# Patient Record
Sex: FEMALE | Race: WHITE | NOT HISPANIC OR LATINO | URBAN - METROPOLITAN AREA
[De-identification: names, ages, dates, MRNs, and addresses within clinical notes are randomized per-mention and may not be internally consistent; named-entity substitution may affect disease eponyms.]

---

## 2020-04-30 ENCOUNTER — EMERGENCY (EMERGENCY)
Facility: HOSPITAL | Age: 85
LOS: 1 days | Discharge: ANOTHER HOSPITAL | End: 2020-04-30
Attending: EMERGENCY MEDICINE | Admitting: EMERGENCY MEDICINE
Payer: MEDICARE

## 2020-04-30 ENCOUNTER — INPATIENT (INPATIENT)
Facility: HOSPITAL | Age: 85
LOS: 1 days | Discharge: HOME | DRG: 308 | End: 2020-05-02
Attending: INTERNAL MEDICINE | Admitting: INTERNAL MEDICINE
Payer: MEDICARE

## 2020-04-30 ENCOUNTER — TRANSCRIPTION ENCOUNTER (OUTPATIENT)
Age: 85
End: 2020-04-30

## 2020-04-30 VITALS
HEART RATE: 130 BPM | DIASTOLIC BLOOD PRESSURE: 59 MMHG | RESPIRATION RATE: 19 BRPM | OXYGEN SATURATION: 95 % | SYSTOLIC BLOOD PRESSURE: 93 MMHG

## 2020-04-30 VITALS
HEIGHT: 61 IN | RESPIRATION RATE: 18 BRPM | OXYGEN SATURATION: 97 % | DIASTOLIC BLOOD PRESSURE: 73 MMHG | TEMPERATURE: 97 F | WEIGHT: 164.91 LBS | SYSTOLIC BLOOD PRESSURE: 103 MMHG | HEART RATE: 129 BPM

## 2020-04-30 VITALS
DIASTOLIC BLOOD PRESSURE: 57 MMHG | HEART RATE: 144 BPM | SYSTOLIC BLOOD PRESSURE: 81 MMHG | OXYGEN SATURATION: 97 % | TEMPERATURE: 97 F | RESPIRATION RATE: 20 BRPM | HEIGHT: 60 IN | WEIGHT: 169.09 LBS

## 2020-04-30 DIAGNOSIS — I48.91 UNSPECIFIED ATRIAL FIBRILLATION: ICD-10-CM

## 2020-04-30 DIAGNOSIS — I25.10 ATHEROSCLEROTIC HEART DISEASE OF NATIVE CORONARY ARTERY WITHOUT ANGINA PECTORIS: ICD-10-CM

## 2020-04-30 DIAGNOSIS — R79.89 OTHER SPECIFIED ABNORMAL FINDINGS OF BLOOD CHEMISTRY: ICD-10-CM

## 2020-04-30 DIAGNOSIS — I10 ESSENTIAL (PRIMARY) HYPERTENSION: ICD-10-CM

## 2020-04-30 DIAGNOSIS — N17.9 ACUTE KIDNEY FAILURE, UNSPECIFIED: ICD-10-CM

## 2020-04-30 DIAGNOSIS — Z29.9 ENCOUNTER FOR PROPHYLACTIC MEASURES, UNSPECIFIED: ICD-10-CM

## 2020-04-30 LAB
ALBUMIN SERPL ELPH-MCNC: 2.7 G/DL — LOW (ref 3.3–5)
ALBUMIN SERPL ELPH-MCNC: 3.5 G/DL — SIGNIFICANT CHANGE UP (ref 3.3–5)
ALP SERPL-CCNC: 71 U/L — SIGNIFICANT CHANGE UP (ref 40–120)
ALP SERPL-CCNC: 89 U/L — SIGNIFICANT CHANGE UP (ref 30–120)
ALT FLD-CCNC: 18 U/L — SIGNIFICANT CHANGE UP (ref 12–78)
ALT FLD-CCNC: 21 U/L DA — SIGNIFICANT CHANGE UP (ref 10–60)
ANION GAP SERPL CALC-SCNC: 13 MMOL/L — SIGNIFICANT CHANGE UP (ref 5–17)
ANION GAP SERPL CALC-SCNC: 8 MMOL/L — SIGNIFICANT CHANGE UP (ref 5–17)
APPEARANCE UR: CLEAR — SIGNIFICANT CHANGE UP
APTT BLD: 21.1 SEC — LOW (ref 28.5–37)
AST SERPL-CCNC: 21 U/L — SIGNIFICANT CHANGE UP (ref 10–40)
AST SERPL-CCNC: 21 U/L — SIGNIFICANT CHANGE UP (ref 15–37)
BACTERIA # UR AUTO: ABNORMAL
BASOPHILS # BLD AUTO: 0.01 K/UL — SIGNIFICANT CHANGE UP (ref 0–0.2)
BASOPHILS # BLD AUTO: 0.02 K/UL — SIGNIFICANT CHANGE UP (ref 0–0.2)
BASOPHILS NFR BLD AUTO: 0.1 % — SIGNIFICANT CHANGE UP (ref 0–2)
BASOPHILS NFR BLD AUTO: 0.2 % — SIGNIFICANT CHANGE UP (ref 0–2)
BILIRUB SERPL-MCNC: 0.4 MG/DL — SIGNIFICANT CHANGE UP (ref 0.2–1.2)
BILIRUB SERPL-MCNC: 0.4 MG/DL — SIGNIFICANT CHANGE UP (ref 0.2–1.2)
BILIRUB UR-MCNC: NEGATIVE — SIGNIFICANT CHANGE UP
BUN SERPL-MCNC: 27 MG/DL — HIGH (ref 7–23)
BUN SERPL-MCNC: 33 MG/DL — HIGH (ref 7–23)
CALCIUM SERPL-MCNC: 7.9 MG/DL — LOW (ref 8.5–10.1)
CALCIUM SERPL-MCNC: 9.5 MG/DL — SIGNIFICANT CHANGE UP (ref 8.4–10.5)
CHLORIDE SERPL-SCNC: 103 MMOL/L — SIGNIFICANT CHANGE UP (ref 96–108)
CHLORIDE SERPL-SCNC: 114 MMOL/L — HIGH (ref 96–108)
CK MB BLD-MCNC: 4 % — HIGH (ref 0–3.5)
CK MB BLD-MCNC: 5.6 % — HIGH (ref 0–3.5)
CK MB CFR SERPL CALC: 2.5 NG/ML — SIGNIFICANT CHANGE UP (ref 0–3.6)
CK MB CFR SERPL CALC: 3.4 NG/ML — SIGNIFICANT CHANGE UP (ref 0–3.6)
CK SERPL-CCNC: 61 U/L — SIGNIFICANT CHANGE UP (ref 26–192)
CK SERPL-CCNC: 63 U/L — SIGNIFICANT CHANGE UP (ref 26–192)
CO2 SERPL-SCNC: 22 MMOL/L — SIGNIFICANT CHANGE UP (ref 22–31)
CO2 SERPL-SCNC: 22 MMOL/L — SIGNIFICANT CHANGE UP (ref 22–31)
COLOR SPEC: YELLOW — SIGNIFICANT CHANGE UP
CREAT SERPL-MCNC: 0.92 MG/DL — SIGNIFICANT CHANGE UP (ref 0.5–1.3)
CREAT SERPL-MCNC: 1.43 MG/DL — HIGH (ref 0.5–1.3)
D DIMER BLD IA.RAPID-MCNC: 2087 NG/ML DDU — HIGH
DIFF PNL FLD: ABNORMAL
EOSINOPHIL # BLD AUTO: 0 K/UL — SIGNIFICANT CHANGE UP (ref 0–0.5)
EOSINOPHIL # BLD AUTO: 0.08 K/UL — SIGNIFICANT CHANGE UP (ref 0–0.5)
EOSINOPHIL NFR BLD AUTO: 0 % — SIGNIFICANT CHANGE UP (ref 0–6)
EOSINOPHIL NFR BLD AUTO: 0.7 % — SIGNIFICANT CHANGE UP (ref 0–6)
EPI CELLS # UR: ABNORMAL
GLUCOSE SERPL-MCNC: 144 MG/DL — HIGH (ref 70–99)
GLUCOSE SERPL-MCNC: 175 MG/DL — HIGH (ref 70–99)
GLUCOSE UR QL: NEGATIVE MG/DL — SIGNIFICANT CHANGE UP
GRAN CASTS # UR COMP ASSIST: ABNORMAL /LPF
HCT VFR BLD CALC: 34.5 % — SIGNIFICANT CHANGE UP (ref 34.5–45)
HCT VFR BLD CALC: 42.8 % — SIGNIFICANT CHANGE UP (ref 34.5–45)
HGB BLD-MCNC: 11.4 G/DL — LOW (ref 11.5–15.5)
HGB BLD-MCNC: 13.9 G/DL — SIGNIFICANT CHANGE UP (ref 11.5–15.5)
HYALINE CASTS # UR AUTO: ABNORMAL /LPF
IMM GRANULOCYTES NFR BLD AUTO: 0.4 % — SIGNIFICANT CHANGE UP (ref 0–1.5)
IMM GRANULOCYTES NFR BLD AUTO: 0.5 % — SIGNIFICANT CHANGE UP (ref 0–1.5)
INR BLD: 1.1 RATIO — SIGNIFICANT CHANGE UP (ref 0.88–1.16)
KETONES UR-MCNC: ABNORMAL
LACTATE SERPL-SCNC: 1.2 MMOL/L — SIGNIFICANT CHANGE UP (ref 0.7–2)
LACTATE SERPL-SCNC: 2.3 MMOL/L — HIGH (ref 0.7–2)
LACTATE SERPL-SCNC: 3.3 MMOL/L — HIGH (ref 0.7–2)
LEUKOCYTE ESTERASE UR-ACNC: ABNORMAL
LYMPHOCYTES # BLD AUTO: 0.66 K/UL — LOW (ref 1–3.3)
LYMPHOCYTES # BLD AUTO: 2.3 K/UL — SIGNIFICANT CHANGE UP (ref 1–3.3)
LYMPHOCYTES # BLD AUTO: 21.4 % — SIGNIFICANT CHANGE UP (ref 13–44)
LYMPHOCYTES # BLD AUTO: 8.5 % — LOW (ref 13–44)
MCHC RBC-ENTMCNC: 32 PG — SIGNIFICANT CHANGE UP (ref 27–34)
MCHC RBC-ENTMCNC: 32 PG — SIGNIFICANT CHANGE UP (ref 27–34)
MCHC RBC-ENTMCNC: 32.5 GM/DL — SIGNIFICANT CHANGE UP (ref 32–36)
MCHC RBC-ENTMCNC: 33 GM/DL — SIGNIFICANT CHANGE UP (ref 32–36)
MCV RBC AUTO: 96.9 FL — SIGNIFICANT CHANGE UP (ref 80–100)
MCV RBC AUTO: 98.6 FL — SIGNIFICANT CHANGE UP (ref 80–100)
MONOCYTES # BLD AUTO: 0.38 K/UL — SIGNIFICANT CHANGE UP (ref 0–0.9)
MONOCYTES # BLD AUTO: 0.57 K/UL — SIGNIFICANT CHANGE UP (ref 0–0.9)
MONOCYTES NFR BLD AUTO: 4.9 % — SIGNIFICANT CHANGE UP (ref 2–14)
MONOCYTES NFR BLD AUTO: 5.3 % — SIGNIFICANT CHANGE UP (ref 2–14)
NEUTROPHILS # BLD AUTO: 6.71 K/UL — SIGNIFICANT CHANGE UP (ref 1.8–7.4)
NEUTROPHILS # BLD AUTO: 7.74 K/UL — HIGH (ref 1.8–7.4)
NEUTROPHILS NFR BLD AUTO: 71.9 % — SIGNIFICANT CHANGE UP (ref 43–77)
NEUTROPHILS NFR BLD AUTO: 86.1 % — HIGH (ref 43–77)
NITRITE UR-MCNC: NEGATIVE — SIGNIFICANT CHANGE UP
NRBC # BLD: 0 /100 WBCS — SIGNIFICANT CHANGE UP (ref 0–0)
NRBC # BLD: 0 /100 WBCS — SIGNIFICANT CHANGE UP (ref 0–0)
NT-PROBNP SERPL-SCNC: 5693 PG/ML — HIGH (ref 0–450)
PH UR: 6 — SIGNIFICANT CHANGE UP (ref 5–8)
PLATELET # BLD AUTO: 210 K/UL — SIGNIFICANT CHANGE UP (ref 150–400)
PLATELET # BLD AUTO: 230 K/UL — SIGNIFICANT CHANGE UP (ref 150–400)
POTASSIUM SERPL-MCNC: 3.5 MMOL/L — SIGNIFICANT CHANGE UP (ref 3.5–5.3)
POTASSIUM SERPL-MCNC: 4.3 MMOL/L — SIGNIFICANT CHANGE UP (ref 3.5–5.3)
POTASSIUM SERPL-SCNC: 3.5 MMOL/L — SIGNIFICANT CHANGE UP (ref 3.5–5.3)
POTASSIUM SERPL-SCNC: 4.3 MMOL/L — SIGNIFICANT CHANGE UP (ref 3.5–5.3)
PROCALCITONIN SERPL-MCNC: 0.07 NG/ML — SIGNIFICANT CHANGE UP (ref 0.02–0.1)
PROT SERPL-MCNC: 5.8 G/DL — LOW (ref 6–8.3)
PROT SERPL-MCNC: 7.1 G/DL — SIGNIFICANT CHANGE UP (ref 6–8.3)
PROT UR-MCNC: 100 MG/DL
PROTHROM AB SERPL-ACNC: 12.2 SEC — SIGNIFICANT CHANGE UP (ref 10–12.9)
RBC # BLD: 3.56 M/UL — LOW (ref 3.8–5.2)
RBC # BLD: 4.34 M/UL — SIGNIFICANT CHANGE UP (ref 3.8–5.2)
RBC # FLD: 14.5 % — SIGNIFICANT CHANGE UP (ref 10.3–14.5)
RBC # FLD: 14.9 % — HIGH (ref 10.3–14.5)
RBC CASTS # UR COMP ASSIST: ABNORMAL /HPF (ref 0–4)
SARS-COV-2 RNA SPEC QL NAA+PROBE: SIGNIFICANT CHANGE UP
SODIUM SERPL-SCNC: 138 MMOL/L — SIGNIFICANT CHANGE UP (ref 135–145)
SODIUM SERPL-SCNC: 144 MMOL/L — SIGNIFICANT CHANGE UP (ref 135–145)
SP GR SPEC: 1.01 — SIGNIFICANT CHANGE UP (ref 1.01–1.02)
T3 SERPL-MCNC: 79 NG/DL — LOW (ref 80–200)
T4 AB SER-ACNC: 8.8 UG/DL — SIGNIFICANT CHANGE UP (ref 4.6–12)
TROPONIN I SERPL-MCNC: 0.04 NG/ML — SIGNIFICANT CHANGE UP (ref 0.02–0.06)
TROPONIN I SERPL-MCNC: 0.12 NG/ML — HIGH (ref 0.01–0.04)
TROPONIN I SERPL-MCNC: 0.23 NG/ML — HIGH (ref 0.01–0.04)
TROPONIN I SERPL-MCNC: 0.25 NG/ML — HIGH (ref 0.01–0.04)
TSH SERPL-MCNC: 1.38 UIU/ML — SIGNIFICANT CHANGE UP (ref 0.36–3.74)
UROBILINOGEN FLD QL: NEGATIVE MG/DL — SIGNIFICANT CHANGE UP
WBC # BLD: 10.76 K/UL — HIGH (ref 3.8–10.5)
WBC # BLD: 7.79 K/UL — SIGNIFICANT CHANGE UP (ref 3.8–10.5)
WBC # FLD AUTO: 10.76 K/UL — HIGH (ref 3.8–10.5)
WBC # FLD AUTO: 7.79 K/UL — SIGNIFICANT CHANGE UP (ref 3.8–10.5)
WBC UR QL: ABNORMAL

## 2020-04-30 PROCEDURE — 71045 X-RAY EXAM CHEST 1 VIEW: CPT

## 2020-04-30 PROCEDURE — 36415 COLL VENOUS BLD VENIPUNCTURE: CPT

## 2020-04-30 PROCEDURE — 99291 CRITICAL CARE FIRST HOUR: CPT

## 2020-04-30 PROCEDURE — 99291 CRITICAL CARE FIRST HOUR: CPT | Mod: 25

## 2020-04-30 PROCEDURE — 93010 ELECTROCARDIOGRAM REPORT: CPT

## 2020-04-30 PROCEDURE — 83880 ASSAY OF NATRIURETIC PEPTIDE: CPT

## 2020-04-30 PROCEDURE — 96361 HYDRATE IV INFUSION ADD-ON: CPT

## 2020-04-30 PROCEDURE — 85730 THROMBOPLASTIN TIME PARTIAL: CPT

## 2020-04-30 PROCEDURE — 87635 SARS-COV-2 COVID-19 AMP PRB: CPT

## 2020-04-30 PROCEDURE — 96374 THER/PROPH/DIAG INJ IV PUSH: CPT

## 2020-04-30 PROCEDURE — 81001 URINALYSIS AUTO W/SCOPE: CPT

## 2020-04-30 PROCEDURE — 85027 COMPLETE CBC AUTOMATED: CPT

## 2020-04-30 PROCEDURE — 85610 PROTHROMBIN TIME: CPT

## 2020-04-30 PROCEDURE — 96372 THER/PROPH/DIAG INJ SC/IM: CPT | Mod: XU

## 2020-04-30 PROCEDURE — 87040 BLOOD CULTURE FOR BACTERIA: CPT

## 2020-04-30 PROCEDURE — 84145 PROCALCITONIN (PCT): CPT

## 2020-04-30 PROCEDURE — 83605 ASSAY OF LACTIC ACID: CPT

## 2020-04-30 PROCEDURE — 87086 URINE CULTURE/COLONY COUNT: CPT

## 2020-04-30 PROCEDURE — 71045 X-RAY EXAM CHEST 1 VIEW: CPT | Mod: 26

## 2020-04-30 PROCEDURE — 85379 FIBRIN DEGRADATION QUANT: CPT

## 2020-04-30 PROCEDURE — 99223 1ST HOSP IP/OBS HIGH 75: CPT

## 2020-04-30 PROCEDURE — 84484 ASSAY OF TROPONIN QUANT: CPT

## 2020-04-30 PROCEDURE — 80053 COMPREHEN METABOLIC PANEL: CPT

## 2020-04-30 PROCEDURE — 93005 ELECTROCARDIOGRAM TRACING: CPT

## 2020-04-30 RX ORDER — METOPROLOL TARTRATE 50 MG
0 TABLET ORAL
Qty: 0 | Refills: 0 | DISCHARGE

## 2020-04-30 RX ORDER — DILTIAZEM HCL 120 MG
12.5 CAPSULE, EXT RELEASE 24 HR ORAL ONCE
Refills: 0 | Status: COMPLETED | OUTPATIENT
Start: 2020-04-30 | End: 2020-04-30

## 2020-04-30 RX ORDER — METOPROLOL TARTRATE 50 MG
12.5 TABLET ORAL
Refills: 0 | Status: DISCONTINUED | OUTPATIENT
Start: 2020-04-30 | End: 2020-05-02

## 2020-04-30 RX ORDER — SACUBITRIL AND VALSARTAN 24; 26 MG/1; MG/1
1 TABLET, FILM COATED ORAL
Qty: 0 | Refills: 0 | DISCHARGE

## 2020-04-30 RX ORDER — DILTIAZEM HCL 120 MG
20 CAPSULE, EXT RELEASE 24 HR ORAL ONCE
Refills: 0 | Status: COMPLETED | OUTPATIENT
Start: 2020-04-30 | End: 2020-04-30

## 2020-04-30 RX ORDER — SODIUM CHLORIDE 9 MG/ML
1500 INJECTION INTRAMUSCULAR; INTRAVENOUS; SUBCUTANEOUS ONCE
Refills: 0 | Status: COMPLETED | OUTPATIENT
Start: 2020-04-30 | End: 2020-04-30

## 2020-04-30 RX ORDER — FUROSEMIDE 40 MG
20 TABLET ORAL DAILY
Refills: 0 | Status: DISCONTINUED | OUTPATIENT
Start: 2020-04-30 | End: 2020-05-02

## 2020-04-30 RX ORDER — ASPIRIN/CALCIUM CARB/MAGNESIUM 324 MG
1 TABLET ORAL
Qty: 0 | Refills: 0 | DISCHARGE

## 2020-04-30 RX ORDER — FUROSEMIDE 40 MG
1 TABLET ORAL
Qty: 0 | Refills: 0 | DISCHARGE

## 2020-04-30 RX ORDER — FUROSEMIDE 40 MG
0 TABLET ORAL
Qty: 0 | Refills: 0 | DISCHARGE

## 2020-04-30 RX ORDER — SACUBITRIL AND VALSARTAN 24; 26 MG/1; MG/1
2 TABLET, FILM COATED ORAL
Refills: 0 | Status: DISCONTINUED | OUTPATIENT
Start: 2020-04-30 | End: 2020-05-02

## 2020-04-30 RX ORDER — DILTIAZEM HCL 120 MG
10 CAPSULE, EXT RELEASE 24 HR ORAL
Qty: 125 | Refills: 0 | Status: DISCONTINUED | OUTPATIENT
Start: 2020-04-30 | End: 2020-04-30

## 2020-04-30 RX ORDER — ATORVASTATIN CALCIUM 80 MG/1
40 TABLET, FILM COATED ORAL AT BEDTIME
Refills: 0 | Status: DISCONTINUED | OUTPATIENT
Start: 2020-04-30 | End: 2020-05-02

## 2020-04-30 RX ORDER — CLOPIDOGREL BISULFATE 75 MG/1
75 TABLET, FILM COATED ORAL DAILY
Refills: 0 | Status: DISCONTINUED | OUTPATIENT
Start: 2020-04-30 | End: 2020-05-02

## 2020-04-30 RX ORDER — SACUBITRIL AND VALSARTAN 24; 26 MG/1; MG/1
0 TABLET, FILM COATED ORAL
Qty: 0 | Refills: 0 | DISCHARGE

## 2020-04-30 RX ORDER — ATORVASTATIN CALCIUM 80 MG/1
1 TABLET, FILM COATED ORAL
Qty: 0 | Refills: 0 | DISCHARGE

## 2020-04-30 RX ORDER — SACUBITRIL AND VALSARTAN 24; 26 MG/1; MG/1
1 TABLET, FILM COATED ORAL
Refills: 0 | Status: DISCONTINUED | OUTPATIENT
Start: 2020-04-30 | End: 2020-04-30

## 2020-04-30 RX ORDER — ENOXAPARIN SODIUM 100 MG/ML
75 INJECTION SUBCUTANEOUS ONCE
Refills: 0 | Status: COMPLETED | OUTPATIENT
Start: 2020-04-30 | End: 2020-04-30

## 2020-04-30 RX ORDER — CLOPIDOGREL BISULFATE 75 MG/1
1 TABLET, FILM COATED ORAL
Qty: 0 | Refills: 0 | DISCHARGE

## 2020-04-30 RX ORDER — METOPROLOL TARTRATE 50 MG
0.5 TABLET ORAL
Qty: 0 | Refills: 0 | DISCHARGE

## 2020-04-30 RX ORDER — APIXABAN 2.5 MG/1
5 TABLET, FILM COATED ORAL EVERY 12 HOURS
Refills: 0 | Status: DISCONTINUED | OUTPATIENT
Start: 2020-04-30 | End: 2020-05-02

## 2020-04-30 RX ORDER — CLOPIDOGREL BISULFATE 75 MG/1
0 TABLET, FILM COATED ORAL
Qty: 0 | Refills: 0 | DISCHARGE

## 2020-04-30 RX ORDER — FUROSEMIDE 40 MG
20 TABLET ORAL ONCE
Refills: 0 | Status: COMPLETED | OUTPATIENT
Start: 2020-04-30 | End: 2020-04-30

## 2020-04-30 RX ORDER — ASPIRIN/CALCIUM CARB/MAGNESIUM 324 MG
0 TABLET ORAL
Qty: 0 | Refills: 0 | DISCHARGE

## 2020-04-30 RX ORDER — ATORVASTATIN CALCIUM 80 MG/1
0 TABLET, FILM COATED ORAL
Qty: 0 | Refills: 0 | DISCHARGE

## 2020-04-30 RX ORDER — ASPIRIN/CALCIUM CARB/MAGNESIUM 324 MG
81 TABLET ORAL DAILY
Refills: 0 | Status: DISCONTINUED | OUTPATIENT
Start: 2020-04-30 | End: 2020-04-30

## 2020-04-30 RX ADMIN — Medication 10 MG/HR: at 06:21

## 2020-04-30 RX ADMIN — CLOPIDOGREL BISULFATE 75 MILLIGRAM(S): 75 TABLET, FILM COATED ORAL at 13:04

## 2020-04-30 RX ADMIN — ENOXAPARIN SODIUM 75 MILLIGRAM(S): 100 INJECTION SUBCUTANEOUS at 04:29

## 2020-04-30 RX ADMIN — SODIUM CHLORIDE 1500 MILLILITER(S): 9 INJECTION INTRAMUSCULAR; INTRAVENOUS; SUBCUTANEOUS at 02:45

## 2020-04-30 RX ADMIN — ATORVASTATIN CALCIUM 40 MILLIGRAM(S): 80 TABLET, FILM COATED ORAL at 22:04

## 2020-04-30 RX ADMIN — Medication 81 MILLIGRAM(S): at 13:03

## 2020-04-30 RX ADMIN — SODIUM CHLORIDE 1500 MILLILITER(S): 9 INJECTION INTRAMUSCULAR; INTRAVENOUS; SUBCUTANEOUS at 03:45

## 2020-04-30 RX ADMIN — Medication 12.5 MILLIGRAM(S): at 03:45

## 2020-04-30 RX ADMIN — Medication 20 MILLIGRAM(S): at 06:02

## 2020-04-30 RX ADMIN — Medication 20 MILLIGRAM(S): at 14:54

## 2020-04-30 RX ADMIN — APIXABAN 5 MILLIGRAM(S): 2.5 TABLET, FILM COATED ORAL at 18:47

## 2020-04-30 NOTE — ED ADULT TRIAGE NOTE - CHIEF COMPLAINT QUOTE
Patient brought in by ambulance from [facility] for rapid atrial fibrillation and hypotension for admission+ Patient brought in by ambulance from [facility] for rapid atrial fibrillation and hypotension for admission+  Cardizem 12.5 mg and 3 bags of fluids given to patient. IV victor hugo to right AC 20 g and right hand 20 gauge.

## 2020-04-30 NOTE — DISCHARGE NOTE PROVIDER - HOSPITAL COURSE
History of Present Illness    Patient is an 84 year old female with past medical history significant for HTN, and CAD s/p recent MI (March 2020) that required zoll life vest to be worn presenting to the ED with atrial fibrillation with RVR. Patient was recently transferred from [facility]. Patient stated she was last well morning the day before admission but started to progressively become weaker as the day went by. Patient stated that after she ate dinner, she started to feel very nauseas with an odd feeling in her throat to the point of wanting to vomit but did not. Patient states she had associated SOB during that time. When she went to the bathroom, she was unable to stand up from the toilet because of her worsening weakness. Her daughter called the ambulance where she went to [place] [facility].  EMS stated they found the patient not responding well, with BP 68 mm Hg systolic, and O2 sat of 90% on RA. They administered O2 only, and her pressure came up and she became more responsive. At [place] ED, she was found to have Atrial fibrillation with RVR, elevated d- dimer and elevated proBNP. She was transferred to [place] [facility] for admission. Patient denied chest pain, vomiting, abdominal pain, headaches, fever or chills.        Of note patient had an MI in March 2020. Records obtained from outpatient cardiologist Dr Saha [phone]    Cath 3/2020 : 3 stents in LAD, residual lesion in OM1    Pt was continued with Plavix, beta blocker, Statin, and decreased entresto to 24/26 bid    Echo from 3/2020 post MI revealing ef 25-30%, moderate MR, moderate TR, - no need to repeat 2decho , will need to follow up with Dr Aguirre for reassessment of Lvef 3 month post MI for possible ICD placement             Vitals  /70 RR 19 SpO2 95    EKG Afib with RVR    Significant Labs: Trop 0.116 WBC 10.76 APTT 21.1 D-dimer 2087 BUN 33 Cr 1.43 Glucose 175 GFR 34 lactate 2.3 ProBNP 5693 UA +for protein and trace ketone    Imaging     Chest xray Clear lungs. Mild cardiomegaly.    In the ED Received 1.5 L IVF NS, Full dose Lovenox 75, 12.5 cardizem IV.        Hospital Course    Patient was admitted for hypotension, A fib with RVR, elevated D-dimer and proBNP. Patient was made NPO initially due to initial presentation of nausea and discomfort in throat. Bedside speech and swallow was performed, and speech language pathologist recommended regular solids with thin liquids with aspiration precautions.. Cardiology, Dr. Casanova was consulted due to A fib with RVR, elevated troponin, and elevated D-dimer. Patient was unable to have CTA chest done due to renal function. Patient was started on full dose anticoagulation due to presumed pulmonary embolism as evidenced by elevated D-dimer. Lasix 20 mg IVp given in the ED. Patient was given Cardizem infusion 125 mg at 10 mL/hour. Cardiology discontinued the Cardizem drip and started Lopressor 25 mg PO BID, which is an increased dose from her home dose. Eliquis 5 mg PO BID was started given CHADVASC of at least 6. Aspirin was stopped, as she was at high risk for bleeding on triple therapy and she is greater than 1 month from MI and PCI. Plavix 75 mg PO daily was continued. Elevated troponin was likely due to demand ischemia in the setting of A fib with RVR. Patient was monitored on telemetry and troponins were peaked. Patient will need to follow up with outpatient cardiologist, Dr. Aguirre for reassessment of LVEF 3 months post MI for possible ICD placement.                Consultants    Cardiology: Dr. Casanova            Vital Signs                Physical Exam History of Present Illness    Patient is an 84 year old female with past medical history significant for HTN, and CAD s/p recent MI (March 2020) that required zoll life vest to be worn presenting to the ED with atrial fibrillation with RVR. Patient was recently transferred from [facility]. Patient stated she was last well morning the day before admission but started to progressively become weaker as the day went by. Patient stated that after she ate dinner, she started to feel very nauseas with an odd feeling in her throat to the point of wanting to vomit but did not. Patient states she had associated SOB during that time. When she went to the bathroom, she was unable to stand up from the toilet because of her worsening weakness. Her daughter called the ambulance where she went to [place] [facility].  EMS stated they found the patient not responding well, with BP 68 mm Hg systolic, and O2 sat of 90% on RA. They administered O2 only, and her pressure came up and she became more responsive. At [place] ED, she was found to have Atrial fibrillation with RVR, elevated d- dimer and elevated proBNP. She was transferred to [place] ED for admission. Patient denied chest pain, vomiting, abdominal pain, headaches, fever or chills.        Of note patient had an MI in March 2020. Records obtained from outpatient cardiologist Dr Saha [phone]    Cath 3/2020 : 3 stents in LAD, residual lesion in OM1    Pt was continued with Plavix, beta blocker, Statin, and decreased entresto to 24/26 bid    Echo from 3/2020 post MI revealing ef 25-30%, moderate MR, moderate TR, - no need to repeat 2decho , will need to follow up with Dr Aguirre for reassessment of Lvef 3 month post MI for possible ICD placement             Vitals  /70 RR 19 SpO2 95    EKG Afib with RVR    Significant Labs: Trop 0.116 WBC 10.76 APTT 21.1 D-dimer 2087 BUN 33 Cr 1.43 Glucose 175 GFR 34 lactate 2.3 ProBNP 5693 UA +for protein and trace ketone    Imaging     Chest xray Clear lungs. Mild cardiomegaly.    In the ED Received 1.5 L IVF NS, Full dose Lovenox 75, 12.5 cardizem IV.        Hospital Course    Patient was admitted for hypotension, A fib with RVR, elevated D-dimer and proBNP. Patient was made NPO initially due to initial presentation of nausea and discomfort in throat. Bedside speech and swallow was performed, and speech language pathologist recommended regular solids with thin liquids with aspiration precautions.. Cardiology, Dr. Casanova was consulted due to A fib with RVR, elevated troponin, and elevated D-dimer. Patient was unable to have CTA chest done due to renal function. Patient was started on full dose anticoagulation due to presumed pulmonary embolism as evidenced by elevated D-dimer. Pt noted with elevated BNP at 5963 on Lasix 20 mg IVp given in the ED. Lasix 20 mg PO daily was continuedPatient was given Cardizem infusion 125 mg at 10 mL/hour. Cardiology discontinued the Cardizem drip  due to LVSD and started Lopressor 25 mg PO BID, which is an increased dose from her home dose. Eliquis 5 mg PO BID was started given CHADVASC of at least 6. Aspirin was stopped, as she was at high risk for bleeding on triple therapy and she is greater than 1 month from MI and PCI. Plavix 75 mg PO daily was continued. Elevated troponin was likely due to demand ischemia in the setting of A fib with RVR. Troponin peaked and were no longer trended. Patient was monitored on telemetry. Patient will need to follow up with outpatient cardiologist, Dr. Aguirre for reassessment of LVEF 3 months post MI for possible ICD placement.                Consultants    Cardiology: Dr. Casanova            Vital Signs                Physical Exam History of Present Illness    Patient is an 84 year old female with past medical history significant for HTN, and CAD s/p recent MI (March 2020) that required zoll life vest to be worn presenting to the ED with atrial fibrillation with RVR. Patient was recently transferred from [facility]. Patient stated she was last well morning the day before admission but started to progressively become weaker as the day went by. Patient stated that after she ate dinner, she started to feel very nauseas with an odd feeling in her throat to the point of wanting to vomit but did not. Patient states she had associated SOB during that time. When she went to the bathroom, she was unable to stand up from the toilet because of her worsening weakness. Her daughter called the ambulance where she went to [place] ED.  EMS stated they found the patient not responding well, with BP 68 mm Hg systolic, and O2 sat of 90% on RA. They administered O2 only, and her pressure came up and she became more responsive. At [place] ED, she was found to have Atrial fibrillation with RVR, elevated d- dimer and elevated proBNP. She was transferred to [place] ED for admission. Patient denied chest pain, vomiting, abdominal pain, headaches, fever or chills.        Of note patient had an MI in March 2020. Records obtained from outpatient cardiologist Dr Saha [phone]    Cath 3/2020 : 3 stents in LAD, residual lesion in OM1    Pt was continued with Plavix, beta blocker, Statin, and decreased entresto to 24/26 bid    Echo from 3/2020 post MI revealing ef 25-30%, moderate MR, moderate TR, - no need to repeat 2decho , will need to follow up with Dr Aguirre for reassessment of Lvef 3 month post MI for possible ICD placement         Vitals  /70 RR 19 SpO2 95    EKG Afib with RVR    Significant Labs: Trop 0.116 WBC 10.76 APTT 21.1 D-dimer 2087 BUN 33 Cr 1.43 Glucose 175 GFR 34 lactate 2.3 ProBNP 5693 UA +for protein and trace ketone    Imaging     Chest xray Clear lungs. Mild cardiomegaly.    In the ED Received 1.5 L IVF NS, Full dose Lovenox 75, 12.5 cardizem IV.        Hospital Course    Patient was admitted for hypotension, A fib with RVR, elevated D-dimer and proBNP. Patient was made NPO initially due to initial presentation of nausea and discomfort in throat. Bedside speech and swallow was performed, and speech language pathologist recommended regular solids with thin liquids with aspiration precautions.. Cardiology, Dr. Casanova was consulted due to A fib with RVR, elevated troponin, and elevated D-dimer. Patient was unable to have CTA chest done due to renal function. Patient was started on full dose anticoagulation due to presumed pulmonary embolism as evidenced by elevated D-dimer. Pt noted with elevated BNP at 5963 on Lasix 20 mg IVp given in the ED. Lasix 20 mg PO daily was continuedPatient was given Cardizem infusion 125 mg at 10 mL/hour. Cardiology discontinued the Cardizem drip  due to LVSD and started Lopressor 25 mg PO BID, which is an increased dose from her home dose. Eliquis 5 mg PO BID was started given CHADVASC of at least 6. Aspirin was stopped, as she was at high risk for bleeding on triple therapy and she is greater than 1 month from MI and PCI. Plavix 75 mg PO daily was continued. Elevated troponin was likely due to demand ischemia in the setting of A fib with RVR. Troponin peaked and were no longer trended. Patient was monitored on telemetry. Patient will need to follow up with outpatient cardiologist, Dr. Aguirre for reassessment of LVEF 3 months post MI for possible ICD placement.                Consultants    Cardiology: Dr. Casanova            Vital Signs                Physical Exam History of Present Illness    Patient is an 84 year old female with past medical history significant for HTN, and CAD s/p recent MI (March 2020) that required zoll life vest to be worn presenting to the ED with atrial fibrillation with RVR. Patient was recently transferred from [facility]. Patient stated she was last well morning the day before admission but started to progressively become weaker as the day went by. Patient stated that after she ate dinner, she started to feel very nauseas with an odd feeling in her throat to the point of wanting to vomit but did not. Patient states she had associated SOB during that time. When she went to the bathroom, she was unable to stand up from the toilet because of her worsening weakness. Her daughter called the ambulance where she went to [place] ED.  EMS stated they found the patient not responding well, with BP 68 mm Hg systolic, and O2 sat of 90% on RA. They administered O2 only, and her pressure came up and she became more responsive. At [place] ED, she was found to have Atrial fibrillation with RVR, elevated d- dimer and elevated proBNP. She was transferred to [place] ED for admission. Patient denied chest pain, vomiting, abdominal pain, headaches, fever or chills.        Of note patient had an MI in March 2020. Records obtained from outpatient cardiologist Dr Saha [phone]    Cath 3/2020 : 3 stents in LAD, residual lesion in OM1    Pt was continued with Plavix, beta blocker, Statin, and decreased entresto to 24/26 bid    Echo from 3/2020 post MI revealing ef 25-30%, moderate MR, moderate TR, - no need to repeat 2decho , will need to follow up with Dr Aguirre for reassessment of Lvef 3 month post MI for possible ICD placement         Vitals  /70 RR 19 SpO2 95    EKG Afib with RVR    Significant Labs: Trop 0.116 WBC 10.76 APTT 21.1 D-dimer 2087 BUN 33 Cr 1.43 Glucose 175 GFR 34 lactate 2.3 ProBNP 5693 UA +for protein and trace ketone    Imaging     Chest xray Clear lungs. Mild cardiomegaly.    In the ED Received 1.5 L IVF NS, Full dose Lovenox 75, 12.5 cardizem IV.        Hospital Course    Patient was admitted for hypotension, A fib with RVR, elevated D-dimer and proBNP. Patient was made NPO initially due to initial presentation of nausea and discomfort in throat. Bedside speech and swallow was performed, and speech language pathologist recommended regular solids with thin liquids with aspiration precautions. Cardiology, Dr. Casanova was consulted due to A fib with RVR, elevated troponin, and elevated D-dimer. Patient was unable to have CTA chest done due to renal function. Patient was started on full dose anticoagulation with Eliquis 5 mg PO BID due to presumed pulmonary embolism as evidenced by elevated D-dimer. Pt noted with elevated BNP at 5963 on Lasix 20 mg IVp given in the ED. Lasix 20 mg PO daily was continued. Patient was given Cardizem infusion 125 mg at 10 mL/hour. Cardiology discontinued the Cardizem drip due to LVSD and restarted outpatient Lopressor 12.5 mg PO BID. Eliquis 5 mg PO BID was started given CHADVASC of at least 6. Aspirin was stopped, as she was at high risk for bleeding on triple therapy and she is greater than 1 month from MI and PCI. Plavix 75 mg PO daily was continued. Elevated troponin was likely due to demand ischemia in the setting of A fib with RVR. Troponin peaked and were no longer trended. Patient was monitored on telemetry. Patient will need to follow up with outpatient cardiologist, Dr. Aguirre for reassessment of LVEF 3 months post MI for possible ICD placement. Pt was discharged home on Eliquis 5 mg PO BID. Patient was deemed medically cleared for discharge.                 Consultants    Cardiology: Dr. Casanova    Speech Language Pathologist        Vital Signs                Physical Exam    PHYSICAL EXAM: Overweight elderly female.    GENERAL:  [ x ] NAD , [ x ] well appearing, [  ] Agitated, [  ] Drowsy,  [  ] Lethargy, [  ] confused     HEAD:  [ x ] Normal, [  ] Other    EYES:  [ x ] EOMI, [ x ] PERRLA, [ x ] conjunctiva and sclera clear normal, [  ] Other,  [  ] Pallor,[  ] Discharge    ENMT:  [  ] Normal, [  ] Moist mucous membranes, [  ] Good dentition, [  ] No Thrush    NECK:  [ x ] Supple, [ x ] No JVD, [  ] Normal thyroid, [  ] Lymphadenopathy [  ] Other    CHEST/LUNG:  [ x ] Clear to auscultation bilaterally, [x  ] Breath Sounds equal B/L / Decrease, [  ] poor effort  [ x ] No rales, [ x ] No rhonchi  [ x ]  No wheezing,     HEART:  [ x ] Irregularly irregular [  ] Regular rate and rhythm, [  ] tachycardia, [  ] Bradycardia,  [  ] irregular  [ x ] No murmurs, No rubs, No gallops, [  ] PPM in place (Mfr:  )    ABDOMEN:  [ x ] Soft, [ x ] Nontender, [ x ] Nondistended, [  ] No mass, [  ] Bowel sounds present, [ x ] obese    NERVOUS SYSTEM:  [ x ] Alert & Oriented X3, [ x ] Nonfocal  [  ] Confusion  [  ] Encephalopathic [  ] Sedated [  ] Unable to assess, [  ] Dementia [  ] Other-    EXTREMITIES: [ x ] 2+ Peripheral Pulses, No clubbing, No cyanosis,  [  ] edema B/L lower EXT. [  ] PVD stasis skin changes B/L Lower EXT, [  ] wound    LYMPH: No lymphadenopathy noted    SKIN:  [ x ] No rashes or lesions, [  ] Pressure Ulcers, [  ] ecchymosis, [  ] Skin Tears, [  ] Other History of Present Illness    Patient is an 84 year old female with past medical history significant for HTN, and CAD s/p recent MI (March 2020) that required zoll life vest to be worn presenting to the ED with atrial fibrillation with RVR. Patient was recently transferred from [facility]. Patient stated she was last well morning the day before admission but started to progressively become weaker as the day went by. Patient stated that after she ate dinner, she started to feel very nauseas with an odd feeling in her throat to the point of wanting to vomit but did not. Patient states she had associated SOB during that time. When she went to the bathroom, she was unable to stand up from the toilet because of her worsening weakness. Her daughter called the ambulance where she went to [place] ED.  EMS stated they found the patient not responding well, with BP 68 mm Hg systolic, and O2 sat of 90% on RA. They administered O2 only, and her pressure came up and she became more responsive. At [place] ED, she was found to have Atrial fibrillation with RVR, elevated d- dimer and elevated proBNP. She was transferred to [place] ED for admission. Patient denied chest pain, vomiting, abdominal pain, headaches, fever or chills.        Of note patient had an MI in March 2020. Records obtained from outpatient cardiologist Dr Saha [phone]    Cath 3/2020 : 3 stents in LAD, residual lesion in OM1    Pt was continued with Plavix, beta blocker, Statin, and decreased entresto to 24/26 bid    Echo from 3/2020 post MI revealing ef 25-30%, moderate MR, moderate TR, - no need to repeat 2decho , will need to follow up with Dr Aguirre for reassessment of Lvef 3 month post MI for possible ICD placement         Vitals  /70 RR 19 SpO2 95    EKG Afib with RVR    Significant Labs: Trop 0.116 WBC 10.76 APTT 21.1 D-dimer 2087 BUN 33 Cr 1.43 Glucose 175 GFR 34 lactate 2.3 ProBNP 5693 UA +for protein and trace ketone    Imaging     Chest xray Clear lungs. Mild cardiomegaly.    In the ED Received 1.5 L IVF NS, Full dose Lovenox 75, 12.5 cardizem IV.        Hospital Course    Patient was admitted for hypotension, A fib with RVR, elevated D-dimer and proBNP. Patient was made NPO initially due to initial presentation of nausea and discomfort in throat. Bedside speech and swallow was performed, and speech language pathologist recommended regular solids with thin liquids with aspiration precautions. Cardiology, Dr. Casanova was consulted due to A fib with RVR, elevated troponin, and elevated D-dimer. Patient was unable to have CTA chest done due to renal function. Patient was started on full dose anticoagulation with Eliquis 5 mg PO BID due to presumed pulmonary embolism as evidenced by elevated D-dimer. Pt noted with elevated BNP at 5963 on Lasix 20 mg IVp given in the ED. Lasix 20 mg PO daily was continued. Patient was given Cardizem infusion 125 mg at 10 mL/hour. Cardiology discontinued the Cardizem drip due to LVSD and restarted outpatient Lopressor 12.5 mg PO BID. Eliquis 5 mg PO BID was started given CHADVASC of at least 6. Aspirin was stopped, as she was at high risk for bleeding on triple therapy and she is greater than 1 month from MI and PCI. Plavix 75 mg PO daily was continued. Elevated troponin was likely due to demand ischemia in the setting of A fib with RVR. Troponin peaked and were no longer trended. Patient was monitored on telemetry. Patient was told she needs to follow up with outpatient cardiologist, Dr. Aguirre for reassessment of LVEF 3 months post MI for possible ICD placement. Pt was discharged home on Eliquis 5 mg PO BID via meds to bed to ease transition of discharge. Patient was deemed medically cleared for discharge.                 Consultants    Cardiology: Dr. Casanova    Speech Language Pathologist        Vital Signs    T(C): 36.6 (02 May 2020 04:37), Max: 36.7 (01 May 2020 13:35)    T(F): 97.8 (02 May 2020 04:37), Max: 98.1 (01 May 2020 13:35)    HR: 87 (02 May 2020 05:48) (79 - 87)    BP: 117/71 (02 May 2020 05:48) (117/71 - 130/72)    BP(mean): --    RR: 17 (02 May 2020 04:37) (17 - 18)    SpO2: 92% (02 May 2020 04:37) (92% - 94%)            Physical Exam    PHYSICAL EXAM: Overweight elderly female.    GENERAL:  [ x ] NAD , [ x ] well appearing, [  ] Agitated, [  ] Drowsy,  [  ] Lethargy, [  ] confused     HEAD:  [ x ] Normal, [  ] Other    EYES:  [ x ] EOMI, [ x ] PERRLA, [ x ] conjunctiva and sclera clear normal, [  ] Other,  [  ] Pallor,[  ] Discharge    ENMT:  [  ] Normal, [  ] Moist mucous membranes, [  ] Good dentition, [  ] No Thrush    NECK:  [ x ] Supple, [ x ] No JVD, [  ] Normal thyroid, [  ] Lymphadenopathy [  ] Other    CHEST/LUNG:  [ x ] Clear to auscultation bilaterally, [x  ] Breath Sounds equal B/L / Decrease, [  ] poor effort  [ x ] No rales, [ x ] No rhonchi  [ x ]  No wheezing,     HEART:  [ x ] Irregularly irregular [  ] Regular rate and rhythm, [  ] tachycardia, [  ] Bradycardia,  [  ] irregular  [ x ] No murmurs, No rubs, No gallops, [  ] PPM in place (Mfr:  )    ABDOMEN:  [ x ] Soft, [ x ] Nontender, [ x ] Nondistended, [  ] No mass, [  ] Bowel sounds present, [ x ] obese    NERVOUS SYSTEM:  [ x ] Alert & Oriented X3, [ x ] Nonfocal  [  ] Confusion  [  ] Encephalopathic [  ] Sedated [  ] Unable to assess, [  ] Dementia [  ] Other-    EXTREMITIES: [ x ] 2+ Peripheral Pulses, No clubbing, No cyanosis,  [  ] edema B/L lower EXT. [  ] PVD stasis skin changes B/L Lower EXT, [  ] wound    LYMPH: No lymphadenopathy noted    SKIN:  [ x ] No rashes or lesions, [  ] Pressure Ulcers, [  ] ecchymosis, [  ] Skin Tears, [  ] Other History of Present Illness    Patient is an 84 year old female with past medical history significant for HTN, and CAD s/p recent MI (March 2020) that required zoll life vest to be worn presenting to the ED with atrial fibrillation with RVR. Patient was recently transferred from [facility]. Patient stated she was last well morning the day before admission but started to progressively become weaker as the day went by. Patient stated that after she ate dinner, she started to feel very nauseas with an odd feeling in her throat to the point of wanting to vomit but did not. Patient states she had associated SOB during that time. When she went to the bathroom, she was unable to stand up from the toilet because of her worsening weakness. Her daughter called the ambulance where she went to [place] ED.  EMS stated they found the patient not responding well, with BP 68 mm Hg systolic, and O2 sat of 90% on RA. They administered O2 only, and her pressure came up and she became more responsive. At [place] ED, she was found to have Atrial fibrillation with RVR, elevated d- dimer and elevated proBNP. She was transferred to [place] ED for admission. Patient denied chest pain, vomiting, abdominal pain, headaches, fever or chills.        Of note patient had an MI in March 2020. Records obtained from outpatient cardiologist Dr Saha [phone]    Cath 3/2020 : 3 stents in LAD, residual lesion in OM1    Pt was continued with Plavix, beta blocker, Statin, and decreased entresto to 24/26 bid    Echo from 3/2020 post MI revealing ef 25-30%, moderate MR, moderate TR, - no need to repeat 2decho , will need to follow up with Dr Aguirre for reassessment of Lvef 3 month post MI for possible ICD placement         Vitals  /70 RR 19 SpO2 95    EKG Afib with RVR    Significant Labs: Trop 0.116 WBC 10.76 APTT 21.1 D-dimer 2087 BUN 33 Cr 1.43 Glucose 175 GFR 34 lactate 2.3 ProBNP 5693 UA +for protein and trace ketone    Imaging     Chest xray Clear lungs. Mild cardiomegaly.    In the ED Received 1.5 L IVF NS, Full dose Lovenox 75, 12.5 cardizem IV.        Hospital Course    Patient was admitted for hypotension, A fib with RVR, elevated D-dimer and proBNP. Patient was made NPO initially due to initial presentation of nausea and discomfort in throat. Bedside speech and swallow was performed, and speech language pathologist recommended regular solids with thin liquids with aspiration precautions. Cardiology, Dr. Casanova was consulted due to A fib with RVR, elevated troponin, and elevated D-dimer. Patient was unable to have CTA chest done due to renal function. Patient was started on full dose anticoagulation with Eliquis 5 mg PO BID due to presumed pulmonary embolism as evidenced by elevated D-dimer. Pt noted with elevated BNP at 5963 on Lasix 20 mg IVp given in the ED. Lasix 20 mg PO daily was continued. Patient was given Cardizem infusion 125 mg at 10 mL/hour. Cardiology discontinued the Cardizem drip due to LVSD and restarted outpatient Lopressor 12.5 mg PO BID. Eliquis 5 mg PO BID was started given CHADVASC of at least 6. Aspirin was stopped, as she was at high risk for bleeding on triple therapy and she is greater than 1 month from MI and PCI. Plavix 75 mg PO daily was continued. Elevated troponin was likely due to demand ischemia in the setting of A fib with RVR. Troponin peaked and were no longer trended. Patient was monitored on telemetry. Patient was told she needs to follow up with outpatient cardiologist, Dr. Aguirre for reassessment of LVEF 3 months post MI for possible ICD placement. Pt was discharged home on Eliquis 5 mg PO BID via meds to bed to ease transition of discharge. Message was left with Dr. Aguirre's team regarding hospitalization and treatment plan. Patient was deemed medically cleared for discharge.                 Consultants    Cardiology: Dr. Casanova    Speech Language Pathologist        Vital Signs    T(C): 36.6 (02 May 2020 04:37), Max: 36.7 (01 May 2020 13:35)    T(F): 97.8 (02 May 2020 04:37), Max: 98.1 (01 May 2020 13:35)    HR: 87 (02 May 2020 05:48) (79 - 87)    BP: 117/71 (02 May 2020 05:48) (117/71 - 130/72)    BP(mean): --    RR: 17 (02 May 2020 04:37) (17 - 18)    SpO2: 92% (02 May 2020 04:37) (92% - 94%)            Physical Exam    PHYSICAL EXAM: Overweight elderly female.    GENERAL:  [ x ] NAD , [ x ] well appearing, [  ] Agitated, [  ] Drowsy,  [  ] Lethargy, [  ] confused     HEAD:  [ x ] Normal, [  ] Other    EYES:  [ x ] EOMI, [ x ] PERRLA, [ x ] conjunctiva and sclera clear normal, [  ] Other,  [  ] Pallor,[  ] Discharge    ENMT:  [  ] Normal, [  ] Moist mucous membranes, [  ] Good dentition, [  ] No Thrush    NECK:  [ x ] Supple, [ x ] No JVD, [  ] Normal thyroid, [  ] Lymphadenopathy [  ] Other    CHEST/LUNG:  [ x ] Clear to auscultation bilaterally, [x  ] Breath Sounds equal B/L / Decrease, [  ] poor effort  [ x ] No rales, [ x ] No rhonchi  [ x ]  No wheezing,     HEART:  [ x ] Irregularly irregular [  ] Regular rate and rhythm, [  ] tachycardia, [  ] Bradycardia,  [  ] irregular  [ x ] No murmurs, No rubs, No gallops, [  ] PPM in place (Mfr:  )    ABDOMEN:  [ x ] Soft, [ x ] Nontender, [ x ] Nondistended, [  ] No mass, [  ] Bowel sounds present, [ x ] obese    NERVOUS SYSTEM:  [ x ] Alert & Oriented X3, [ x ] Nonfocal  [  ] Confusion  [  ] Encephalopathic [  ] Sedated [  ] Unable to assess, [  ] Dementia [  ] Other-    EXTREMITIES: [ x ] 2+ Peripheral Pulses, No clubbing, No cyanosis,  [  ] edema B/L lower EXT. [  ] PVD stasis skin changes B/L Lower EXT, [  ] wound    LYMPH: No lymphadenopathy noted    SKIN:  [ x ] No rashes or lesions, [  ] Pressure Ulcers, [  ] ecchymosis, [  ] Skin Tears, [  ] Other

## 2020-04-30 NOTE — DISCHARGE NOTE PROVIDER - PROVIDER TOKENS
FREE:[LAST:[Arturoi],PHONE:[phone]],FAX:[(   )    -],FOLLOWUP:[2 weeks],ESTABLISHEDPATIENT:[T]] FREE:[LAST:[Keke],FIRST:[Loi],PHONE:[phone]],FAX:[phone]:[[address] [place] [address], [place] [address]],FOLLOWUP:[1 week],ESTABLISHEDPATIENT:[T]]

## 2020-04-30 NOTE — ED PROVIDER NOTE - CLINICAL SUMMARY MEDICAL DECISION MAKING FREE TEXT BOX
pt transferred for rapid a fib and hypotension with need for admission.  Presentation not consistent with PE at this time so despite dimer will not CT at this time as also already got one dose of AC.  BP on arrival here in the 105 systolic range and HR in 140s.  Could be a problem with filling leading to lower BPs.  Gave appropriate danis based dose of cardizem and had an appropriate response.  Will start gtt as easily titratable in the setting of rapid fib and borderline BP.  Will watch fluid status carefully since has a EF of only 25-30% and got about 3 L of IVF prior to ED arrival here at [place].

## 2020-04-30 NOTE — ED ADULT NURSE REASSESSMENT NOTE - NS ED NURSE REASSESS COMMENT FT1
external defibrillator removed and placed in pt belonging bag and labeled along with necklace; pt aware

## 2020-04-30 NOTE — ED PROVIDER NOTE - OBJECTIVE STATEMENT
Patient states she went to bedabout 5 hours ago feeling a little unwell. Felt as if the food she had eaten was not going down, but denies CP Patient states she went to bed about 5 hours ago feeling a little unwell. Felt as if the food she had eaten was not going down, but denies CP. Over the next while states she had a little SOB, then felt some nausea. No stomach pain. Felt cold and weak, and daughter called EMS. EMS states they found patient not responding well, with BP 68 systolic, and O2 sat of 90 on RA. They administered O2 only, and her pressure came up and she became more responsive.    Patient states she feels better than before but is still cold and weak. States she had some sweating earlier

## 2020-04-30 NOTE — SWALLOW BEDSIDE ASSESSMENT ADULT - SLP PERTINENT HISTORY OF CURRENT PROBLEM
Per charting, Patient is an 85 yo female with PMHx significant of HTN, and CAD s/p recent MI(March 2020) that required zoll life vest to be worn presenting to the ED with atrial fibrillation with RVR. Patient was recently transferred from [facility]. Found to have hypotensive and afib with RVR, elevated d-dimer, and PRoBNP. Admitted for afib with RVR.

## 2020-04-30 NOTE — ED ADULT NURSE NOTE - PSH
Include Location In Plan?: No Detail Level: Zone Detail Level: Simple No significant past surgical history

## 2020-04-30 NOTE — ED PROVIDER NOTE - OBJECTIVE STATEMENT
pt with history of CAD s/p recent MI that required Zoll Life vest to be worn.  Also with HTN and DM presenting in transfer from [facility].  Initially presented there because of general fatigue and AMS.  Earlier in the night had some SOB and "odd feeling in throat".  When she arrived at OSH was found to be hypotensive and in rapid a-fib.  After fluids got 12.5 mg Cardizem which did not improve HR.  Decision was to transfer for admission and possible ICU admission.  Of note patient had an elevated dimer at OSH and they gave a dose of lovenox as could not get CTA based on her GFR.  Currently pt states she feels much better with no CP or SOB.  Still intermittently has the odd feeling in her throat.  Otherwise no other complaints    MR Number from [place] visit was [id number]

## 2020-04-30 NOTE — H&P ADULT - NEUROLOGICAL DETAILS
alert and oriented x 3 alert and oriented x 3/responds to verbal commands/sensation intact/cranial nerves intact/normal strength

## 2020-04-30 NOTE — DISCHARGE NOTE PROVIDER - NSDCACTIVITY_GEN_ALL_CORE
No restrictions/Bathing allowed Bathing allowed/Do not drive or operate machinery/No heavy lifting/straining/No restrictions

## 2020-04-30 NOTE — SWALLOW BEDSIDE ASSESSMENT ADULT - SWALLOW EVAL: DIAGNOSIS
Pt self-administered PO trials of puree, regular solids, and thin liquids. Pt p/w 1. Functional oral phase marked by adequate retrieval and containment, timely mastication/manipulation and transfer, and adequate oral clearance post swallow. 2. Functional pharyngeal phase marked by suspected timely swallow onset, +laryngeal elevation to palpation, and no overt s/sx of aspiration. Pt denied sensation of pharyngeal stasis across all trialed consistencies. 3. Recommend regular solids with thin liquids +aspiration precautions. If sensation of pharyngeal stasis resumes, please reconsult this service as appropriate.

## 2020-04-30 NOTE — ED ADULT NURSE NOTE - CHIEF COMPLAINT QUOTE
Patient brought in by ambulance from [facility] for rapid atrial fibrillation and hypotension for admission+  Cardizem 12.5 mg and 3 bags of fluids given to patient. IV victor hugo to right AC 20 g and right hand 20 gauge.

## 2020-04-30 NOTE — SWALLOW BEDSIDE ASSESSMENT ADULT - COMMENTS
Pt received upright in bed, awake and alert, on supplemental O2 via NC. Pt denied pain and nausea pre and post assessment. Pt followed commands consistently. Pt's vocal quality/intensity deemed WFL. Pt denied s/sx of aspiration during deglutition, but did report intermittent sensation of pharyngeal stasis with solid textures, which is questions if its in relation to post nasal drip.    CXR 4/30: "Clear lungs. Mild cardiomegaly." Pt's WBC is WFL.

## 2020-04-30 NOTE — ED ADULT TRIAGE NOTE - NS ED TRIAGE AVPU SCALE
Alert-The patient is alert, awake and responds to voice. The patient is oriented to time, place, and person. The triage nurse is able to obtain subjective information. 0

## 2020-04-30 NOTE — ED ADULT NURSE REASSESSMENT NOTE - NS ED NURSE REASSESS COMMENT FT1
pt daughter Bebe called at [phone] and updated with plan of care; pt daughter aware pt is being transferred to [facility]

## 2020-04-30 NOTE — H&P ADULT - PROBLEM SELECTOR PLAN 6
- Chronic, Stable  - Continue home medication of metoprolol   - Continue to monitor - Chronic, Stable  - Continue home medication of metoprolol with hold parameters, Entresto, and Furosemide  - Continue to monitor - Patient with elevated D-dimer initially with SOB, at [place] patient received full dose lovenox 75mg  - Unable to perform CTA due to renal function  - Patient currently on Plavix 75mg daily  - Cont to monitor, D/W cardio -Dr Cristobal , start AC with DOAC agents, STOP ASA

## 2020-04-30 NOTE — ED ADULT NURSE NOTE - OBJECTIVE STATEMENT
Pt. brought to ED via EMS for admission from [facility] for hypotension and rapid afib. Pt. denied dizziness or headache. Pt. complaining of chest discomfort.

## 2020-04-30 NOTE — DISCHARGE NOTE PROVIDER - NSDCMRMEDTOKEN_GEN_ALL_CORE_FT
Aspirin Enteric Coated 81 mg oral delayed release tablet: 1 tab(s) orally once a day  atorvastatin 40 mg oral tablet: 1 tab(s) orally once a day  clopidogrel 75 mg oral tablet: 1 tab(s) orally once a day  Entresto 24 mg-26 mg oral tablet: 1 tab(s) orally 2 times a day  furosemide 20 mg oral tablet: 1 tab(s) orally once a day  metoprolol tartrate 25 mg oral tablet: 0.5 tab(s) orally 2 times a day  Plavix 75 mg oral tablet: 1 tab(s) orally once a day Aspirin Enteric Coated 81 mg oral delayed release tablet: 1 tab(s) orally once a day  atorvastatin 40 mg oral tablet: 1 tab(s) orally once a day  clopidogrel 75 mg oral tablet: 1 tab(s) orally once a day  Entresto 24 mg-26 mg oral tablet: 1 tab(s) orally 2 times a day  furosemide 20 mg oral tablet: 1 tab(s) orally once a day  metoprolol tartrate 25 mg oral tablet: 0.5 tab(s) orally 2 times a day apixaban 5 mg oral tablet: 1 tab(s) orally every 12 hours  atorvastatin 40 mg oral tablet: 1 tab(s) orally once a day  clopidogrel 75 mg oral tablet: 1 tab(s) orally once a day  Entresto 24 mg-26 mg oral tablet: 1 tab(s) orally 2 times a day  furosemide 20 mg oral tablet: 1 tab(s) orally once a day  metoprolol tartrate 25 mg oral tablet: 0.5 tab(s) orally 2 times a day  potassium chloride 20 mEq oral tablet, extended release: 2 tab(s) orally every 4 hours

## 2020-04-30 NOTE — ED PROVIDER NOTE - PHYSICAL EXAMINATION
Gen: Well appearing in NAD  Head: NC/AT  Neck: trachea midline  Resp:  No distress CTAB  CV: tachy Irreg irreg  Ext: no deformities  Neuro:  A&O appears non focal  Skin:  Warm and dry as visualized  Psych:  Normal affect and mood

## 2020-04-30 NOTE — H&P ADULT - PROBLEM SELECTOR PLAN 3
- Patient found to have elevated troponin denies chest pain  - Increased proBNP  - Full cardiac enzymes ordered, f/u on results  - Cardio consulted, - Patient found to have elevated troponin denies chest pain  - Increased proBNP, echo ordered, f/u results  - Full cardiac enzymes ordered, f/u on results  - Cardio consulted, Dr. Cristobal, f/u recs - Patient found to have elevated troponin denies chest pain, NO ACS, likely 2/2 demand ischemia  - Increased proBNP, echo ordered, f/u results  - Full cardiac enzymes ordered, f/u on results  - Cardio consulted, Dr. Cristobal, f/u recs - Patient with hx of MI March 2014, S/P PCI wit 3 stents , pt needs 2 nd cath   - requiring zoll vest. Pt on Tele now  - Continue Plavix 75mg and atorvastatin  -Lasix 20 mg IV p x 1 & continue Lasix 20 mg 1 tab po dialy

## 2020-04-30 NOTE — H&P ADULT - ATTENDING COMMENTS
Pt seen, Examined, case & care plan d/w pt, residents at detail.  D/W cardiology DR Cristobal -JAI BATISTA, start AC -Eliquis , follow ECHO  D/W Dtr at detail.  AM labs   PT Eval

## 2020-04-30 NOTE — DISCHARGE NOTE PROVIDER - NSDCCPCAREPLAN_GEN_ALL_CORE_FT
PRINCIPAL DISCHARGE DIAGNOSIS  Diagnosis: Atrial fibrillation with RVR  Assessment and Plan of Treatment: You were admitted to the hospital due to weakness, nausea, and shortness of breath. You were found to have atrial fibrillation with rapid ventricular response. You were given a rate control medication, Lopressor, to control the fast rate of your heart. You were started on a blood thinner, Eliquis 5 mg twice per day as Atrial fibrillation puts you at an increased risk for having a stroke. Your plavix was continued and your aspirin was STOPPED. You should STOP taking aspirin as you do not need to be on 3 blood thinners and your MI/PCI was over 1 month ago.  You must follow up with Dr. Aguirre, your cardiologist, to reassess you 3 months after your heart attack.  You were evaluated by a cardiologist.      SECONDARY DISCHARGE DIAGNOSES  Diagnosis: CHF, acute  Assessment and Plan of Treatment: You were found to also have an exacerbation of your congestive heart failure. You were given IV Lasix.  Continue to take your Lasix 20 mg once per day, metoprolol tartrate 25 mg 1/2 tablet twice per day, Entresto 24-26 twice per day.    Diagnosis: CAD (coronary artery disease)  Assessment and Plan of Treatment: You should continue to take this new medication, Eliquis 5 mg two times per day.  STOP taking your Aspirin as you do not need it anymore.  CONTINUE to take your Plavix 75 mg once per day and atorvastatin 40 mg once per day. PRINCIPAL DISCHARGE DIAGNOSIS  Diagnosis: Atrial fibrillation with RVR  Assessment and Plan of Treatment: You were admitted to the hospital due to weakness, nausea, and shortness of breath. You were found to have atrial fibrillation with rapid ventricular response. You were given a rate control medication, Lopressor, to control the fast rate of your heart. You were started on a blood thinner, Eliquis 5 mg twice per day as Atrial fibrillation puts you at an increased risk for having a stroke. Your plavix was continued and your aspirin was STOPPED. You should STOP taking aspirin as you do not need to be on 3 blood thinners and your MI/PCI was over 1 month ago.  Your Lopressor dose was increased to 25 mg twicce per day. You should CONTINUE? this increased dose.  You must follow up with Dr. Aguirre, your cardiologist, to reassess you 3 months after your heart attack.  You were evaluated by a cardiologist.      SECONDARY DISCHARGE DIAGNOSES  Diagnosis: CHF, acute  Assessment and Plan of Treatment: You were found to also have an exacerbation of your congestive heart failure. You were given IV Lasix.  Continue to take your Lasix 20 mg once per day, metoprolol tartrate 25 mg 1/2 tablet twice per day, Entresto 24-26 twice per day.    Diagnosis: CAD (coronary artery disease)  Assessment and Plan of Treatment: You should continue to take this new medication, Eliquis 5 mg two times per day.  STOP taking your Aspirin as you do not need it anymore.  CONTINUE to take your Plavix 75 mg once per day and atorvastatin 40 mg once per day. PRINCIPAL DISCHARGE DIAGNOSIS  Diagnosis: Atrial fibrillation with RVR  Assessment and Plan of Treatment: You were admitted to the hospital due to weakness, nausea, and shortness of breath.   You were found to have atrial fibrillation with rapid ventricular response. You were evaluated by a cardiologist. You were given a rate control medication, Lopressor, to control the fast rate of your heart. You were started on a blood thinner, Eliquis 5 mg twice per day as Atrial fibrillation puts you at an increased risk for having a stroke.   Your plavix was continued and your aspirin was STOPPED. You should STOP taking aspirin as you do not need to be on 3 blood thinners and your MI/PCI was over 1 month ago.  Continue your metoprolol tartrate 25 mg 1/2 tablet twice per day,  You must follow up with Dr. Aguirre, your cardiologist, to reassess you 3 months after your heart attack.        SECONDARY DISCHARGE DIAGNOSES  Diagnosis: CHF, acute  Assessment and Plan of Treatment: You were found to also have an exacerbation of your congestive heart failure. You were given IV Lasix.  Continue to take your Lasix 20 mg once per day, metoprolol tartrate 25 mg 1/2 tablet twice per day, Entresto 24-26 twice per day.    Diagnosis: CAD (coronary artery disease)  Assessment and Plan of Treatment: You should continue to take this new medication, Eliquis 5 mg two times per day.  STOP taking your Aspirin as you do not need it anymore.  CONTINUE to take your Plavix 75 mg once per day and atorvastatin 40 mg once per day. PRINCIPAL DISCHARGE DIAGNOSIS  Diagnosis: Atrial fibrillation with RVR  Assessment and Plan of Treatment: You were admitted to the hospital due to weakness, nausea, and shortness of breath.   You were found to have atrial fibrillation with rapid ventricular response. You were evaluated by a cardiologist. You were given a rate control medication, Lopressor, to control the fast rate of your heart. You were started on a blood thinner, Eliquis 5 mg twice per day as Atrial fibrillation puts you at an increased risk for having a stroke.   Your plavix was continued and your aspirin was STOPPED. You should STOP taking aspirin as you do not need to be on 3 blood thinners and your MI/PCI was over 1 month ago.  Continue your metoprolol tartrate 25 mg 1/2 tablet twice per day,  You must follow up with Dr. Aguirre, your cardiologist, to reassess you 3 months after your heart attack. I left a message with Dr. Aguirre's team regarding your hospitalization and treatment plan.        SECONDARY DISCHARGE DIAGNOSES  Diagnosis: CHF, acute  Assessment and Plan of Treatment: You were found to also have an exacerbation of your congestive heart failure. You were given IV Lasix.  Continue to take your Lasix 20 mg once per day, metoprolol tartrate 25 mg 1/2 tablet twice per day, Entresto 24-26 twice per day.    Diagnosis: CAD (coronary artery disease)  Assessment and Plan of Treatment: You should continue to take this new medication, Eliquis 5 mg two times per day.  STOP taking your Aspirin as you do not need it anymore.  CONTINUE to take your Plavix 75 mg once per day and atorvastatin 40 mg once per day. PRINCIPAL DISCHARGE DIAGNOSIS  Diagnosis: Atrial fibrillation with RVR  Assessment and Plan of Treatment: You were admitted to the hospital due to weakness, nausea, and shortness of breath.   You were found to have atrial fibrillation with rapid ventricular response. You were evaluated by a cardiologist. You were given a rate control medication, Lopressor, to control the fast rate of your heart. You were started on a blood thinner, Eliquis 5 mg twice per day as Atrial fibrillation puts you at an increased risk for having a stroke.   Your plavix was continued and your aspirin was STOPPED. You should STOP taking aspirin as you do not need to be on 3 blood thinners and your MI/PCI was over 1 month ago.  Continue your metoprolol tartrate 25 mg 1/2 tablet twice per day,  You must follow up with Dr. Aguirre, your cardiologist, to reassess you 3 months after your heart attack. I left a message with Dr. Aguirre's team regarding your hospitalization and treatment plan.        SECONDARY DISCHARGE DIAGNOSES  Diagnosis: CAD (coronary artery disease)  Assessment and Plan of Treatment: You should continue to take this new medication, Eliquis 5 mg two times per day.  -Consider starting on Statin as per your cardiologist.  STOP  your Aspirin as you do not need it anymore.  -On Metoprolol 2x day 1/2 tab   CONTINUE to take your Plavix 75 mg once per day and atorvastatin 40 mg once per day.    Diagnosis: CHF, acute  Assessment and Plan of Treatment: You were found to also have an exacerbation of your congestive heart failure. You were given IV Lasix. Chronic Systolic CHF   Continue to take your Lasix 20 mg once per day, metoprolol tartrate 25 mg 1/2 tablet twice per day, Entresto 24-26 twice per day.    Diagnosis: HTN (hypertension)  Assessment and Plan of Treatment: HTN (hypertension) stable on Metoprolol &    Diagnosis: Elevated troponin  Assessment and Plan of Treatment: Elevated troponin 2/2 Demand Ic=schemia in setting of CAD  with recent stents placement PRINCIPAL DISCHARGE DIAGNOSIS  Diagnosis: Atrial fibrillation with RVR  Assessment and Plan of Treatment: You were admitted to the hospital due to weakness, nausea, and shortness of breath.   You were found to have atrial fibrillation with rapid ventricular response. You were evaluated by a cardiologist. You were given a rate control medication, Lopressor, to control the fast rate of your heart. You were started on a blood thinner, Eliquis 5 mg twice per day as Atrial fibrillation puts you at an increased risk for having a stroke.   Your plavix was continued and your aspirin was STOPPED. You should STOP taking aspirin as you do not need to be on 3 blood thinners and your MI/PCI was over 1 month ago.  Continue your metoprolol tartrate 25 mg 1/2 tablet twice per day,  You must follow up with Dr. Aguirre, your cardiologist, to reassess you 3 months after your heart attack. I left a message with Dr. Aguirre's team regarding your hospitalization and treatment plan.        SECONDARY DISCHARGE DIAGNOSES  Diagnosis: CAD (coronary artery disease)  Assessment and Plan of Treatment: You should continue to take this new medication, Eliquis 5 mg two times per day.  - on Statin 40 mg daily   STOP  your Aspirin as you do not need it anymore.  -On Metoprolol 2x day 1/2 tab   CONTINUE to take your Plavix 75 mg once per day and atorvastatin 40 mg once per day.    Diagnosis: CHF, acute  Assessment and Plan of Treatment: You were found to also have an exacerbation of your congestive heart failure. You were given IV Lasix. Chronic Systolic CHF   Continue to take your Lasix 20 mg once per day, metoprolol tartrate 25 mg 1/2 tablet twice per day, Entresto 24-26 twice per day.    Diagnosis: HTN (hypertension)  Assessment and Plan of Treatment: HTN (hypertension) stable on Metoprolol &    Diagnosis: Elevated troponin  Assessment and Plan of Treatment: Elevated troponin 2/2 Demand Ic=schemia in setting of CAD  with recent stents placement PRINCIPAL DISCHARGE DIAGNOSIS  Diagnosis: Atrial fibrillation with RVR  Assessment and Plan of Treatment: You were admitted to the hospital due to weakness, nausea, and shortness of breath.   You were found to have atrial fibrillation with rapid ventricular response. You were evaluated by a cardiologist. You were given a rate control medication, Lopressor, to control the fast rate of your heart. You were started on a blood thinner, Eliquis 5 mg twice per day as Atrial fibrillation puts you at an increased risk for having a stroke.   Your plavix was continued and your aspirin was STOPPED. You should STOP taking aspirin as you do not need to be on 3 blood thinners and your MI/PCI was over 1 month ago.  Continue your metoprolol tartrate 25 mg 1/2 tablet twice per day,  You must follow up with Dr. Aguirre, your cardiologist, to reassess you 3 months after your heart attack. I left a message with Dr. Aguirre's team regarding your hospitalization and treatment plan.        SECONDARY DISCHARGE DIAGNOSES  Diagnosis: CHF, acute  Assessment and Plan of Treatment: You were found to also have an exacerbation of your congestive heart failure. You were given IV Lasix. Chronic Systolic CHF   Continue to take your Lasix 20 mg once per day, metoprolol tartrate 25 mg 1/2 tablet twice per day, Entresto 24-26 twice per day.    Diagnosis: CAD (coronary artery disease)  Assessment and Plan of Treatment: You should continue to take this new medication, Eliquis 5 mg two times per day.  -Continue your atorvastatin 40 mg daily   STOP  your Aspirin as you do not need it anymore.  -On Metoprolol 2x day 1/2 tab   CONTINUE to take your Plavix 75 mg once per day and atorvastatin 40 mg once per day.    Diagnosis: Elevated troponin  Assessment and Plan of Treatment: Elevated troponin 2/2 Demand Ic=schemia in setting of CAD  with recent stents placement    Diagnosis: HTN (hypertension)  Assessment and Plan of Treatment: HTN (hypertension) stable on Metoprolol &

## 2020-04-30 NOTE — DISCHARGE NOTE PROVIDER - CARE PROVIDER_API CALL
Yifan,   Phone: [phone]  Fax: (   )    -  Established Patient  Follow Up Time: 2 weeks Loi Davies  [address], [address]  [place] [address]  Phone: [phone]  Fax: [phone]  Established Patient  Follow Up Time: 1 week

## 2020-04-30 NOTE — H&P ADULT - NSICDXPASTMEDICALHX_GEN_ALL_CORE_FT
PAST MEDICAL HISTORY:  CAD (coronary artery disease)     Rapid atrial fibrillation PAST MEDICAL HISTORY:  CAD (coronary artery disease)     CAD (coronary artery disease) s/p Cath & 3 stents on 3/2020  EF ~30%    Mild HTN     Rapid atrial fibrillation

## 2020-04-30 NOTE — H&P ADULT - RS GEN PE MLT RESP DETAILS PC
clear to auscultation bilaterally normal/no rales/breath sounds equal/clear to auscultation bilaterally/no rhonchi/no wheezes

## 2020-04-30 NOTE — CONSULT NOTE ADULT - SUBJECTIVE AND OBJECTIVE BOX
[facility] - Gerri Brothers, Dia, Mario, Levar, Roberto Mahoney  Office Number: [phone]    Initial Consult Note    CHIEF COMPLAINT: Patient is a 84y old  Female who presents with a chief complaint of Afib with RVR (30 Apr 2020 07:51)      HPI:  Patient is an 85 yo female with PMHx significant of HTN, and CAD s/p recent MI(March 2020) at [facility] discharged on life vest presenting to the [facility] ED with atrial fibrillation with RVR.   Patient states she was last well yesterday morning but started to progressively get weaker as the day went by. Patient states that after she ate dinner she started to feel very nauseas with two episodes of diarrhea.  When walking to the bathroom she reports she became diaphoretic and short of breath . Her daughter called the ambulance where she went to [facility].  EMS states they found patient not responding well, with BP 68 systolic, and O2 sat of 90 on RA.   At [facility] she was found to have afib with RVR, elevated d- dimer and elevated proBNP. She was transferred to [facility] ed for admission. denies chest pain, vomiting, abdominal pain, headaches, fever or chills  In the ED Received 1.5 L IVF NS, Full dose lovenox 75, 12.5 Injectable cardizem  Vitals  /70 RR 19 SpO2 95  EKG Afib with RVR  Significant Labs: Trop 0.116 WBC 10.76 APTT 21.1 D-dimer 2087 BUN 33 Cr 1.43 Glucose 175 GFR 34 lactate 2.3 ProBNP 5693 UA +for protein and trace ketone  Imaging   Chest xray Clear lungs. Mild cardiomegaly.      PAST MEDICAL & SURGICAL HISTORY:  CAD (coronary artery disease)  Rapid atrial fibrillation  CAD (coronary artery disease)  No significant past surgical history      SOCIAL HISTORY: former tobacco    ethanol, or drug abuse.    FAMILY HISTORY:  No pertinent family history in first degree relatives    No family history of acute MI or sudden cardiac death.    MEDICATIONS  (STANDING):  aspirin  chewable 81 milliGRAM(s) Oral daily  atorvastatin 40 milliGRAM(s) Oral at bedtime  clopidogrel Tablet 75 milliGRAM(s) Oral daily  diltiazem Infusion 10 mG/Hr (10 mL/Hr) IV Continuous <Continuous>  furosemide    Tablet 20 milliGRAM(s) Oral daily  metoprolol tartrate 12.5 milliGRAM(s) Oral two times a day  sacubitril 24 mG/valsartan 26 mG 2 Tablet(s) Oral two times a day    MEDICATIONS  (PRN):      Allergies    Persantine (Rash)  Persantine (Unknown)    Intolerances        REVIEW OF SYSTEMS:    CONSTITUTIONAL: + weakness, fevers or chills  EYES/ENT: No visual changes;  No vertigo or throat pain   NECK: No pain or stiffness  RESPIRATORY: No cough, wheezing, hemoptysis; + shortness of breath  CARDIOVASCULAR: No chest pain or palpitations  GASTROINTESTINAL: No abdominal pain. No nausea, vomiting, or hematemesis; + diarrhea or constipation. No melena or hematochezia.  GENITOURINARY: No dysuria, frequency or hematuria  NEUROLOGICAL: No numbness or weakness  SKIN: No itching or rash  All other review of systems is negative unless indicated above    VITAL SIGNS:   Vital Signs Last 24 Hrs  T(C): 36.3 (30 Apr 2020 05:36), Max: 36.3 (30 Apr 2020 05:36)  T(F): 97.4 (30 Apr 2020 05:36), Max: 97.4 (30 Apr 2020 05:36)  HR: 66 (30 Apr 2020 09:00) (66 - 150)  BP: 89/52 (30 Apr 2020 09:00) (72/48 - 108/64)  BP(mean): --  RR: 17 (30 Apr 2020 09:00) (17 - 23)  SpO2: 95% (30 Apr 2020 09:00) (94% - 98%)    I&O's Summary      On Exam:    Constitutional: NAD, alert and oriented x 3, obese   Lungs:  Non-labored, breath sounds are clear bilaterally, No wheezing, rales or rhonchi  Cardiovascular: RRR.  S1 and S2 positive.  No murmurs, rubs, gallops or clicks  Gastrointestinal: Bowel Sounds present, soft, nontender.   Lymph: No peripheral edema. No cervical lymphadenopathy.  Neurological: Alert, no focal deficits  Skin: No rashes or ulcers   Psych:  Mood & affect appropriate.    LABS: All Labs Reviewed:                        11.4   7.79  )-----------( 210      ( 30 Apr 2020 09:50 )             34.5                         13.9   10.76 )-----------( 230      ( 30 Apr 2020 02:55 )             42.8     30 Apr 2020 09:50    144    |  114    |  27     ----------------------------<  144    4.3     |  22     |  0.92   30 Apr 2020 02:55    138    |  103    |  33     ----------------------------<  175    3.5     |  22     |  1.43     Ca    7.9        30 Apr 2020 09:50  Ca    9.5        30 Apr 2020 02:55    TPro  5.8    /  Alb  2.7    /  TBili  0.4    /  DBili  x      /  AST  21     /  ALT  18     /  AlkPhos  71     30 Apr 2020 09:50  TPro  7.1    /  Alb  3.5    /  TBili  0.4    /  DBili  x      /  AST  21     /  ALT  21     /  AlkPhos  89     30 Apr 2020 02:55    PT/INR - ( 30 Apr 2020 02:55 )   PT: 12.2 sec;   INR: 1.10 ratio         PTT - ( 30 Apr 2020 02:55 )  PTT:21.1 sec  CARDIAC MARKERS ( 30 Apr 2020 06:31 )  .116 ng/mL / x     / 53 U/L / x     / 2.9 ng/mL  CARDIAC MARKERS ( 30 Apr 2020 03:34 )  .036 ng/mL / x     / x     / x     / x          Blood Culture:   04-30 @ 03:34  Pro Bnp [id number]    04-30 @ 09:50  TSH: 1.38      RADIOLOGY:    EKG:  < from: 12 Lead ECG (04.30.20 @ 02:57) >    Ventricular Rate 145 BPM    Atrial Rate 166 BPM    QRS Duration 78 ms    Q-T Interval 330 ms    QTC Calculation(Bezet) 512 ms    R Axis 48 degrees    T Axis 175 degrees    Diagnosis Line Atrial fibrillation  Low voltage QRS  T wave abnormality, consider lateral ischemia  Abnormal ECG  No previous ECGs available [facility] - Gerri Brothers, Dia, Mario, Levar, Roberto Mahoney  Office Number: [phone]    Initial Consult Note    CHIEF COMPLAINT: Patient is a 84y old  Female who presents with a chief complaint of Afib with RVR (30 Apr 2020 07:51)      HPI:  Patient is an 83 yo female with PMHx significant of HTN, HLD  CAD s/p recent MI 3/14/2020 at [facility],  3 stents LAD with residual disease om1, sp IABP, with post MI ef 30% discharged on life vest now presenting to the [facility] ED with atrial fibrillation with RVR. Patient states she was last well yesterday morning but started to progressively get weaker as the day went by. Patient states that after she ate dinner she started to feel very nauseas with two episodes of diarrhea.  When walking to the bathroom she reports she became diaphoretic and short of breath . reports she was wearing her life vest at this time with alarm going off.  Her daughter called the ambulance where she went to [facility].  EMS states they found patient not responding well, with BP 68 systolic, and O2 sat of 90 on RA.   At [facility] she was found to have afib with RVR, elevated d- dimer and elevated proBNP. She was transferred to hospitals ed for admission. denies chest pain, vomiting, abdominal pain, headaches, fever or chills  In the ED Received 1.5 L IVF NS, Full dose lovenox 75, 12.5 Injectable cardizem  Vitals  /70 RR 19 SpO2 95  EKG Afib with RVR  Significant Labs: Trop 0.116 WBC 10.76 APTT 21.1 D-dimer 2087 BUN 33 Cr 1.43 Glucose 175 GFR 34 lactate 2.3 ProBNP 5693 UA +for protein and trace ketone  Imaging   Chest xray Clear lungs. Mild cardiomegaly.      PAST MEDICAL & SURGICAL HISTORY:  CAD (coronary artery disease)  Rapid atrial fibrillation  CAD (coronary artery disease)  No significant past surgical history      SOCIAL HISTORY:   former tobacco    ethanol, or drug abuse.    FAMILY HISTORY:  No pertinent family history in first degree relatives    No family history of acute MI or sudden cardiac death.    MEDICATIONS  (STANDING):  aspirin  chewable 81 milliGRAM(s) Oral daily  atorvastatin 40 milliGRAM(s) Oral at bedtime  clopidogrel Tablet 75 milliGRAM(s) Oral daily  diltiazem Infusion 10 mG/Hr (10 mL/Hr) IV Continuous <Continuous>  furosemide    Tablet 20 milliGRAM(s) Oral daily  metoprolol tartrate 12.5 milliGRAM(s) Oral two times a day  sacubitril 24 mG/valsartan 26 mG 2 Tablet(s) Oral two times a day    MEDICATIONS  (PRN):      Allergies    Persantine (Rash)  Persantine (Unknown)    Intolerances        REVIEW OF SYSTEMS:    CONSTITUTIONAL: + weakness, fevers or chills  EYES/ENT: No visual changes;  No vertigo or throat pain   NECK: No pain or stiffness  RESPIRATORY: No cough, wheezing, hemoptysis; + shortness of breath  CARDIOVASCULAR: No chest pain or palpitations  GASTROINTESTINAL: No abdominal pain. No nausea, vomiting, or hematemesis; + diarrhea or constipation. No melena or hematochezia.  GENITOURINARY: No dysuria, frequency or hematuria  NEUROLOGICAL: No numbness or weakness  SKIN: No itching or rash  All other review of systems is negative unless indicated above    VITAL SIGNS:   Vital Signs Last 24 Hrs  T(C): 36.3 (30 Apr 2020 05:36), Max: 36.3 (30 Apr 2020 05:36)  T(F): 97.4 (30 Apr 2020 05:36), Max: 97.4 (30 Apr 2020 05:36)  HR: 66 (30 Apr 2020 09:00) (66 - 150)  BP: 89/52 (30 Apr 2020 09:00) (72/48 - 108/64)  BP(mean): --  RR: 17 (30 Apr 2020 09:00) (17 - 23)  SpO2: 95% (30 Apr 2020 09:00) (94% - 98%)    I&O's Summary      On Exam:    Constitutional: NAD, alert and oriented x 3, obese   Lungs:  Non-labored, breath sounds are clear bilaterally, No wheezing, rales or rhonchi  Cardiovascular: RRR.  S1 and S2 positive.  + murmur   Gastrointestinal: Bowel Sounds present, soft, nontender.   Lymph: + edema bilateral LE   Neurological: Alert, no focal deficits  Skin: No rashes or ulcers   Psych:  Mood & affect appropriate.    LABS: All Labs Reviewed:                        11.4   7.79  )-----------( 210      ( 30 Apr 2020 09:50 )             34.5                         13.9   10.76 )-----------( 230      ( 30 Apr 2020 02:55 )             42.8     30 Apr 2020 09:50    144    |  114    |  27     ----------------------------<  144    4.3     |  22     |  0.92   30 Apr 2020 02:55    138    |  103    |  33     ----------------------------<  175    3.5     |  22     |  1.43     Ca    7.9        30 Apr 2020 09:50  Ca    9.5        30 Apr 2020 02:55    TPro  5.8    /  Alb  2.7    /  TBili  0.4    /  DBili  x      /  AST  21     /  ALT  18     /  AlkPhos  71     30 Apr 2020 09:50  TPro  7.1    /  Alb  3.5    /  TBili  0.4    /  DBili  x      /  AST  21     /  ALT  21     /  AlkPhos  89     30 Apr 2020 02:55    PT/INR - ( 30 Apr 2020 02:55 )   PT: 12.2 sec;   INR: 1.10 ratio         PTT - ( 30 Apr 2020 02:55 )  PTT:21.1 sec  CARDIAC MARKERS ( 30 Apr 2020 06:31 )  .116 ng/mL / x     / 53 U/L / x     / 2.9 ng/mL  CARDIAC MARKERS ( 30 Apr 2020 03:34 )  .036 ng/mL / x     / x     / x     / x          Blood Culture:   04-30 @ 03:34  Pro Bnp [id number]    04-30 @ 09:50  TSH: 1.38      RADIOLOGY:    EKG:  < from: 12 Lead ECG (04.30.20 @ 02:57) >    Ventricular Rate 145 BPM    Atrial Rate 166 BPM    QRS Duration 78 ms    Q-T Interval 330 ms    QTC Calculation(Bezet) 512 ms    R Axis 48 degrees    T Axis 175 degrees    Diagnosis Line Atrial fibrillation  Low voltage QRS  T wave abnormality, consider lateral ischemia  Abnormal ECG  No previous ECGs available

## 2020-04-30 NOTE — H&P ADULT - PROBLEM SELECTOR PLAN 1
- Presenting to the ED with atrial fibrillation with RVR. Patient was recently transferred from [facility].  - Found to have hypotensive and afib with RVR, elevated d-dimer, and PRoBNP. - Admitted for afib with RVR  - Currently on cardizem infusion 125mg at 10ml/hr  - Cardio consulted, Dr Cristobal - Presenting to the ED with atrial fibrillation with RVR. Patient was recently transferred from [facility].  - Found to have hypotensive and afib with RVR, elevated d-dimer, and PRoBNP.   - At [facility]   - Admitted at [facility] for afib with RVR  - Currently on cardizem infusion 125mg at 10ml/hr  - stat labs ordered, Thyroid panel, CBC and CMP, Lactate  - NPO due to initial presentation of nausea and discomfort in throat, bedside speech and swallow   - Cardio consulted, Dr Cristobal, f/u recs - Presenting to the ED with atrial fibrillation with RVR. Patient was recently transferred from [facility].  - Found to have hypotensive and afib with RVR, elevated d-dimer, and PRoBNP.   - At [facility]   - Admitted at [facility] for afib with RVR  - Currently on Cardizem infusion 125mg at 10ml/hr  - stat labs ordered, Thyroid panel, CBC and CMP, Lactate  - NPO due to initial presentation of nausea and discomfort in throat, bedside speech and swallow   - Cardio consulted, Dr Cristobal, f/u recs-Full dose AC

## 2020-04-30 NOTE — H&P ADULT - GASTROINTESTINAL DETAILS
soft/normal/nontender nontender/no distention/no rebound tenderness/no rigidity/soft/no guarding/no organomegaly/normal/no masses palpable/bowel sounds normal/no bruit

## 2020-04-30 NOTE — H&P ADULT - PROBLEM SELECTOR PLAN 5
- Patient with hx of MI March 2014, requiring zoll vest  - Continue plavix 75mg and atorvastatin - Patient with hx of MI March 2014, S/P PCI wit 3 stents , pt needs 2 nd cath   - requiring zoll vest. Pt on Tele now  - Continue Plavix 75mg and atorvastatin  -Lasix 20 mg IV p x 1 & continue Lasix 20 mg 1 tab po dialy - Chronic, Stable  - Continue home medication of metoprolol with hold parameters, Entresto, and Furosemide  - Continue to monitor

## 2020-04-30 NOTE — ED ADULT NURSE NOTE - NSIMPLEMENTINTERV_GEN_ALL_ED
Implemented All Fall with Harm Risk Interventions:  [place] to call system. Call bell, personal items and telephone within reach. Instruct patient to call for assistance. Room bathroom lighting operational. Non-slip footwear when patient is off stretcher. Physically safe environment: no spills, clutter or unnecessary equipment. Stretcher in lowest position, wheels locked, appropriate side rails in place. Provide visual cue, wrist band, yellow gown, etc. Monitor gait and stability. Monitor for mental status changes and reorient to person, place, and time. Review medications for side effects contributing to fall risk. Reinforce activity limits and safety measures with patient and family. Provide visual clues: red socks.

## 2020-04-30 NOTE — ED ADULT NURSE REASSESSMENT NOTE - NS ED NURSE REASSESS COMMENT FT1
report from previous shift patient awake alert medical resident at bedside interviewing patient for admission

## 2020-04-30 NOTE — SWALLOW BEDSIDE ASSESSMENT ADULT - SWALLOW EVAL: RECOMMENDED FEEDING/EATING TECHNIQUES
alternate food with liquid/allow for swallow between intakes/position upright (90 degrees)/small sips/bites/maintain upright posture during/after eating for 30 mins/oral hygiene

## 2020-04-30 NOTE — SWALLOW BEDSIDE ASSESSMENT ADULT - ASR SWALLOW ASPIRATION MONITOR
change of breathing pattern/position upright (90Y)/cough/gurgly voice/upper respiratory infection/pneumonia/oral hygiene/throat clearing/fever

## 2020-04-30 NOTE — H&P ADULT - PROBLEM SELECTOR PLAN 4
- Unknown baseline  - Found to have elevated BUN and Cr  - - Unknown baseline  - Found to have elevated BUN and Cr  - Received 1.5 L NS IVF  - Cont to trend

## 2020-04-30 NOTE — ED PROVIDER NOTE - CLINICAL SUMMARY MEDICAL DECISION MAKING FREE TEXT BOX
Patient arrives hypotensive in rapid a. fib with vague complaints. Will get labs/ekg/cxr. Will start IV fluids and comtinue care based on response to interventions and results of studies

## 2020-04-30 NOTE — H&P ADULT - ASSESSMENT
Patient is an 83 yo female with PMHx significant of HTN, and CAD s/p recent MI(March 2020) that required zoll life vest to be worn presenting to the ED with atrial fibrillation with RVR. Patient was recently transferred from [facility]. Found to have hypotensive and afib with RVR, elevated d-dimer, and PRoBNP. Admitted for afib with RVR. Patient is an 83 yo female with PMHx significant of HTN, and CAD s/p recent MI(March 2020) that required zoll life vest to be worn presenting to the ED with atrial fibrillation with RVR. Patient was recently transferred from [facility]. Found to have hypotensive and afib with RVR, elevated d-dimer, and PRoBNP. Admitted for afib with RVR. COVID neg Patient is an 83 yo female with PMHx significant of HTN, and CAD s/p recent MI(March 2020) that required zoll life vest to be worn presenting to the ED with atrial fibrillation with RVR. Patient was recently transferred from [facility]. Found to have hypotensive and afib with RVR, elevated d-dimer, and PRoBNP. Admitted for afib with RVR.

## 2020-04-30 NOTE — CONSULT NOTE ADULT - ASSESSMENT
84 year old female with PMHx significant of HTN, and CAD s/p recent MI(March 2020) at [facility] discharged on life vest presenting to the [place] ED with complaint of dyspnea, diarrhea, found with hypotension by EMS in ED found with atrial fibrillation with RVR, COVID negative as per H & P     IN PROGRESS     Afib  Ekg revealing afib with RVR, now on tele appears to be NSR 68bpm  Repeat 12 lead EKG now that she is rate controlled   on cardizem drip , change to cardizem po   Start FD lovenox SQ BID     CAD  Troponin leak likely demand ischemia in setting of afib with RVR,  monitor on telemetry   trend troponin to peak   Records obtained from outpatient cardiologist Dr Vail [phone]  Cath 3/2020 revealing:  Continue with ASA, Plavix, beta blocker, Entresto and Statin  Echo  D DImer 2087- check CT PE  BNP 5693,chest xray with clear lungs, on exam mildly volume overloaded with LE edema, can give lasix 20mg IV x 1 and reassess daily ,     HTN  Continue beta blocker     Monitor and replete electrolytes. Keep K>4.0 and Mg>2.0.  further plan and recommendations pending results of above and clinical course.   Bel Bautista FNP-C  Cardiology NP  [facility] [phone] 84 year old female with PMHx significant of HTN, and CAD s/p recent anterior lateral MI 3/14/2020 sp PCI LAD x 3, sp IABP due to hypotension, course complicated with fluid overloaded sp lasix, at [facility] discharged on life vest presenting to the [place] ED with complaint of dyspnea, diarrhea, found with hypotension by EMS in ED found with atrial fibrillation with RVR, COVID negative as per H & P now transferred to [facility].     Afib  Ekg revealing afib with RVR, now on telemetry NSR 68bpm  DC Cardizem gtt, start Lopressor 25mg PO BID  Start Eliquis 5mg PO BID  Stop ASA    CAD  Troponin leak likely demand ischemia in setting of afib with RVR,  monitor on telemetry   trend troponin to peak   Records obtained from outpatient cardiologist Dr Vail [phone]  Cath 3/2020 : 3 stents in LAD, residual lesion in OM1  Continue with Plavix, beta blocker, Statin, decrease entresto to   Echo from 3/2020 post MI revealing ef 25-30%, moderate MR, moderate TR, - no need to repeat 2decho , will need to follow up with Dr Aguirre for reassessment of Lvef 3 month post MI for possible ICD placement   D DImer 2087- check CT PE  BNP 5693,chest xray with clear lungs, on exam mildly volume overloaded with LE edema, can give lasix 20mg IV x 1 and reassess daily ,     HTN  Continue beta blocker and Entresto     Monitor and replete electrolytes. Keep K>4.0 and Mg>2.0.  further plan and recommendations pending results of above and clinical course.   Bel Bautista FNP-C  Cardiology NP  [facility] [phone] 84 year old female with PMHx significant of HTN, and CAD s/p recent anterior lateral MI 3/14/2020 sp PCI LAD x 3, sp IABP due to hypotension, course complicated with fluid overloaded sp lasix, at [facility] discharged on life vest presenting to the [place] ED with complaint of dyspnea, diarrhea, found with hypotension by EMS in ED found with atrial fibrillation with RVR, COVID negative as per H & P now transferred to [facility].     Afib  Ekg revealing afib with RVR, now on telemetry NSR 68bpm  DC Cardizem gtt, start Lopressor 25mg PO BID, which is increased dose from her home dose  Start Eliquis 5mg PO BID given CHADSVASC of at least 6  She is at high risk for bleeding on triple therapy.  She is greater than one month from MI and PCi.  Stop ASA.  Continue Plavix 75 QD  No need to repeat echocardiogram as it will not     CAD  Troponin leak likely demand ischemia in setting of afib with RVR,  monitor on telemetry   trend troponin to peak   Records obtained from outpatient cardiologist Dr Saha [phone]  Cath 3/2020 : 3 stents in LAD, residual lesion in OM1  Continue with Plavix, beta blocker, Statin, decrease entresto to 24/26 bid  Echo from 3/2020 post MI revealing ef 25-30%, moderate MR, moderate TR, - no need to repeat 2decho , will need to follow up with Dr Aguirre for reassessment of Lvef 3 month post MI for possible ICD placement   BNP 5693,chest xray with clear lungs, on exam mildly volume overloaded with LE edema, can give lasix 20mg IV x 1 and reassess daily ,     HTN  Continue beta blocker and Entresto     Monitor and replete electrolytes. Keep K>4.0 and Mg>2.0.  further plan and recommendations pending results of above and clinical course.   Bel Bautista FNP-C  Cardiology NP  [facility] [phone]

## 2020-04-30 NOTE — H&P ADULT - HISTORY OF PRESENT ILLNESS
Patient is an 83 yo female with PMHx significant of HTN, and CAD s/p recent MI(March 2020) that required zoll life vest to be worn presenting to the ED with atrial fibrillation with RVR. Patient was recently transferred from [facility]. Patient states she was last well yesterday morning but started to progressively get weaker as the day went by. Patient states that after she ate dinner she started to feel very nauseas with odd feeling in her throat to the point of wanting to vomit but didn't. Patient states she had associated SOB during that time. When she went to the bathroom, she was unable to get up from the toilet because of her worsening weakness. Her daughter called the ambulance where she went to [place]. She was found to have hypotensive and afib with RVR, worsening ddimer.. She [facility]. Patient denies chest pain, vomiting, abdominal pain, headaches, fever or chills.     In the ED  Vitals  /70 RR 19 SpO2 95  Significant Labs: Trop 0.116 WBC 10.76 APTT 21.1 D-dimer 2087 BUN 33 Cr 1.43 Glucose 175 GFR 34 lactate 2.3 ProBNP 5693 UA +for protein and trace ketone  Imaging Patient is an 83 yo female with PMHx significant of HTN, and CAD s/p recent MI(March 2020) that required zoll life vest to be worn presenting to the ED with atrial fibrillation with RVR. Patient was recently transferred from [facility]. Patient states she was last well yesterday morning but started to progressively get weaker as the day went by. Patient states that after she ate dinner she started to feel very nauseas with odd feeling in her throat to the point of wanting to vomit but didn't. Patient states she had associated SOB during that time. When she went to the bathroom, she was unable to get up from the toilet because of her worsening weakness. Her daughter called the ambulance where she went to [place]. She was found to have hypotensive and afib with RVR, elevated d- dimer and prob. She was transferred to [facility] ed for admission. Patient denies chest pain, vomiting, abdominal pain, headaches, fever or chills. Of note patient had an MI in March 2020.    In the ED Received 20mg x1   Vitals  /70 RR 19 SpO2 95  Significant Labs: Trop 0.116 WBC 10.76 APTT 21.1 D-dimer 2087 BUN 33 Cr 1.43 Glucose 175 GFR 34 lactate 2.3 ProBNP 5693 UA +for protein and trace ketone  Imaging Patient is an 83 yo female with PMHx significant of HTN, and CAD s/p recent MI(March 2020) that required zoll life vest to be worn presenting to the ED with atrial fibrillation with RVR. Patient was recently transferred from [facility]. Patient states she was last well yesterday morning but started to progressively get weaker as the day went by. Patient states that after she ate dinner she started to feel very nauseas with odd feeling in her throat to the point of wanting to vomit but didn't. Patient states she had associated SOB during that time. When she went to the bathroom, she was unable to get up from the toilet because of her worsening weakness. Her daughter called the ambulance where she went to [place].  EMS states they found patient not responding well, with BP 68 systolic, and O2 sat of 90 on RA. They administered O2 only, and her pressure came up and she became more responsive. At [place] she was found to have afib with RVR, elevated d- dimer and elevated proBNP. She was transferred to [facility] ed for admission. Patient denies chest pain, vomiting, abdominal pain, headaches, fever or chills. Of note patient had an MI in March 2020.    In the ED Received 1.5 L IVF NS, Full dose lovenox 75, 12.5 Injectable cardizem  Vitals  /70 RR 19 SpO2 95  EKG Afib with RVR  Significant Labs: Trop 0.116 WBC 10.76 APTT 21.1 D-dimer 2087 BUN 33 Cr 1.43 Glucose 175 GFR 34 lactate 2.3 ProBNP 5693 UA +for protein and trace ketone  Imaging   Chest xray Clear lungs. Mild cardiomegaly. Patient is an 85 yo female with PMHx significant of HTN, and CAD s/p recent MI(March 2020) that required zoll life vest to be worn presenting to the ED with atrial fibrillation with RVR. Patient was recently transferred from [facility]. Patient states she was last well yesterday morning but started to progressively get weaker as the day went by. Patient states that after she ate dinner she started to feel very nauseas with odd feeling in her throat to the point of wanting to vomit but didn't. Patient states she had associated SOB during that time. When she went to the bathroom, she was unable to get up from the toilet because of her worsening weakness. Her daughter called the ambulance where she went to [place].  EMS states they found patient not responding well, with BP 68 systolic, and O2 sat of 90 on RA. They administered O2 only, and her pressure came up and she became more responsive. At [place] she was found to have afib with RVR, elevated d- dimer and elevated proBNP. She was transferred to \[facility]\"" ed for admission. Patient denies chest pain, vomiting, abdominal pain, headaches, fever or chills.   Of note patient had an MI in March 2020. Records obtained from outpatient cardiologist Dr Saha [phone]  Cath 3/2020 : 3 stents in LAD, residual lesion in OM1  Continue with Plavix, beta blocker, Statin, decrease entresto to 24/26 bid  Echo from 3/2020 post MI revealing ef 25-30%, moderate MR, moderate TR, - no need to repeat 2decho , will need to follow up with Dr Aguirre for reassessment of Lvef 3 month post MI for possible ICD placement       In the ED Received 1.5 L IVF NS, Full dose lovenox 75, 12.5 Injectable cardizem  Vitals  /70 RR 19 SpO2 95  EKG Afib with RVR  Significant Labs: Trop 0.116 WBC 10.76 APTT 21.1 D-dimer 2087 BUN 33 Cr 1.43 Glucose 175 GFR 34 lactate 2.3 ProBNP 5693 UA +for protein and trace ketone  Imaging   Chest xray Clear lungs. Mild cardiomegaly.

## 2020-04-30 NOTE — H&P ADULT - PROBLEM SELECTOR PLAN 7
- DVT prophylaxis plavix 75mg - DVT prophylaxis plavix 75mg, at [place] patient received full dose lovenox 75 for suspected PE  IMPROVE VTE Individual Risk Assessment        RISK                                                          Points  [  ] Previous VTE                                                3  [  ] Thrombophilia                                             2  [  ] Lower limb paralysis                                   2        (unable to hold up >15 seconds)    [  ] Current Cancer                                             2         (within 6 months)  [  ] Immobilization > 24 hrs                              1  [  ] ICU/CCU stay > 24 hours                             1  [  ] Age > 60                                                         1  IMPROVE VTE Score:         [         ]  Total Risk Factor Score:    0 - 1:   Consider IPC  >2 - 3:  Thromboprophylaxis required (enoxaparin or SQ heparin)        >4:   High Risk: Thromboprophylaxis required (enoxaparin or SQ heparin), optional add IPC  **If CONTRAINDICATION to enoxaparin or SQ heparin, USE IPCs** - DVT prophylaxis Plavix 75mg, at [place] patient received full dose Lovenox 75 for suspected PE  IMPROVE VTE Individual Risk Assessment        RISK                                                          Points  [  ] Previous VTE                                                3  [  ] Thrombophilia                                             2  [  ] Lower limb paralysis                                   2        (unable to hold up >15 seconds)    [  ] Current Cancer                                             2         (within 6 months)  [  ] Immobilization > 24 hrs                              1  [  ] ICU/CCU stay > 24 hours                             1  [  ] Age > 60                                                         1  IMPROVE VTE Score:         [         ]  Total Risk Factor Score:    0 - 1:   Consider IPC  >2 - 3:  Thromboprophylaxis required (enoxaparin or SQ heparin)        >4:   High Risk: Thromboprophylaxis required (enoxaparin or SQ heparin), optional add IPC  **If CONTRAINDICATION to enoxaparin or SQ heparin, USE IPCs**

## 2020-04-30 NOTE — CONSULT NOTE ADULT - ATTENDING COMMENTS
I personally saw and examined the patient in detail.  I have spoken to the above provider regarding the assessment and plan of care.  I reviewed the above assessment and plan of care, and agree.  I have made changes in the body of the note where appropriate.  Patient with CAD, s/p MI, PCI to prox LAD, severe ICM with AF with RVR.  Start oral AC with DOAC.  Need to dc aspirin to avoid triple therapy as very high risk of bleeding in this patient.  She is vol OL and is going to get IV Lasix x 1.

## 2020-04-30 NOTE — H&P ADULT - NSHPSOCIALHISTORY_GEN_ALL_CORE
Patient states she has been living at home with daughter since her hospitalization for Heart attack in March. Patient states she was living at home independently before completely her ADLs without assistance. She uses a cane for the steps and ambulates independently otherwise.   Tobacco: Quit 50 years ago, states she smoked tobacco for 5 years before that 1.5 packs a day for 5 years.  Alcohol: Denies   Illicit Drug Use: Denies

## 2020-04-30 NOTE — ED ADULT NURSE NOTE - OBJECTIVE STATEMENT
84 yr old female BIB EMS from home c/o generalized weakness, SOB and nausea since last night; pt states after dinner she did not feel "well"; states she felt the burger she ate was not sitting well with her; ambulated to restroom and had loose bowel movement x2; pt restless and feeling "cold"; pt had MI in March 2020; stent placed at [facility] as per EMS; pt received at home by EMS hypotensive and slightly short of breath; O2 administered and pt BP improved slightly and pt became more awake as per EMS

## 2020-04-30 NOTE — H&P ADULT - PROBLEM SELECTOR PLAN 2
- Patient with elevated D-dimer initially with SOB  - Patient currently on Plavix 75mg daily  - Cont to monitor - Patient with elevated D-dimer initially with SOB, at [place] patient received full dose lovenox 75mg  - Unable to perform CTA due to renal function  - Patient currently on Plavix 75mg daily  - Cont to monitor - Patient with elevated D-dimer initially with SOB, at [place] patient received full dose lovenox 75mg  - Unable to perform CTA due to renal function  - Patient currently on Plavix 75mg daily  - Cont to monitor, D/W cardio -Dr Cristobal , start AC with DOAC agents, STOP ASA - Patient found to have elevated troponin denies chest pain, NO ACS, likely 2/2 demand ischemia  - Increased proBNP, echo ordered, f/u results  - Full cardiac enzymes ordered, f/u on results  - Cardio consulted, Dr. Cristobal, f/u recs

## 2020-05-01 DIAGNOSIS — I50.9 HEART FAILURE, UNSPECIFIED: ICD-10-CM

## 2020-05-01 LAB
ALBUMIN SERPL ELPH-MCNC: 2.7 G/DL — LOW (ref 3.3–5)
ALP SERPL-CCNC: 63 U/L — SIGNIFICANT CHANGE UP (ref 40–120)
ALT FLD-CCNC: 17 U/L — SIGNIFICANT CHANGE UP (ref 12–78)
ANION GAP SERPL CALC-SCNC: 9 MMOL/L — SIGNIFICANT CHANGE UP (ref 5–17)
AST SERPL-CCNC: 21 U/L — SIGNIFICANT CHANGE UP (ref 15–37)
BASOPHILS # BLD AUTO: 0.01 K/UL — SIGNIFICANT CHANGE UP (ref 0–0.2)
BASOPHILS NFR BLD AUTO: 0.2 % — SIGNIFICANT CHANGE UP (ref 0–2)
BILIRUB SERPL-MCNC: 0.5 MG/DL — SIGNIFICANT CHANGE UP (ref 0.2–1.2)
BUN SERPL-MCNC: 18 MG/DL — SIGNIFICANT CHANGE UP (ref 7–23)
CALCIUM SERPL-MCNC: 8.2 MG/DL — LOW (ref 8.5–10.1)
CHLORIDE SERPL-SCNC: 113 MMOL/L — HIGH (ref 96–108)
CO2 SERPL-SCNC: 23 MMOL/L — SIGNIFICANT CHANGE UP (ref 22–31)
CREAT SERPL-MCNC: 0.91 MG/DL — SIGNIFICANT CHANGE UP (ref 0.5–1.3)
CULTURE RESULTS: SIGNIFICANT CHANGE UP
D DIMER BLD IA.RAPID-MCNC: 478 NG/ML DDU — HIGH
EOSINOPHIL # BLD AUTO: 0.1 K/UL — SIGNIFICANT CHANGE UP (ref 0–0.5)
EOSINOPHIL NFR BLD AUTO: 1.7 % — SIGNIFICANT CHANGE UP (ref 0–6)
GLUCOSE SERPL-MCNC: 133 MG/DL — HIGH (ref 70–99)
HCT VFR BLD CALC: 35.2 % — SIGNIFICANT CHANGE UP (ref 34.5–45)
HGB BLD-MCNC: 11.7 G/DL — SIGNIFICANT CHANGE UP (ref 11.5–15.5)
IMM GRANULOCYTES NFR BLD AUTO: 0.5 % — SIGNIFICANT CHANGE UP (ref 0–1.5)
LYMPHOCYTES # BLD AUTO: 1.49 K/UL — SIGNIFICANT CHANGE UP (ref 1–3.3)
LYMPHOCYTES # BLD AUTO: 25.6 % — SIGNIFICANT CHANGE UP (ref 13–44)
MAGNESIUM SERPL-MCNC: 1.8 MG/DL — SIGNIFICANT CHANGE UP (ref 1.6–2.6)
MCHC RBC-ENTMCNC: 32.1 PG — SIGNIFICANT CHANGE UP (ref 27–34)
MCHC RBC-ENTMCNC: 33.2 GM/DL — SIGNIFICANT CHANGE UP (ref 32–36)
MCV RBC AUTO: 96.4 FL — SIGNIFICANT CHANGE UP (ref 80–100)
MONOCYTES # BLD AUTO: 0.49 K/UL — SIGNIFICANT CHANGE UP (ref 0–0.9)
MONOCYTES NFR BLD AUTO: 8.4 % — SIGNIFICANT CHANGE UP (ref 2–14)
NEUTROPHILS # BLD AUTO: 3.71 K/UL — SIGNIFICANT CHANGE UP (ref 1.8–7.4)
NEUTROPHILS NFR BLD AUTO: 63.6 % — SIGNIFICANT CHANGE UP (ref 43–77)
NRBC # BLD: 0 /100 WBCS — SIGNIFICANT CHANGE UP (ref 0–0)
PHOSPHATE SERPL-MCNC: 2.4 MG/DL — LOW (ref 2.5–4.5)
PLATELET # BLD AUTO: 208 K/UL — SIGNIFICANT CHANGE UP (ref 150–400)
POTASSIUM SERPL-MCNC: 3.4 MMOL/L — LOW (ref 3.5–5.3)
POTASSIUM SERPL-SCNC: 3.4 MMOL/L — LOW (ref 3.5–5.3)
PROT SERPL-MCNC: 6 G/DL — SIGNIFICANT CHANGE UP (ref 6–8.3)
RBC # BLD: 3.65 M/UL — LOW (ref 3.8–5.2)
RBC # FLD: 15 % — HIGH (ref 10.3–14.5)
SODIUM SERPL-SCNC: 145 MMOL/L — SIGNIFICANT CHANGE UP (ref 135–145)
SPECIMEN SOURCE: SIGNIFICANT CHANGE UP
WBC # BLD: 5.83 K/UL — SIGNIFICANT CHANGE UP (ref 3.8–10.5)
WBC # FLD AUTO: 5.83 K/UL — SIGNIFICANT CHANGE UP (ref 3.8–10.5)

## 2020-05-01 PROCEDURE — 99232 SBSQ HOSP IP/OBS MODERATE 35: CPT

## 2020-05-01 RX ORDER — MAGNESIUM SULFATE 500 MG/ML
2 VIAL (ML) INJECTION ONCE
Refills: 0 | Status: COMPLETED | OUTPATIENT
Start: 2020-05-01 | End: 2020-05-01

## 2020-05-01 RX ORDER — ASPIRIN/CALCIUM CARB/MAGNESIUM 324 MG
1 TABLET ORAL
Qty: 0 | Refills: 0 | DISCHARGE

## 2020-05-01 RX ORDER — POTASSIUM CHLORIDE 20 MEQ
40 PACKET (EA) ORAL EVERY 4 HOURS
Refills: 0 | Status: COMPLETED | OUTPATIENT
Start: 2020-05-01 | End: 2020-05-01

## 2020-05-01 RX ORDER — SODIUM,POTASSIUM PHOSPHATES 278-250MG
1 POWDER IN PACKET (EA) ORAL
Refills: 0 | Status: COMPLETED | OUTPATIENT
Start: 2020-05-01 | End: 2020-05-01

## 2020-05-01 RX ORDER — POTASSIUM CHLORIDE 20 MEQ
2 PACKET (EA) ORAL
Qty: 0 | Refills: 0 | DISCHARGE
Start: 2020-05-01

## 2020-05-01 RX ORDER — APIXABAN 2.5 MG/1
1 TABLET, FILM COATED ORAL
Qty: 60 | Refills: 0
Start: 2020-05-01 | End: 2020-05-30

## 2020-05-01 RX ORDER — GUAIFENESIN/DEXTROMETHORPHAN 600MG-30MG
5 TABLET, EXTENDED RELEASE 12 HR ORAL
Refills: 0 | Status: DISCONTINUED | OUTPATIENT
Start: 2020-05-01 | End: 2020-05-02

## 2020-05-01 RX ADMIN — Medication 40 MILLIEQUIVALENT(S): at 12:51

## 2020-05-01 RX ADMIN — Medication 1 PACKET(S): at 20:49

## 2020-05-01 RX ADMIN — Medication 20 MILLIGRAM(S): at 05:51

## 2020-05-01 RX ADMIN — APIXABAN 5 MILLIGRAM(S): 2.5 TABLET, FILM COATED ORAL at 05:51

## 2020-05-01 RX ADMIN — Medication 40 MILLIEQUIVALENT(S): at 17:19

## 2020-05-01 RX ADMIN — Medication 12.5 MILLIGRAM(S): at 05:51

## 2020-05-01 RX ADMIN — Medication 5 MILLILITER(S): at 23:16

## 2020-05-01 RX ADMIN — Medication 50 GRAM(S): at 13:21

## 2020-05-01 RX ADMIN — ATORVASTATIN CALCIUM 40 MILLIGRAM(S): 80 TABLET, FILM COATED ORAL at 20:49

## 2020-05-01 RX ADMIN — CLOPIDOGREL BISULFATE 75 MILLIGRAM(S): 75 TABLET, FILM COATED ORAL at 11:30

## 2020-05-01 RX ADMIN — Medication 1 PACKET(S): at 17:19

## 2020-05-01 RX ADMIN — SACUBITRIL AND VALSARTAN 2 TABLET(S): 24; 26 TABLET, FILM COATED ORAL at 17:19

## 2020-05-01 RX ADMIN — Medication 12.5 MILLIGRAM(S): at 17:19

## 2020-05-01 RX ADMIN — Medication 1 PACKET(S): at 23:16

## 2020-05-01 RX ADMIN — APIXABAN 5 MILLIGRAM(S): 2.5 TABLET, FILM COATED ORAL at 17:19

## 2020-05-01 RX ADMIN — Medication 1 PACKET(S): at 16:45

## 2020-05-01 NOTE — PROGRESS NOTE ADULT - SUBJECTIVE AND OBJECTIVE BOX
Patient is a 84y old  Female who presents with a chief complaint of Afib with RVR (01 May 2020 08:57)      HPI: Patient is an 84 year old female with past medical history significant for HTN, and CAD s/p recent MI (2020) that required zoll life vest to be worn presenting to the ED with atrial fibrillation with RVR. Patient was recently transferred from [facility]. Patient stated she was last well morning the day before admission but started to progressively become weaker as the day went by. Patient stated that after she ate dinner, she started to feel very nauseas with an odd feeling in her throat to the point of wanting to vomit but did not. Patient states she had associated SOB during that time. When she went to the bathroom, she was unable to stand up from the toilet because of her worsening weakness. Her daughter called the ambulance where she went to [place] ED.  EMS stated they found the patient not responding well, with BP 68 mm Hg systolic, and O2 sat of 90% on RA. They administered O2 only, and her pressure came up and she became more responsive. At [place] ED, she was found to have Atrial fibrillation with RVR, elevated d- dimer and elevated proBNP. She was transferred to [place] ED for admission. Patient denied chest pain, vomiting, abdominal pain, headaches, fever or chills.    Of note patient had an MI in 2020. Records obtained from outpatient cardiologist Dr Saha [phone]  Cath 3/2020 : 3 stents in LAD, residual lesion in OM1  Pt was continued with Plavix, beta blocker, Statin, and decreased entresto to 24/26 bid  Echo from 3/2020 post MI revealing ef 25-30%, moderate MR, moderate TR, - no need to repeat 2decho , will need to follow up with Dr Aguirre for reassessment of Lvef 3 month post MI for possible ICD placement       Vitals  /70 RR 19 SpO2 95  EKG Afib with RVR  Significant Labs: Trop 0.116 WBC 10.76 APTT 21.1 D-dimer 2087 BUN 33 Cr 1.43 Glucose 175 GFR 34 lactate 2.3 ProBNP 5693 UA +for protein and trace ketone  Imaging   Chest xray Clear lungs. Mild cardiomegaly.  In the ED Received 1.5 L IVF NS, Full dose Lovenox 75, 12.5 cardizem IV.    2020: Patient seen and examined at bedside. Pt unable to have CTA chest done due to renal function, but was started on full dose AC anyway due to presumed PE due to elevated D-dimer. SLP evaluated patient and recommended regular diet with thin liquids. Cardizem infusion started for A fib with RVR. Cards (Dr. Casanova) consulted. Dr. Casanova D/Jarvis cardizem infusion due to LVSD and started Lopressor 25 mg PO BID (increased dose from home dose) and Eliquis 5 mg PO BID. ASA stopped as patient is at increased risk of bleeding from triple therapy and MI/PCI was > 1 month ago. Elevated trop likely due to demand ischemia in setting of A fib with RVR.  2020: Patient seen and examined at bedside. As per cardio, no need to repeat TTE. No need to trend Trops further as Troponin peaked.       OVERNIGHT EVENTS: NONE    Home Medications:  Aspirin Enteric Coated 81 mg oral delayed release tablet: 1 tab(s) orally once a day (2020 23:22)  atorvastatin 40 mg oral tablet: 1 tab(s) orally once a day (2020 23:22)  clopidogrel 75 mg oral tablet: 1 tab(s) orally once a day (2020 23:22)  Entresto 24 mg-26 mg oral tablet: 1 tab(s) orally 2 times a day (2020 23:22)  furosemide 20 mg oral tablet: 1 tab(s) orally once a day (2020 23:22)  metoprolol tartrate 25 mg oral tablet: 0.5 tab(s) orally 2 times a day (2020 23:22)      MEDICATIONS  (STANDING):  apixaban 5 milliGRAM(s) Oral every 12 hours  atorvastatin 40 milliGRAM(s) Oral at bedtime  clopidogrel Tablet 75 milliGRAM(s) Oral daily  furosemide    Tablet 20 milliGRAM(s) Oral daily  metoprolol tartrate 12.5 milliGRAM(s) Oral two times a day  sacubitril 24 mG/valsartan 26 mG 2 Tablet(s) Oral two times a day    MEDICATIONS  (PRN):  None      Allergies  Persantine (Rash)  Persantine (Unknown)      Social History:  Patient states she has been living at home with daughter since her hospitalization for Heart attack in March. Patient states she was living at home independently before completely her ADLs without assistance. She uses a cane for the steps and ambulates independently otherwise.   Tobacco: Quit 50 years ago, states she smoked tobacco for 5 years before that 1.5 packs a day for 5 years.  Alcohol: Denies   Illicit Drug Use: Denies      REVIEW OF SYSTEMS:  CONSTITUTIONAL: Admits to malaise. No fever, No chills, No fatigue, No myalgia, No body ache, No weakness  EYES: No eye pain,  No visual disturbances, No discharge, No Redness.  ENMT:  No ear pain, No nose bleed, No vertigo; No sinus pain, No throat pain, No congestion.  NECK: No pain, No stiffness.  RESPIRATORY: Admits to dyspnea that improved. No cough, No wheezing, No  hemoptysis.  CARDIOVASCULAR: Admits to palpitations. No chest pain.  GASTROINTESTINAL: Admits to nausea. No abdominal pain, No epigastric pain, No vomiting; No diarrhea, No constipation. [  ] BM  GENITOURINARY: No dysuria, No frequency, No urgency, No hematuria, No incontinence  NEUROLOGICAL: No headaches, No dizziness, No numbness, No tingling, No tremors, No weakness  EXT: No Swelling, No pain., No edema.  SKIN:  [ x ] No itching, burning, rashes, or lesions   MUSCULOSKELETAL: No joint pain or swelling; No muscle pain, No back pain, No extremity pain.  PSYCHIATRIC: No depression,  No anxiety,  No mood swings, No difficulty sleeping at night.  PAIN SCALE: [  ] None  [  ] Other-  ROS Unable to obtain due to - [  ] Dementia  [  ] Lethargy [  ] Drowsy [  ] Sedated [  ] non verbal  REST OF REVIEW Of SYSTEM - [  ] Normal       Vital Signs Last 24 Hrs  T(C): 36.7 (01 May 2020 05:06), Max: 36.9 (2020 22:07)  T(F): 98 (01 May 2020 05:06), Max: 98.4 (2020 22:07)  HR: 76 (01 May 2020 05:30) (74 - 79)  BP: 116/68 (01 May 2020 05:30) (110/59 - 131/69)  BP(mean): --  RR: 17 (01 May 2020 05:06) (17 - 20)  SpO2: 95% (01 May 2020 05:06) (94% - 96%)  Finger Stick      I&Os  04-30 @ 07:01  -  - @ 07:00  --------------------------------------------------------  IN: 0 mL / OUT: 0 mL / NET: 0 mL        PHYSICAL EXAM: Overweight elderly female.  GENERAL:  [ x ] NAD , [ x ] well appearing, [  ] Agitated, [  ] Drowsy,  [  ] Lethargy, [  ] confused   HEAD:  [ x ] Normal, [  ] Other  EYES:  [ x ] EOMI, [ x ] PERRLA, [ x ] conjunctiva and sclera clear normal, [  ] Other,  [  ] Pallor,[  ] Discharge  ENMT:  [  ] Normal, [  ] Moist mucous membranes, [  ] Good dentition, [  ] No Thrush  NECK:  [ x ] Supple, [ x ] No JVD, [  ] Normal thyroid, [  ] Lymphadenopathy [  ] Other  CHEST/LUNG:  [ x ] Clear to auscultation bilaterally, [x  ] Breath Sounds equal B/L / Decrease, [  ] poor effort  [ x ] No rales, [ x ] No rhonchi  [ x ]  No wheezing,   HEART:  [ x ] Irregularly irregular [  ] Regular rate and rhythm, [  ] tachycardia, [  ] Bradycardia,  [  ] irregular  [ x ] No murmurs, No rubs, No gallops, [  ] PPM in place (Mfr:  )  ABDOMEN:  [ x ] Soft, [ x ] Nontender, [ x ] Nondistended, [  ] No mass, [  ] Bowel sounds present, [ x ] obese  NERVOUS SYSTEM:  [ x ] Alert & Oriented X3, [ x ] Nonfocal  [  ] Confusion  [  ] Encephalopathic [  ] Sedated [  ] Unable to assess, [  ] Dementia [  ] Other-  EXTREMITIES: [ x ] 2+ Peripheral Pulses, No clubbing, No cyanosis,  [  ] edema B/L lower EXT. [  ] PVD stasis skin changes B/L Lower EXT, [  ] wound  LYMPH: No lymphadenopathy noted  SKIN:  [ x ] No rashes or lesions, [  ] Pressure Ulcers, [  ] ecchymosis, [  ] Skin Tears, [  ] Other    DIET: DASH/TLC    LABS:                        11.7   5.83  )-----------( 208      ( 01 May 2020 09:39 )             35.2     01 May 2020 09:39    145    |  113    |  18     ----------------------------<  133    3.4     |  23     |  0.91     Ca    8.2        01 May 2020 09:39  Phos  2.4       01 May 2020 09:39  Mg     1.8       01 May 2020 09:39    TPro  6.0    /  Alb  2.7    /  TBili  0.5    /  DBili  x      /  AST  21     /  ALT  17     /  AlkPhos  63     01 May 2020 09:39  PT/INR - ( 2020 02:55 )   PT: 12.2 sec;   INR: 1.10 ratio    PTT - ( 2020 02:55 )  PTT:21.1 sec  D-dimer () 478 (H)    Procalcitonin (): 0.07  Lactate (): 2.3 (H) -> 1.2    Serum Pro-Brain Natriuretic Peptide: 5693 pg/mL (20 @ 03:34)    THYROID FUNCTION TESTS  T3 (): 79 (L)  T4 (): 8.8  TSH 1.38    Urinalysis Basic - ( 2020 03:39 )  Color: Yellow / Appearance: Clear / S.015 / pH: x  Gluc: x / Ketone: Trace  / Bili: Negative / Urobili: Negative mg/dL   Blood: x / Protein: 100 mg/dL / Nitrite: Negative   Leuk Esterase: Small / RBC: 3-5 /HPF / WBC 6-10   Sq Epi: x / Non Sq Epi: Moderate / Bacteria: Moderate      CARDIAC MARKERS ( 2020 21:53 )  Trop I 0.235 ng/mL / x     / CK 63 U/L / x     / CKMB 2.5 ng/mL  CARDIAC MARKERS ( 2020 14:47 )  Trop I 0.248 ng/mL / x     / CK 61 U/L / x     / CKMB 3.4 ng/mL  CARDIAC MARKERS ( 2020 06:31 )  Trop I 0.116 ng/mL / x     / CK 53 U/L / x     / CKMB  2.9 ng/mL  CARDIAC MARKERS ( 2020 03:34 )  Trop I .036 ng/mL / x     / x     / x     / x          MICROBIOLOGY  Urine Culture (): < 10,000 CFU/mL Normal Urogenital Urmila  COVID-19 PCR (): Not detected      CARDIOLOGY  EKG (): Atrial fibrillation. Low voltage QRS. T wave abnormality, consider lateral ischemia.  bpm. QTc 512 ms.      RADIOLOGY & ADDITIONAL TESTS:  CXR (): Clear lungs. Mild cardiomegaly.    HEALTH ISSUES - PROBLEM Dx:  Need for prophylactic measure  VITO (acute kidney injury)  Elevated troponin  Atrial fibrillation with RVR  HTN   CAD     Consultant(s) Notes Reviewed:  [ X ] YES     Care Discussed with [X] Consultants  [ X ] Patient  [  ] Family [  ] HCP [  ]   [  ] Social Service  [  ] RN, [  ] Physical Therapy,[  ] Palliative care team  DVT PPX: [  ] Lovenox, [  ] S C Heparin, [  ] Coumadin, [  ] Xarelto, [ X ] Eliquis, [  ] Pradaxa, [  ] IV Heparin drip, [  ] SCD [  ] Contraindication 2 to GI Bleed,[  ] Ambulation [  ] Contraindicated 2 to  bleed [  ] Contraindicated 2 to Brain Bleed  Advanced directive: [ X ] None, [  ] DNR/DNI Patient is a 84y old  Female who presents with a chief complaint of Afib with RVR (01 May 2020 08:57)      HPI: Patient is an 84 year old female with past medical history significant for HTN, and CAD s/p recent MI (2020) that required zoll life vest to be worn presenting to the ED with atrial fibrillation with RVR. Patient was recently transferred from [facility]. Patient stated she was last well morning the day before admission but started to progressively become weaker as the day went by. Patient stated that after she ate dinner, she started to feel very nauseas with an odd feeling in her throat to the point of wanting to vomit but did not. Patient states she had associated SOB during that time. When she went to the bathroom, she was unable to stand up from the toilet because of her worsening weakness. Her daughter called the ambulance where she went to [place] ED.  EMS stated they found the patient not responding well, with BP 68 mm Hg systolic, and O2 sat of 90% on RA. They administered O2 only, and her pressure came up and she became more responsive. At [place] ED, she was found to have Atrial fibrillation with RVR, elevated d- dimer and elevated proBNP. She was transferred to [place] ED for admission. Patient denied chest pain, vomiting, abdominal pain, headaches, fever or chills.    Of note patient had an MI in 2020. Records obtained from outpatient cardiologist Dr Saha [phone]  Cath 3/2020 : 3 stents in LAD, residual lesion in OM1  Pt was continued with Plavix, beta blocker, Statin, and decreased entresto to 24/26 bid  Echo from 3/2020 post MI revealing ef 25-30%, moderate MR, moderate TR, - no need to repeat 2decho , will need to follow up with Dr Aguirre for reassessment of Lvef 3 month post MI for possible ICD placement       Vitals  /70 RR 19 SpO2 95  EKG Afib with RVR  Significant Labs: Trop 0.116 WBC 10.76 APTT 21.1 D-dimer 2087 BUN 33 Cr 1.43 Glucose 175 GFR 34 lactate 2.3 ProBNP 5693 UA +for protein and trace ketone  Imaging   Chest xray Clear lungs. Mild cardiomegaly.  In the ED Received 1.5 L IVF NS, Full dose Lovenox 75, 12.5 cardizem IV.    2020: Patient seen and examined at bedside. Pt unable to have CTA chest done due to renal function, but was started on full dose AC anyway due to presumed PE due to elevated D-dimer. SLP evaluated patient and recommended regular diet with thin liquids. Cardizem infusion started for A fib with RVR. Cards (Dr. Casanova) consulted. Dr. Casanova D/Jarvis cardizem infusion due to LVSD and restarted outpatient Lopressor 12.5 mg PO BID and started Eliquis 5 mg PO BID. ASA stopped as patient is at increased risk of bleeding from triple therapy and MI/PCI was > 1 month ago. Elevated trop likely due to demand ischemia in setting of A fib with RVR.  2020: Patient seen and examined at bedside. As per cardio, no need to repeat TTE. No need to trend Trops further as Troponin peaked. Discussed with pharmacy to discharge patient on Eliquis 5 mg PO BID via meds to beds to ease transition of discharge.     OVERNIGHT EVENTS: NONE    Home Medications:  Aspirin Enteric Coated 81 mg oral delayed release tablet: 1 tab(s) orally once a day (2020 23:22)  atorvastatin 40 mg oral tablet: 1 tab(s) orally once a day (2020 23:22)  clopidogrel 75 mg oral tablet: 1 tab(s) orally once a day (2020 23:22)  Entresto 24 mg-26 mg oral tablet: 1 tab(s) orally 2 times a day (2020 23:22)  furosemide 20 mg oral tablet: 1 tab(s) orally once a day (2020 23:22)  metoprolol tartrate 25 mg oral tablet: 0.5 tab(s) orally 2 times a day (2020 23:22)      MEDICATIONS  (STANDING):  apixaban 5 milliGRAM(s) Oral every 12 hours  atorvastatin 40 milliGRAM(s) Oral at bedtime  clopidogrel Tablet 75 milliGRAM(s) Oral daily  furosemide    Tablet 20 milliGRAM(s) Oral daily  metoprolol tartrate 12.5 milliGRAM(s) Oral two times a day  sacubitril 24 mG/valsartan 26 mG 2 Tablet(s) Oral two times a day    MEDICATIONS  (PRN):  None      Allergies  Persantine (Rash)  Persantine (Unknown)      Social History:  Patient states she has been living at home with daughter since her hospitalization for Heart attack in March. Patient states she was living at home independently before completely her ADLs without assistance. She uses a cane for the steps and ambulates independently otherwise.   Tobacco: Quit 50 years ago, states she smoked tobacco for 5 years before that 1.5 packs a day for 5 years.  Alcohol: Denies   Illicit Drug Use: Denies      REVIEW OF SYSTEMS:  CONSTITUTIONAL: Admits to malaise. No fever, No chills, No fatigue, No myalgia, No body ache, No weakness  EYES: No eye pain,  No visual disturbances, No discharge, No Redness.  ENMT:  No ear pain, No nose bleed, No vertigo; No sinus pain, No throat pain, No congestion.  NECK: No pain, No stiffness.  RESPIRATORY: Admits to dyspnea that improved. No cough, No wheezing, No  hemoptysis.  CARDIOVASCULAR: Admits to palpitations. No chest pain.  GASTROINTESTINAL: Admits to nausea. No abdominal pain, No epigastric pain, No vomiting; No diarrhea, No constipation. [  ] BM  GENITOURINARY: No dysuria, No frequency, No urgency, No hematuria, No incontinence  NEUROLOGICAL: No headaches, No dizziness, No numbness, No tingling, No tremors, No weakness  EXT: No Swelling, No pain., No edema.  SKIN:  [ x ] No itching, burning, rashes, or lesions   MUSCULOSKELETAL: No joint pain or swelling; No muscle pain, No back pain, No extremity pain.  PSYCHIATRIC: No depression,  No anxiety,  No mood swings, No difficulty sleeping at night.  PAIN SCALE: [  ] None  [  ] Other-  ROS Unable to obtain due to - [  ] Dementia  [  ] Lethargy [  ] Drowsy [  ] Sedated [  ] non verbal  REST OF REVIEW Of SYSTEM - [  ] Normal       Vital Signs Last 24 Hrs  T(C): 36.7 (01 May 2020 05:06), Max: 36.9 (2020 22:07)  T(F): 98 (01 May 2020 05:06), Max: 98.4 (2020 22:07)  HR: 76 (01 May 2020 05:30) (74 - 79)  BP: 116/68 (01 May 2020 05:30) (110/59 - 131/69)  BP(mean): --  RR: 17 (01 May 2020 05:06) (17 - 20)  SpO2: 95% (01 May 2020 05:06) (94% - 96%)  Finger Stick      I&Os  04-30 @ 07:01  -  - @ 07:00  --------------------------------------------------------  IN: 0 mL / OUT: 0 mL / NET: 0 mL        PHYSICAL EXAM: Overweight elderly female.  GENERAL:  [ x ] NAD , [ x ] well appearing, [  ] Agitated, [  ] Drowsy,  [  ] Lethargy, [  ] confused   HEAD:  [ x ] Normal, [  ] Other  EYES:  [ x ] EOMI, [ x ] PERRLA, [ x ] conjunctiva and sclera clear normal, [  ] Other,  [  ] Pallor,[  ] Discharge  ENMT:  [  ] Normal, [  ] Moist mucous membranes, [  ] Good dentition, [  ] No Thrush  NECK:  [ x ] Supple, [ x ] No JVD, [  ] Normal thyroid, [  ] Lymphadenopathy [  ] Other  CHEST/LUNG:  [ x ] Clear to auscultation bilaterally, [x  ] Breath Sounds equal B/L / Decrease, [  ] poor effort  [ x ] No rales, [ x ] No rhonchi  [ x ]  No wheezing,   HEART:  [ x ] Irregularly irregular [  ] Regular rate and rhythm, [  ] tachycardia, [  ] Bradycardia,  [  ] irregular  [ x ] No murmurs, No rubs, No gallops, [  ] PPM in place (Mfr:  )  ABDOMEN:  [ x ] Soft, [ x ] Nontender, [ x ] Nondistended, [  ] No mass, [  ] Bowel sounds present, [ x ] obese  NERVOUS SYSTEM:  [ x ] Alert & Oriented X3, [ x ] Nonfocal  [  ] Confusion  [  ] Encephalopathic [  ] Sedated [  ] Unable to assess, [  ] Dementia [  ] Other-  EXTREMITIES: [ x ] 2+ Peripheral Pulses, No clubbing, No cyanosis,  [  ] edema B/L lower EXT. [  ] PVD stasis skin changes B/L Lower EXT, [  ] wound  LYMPH: No lymphadenopathy noted  SKIN:  [ x ] No rashes or lesions, [  ] Pressure Ulcers, [  ] ecchymosis, [  ] Skin Tears, [  ] Other    DIET: DASH/TLC    LABS:                        11.7   5.83  )-----------( 208      ( 01 May 2020 09:39 )             35.2     01 May 2020 09:39    145    |  113    |  18     ----------------------------<  133    3.4     |  23     |  0.91     Ca    8.2        01 May 2020 09:39  Phos  2.4       01 May 2020 09:39  Mg     1.8       01 May 2020 09:39    TPro  6.0    /  Alb  2.7    /  TBili  0.5    /  DBili  x      /  AST  21     /  ALT  17     /  AlkPhos  63     01 May 2020 09:39  PT/INR - ( 2020 02:55 )   PT: 12.2 sec;   INR: 1.10 ratio    PTT - ( 2020 02:55 )  PTT:21.1 sec  D-dimer () 478 (H)    Procalcitonin (): 0.07  Lactate (): 2.3 (H) -> 1.2    Serum Pro-Brain Natriuretic Peptide: 5693 pg/mL (20 @ 03:34)    THYROID FUNCTION TESTS  T3 (): 79 (L)  T4 (): 8.8  TSH 1.38    Urinalysis Basic - ( 2020 03:39 )  Color: Yellow / Appearance: Clear / S.015 / pH: x  Gluc: x / Ketone: Trace  / Bili: Negative / Urobili: Negative mg/dL   Blood: x / Protein: 100 mg/dL / Nitrite: Negative   Leuk Esterase: Small / RBC: 3-5 /HPF / WBC 6-10   Sq Epi: x / Non Sq Epi: Moderate / Bacteria: Moderate      CARDIAC MARKERS ( 2020 21:53 )  Trop I 0.235 ng/mL / x     / CK 63 U/L / x     / CKMB 2.5 ng/mL  CARDIAC MARKERS ( 2020 14:47 )  Trop I 0.248 ng/mL / x     / CK 61 U/L / x     / CKMB 3.4 ng/mL  CARDIAC MARKERS ( 2020 06:31 )  Trop I 0.116 ng/mL / x     / CK 53 U/L / x     / CKMB  2.9 ng/mL  CARDIAC MARKERS ( 2020 03:34 )  Trop I .036 ng/mL / x     / x     / x     / x          MICROBIOLOGY  Urine Culture (): < 10,000 CFU/mL Normal Urogenital Urmila  COVID-19 PCR (): Not detected      CARDIOLOGY  EKG (): Atrial fibrillation. Low voltage QRS. T wave abnormality, consider lateral ischemia.  bpm. QTc 512 ms.      RADIOLOGY & ADDITIONAL TESTS:  CXR (): Clear lungs. Mild cardiomegaly.    HEALTH ISSUES - PROBLEM Dx:  Need for prophylactic measure  VITO (acute kidney injury)  Elevated troponin  Atrial fibrillation with RVR  HTN   CAD     Consultant(s) Notes Reviewed:  [ X ] YES     Care Discussed with [X] Consultants  [ X ] Patient  [  ] Family [  ] HCP [  ]   [  ] Social Service  [  ] RN, [  ] Physical Therapy,[  ] Palliative care team  DVT PPX: [  ] Lovenox, [  ] S C Heparin, [  ] Coumadin, [  ] Xarelto, [ X ] Eliquis, [  ] Pradaxa, [  ] IV Heparin drip, [  ] SCD [  ] Contraindication 2 to GI Bleed,[  ] Ambulation [  ] Contraindicated 2 to  bleed [  ] Contraindicated 2 to Brain Bleed  Advanced directive: [ X ] None, [  ] DNR/DNI Patient is a 84y old  Female who presents with a chief complaint of Afib with RVR (01 May 2020 08:57)      HPI: Patient is an 84 year old female with past medical history significant for HTN, and CAD s/p recent MI (2020) that required zoll life vest to be worn presenting to the ED with atrial fibrillation with RVR. Patient was recently transferred from [facility]. Patient stated she was last well morning the day before admission but started to progressively become weaker as the day went by. Patient stated that after she ate dinner, she started to feel very nauseas with an odd feeling in her throat to the point of wanting to vomit but did not. Patient states she had associated SOB during that time. When she went to the bathroom, she was unable to stand up from the toilet because of her worsening weakness. Her daughter called the ambulance where she went to [place] ED.  EMS stated they found the patient not responding well, with BP 68 mm Hg systolic, and O2 sat of 90% on RA. They administered O2 only, and her pressure came up and she became more responsive. At [place] ED, she was found to have Atrial fibrillation with RVR, elevated d- dimer and elevated proBNP. She was transferred to [place] ED for admission. Patient denied chest pain, vomiting, abdominal pain, headaches, fever or chills.    Of note patient had an MI in 2020. Records obtained from outpatient cardiologist Dr Saha [phone]  Cath 3/2020 : 3 stents in LAD, residual lesion in OM1  Pt was continued with Plavix, beta blocker, Statin, and decreased entresto to 24/26 bid  Echo from 3/2020 post MI revealing ef 25-30%, moderate MR, moderate TR, - no need to repeat 2decho , will need to follow up with Dr Aguirre for reassessment of Lvef 3 month post MI for possible ICD placement       Vitals  /70 RR 19 SpO2 95  EKG Afib with RVR  Significant Labs: Trop 0.116 WBC 10.76 APTT 21.1 D-dimer 2087 BUN 33 Cr 1.43 Glucose 175 GFR 34 lactate 2.3 ProBNP 5693 UA +for protein and trace ketone  Imaging   Chest xray Clear lungs. Mild cardiomegaly.  In the ED Received 1.5 L IVF NS, Full dose Lovenox 75, 12.5 cardizem IV.    2020: Patient seen and examined at bedside. Pt unable to have CTA chest done due to renal function, but was started on full dose AC anyway due to presumed PE due to elevated D-dimer. SLP evaluated patient and recommended regular diet with thin liquids. Cardizem infusion started for A fib with RVR. Cards (Dr. Casanova) consulted. Dr. Casanova D/Jarvis cardizem infusion due to LVSD and restarted outpatient Lopressor 12.5 mg PO BID and started Eliquis 5 mg PO BID. ASA stopped as patient is at increased risk of bleeding from triple therapy and MI/PCI was > 1 month ago. Elevated trop likely due to demand ischemia in setting of A fib with RVR.  2020: Patient seen and examined at bedside. As per cardio, no need to repeat TTE. No need to trend Trops further as Troponin peaked. Discussed with pharmacy to discharge patient on Eliquis 5 mg PO BID via meds to beds to ease transition of discharge. K replaced.    OVERNIGHT EVENTS: NONE    Home Medications:  Aspirin Enteric Coated 81 mg oral delayed release tablet: 1 tab(s) orally once a day (2020 23:22)  atorvastatin 40 mg oral tablet: 1 tab(s) orally once a day (2020 23:22)  clopidogrel 75 mg oral tablet: 1 tab(s) orally once a day (2020 23:22)  Entresto 24 mg-26 mg oral tablet: 1 tab(s) orally 2 times a day (2020 23:22)  furosemide 20 mg oral tablet: 1 tab(s) orally once a day (2020 23:22)  metoprolol tartrate 25 mg oral tablet: 0.5 tab(s) orally 2 times a day (2020 23:22)      MEDICATIONS  (STANDING):  apixaban 5 milliGRAM(s) Oral every 12 hours  atorvastatin 40 milliGRAM(s) Oral at bedtime  clopidogrel Tablet 75 milliGRAM(s) Oral daily  furosemide    Tablet 20 milliGRAM(s) Oral daily  metoprolol tartrate 12.5 milliGRAM(s) Oral two times a day  sacubitril 24 mG/valsartan 26 mG 2 Tablet(s) Oral two times a day    MEDICATIONS  (PRN):  None      Allergies  Persantine (Rash)  Persantine (Unknown)      Social History:  Patient states she has been living at home with daughter since her hospitalization for Heart attack in March. Patient states she was living at home independently before completely her ADLs without assistance. She uses a cane for the steps and ambulates independently otherwise.   Tobacco: Quit 50 years ago, states she smoked tobacco for 5 years before that 1.5 packs a day for 5 years.  Alcohol: Denies   Illicit Drug Use: Denies      REVIEW OF SYSTEMS: i feel OK  CONSTITUTIONAL: Admits to malaise. No fever, No chills, No fatigue, No myalgia, No body ache, No weakness  EYES: No eye pain,  No visual disturbances, No discharge, No Redness.  ENMT:  No ear pain, No nose bleed, No vertigo; No sinus pain, No throat pain, No congestion.  NECK: No pain, No stiffness.  RESPIRATORY: Admits to dyspnea that improved. No cough, No wheezing, No  hemoptysis.  CARDIOVASCULAR: Admits to palpitations. No chest pain.  GASTROINTESTINAL: Admits to nausea. No abdominal pain, No epigastric pain, No vomiting; No diarrhea, No constipation. [  ] BM  GENITOURINARY: No dysuria, No frequency, No urgency, No hematuria, No incontinence  NEUROLOGICAL: No headaches, No dizziness, No numbness, No tingling, No tremors, No weakness  EXT: No Swelling, No pain., No edema.  SKIN:  [ x ] No itching, burning, rashes, or lesions   MUSCULOSKELETAL: No joint pain or swelling; No muscle pain, No back pain, No extremity pain.  PSYCHIATRIC: No depression,  No anxiety,  No mood swings, No difficulty sleeping at night.  PAIN SCALE: [ x ] None  [  ] Other-  ROS Unable to obtain due to - [  ] Dementia  [  ] Lethargy [  ] Drowsy [  ] Sedated [  ] non verbal  REST OF REVIEW Of SYSTEM - [ x ] Normal       Vital Signs Last 24 Hrs  T(C): 36.7 (01 May 2020 05:06), Max: 36.9 (2020 22:07)  T(F): 98 (01 May 2020 05:06), Max: 98.4 (2020 22:07)  HR: 76 (01 May 2020 05:30) (74 - 79)  BP: 116/68 (01 May 2020 05:30) (110/59 - 131/69)  BP(mean): --  RR: 17 (01 May 2020 05:06) (17 - 20)  SpO2: 95% (01 May 2020 05:06) (94% - 96%)  Finger Stick      I&Os  04-30 @ 07:01  -  05- @ 07:00  --------------------------------------------------------  IN: 0 mL / OUT: 0 mL / NET: 0 mL        PHYSICAL EXAM: Overweight elderly female.  GENERAL:  [ x ] NAD , [ x ] well appearing, [  ] Agitated, [  ] Drowsy,  [  ] Lethargy, [  ] confused   HEAD:  [ x ] Normal, [  ] Other  EYES:  [ x ] EOMI, [ x ] PERRLA, [ x ] conjunctiva and sclera clear normal, [  ] Other,  [  ] Pallor,[  ] Discharge  ENMT:  [x  ] Normal, [ x ] Moist mucous membranes, [ x ] Good dentition, [ x] No Thrush  NECK:  [ x ] Supple, [ x ] No JVD, [ x ] Normal thyroid, [  ] Lymphadenopathy [  ] Other  CHEST/LUNG:  [ x ] Clear to auscultation bilaterally, [x  ] Breath Sounds equal B/L / Decrease, [  ] poor effort  [ x ] No rales, [ x ] No rhonchi  [ x ]  No wheezing,   HEART:  [ x ] Irregularly irregular [  ] Regular rate and rhythm, [  ] tachycardia, [  ] Bradycardia,  [  ] irregular  [ x ] No murmurs, No rubs, No gallops, [  ] PPM in place (Mfr:  )  ABDOMEN:  [ x ] Soft, [ x ] Nontender, [ x ] Nondistended, [  ] No mass, [  ] Bowel sounds present, [ x ] obese  NERVOUS SYSTEM:  [ x ] Alert & Oriented X3, [ x ] Nonfocal  [  ] Confusion  [  ] Encephalopathic [  ] Sedated [  ] Unable to assess, [  ] Dementia [  ] Other-  EXTREMITIES: [ x ] 2+ Peripheral Pulses, No clubbing, No cyanosis,  [  ] edema B/L lower EXT. [  ] PVD stasis skin changes B/L Lower EXT, [  ] wound  LYMPH: No lymphadenopathy noted  SKIN:  [ x ] No rashes or lesions, [  ] Pressure Ulcers, [  ] ecchymosis, [  ] Skin Tears, [  ] Other    DIET: DASH/TLC    LABS:                        11.7   5.83  )-----------( 208      ( 01 May 2020 09:39 )             35.2     01 May 2020 09:39    145    |  113    |  18     ----------------------------<  133    3.4     |  23     |  0.91     Ca    8.2        01 May 2020 09:39  Phos  2.4       01 May 2020 09:39  Mg     1.8       01 May 2020 09:39    TPro  6.0    /  Alb  2.7    /  TBili  0.5    /  DBili  x      /  AST  21     /  ALT  17     /  AlkPhos  63     01 May 2020 09:39  PT/INR - ( 2020 02:55 )   PT: 12.2 sec;   INR: 1.10 ratio    PTT - ( 2020 02:55 )  PTT:21.1 sec  D-dimer () 478 (H)    Procalcitonin (): 0.07  Lactate (): 2.3 (H) -> 1.2    Serum Pro-Brain Natriuretic Peptide: 5693 pg/mL (20 @ 03:34)    THYROID FUNCTION TESTS  T3 (): 79 (L)  T4 (): 8.8  TSH 1.38    Urinalysis Basic - ( 2020 03:39 )  Color: Yellow / Appearance: Clear / S.015 / pH: x  Gluc: x / Ketone: Trace  / Bili: Negative / Urobili: Negative mg/dL   Blood: x / Protein: 100 mg/dL / Nitrite: Negative   Leuk Esterase: Small / RBC: 3-5 /HPF / WBC 6-10   Sq Epi: x / Non Sq Epi: Moderate / Bacteria: Moderate      CARDIAC MARKERS ( 2020 21:53 )  Trop I 0.235 ng/mL / x     / CK 63 U/L / x     / CKMB 2.5 ng/mL  CARDIAC MARKERS ( 2020 14:47 )  Trop I 0.248 ng/mL / x     / CK 61 U/L / x     / CKMB 3.4 ng/mL  CARDIAC MARKERS ( 2020 06:31 )  Trop I 0.116 ng/mL / x     / CK 53 U/L / x     / CKMB  2.9 ng/mL  CARDIAC MARKERS ( 2020 03:34 )  Trop I .036 ng/mL / x     / x     / x     / x          MICROBIOLOGY  Urine Culture (): < 10,000 CFU/mL Normal Urogenital Urmila  COVID-19 PCR (): Not detected      CARDIOLOGY  EKG (): Atrial fibrillation. Low voltage QRS. T wave abnormality, consider lateral ischemia.  bpm. QTc 512 ms.      RADIOLOGY & ADDITIONAL TESTS:  CXR (): Clear lungs. Mild cardiomegaly.    HEALTH ISSUES - PROBLEM Dx:  Need for prophylactic measure  VITO (acute kidney injury)  Elevated troponin  Atrial fibrillation with RVR  HTN   CAD     Consultant(s) Notes Reviewed:  [ X ] YES     Care Discussed with [X] Consultants  [ X ] Patient  [  ] Family [  ] HCP [  ]   [  ] Social Service  [x  ] RN, [  ] Physical Therapy,[  ] Palliative care team  DVT PPX: [  ] Lovenox, [  ] S C Heparin, [  ] Coumadin, [  ] Xarelto, [ X ] Eliquis, [  ] Pradaxa, [  ] IV Heparin drip, [  ] SCD [  ] Contraindication 2 to GI Bleed,[  ] Ambulation [  ] Contraindicated 2 to  bleed [  ] Contraindicated 2 to Brain Bleed  Advanced directive: [ X ] None, [  ] DNR/DNI

## 2020-05-01 NOTE — PROGRESS NOTE ADULT - PROBLEM SELECTOR PLAN 2
-Patient found to have elevated troponin, likely due to demand ischemia in the setting of A fib with RVR.  -Troponin peaked and as per cardiology, no need to trend further.

## 2020-05-01 NOTE — PROGRESS NOTE ADULT - SUBJECTIVE AND OBJECTIVE BOX
[facility] -- Gerri Brothers, Dia, Mario, Denzel Cristobal, Bj Mejia: Office # [phone]    Follow Up:  HTN, HLD  CAD s/p recent MI 3/14/2020, s/p 3 stents LAD with residual disease OM1, s/p IABP, with post MI EF 30% discharged on life vest    Subjective/Observations: Patient seen and examined. Patient awake and alert, resting comfortably in chair. No complaints of chest pain, SOB, LE edema, cough. No signs of orthopnea or PND. Tolerating room air. C/o cough     REVIEW OF SYSTEMS: All review of systems is negative for eye, ENT, GI, , allergic, dermatologic, musculoskeletal and neurologic except as described above    PAST MEDICAL & SURGICAL HISTORY:  Mild HTN  CAD (coronary artery disease): s/p Cath &amp; 3 stents on 3/2020  EF ~30%  Rapid atrial fibrillation  CAD (coronary artery disease)  No significant past surgical history    MEDICATIONS  (STANDING):  apixaban 5 milliGRAM(s) Oral every 12 hours  atorvastatin 40 milliGRAM(s) Oral at bedtime  clopidogrel Tablet 75 milliGRAM(s) Oral daily  furosemide    Tablet 20 milliGRAM(s) Oral daily  metoprolol tartrate 12.5 milliGRAM(s) Oral two times a day  sacubitril 24 mG/valsartan 26 mG 2 Tablet(s) Oral two times a day    MEDICATIONS  (PRN):    Allergies  Persantine (Rash)  Persantine (Unknown)    Vital Signs Last 24 Hrs  T(C): 36.7 (01 May 2020 05:06), Max: 36.9 (30 Apr 2020 22:07)  T(F): 98 (01 May 2020 05:06), Max: 98.4 (30 Apr 2020 22:07)  HR: 76 (01 May 2020 05:30) (66 - 79)  BP: 116/68 (01 May 2020 05:30) (89/52 - 131/69)  BP(mean): --  RR: 17 (01 May 2020 05:06) (17 - 20)  SpO2: 95% (01 May 2020 05:06) (94% - 96%)    I&O's Summary  30 Apr 2020 07:01  -  01 May 2020 07:00  --------------------------------------------------------  IN: 0 mL / OUT: 0 mL / NET: 0 mL    TELE: NSR 80s   PHYSICAL EXAM:  Appearance: NAD, no distress, alert, Well developed   HEENT: Moist Mucous Membranes, Anicteric  Cardiovascular: Regular rate and rhythm, Normal S1 S2, No JVD, No murmurs, No rubs, gallops or clicks  Respiratory: Non-labored, Clear to auscultation, No rales, No rhonchi, No wheezing.   Gastrointestinal:  Soft, Non-tender, + BS  Neurologic: Non-focal  Skin: Warm and dry, No visible rashes or ulcers, No ecchymosis, No cyanosis  Musculoskeletal: No clubbing, No cyanosis, No joint swelling/tenderness  Psychiatry: Mood & affect appropriate  Lymph: No peripheral edema.     LABS: All Labs Reviewed:                        11.4   7.79  )-----------( 210      ( 30 Apr 2020 09:50 )             34.5                         13.9   10.76 )-----------( 230      ( 30 Apr 2020 02:55 )             42.8     30 Apr 2020 09:50    144    |  114    |  27     ----------------------------<  144    4.3     |  22     |  0.92   30 Apr 2020 02:55    138    |  103    |  33     ----------------------------<  175    3.5     |  22     |  1.43     Ca    7.9        30 Apr 2020 09:50  Ca    9.5        30 Apr 2020 02:55    TPro  5.8    /  Alb  2.7    /  TBili  0.4    /  DBili  x      /  AST  21     /  ALT  18     /  AlkPhos  71     30 Apr 2020 09:50  TPro  7.1    /  Alb  3.5    /  TBili  0.4    /  DBili  x      /  AST  21     /  ALT  21     /  AlkPhos  89     30 Apr 2020 02:55  PT/INR - ( 30 Apr 2020 02:55 )   PT: 12.2 sec;   INR: 1.10 ratio      PTT - ( 30 Apr 2020 02:55 )  PTT:21.1 sec  CARDIAC MARKERS ( 30 Apr 2020 21:53 )  .235 ng/mL / x     / 63 U/L / x     / 2.5 ng/mL  CARDIAC MARKERS ( 30 Apr 2020 14:47 )  .248 ng/mL / x     / 61 U/L / x     / 3.4 ng/mL  CARDIAC MARKERS ( 30 Apr 2020 06:31 )  .116 ng/mL / x     / 53 U/L / x     / 2.9 ng/mL  CARDIAC MARKERS ( 30 Apr 2020 03:34 )  .036 ng/mL / x     / x     / x     / x      Creatine Kinase, Serum: 63 U/L (04-30-20 @ 21:53)  Troponin I, Serum: .235 ng/mL (04-30-20 @ 21:53)  Creatine Kinase, Serum: 61 U/L (04-30-20 @ 14:47)  Lactate, Blood: 1.2 mmol/L (04-30-20 @ 09:50)  Lactate, Blood: 2.3 mmol/L (04-30-20 @ 04:19)  Lactate, Blood: 3.3 mmol/L (04-30-20 @ 02:55)    12 Lead ECG:   Ventricular Rate 145 BPM  Atrial Rate 166 BPM  QRS Duration 78 ms  Q-T Interval 330 ms  QTC Calculation(Bezet) 512 ms  R Axis 48 degrees  T Axis 175 degrees  Diagnosis Line Atrial fibrillation  Low voltage QRS  T wave abnormality, consider lateral ischemia  Abnormal ECG  No previous ECGs available  Confirmed by Kathie Rivers MD ([id number]) on 4/30/2020 11:23:36 AM (04-30-20 @ 02:57)    < from: Xray Chest 1 View-PORTABLE IMMEDIATE (04.30.20 @ 03:32) >  Impression:  1.  Clear lungs. Mild cardiomegaly.      DISCRETE X-RAY DATA:  Percent of LEFT lung opacification: Clear  Percent of RIGHT lung opacification: Clear  Change in lung opacification from most recent x-ray (<=3 days): No Prior  Change from prior dated 3 or more days (same admission): No Prior    < end of copied text >

## 2020-05-01 NOTE — PROGRESS NOTE ADULT - PROBLEM SELECTOR PLAN 6
-Patient with a history of HTN. Chronic; stable.  -Continue home medication of metoprolol with hold parameters, Entresto, and Furosemide.  - Continue to monitor

## 2020-05-01 NOTE — PROGRESS NOTE ADULT - PROBLEM SELECTOR PLAN 1
-Presented to the ED with atrial fibrillation with RVR. Found to have hypotensive and afib with RVR, elevated d-dimer (2087), and PRoBNP (5693).   -S/p Cardizem drip. Cardiology D/Jarvis Cardizem drip due to LVSD and started patient on Lopressor 25 mg PO BID (increased dose from outpatient dose).   -Cardiology (Dr. Casanova) started patient on Eliquis 5 mg PO BID for A fib. Outpatient Plavix 75 mg PO daily was continued but outpatient aspirin 81 mg PO daily was discontinued as patient is at a high risk of bleeding on triple therapy and she is greater than 1 month from MI and PCI.  -Magnesium and phosphorus were repleted on 5/1 to keep Mg > 2.0 and phosphorus > 4.0.  -TSH within normal limits. -Presented to the ED with atrial fibrillation with RVR. Found to have hypotensive and afib with RVR, elevated d-dimer (2087), and PRoBNP (5693).   -S/p Cardizem drip. Cardiology D/Jarvis Cardizem drip due to LVSD and restarted patient on outpatient dose Lopressor 12.5 mg PO BID.  -Cardiology (Dr. Casanova) started patient on Eliquis 5 mg PO BID for A fib. Discussed with pharmacy to discharge patient on Eliquis 5 mg PO BID via meds to beds to ease transition of discharge.   -Outpatient Plavix 75 mg PO daily was continued but outpatient aspirin 81 mg PO daily was discontinued as patient is at a high risk of bleeding on triple therapy and she is greater than 1 month from MI and PCI.  -Magnesium and phosphorus were repleted on 5/1 to keep Mg > 2.0 and phosphorus > 4.0.  -TSH within normal limits.

## 2020-05-01 NOTE — PROGRESS NOTE ADULT - ASSESSMENT
84 year old female with PMH significant of HTN, and CAD s/p recent anterior lateral MI 3/14/2020 sp PCI LAD x 3, sp IABP due to hypotension, course complicated with fluid overloaded s/p Lasix at [facility] discharged on life vest presenting to the [place] ED with complaint of dyspnea, diarrhea, found with hypotensive by EMS in ED found with atrial fibrillation with RVR, COVID negative as per H & P now transferred to [facility].     Afib  - EKG revealing Afib with RVR, now on telemetry NSR. Patient was on Cardizem gtt initially which was discontinued 2/2 LVSD and patient now on Lopressor.   - CHADSVASC of at least 6  - Continue Eliquis 5mg PO BID  - Patient is at high risk for bleeding on triple therapy. She is greater than one month from MI and PCI. Aspirin discontinued   - Continue Plavix 75 QD  - No need to repeat echocardiogram as it will not     CAD with recent MI and PCI  - Troponin leak likely demand ischemia in setting of afib with RVR,. Troponin peaked. No need to trend further.   - Records obtained from outpatient cardiologist Dr Saha [phone]  - Cath 3/2020 : 3 stents in LAD, residual lesion in OM1  - Continue with Plavix, beta blocker, Statin  - Echo from 3/2020 post MI revealing ef 25-30%, moderate MR, moderate TR, - no need to repeat 2decho    CHF (Ischemic cardiomyopathy EF 25-30%)  - ECHO from 3/2020 post MI revealing ef 25-30%, moderate MR, moderate TR  - BNP 5693, Chest x-ray with clear lungs, On exam, slight LE edema  - Patient s/p Lasix 40 mg x 1 on 4/30.   - Continue Lasix 20 mg PO daily   - Continue to monitor urine output, appears to be good diuresis currently.  - Monitor electrolytes for goal K >4 and Mg >2  - Continue  beta blocker and Entresto. (Patient Entresto dose decreased to 24/26 BID)  - Weight daily  - Strict I/O   - Low sodium DASH diet  - Continue to monitor on telemetry while inpatient. Patient had life vest at home.   - Patient will need to follow up with Dr Aguirre for reassessment of LV function 3 month post MI for possible ICD placement     HTN  - Continue beta blocker and Entresto   - Monitor routine hemodynamics    - Monitor and replete lytes, keep K>4, Mg>2.  - All other medical needs as per primary team.  - Other cardiovascular workup will depend on clinical course.  - Will continue to follow.      Artie Carter MS FNP, [facility]  Nurse Practitioner- Cardiology   Spectra #[phone] 84 year old female with PMH significant of HTN, and CAD s/p recent anterior lateral MI 3/14/2020 sp PCI LAD x 3, sp IABP due to hypotension, course complicated with fluid overloaded s/p Lasix at [facility] discharged on life vest presenting to the [place] ED with complaint of dyspnea, diarrhea, found with hypotensive by EMS in ED found with atrial fibrillation with RVR, COVID negative as per H & P now transferred to [facility].     Afib  - EKG revealing Afib with RVR, now on telemetry NSR. Patient was on Cardizem gtt initially which was discontinued 2/2 LVSD and patient now on Lopressor.   - CHADSVASC of at least 6  - Continue Eliquis 5mg PO BID  - Patient is at high risk for bleeding on triple therapy. She is greater than one month from MI and PCI. Aspirin discontinued   - Continue Plavix 75 QD  - No need to repeat echocardiogram as it will not     CAD with recent MI and PCI  - Troponin leak likely demand ischemia in setting of afib with RVR,. Troponin peaked. No need to trend further.   - Records obtained from outpatient cardiologist Dr Saha [phone]  - Cath 3/2020 : 3 stents in LAD, residual lesion in OM1  - Continue with Plavix, beta blocker, Statin  - Echo from 3/2020 post MI revealing ef 25-30%, moderate MR, moderate TR, - no need to repeat 2decho    CHF (Ischemic cardiomyopathy EF 25-30%)  - ECHO from 3/2020 post MI revealing ef 25-30%, moderate MR, moderate TR  - BNP 5693, Chest x-ray with clear lungs, On exam, slight LE edema  - Patient s/p Lasix 40 mg x 1 on 4/30.   - Continue Lasix 20 mg PO daily   - Continue to monitor urine output, appears to be good diuresis currently.  - Monitor electrolytes for goal K >4 and Mg >2  - Continue  beta blocker and Entresto. (Patient Entresto dose decreased to 24/26 BID)  - Weight daily  - Strict I/O   - Low sodium DASH diet  - Continue to monitor on telemetry while inpatient. Patient had life vest at home.   - Patient will need to follow up with Dr Davies for reassessment of LV function 3 month post MI for possible ICD placement     HTN  - Continue beta blocker and Entresto   - Monitor routine hemodynamics    - Monitor and replete lytes, keep K>4, Mg>2.  - All other medical needs as per primary team.  - Other cardiovascular workup will depend on clinical course.  - Will continue to follow.      Artie Carter MS FNP, [facility]  Nurse Practitioner- Cardiology   Spectra #[phone]

## 2020-05-01 NOTE — PROGRESS NOTE ADULT - PROBLEM SELECTOR PLAN 7
-Patient with elevated D-dimer initially with SOB, at [facility] patient received full dose Lovenox 75mg.  -Unable to perform CTA due to renal function  -Patient currently on Plavix 75mg daily  -Started Eliquis 5 mg PO BID. Stop Aspirin as patient is at high risk of bleeding with triple therapy.

## 2020-05-01 NOTE — PROGRESS NOTE ADULT - PROBLEM SELECTOR PLAN 4
-Patient with a history of CHF due to ischemic cardiomyopathy (EF 25-30%).  -Echo from 03/2020 post MI revealed EF 25-30%, moderate MR, moderate TR.  -BNP 5963 on admission. CXR showed clear lungs.  -Patient s/p Lasix 40 mg x1 on 4/30. Continue Lasix 20 mg PO daily.  -Monitor urine output. Appears to be in good diuresis currently.  -Monitor electrolytes for goal K > 4 and Mg > 4.  -Continue beta blocker and Entresto (Entresto dose recently decreased to 24/26 BID).  -Weights daily. Strict I&O.  -Continue low sodium, DASH diet.

## 2020-05-01 NOTE — PROGRESS NOTE ADULT - ASSESSMENT
Patient is an 85 yo female with PMHx significant of HTN, and CAD s/p recent MI(March 2020) that required zoll life vest to be worn presenting to the ED with atrial fibrillation with RVR. Patient was recently transferred from [facility]. Found to have hypotensive and afib with RVR, elevated d-dimer, and PRoBNP. Admitted for afib with RVR.

## 2020-05-01 NOTE — PHYSICAL THERAPY INITIAL EVALUATION ADULT - ADDITIONAL COMMENTS
Pt resides at home alone but has been staying at  her daughters house.  Pt has a flight of stair at her house.

## 2020-05-01 NOTE — PROGRESS NOTE ADULT - PROBLEM SELECTOR PLAN 3
-Patient with history of cath 03/2020 with 3 stents in LAD, residual lesion in OM1.  -Continue to monitor on telemetry. Patient had life vest at home.  -Continue with Plavix, beta blocker, and Statin.  -Echo 03/2020 post MI revealed EF 25-30%, moderate MR, moderate TR. As per cardiology, no need to repeat TTE.  -Patient will need to follow up with Dr. Aguirre for reassessment of LV function 3 month post MI for possible ICD placement. -Patient with history of cath 03/2020 with 3 stents in LAD, residual lesion in OM1.  -Continue to monitor on telemetry. Patient had life vest at home.  -Continue with Plavix, beta blocker, and Statin. Off CCB  -Echo 03/2020 post MI revealed EF 25-30%, moderate MR, moderate TR. As per cardiology, no need to repeat TTE.  -Patient will need to follow up with Dr. Aguirre for reassessment of LV function 3 month post MI for possible ICD placement.

## 2020-05-01 NOTE — PHYSICAL THERAPY INITIAL EVALUATION ADULT - PERTINENT HX OF CURRENT PROBLEM, REHAB EVAL
Patient is an 85 yo female with PMHx significant of HTN, and CAD s/p recent MI(March 2020) that required zoll life vest to be worn presenting to the ED with atrial fibrillation with RVR. Patient was recently transferred from [facility]. Found to have hypotensive and afib with RVR, elevated d-dimer, and PRoBNP. Admitted for afib with RVR.

## 2020-05-02 ENCOUNTER — TRANSCRIPTION ENCOUNTER (OUTPATIENT)
Age: 85
End: 2020-05-02

## 2020-05-02 VITALS
SYSTOLIC BLOOD PRESSURE: 110 MMHG | HEART RATE: 74 BPM | DIASTOLIC BLOOD PRESSURE: 61 MMHG | OXYGEN SATURATION: 92 % | TEMPERATURE: 98 F | RESPIRATION RATE: 17 BRPM

## 2020-05-02 LAB
ANION GAP SERPL CALC-SCNC: 7 MMOL/L — SIGNIFICANT CHANGE UP (ref 5–17)
BUN SERPL-MCNC: 13 MG/DL — SIGNIFICANT CHANGE UP (ref 7–23)
CALCIUM SERPL-MCNC: 8.1 MG/DL — LOW (ref 8.5–10.1)
CHLORIDE SERPL-SCNC: 112 MMOL/L — HIGH (ref 96–108)
CO2 SERPL-SCNC: 25 MMOL/L — SIGNIFICANT CHANGE UP (ref 22–31)
CREAT SERPL-MCNC: 0.76 MG/DL — SIGNIFICANT CHANGE UP (ref 0.5–1.3)
GLUCOSE SERPL-MCNC: 107 MG/DL — HIGH (ref 70–99)
HCT VFR BLD CALC: 34.6 % — SIGNIFICANT CHANGE UP (ref 34.5–45)
HGB BLD-MCNC: 11.6 G/DL — SIGNIFICANT CHANGE UP (ref 11.5–15.5)
MAGNESIUM SERPL-MCNC: 2.4 MG/DL — SIGNIFICANT CHANGE UP (ref 1.6–2.6)
MCHC RBC-ENTMCNC: 31.9 PG — SIGNIFICANT CHANGE UP (ref 27–34)
MCHC RBC-ENTMCNC: 33.5 GM/DL — SIGNIFICANT CHANGE UP (ref 32–36)
MCV RBC AUTO: 95.1 FL — SIGNIFICANT CHANGE UP (ref 80–100)
NRBC # BLD: 0 /100 WBCS — SIGNIFICANT CHANGE UP (ref 0–0)
PHOSPHATE SERPL-MCNC: 3.2 MG/DL — SIGNIFICANT CHANGE UP (ref 2.5–4.5)
PLATELET # BLD AUTO: 210 K/UL — SIGNIFICANT CHANGE UP (ref 150–400)
POTASSIUM SERPL-MCNC: 4.6 MMOL/L — SIGNIFICANT CHANGE UP (ref 3.5–5.3)
POTASSIUM SERPL-SCNC: 4.6 MMOL/L — SIGNIFICANT CHANGE UP (ref 3.5–5.3)
RBC # BLD: 3.64 M/UL — LOW (ref 3.8–5.2)
RBC # FLD: 15 % — HIGH (ref 10.3–14.5)
SODIUM SERPL-SCNC: 144 MMOL/L — SIGNIFICANT CHANGE UP (ref 135–145)
WBC # BLD: 6.71 K/UL — SIGNIFICANT CHANGE UP (ref 3.8–10.5)
WBC # FLD AUTO: 6.71 K/UL — SIGNIFICANT CHANGE UP (ref 3.8–10.5)

## 2020-05-02 PROCEDURE — 85027 COMPLETE CBC AUTOMATED: CPT

## 2020-05-02 PROCEDURE — 84484 ASSAY OF TROPONIN QUANT: CPT

## 2020-05-02 PROCEDURE — 84443 ASSAY THYROID STIM HORMONE: CPT

## 2020-05-02 PROCEDURE — 36415 COLL VENOUS BLD VENIPUNCTURE: CPT

## 2020-05-02 PROCEDURE — 85379 FIBRIN DEGRADATION QUANT: CPT

## 2020-05-02 PROCEDURE — 83605 ASSAY OF LACTIC ACID: CPT

## 2020-05-02 PROCEDURE — 84436 ASSAY OF TOTAL THYROXINE: CPT

## 2020-05-02 PROCEDURE — 92610 EVALUATE SWALLOWING FUNCTION: CPT

## 2020-05-02 PROCEDURE — 97163 PT EVAL HIGH COMPLEX 45 MIN: CPT

## 2020-05-02 PROCEDURE — 99232 SBSQ HOSP IP/OBS MODERATE 35: CPT

## 2020-05-02 PROCEDURE — 84100 ASSAY OF PHOSPHORUS: CPT

## 2020-05-02 PROCEDURE — 82550 ASSAY OF CK (CPK): CPT

## 2020-05-02 PROCEDURE — 96374 THER/PROPH/DIAG INJ IV PUSH: CPT

## 2020-05-02 PROCEDURE — 82553 CREATINE MB FRACTION: CPT

## 2020-05-02 PROCEDURE — 93308 TTE F-UP OR LMTD: CPT

## 2020-05-02 PROCEDURE — 99053 MED SERV 10PM-8AM 24 HR FAC: CPT

## 2020-05-02 PROCEDURE — 99291 CRITICAL CARE FIRST HOUR: CPT

## 2020-05-02 PROCEDURE — 84480 ASSAY TRIIODOTHYRONINE (T3): CPT

## 2020-05-02 PROCEDURE — 80053 COMPREHEN METABOLIC PANEL: CPT

## 2020-05-02 PROCEDURE — 83735 ASSAY OF MAGNESIUM: CPT

## 2020-05-02 PROCEDURE — 96375 TX/PRO/DX INJ NEW DRUG ADDON: CPT

## 2020-05-02 PROCEDURE — 80048 BASIC METABOLIC PNL TOTAL CA: CPT

## 2020-05-02 RX ORDER — GUAIFENESIN/DEXTROMETHORPHAN 600MG-30MG
5 TABLET, EXTENDED RELEASE 12 HR ORAL
Qty: 0 | Refills: 0 | DISCHARGE
Start: 2020-05-02

## 2020-05-02 RX ADMIN — Medication 12.5 MILLIGRAM(S): at 05:53

## 2020-05-02 RX ADMIN — Medication 5 MILLILITER(S): at 11:44

## 2020-05-02 RX ADMIN — CLOPIDOGREL BISULFATE 75 MILLIGRAM(S): 75 TABLET, FILM COATED ORAL at 11:44

## 2020-05-02 RX ADMIN — Medication 5 MILLILITER(S): at 05:53

## 2020-05-02 RX ADMIN — APIXABAN 5 MILLIGRAM(S): 2.5 TABLET, FILM COATED ORAL at 05:53

## 2020-05-02 RX ADMIN — Medication 20 MILLIGRAM(S): at 05:53

## 2020-05-02 NOTE — DISCHARGE NOTE NURSING/CASE MANAGEMENT/SOCIAL WORK - PATIENT PORTAL LINK FT
You can access the FollowMyHealth Patient Portal offered by [facility] by registering at the following website: [email or web address]. By joining [facility]’s FollowMyHealth portal, you will also be able to view your health information using other applications (apps) compatible with our system.

## 2020-05-02 NOTE — PROGRESS NOTE ADULT - SUBJECTIVE AND OBJECTIVE BOX
Patient is a 84y old  Female who presents with a chief complaint of Afib with RVR (01 May 2020 08:57)      HPI: Patient is an 84 year old female with past medical history significant for HTN, and CAD s/p recent MI (2020) that required zoll life vest to be worn presenting to the ED with atrial fibrillation with RVR. Patient was recently transferred from [facility]. Patient stated she was last well morning the day before admission but started to progressively become weaker as the day went by. Patient stated that after she ate dinner, she started to feel very nauseas with an odd feeling in her throat to the point of wanting to vomit but did not. Patient states she had associated SOB during that time. When she went to the bathroom, she was unable to stand up from the toilet because of her worsening weakness. Her daughter called the ambulance where she went to [facility].  EMS stated they found the patient not responding well, with BP 68 mm Hg systolic, and O2 sat of 90% on RA. They administered O2 only, and her pressure came up and she became more responsive. At [facility] ED, she was found to have Atrial fibrillation with RVR, elevated d- dimer and elevated proBNP. She was transferred to [facility] for admission. Patient denied chest pain, vomiting, abdominal pain, headaches, fever or chills.    Of note patient had an MI in 2020. Records obtained from outpatient cardiologist Dr Saha [phone]  Cath 3/2020 : 3 stents in LAD, residual lesion in OM1  Pt was continued with Plavix, beta blocker, Statin, and decreased entresto to 24/26 bid  Echo from 3/2020 post MI revealing ef 25-30%, moderate MR, moderate TR, - no need to repeat 2decho , will need to follow up with Dr Aguirre for reassessment of Lvef 3 month post MI for possible ICD placement       Vitals  /70 RR 19 SpO2 95  EKG Afib with RVR  Significant Labs: Trop 0.116 WBC 10.76 APTT 21.1 D-dimer 2087 BUN 33 Cr 1.43 Glucose 175 GFR 34 lactate 2.3 ProBNP 5693 UA +for protein and trace ketone  Imaging   Chest xray Clear lungs. Mild cardiomegaly.  In the ED Received 1.5 L IVF NS, Full dose Lovenox 75, 12.5 cardizem IV.    2020: Patient seen and examined at bedside. Pt unable to have CTA chest done due to renal function, but was started on full dose AC anyway due to presumed PE due to elevated D-dimer. SLP evaluated patient and recommended regular diet with thin liquids. Cardizem infusion started for A fib with RVR. Cards (Dr. Casanova) consulted. Dr. Casanova D/Jarvis cardizem infusion due to LVSD and restarted outpatient Lopressor 12.5 mg PO BID and started Eliquis 5 mg PO BID. ASA stopped as patient is at increased risk of bleeding from triple therapy and MI/PCI was > 1 month ago. Elevated trop likely due to demand ischemia in setting of A fib with RVR.  2020: Patient seen and examined at bedside. As per cardio, no need to repeat TTE. No need to trend Trops further as Troponin peaked. Discussed with pharmacy to discharge patient on Eliquis 5 mg PO BID via meds to beds to ease transition of discharge. K replaced.  2020: Patient seen and examined at bedside. Plan to discharge today with Eliquis 5 mg PO BID via meds to beds. Patient and daughter told to discontinue ASA and continue all other medication regimen. Discharge summary will be faxed to patient's outpatient cardiologist, Dr. Loi Davies. Patient was told to call and follow up with Dr. Davies within 1 week of discharge.    OVERNIGHT EVENTS: NONE    Home Medications:  Aspirin Enteric Coated 81 mg oral delayed release tablet: 1 tab(s) orally once a day (2020 23:22)  atorvastatin 40 mg oral tablet: 1 tab(s) orally once a day (2020 23:22)  clopidogrel 75 mg oral tablet: 1 tab(s) orally once a day (2020 23:22)  Entresto 24 mg-26 mg oral tablet: 1 tab(s) orally 2 times a day (2020 23:22)  furosemide 20 mg oral tablet: 1 tab(s) orally once a day (2020 23:22)  metoprolol tartrate 25 mg oral tablet: 0.5 tab(s) orally 2 times a day (2020 23:22)      MEDICATIONS  (STANDING):  apixaban 5 milliGRAM(s) Oral every 12 hours  atorvastatin 40 milliGRAM(s) Oral at bedtime  clopidogrel Tablet 75 milliGRAM(s) Oral daily  furosemide    Tablet 20 milliGRAM(s) Oral daily  guaifenesin/dextromethorphan  Syrup 5 milliLiter(s) Oral four times a day  metoprolol tartrate 12.5 milliGRAM(s) Oral two times a day  sacubitril 24 mG/valsartan 26 mG 2 Tablet(s) Oral two times a day    MEDICATIONS  (PRN):  None      Allergies  Persantine (Rash)  Persantine (Unknown)      Social History:  Patient states she has been living at home with daughter since her hospitalization for Heart attack in March. Patient states she was living at home independently before completely her ADLs without assistance. She uses a cane for the steps and ambulates independently otherwise.   Tobacco: Quit 50 years ago, states she smoked tobacco for 5 years before that 1.5 packs a day for 5 years.  Alcohol: Denies   Illicit Drug Use: Denies      REVIEW OF SYSTEMS: i feel OK  CONSTITUTIONAL: Admits to malaise. No fever, No chills, No fatigue, No myalgia, No body ache, No weakness  EYES: No eye pain,  No visual disturbances, No discharge, No Redness.  ENMT:  No ear pain, No nose bleed, No vertigo; No sinus pain, No throat pain, No congestion.  NECK: No pain, No stiffness.  RESPIRATORY: Admits to dyspnea that improved. No cough, No wheezing, No  hemoptysis.  CARDIOVASCULAR: Admits to palpitations. No chest pain.  GASTROINTESTINAL: Admits to nausea. No abdominal pain, No epigastric pain, No vomiting; No diarrhea, No constipation. [  ] BM  GENITOURINARY: No dysuria, No frequency, No urgency, No hematuria, No incontinence  NEUROLOGICAL: No headaches, No dizziness, No numbness, No tingling, No tremors, No weakness  EXT: No Swelling, No pain., No edema.  SKIN:  [ x ] No itching, burning, rashes, or lesions   MUSCULOSKELETAL: No joint pain or swelling; No muscle pain, No back pain, No extremity pain.  PSYCHIATRIC: No depression,  No anxiety,  No mood swings, No difficulty sleeping at night.  PAIN SCALE: [ x ] None  [  ] Other-  ROS Unable to obtain due to - [  ] Dementia  [  ] Lethargy [  ] Drowsy [  ] Sedated [  ] non verbal  REST OF REVIEW Of SYSTEM - [ x ] Normal       Vital Signs Last 24 Hrs  T(C): 36.6 (02 May 2020 04:37), Max: 36.7 (01 May 2020 13:35)  T(F): 97.8 (02 May 2020 04:37), Max: 98.1 (01 May 2020 13:35)  HR: 87 (02 May 2020 05:48) (79 - 87)  BP: 117/71 (02 May 2020 05:48) (117/71 - 130/72)  BP(mean): --  RR: 17 (02 May 2020 04:37) (17 - 18)  SpO2: 92% (02 May 2020 04:37) (92% - 94%)      I&O's Summary    01 May 2020 07:01  -  02 May 2020 07:00  --------------------------------------------------------  IN: 0 mL / OUT: 1650 mL / NET: -1650 mL        PHYSICAL EXAM: Overweight elderly female.  GENERAL:  [ x ] NAD , [ x ] well appearing, [  ] Agitated, [  ] Drowsy,  [  ] Lethargy, [  ] confused   HEAD:  [ x ] Normal, [  ] Other  EYES:  [ x ] EOMI, [ x ] PERRLA, [ x ] conjunctiva and sclera clear normal, [  ] Other,  [  ] Pallor,[  ] Discharge  ENMT:  [x  ] Normal, [ x ] Moist mucous membranes, [ x ] Good dentition, [ x] No Thrush  NECK:  [ x ] Supple, [ x ] No JVD, [ x ] Normal thyroid, [  ] Lymphadenopathy [  ] Other  CHEST/LUNG:  [ x ] Clear to auscultation bilaterally, [x  ] Breath Sounds equal B/L / Decrease, [  ] poor effort  [ x ] No rales, [ x ] No rhonchi  [ x ]  No wheezing,   HEART:  [ x ] Irregularly irregular [  ] Regular rate and rhythm, [  ] tachycardia, [  ] Bradycardia,  [  ] irregular  [ x ] No murmurs, No rubs, No gallops, [  ] PPM in place (Mfr:  )  ABDOMEN:  [ x ] Soft, [ x ] Nontender, [ x ] Nondistended, [  ] No mass, [  ] Bowel sounds present, [ x ] obese  NERVOUS SYSTEM:  [ x ] Alert & Oriented X3, [ x ] Nonfocal  [  ] Confusion  [  ] Encephalopathic [  ] Sedated [  ] Unable to assess, [  ] Dementia [  ] Other-  EXTREMITIES: [ x ] 2+ Peripheral Pulses, No clubbing, No cyanosis,  [  ] edema B/L lower EXT. [  ] PVD stasis skin changes B/L Lower EXT, [  ] wound  LYMPH: No lymphadenopathy noted  SKIN:  [ x ] No rashes or lesions, [  ] Pressure Ulcers, [  ] ecchymosis, [  ] Skin Tears, [  ] Other    DIET: DASH/TLC      LABS:                        11.6   6.71  )-----------( 210      ( 02 May 2020 07:22 )             34.6     05-02    144  |  112<H>  |  13  ----------------------------<  107<H>  4.6   |  25  |  0.76    Ca    8.1<L>      02 May 2020 07:22  Phos  3.2     05-02  Mg     2.4     05-02    TPro  6.0  /  Alb  2.7<L>  /  TBili  0.5  /  DBili  x   /  AST  21  /  ALT  17  /  AlkPhos  63  05-01    D-dimer () 478 (H)  Procalcitonin (): 0.07  Lactate (): 2.3 (H) -> 1.2    Serum Pro-Brain Natriuretic Peptide: 5693 pg/mL (20 @ 03:34)    THYROID FUNCTION TESTS  T3 (): 79 (L)  T4 (): 8.8  TSH 1.38    Urinalysis Basic - ( 2020 03:39 )  Color: Yellow / Appearance: Clear / S.015 / pH: x  Gluc: x / Ketone: Trace  / Bili: Negative / Urobili: Negative mg/dL   Blood: x / Protein: 100 mg/dL / Nitrite: Negative   Leuk Esterase: Small / RBC: 3-5 /HPF / WBC 6-10   Sq Epi: x / Non Sq Epi: Moderate / Bacteria: Moderate      CARDIAC MARKERS ( 2020 21:53 )  Trop I 0.235 ng/mL / x     / CK 63 U/L / x     / CKMB 2.5 ng/mL  CARDIAC MARKERS ( 2020 14:47 )  Trop I 0.248 ng/mL / x     / CK 61 U/L / x     / CKMB 3.4 ng/mL  CARDIAC MARKERS ( 2020 06:31 )  Trop I 0.116 ng/mL / x     / CK 53 U/L / x     / CKMB  2.9 ng/mL  CARDIAC MARKERS ( 2020 03:34 )  Trop I .036 ng/mL / x     / x     / x     / x          MICROBIOLOGY  Urine Culture (): < 10,000 CFU/mL Normal Urogenital Urmila  COVID-19 PCR (): Not detected      CARDIOLOGY  EKG (): Atrial fibrillation. Low voltage QRS. T wave abnormality, consider lateral ischemia.  bpm. QTc 512 ms.      RADIOLOGY & ADDITIONAL TESTS:  CXR (): Clear lungs. Mild cardiomegaly.    HEALTH ISSUES - PROBLEM Dx:  Need for prophylactic measure  VITO (acute kidney injury)  Elevated troponin  Atrial fibrillation with RVR  HTN   CAD     Consultant(s) Notes Reviewed:  [ X ] YES     Care Discussed with [X] Consultants  [ X ] Patient  [  ] Family [  ] HCP [  ]   [  ] Social Service  [x  ] RN, [  ] Physical Therapy,[  ] Palliative care team  DVT PPX: [  ] Lovenox, [  ] S C Heparin, [  ] Coumadin, [  ] Xarelto, [ X ] Eliquis, [  ] Pradaxa, [  ] IV Heparin drip, [  ] SCD [  ] Contraindication 2 to GI Bleed,[  ] Ambulation [  ] Contraindicated 2 to  bleed [  ] Contraindicated 2 to Brain Bleed  Advanced directive: [ X ] None, [  ] DNR/DNI Patient is a 84y old  Female who presents with a chief complaint of Afib with RVR (01 May 2020 08:57)      HPI: Patient is an 84 year old female with past medical history significant for HTN, and CAD s/p recent MI (2020) that required zoll life vest to be worn presenting to the ED with atrial fibrillation with RVR. Patient was recently transferred from [facility]. Patient stated she was last well morning the day before admission but started to progressively become weaker as the day went by. Patient stated that after she ate dinner, she started to feel very nauseas with an odd feeling in her throat to the point of wanting to vomit but did not. Patient states she had associated SOB during that time. When she went to the bathroom, she was unable to stand up from the toilet because of her worsening weakness. Her daughter called the ambulance where she went to [facility] ED.  EMS stated they found the patient not responding well, with BP 68 mm Hg systolic, and O2 sat of 90% on RA. They administered O2 only, and her pressure came up and she became more responsive. At [facility] ED, she was found to have Atrial fibrillation with RVR, elevated d- dimer and elevated proBNP. She was transferred to [place] ED for admission. Patient denied chest pain, vomiting, abdominal pain, headaches, fever or chills.    Of note patient had an MI in 2020. Records obtained from outpatient cardiologist Dr Saha [phone]  Cath 3/2020 : 3 stents in LAD, residual lesion in OM1  Pt was continued with Plavix, beta blocker, Statin, and decreased entresto to 24/26 bid  Echo from 3/2020 post MI revealing ef 25-30%, moderate MR, moderate TR, - no need to repeat 2decho , will need to follow up with Dr Aguirre for reassessment of Lvef 3 month post MI for possible ICD placement       Vitals  /70 RR 19 SpO2 95  EKG Afib with RVR  Significant Labs: Trop 0.116 WBC 10.76 APTT 21.1 D-dimer 2087 BUN 33 Cr 1.43 Glucose 175 GFR 34 lactate 2.3 ProBNP 5693 UA +for protein and trace ketone  Imaging   Chest xray Clear lungs. Mild cardiomegaly.  In the ED Received 1.5 L IVF NS, Full dose Lovenox 75, 12.5 cardizem IV.    2020: Patient seen and examined at bedside. Pt unable to have CTA chest done due to renal function, but was started on full dose AC anyway due to presumed PE due to elevated D-dimer. SLP evaluated patient and recommended regular diet with thin liquids. Cardizem infusion started for A fib with RVR. Cards (Dr. Casanova) consulted. Dr. Casanova D/Jarvis cardizem infusion due to LVSD and restarted outpatient Lopressor 12.5 mg PO BID and started Eliquis 5 mg PO BID. ASA stopped as patient is at increased risk of bleeding from triple therapy and MI/PCI was > 1 month ago. Elevated trop likely due to demand ischemia in setting of A fib with RVR.  2020: Patient seen and examined at bedside. As per cardio, no need to repeat TTE. No need to trend Trops further as Troponin peaked. Discussed with pharmacy to discharge patient on Eliquis 5 mg PO BID via meds to beds to ease transition of discharge. K replaced.  2020: Patient seen and examined at bedside. Plan to discharge today with Eliquis 5 mg PO BID via meds to beds. Patient and daughter told to discontinue ASA and continue all other medication regimen. Discharge summary will be faxed to patient's outpatient cardiologist, Dr. Loi Davies. Patient was told to call and follow up with Dr. Davies within 1 week of discharge.    OVERNIGHT EVENTS: NONE    Home Medications:  Aspirin Enteric Coated 81 mg oral delayed release tablet: 1 tab(s) orally once a day (2020 23:22)  atorvastatin 40 mg oral tablet: 1 tab(s) orally once a day (2020 23:22)  clopidogrel 75 mg oral tablet: 1 tab(s) orally once a day (2020 23:22)  Entresto 24 mg-26 mg oral tablet: 1 tab(s) orally 2 times a day (2020 23:22)  furosemide 20 mg oral tablet: 1 tab(s) orally once a day (2020 23:22)  metoprolol tartrate 25 mg oral tablet: 0.5 tab(s) orally 2 times a day (2020 23:22)      MEDICATIONS  (STANDING):  apixaban 5 milliGRAM(s) Oral every 12 hours  atorvastatin 40 milliGRAM(s) Oral at bedtime  clopidogrel Tablet 75 milliGRAM(s) Oral daily  furosemide    Tablet 20 milliGRAM(s) Oral daily  guaifenesin/dextromethorphan  Syrup 5 milliLiter(s) Oral four times a day  metoprolol tartrate 12.5 milliGRAM(s) Oral two times a day  sacubitril 24 mG/valsartan 26 mG 2 Tablet(s) Oral two times a day    MEDICATIONS  (PRN):  None      Allergies  Persantine (Rash)  Persantine (Unknown)      Social History:  Patient states she has been living at home with daughter since her hospitalization for Heart attack in March. Patient states she was living at home independently before completely her ADLs without assistance. She uses a cane for the steps and ambulates independently otherwise.   Tobacco: Quit 50 years ago, states she smoked tobacco for 5 years before that 1.5 packs a day for 5 years.  Alcohol: Denies   Illicit Drug Use: Denies      REVIEW OF SYSTEMS: i feel OK  CONSTITUTIONAL: Admits to malaise. No fever, No chills, No fatigue, No myalgia, No body ache, No weakness  EYES: No eye pain,  No visual disturbances, No discharge, No Redness.  ENMT:  No ear pain, No nose bleed, No vertigo; No sinus pain, No throat pain, No congestion.  NECK: No pain, No stiffness.  RESPIRATORY: Admits to dyspnea that improved. No cough, No wheezing, No  hemoptysis.  CARDIOVASCULAR: Admits to palpitations. No chest pain.  GASTROINTESTINAL: Admits to nausea. No abdominal pain, No epigastric pain, No vomiting; No diarrhea, No constipation. [  ] BM  GENITOURINARY: No dysuria, No frequency, No urgency, No hematuria, No incontinence  NEUROLOGICAL: No headaches, No dizziness, No numbness, No tingling, No tremors, No weakness  EXT: No Swelling, No pain., No edema.  SKIN:  [ x ] No itching, burning, rashes, or lesions   MUSCULOSKELETAL: No joint pain or swelling; No muscle pain, No back pain, No extremity pain.  PSYCHIATRIC: No depression,  No anxiety,  No mood swings, No difficulty sleeping at night.  PAIN SCALE: [ x ] None  [  ] Other-  ROS Unable to obtain due to - [  ] Dementia  [  ] Lethargy [  ] Drowsy [  ] Sedated [  ] non verbal  REST OF REVIEW Of SYSTEM - [ x ] Normal       Vital Signs Last 24 Hrs  T(C): 36.6 (02 May 2020 04:37), Max: 36.7 (01 May 2020 13:35)  T(F): 97.8 (02 May 2020 04:37), Max: 98.1 (01 May 2020 13:35)  HR: 87 (02 May 2020 05:48) (79 - 87)  BP: 117/71 (02 May 2020 05:48) (117/71 - 130/72)  BP(mean): --  RR: 17 (02 May 2020 04:37) (17 - 18)  SpO2: 92% (02 May 2020 04:37) (92% - 94%)      I&O's Summary    01 May 2020 07:01  -  02 May 2020 07:00  --------------------------------------------------------  IN: 0 mL / OUT: 1650 mL / NET: -1650 mL        PHYSICAL EXAM: OOB to chair   GENERAL:  [ x ] NAD , [ x ] well appearing, [  ] Agitated, [  ] Drowsy,  [  ] Lethargy, [  ] confused   HEAD:  [ x ] Normal, [  ] Other  EYES:  [ x ] EOMI, [ x ] PERRLA, [ x ] conjunctiva and sclera clear normal, [  ] Other,  [  ] Pallor,[  ] Discharge  ENMT:  [x  ] Normal, [ x ] Moist mucous membranes, [ x ] Good dentition, [ x] No Thrush  NECK:  [ x ] Supple, [ x ] No JVD, [ x ] Normal thyroid, [  ] Lymphadenopathy [  ] Other  CHEST/LUNG:  [ x ] Clear to auscultation bilaterally, [x  ] Breath Sounds equal B/L / Decrease, [  ] poor effort  [ x ] No rales, [ x ] No rhonchi  [ x ]  No wheezing,   HEART:  [ x ] Irregularly irregular [  ] Regular rate and rhythm, [  ] tachycardia, [  ] Bradycardia,  [  ] irregular  [ x ] No murmurs, No rubs, No gallops, [  ] PPM in place (Mfr:  )  ABDOMEN:  [ x ] Soft, [ x ] Nontender, [ x ] Nondistended, [  ] No mass, [  ] Bowel sounds present, [ x ] obese  NERVOUS SYSTEM:  [ x ] Alert & Oriented X3, [ x ] Nonfocal  [  ] Confusion  [  ] Encephalopathic [  ] Sedated [  ] Unable to assess, [  ] Dementia [  ] Other-  EXTREMITIES: [ x ] 2+ Peripheral Pulses, No clubbing, No cyanosis,  [  ] edema B/L lower EXT. [  ] PVD stasis skin changes B/L Lower EXT, [  ] wound  LYMPH: No lymphadenopathy noted  SKIN:  [ x ] No rashes or lesions, [  ] Pressure Ulcers, [  ] ecchymosis, [  ] Skin Tears, [  ] Other    DIET: DASH/TLC      LABS:                        11.6   6.71  )-----------( 210      ( 02 May 2020 07:22 )             34.6     05-02    144  |  112<H>  |  13  ----------------------------<  107<H>  4.6   |  25  |  0.76    Ca    8.1<L>      02 May 2020 07:22  Phos  3.2     05-02  Mg     2.4     05-02    TPro  6.0  /  Alb  2.7<L>  /  TBili  0.5  /  DBili  x   /  AST  21  /  ALT  17  /  AlkPhos  63  05-01    D-dimer () 478 (H)  Procalcitonin (): 0.07  Lactate (): 2.3 (H) -> 1.2    Serum Pro-Brain Natriuretic Peptide: 5693 pg/mL (20 @ 03:34)    THYROID FUNCTION TESTS  T3 (): 79 (L)  T4 (): 8.8  TSH 1.38    Urinalysis Basic - ( 2020 03:39 )  Color: Yellow / Appearance: Clear / S.015 / pH: x  Gluc: x / Ketone: Trace  / Bili: Negative / Urobili: Negative mg/dL   Blood: x / Protein: 100 mg/dL / Nitrite: Negative   Leuk Esterase: Small / RBC: 3-5 /HPF / WBC 6-10   Sq Epi: x / Non Sq Epi: Moderate / Bacteria: Moderate      CARDIAC MARKERS ( 2020 21:53 )  Trop I 0.235 ng/mL / x     / CK 63 U/L / x     / CKMB 2.5 ng/mL  CARDIAC MARKERS ( 2020 14:47 )  Trop I 0.248 ng/mL / x     / CK 61 U/L / x     / CKMB 3.4 ng/mL  CARDIAC MARKERS ( 2020 06:31 )  Trop I 0.116 ng/mL / x     / CK 53 U/L / x     / CKMB  2.9 ng/mL  CARDIAC MARKERS ( 2020 03:34 )  Trop I .036 ng/mL / x     / x     / x     / x          MICROBIOLOGY  Urine Culture (): < 10,000 CFU/mL Normal Urogenital Urmila  COVID-19 PCR (): Not detected      CARDIOLOGY  EKG (): Atrial fibrillation. Low voltage QRS. T wave abnormality, consider lateral ischemia.  bpm. QTc 512 ms.      RADIOLOGY & ADDITIONAL TESTS:  CXR (): Clear lungs. Mild cardiomegaly.    HEALTH ISSUES - PROBLEM Dx:  Need for prophylactic measure  VITO (acute kidney injury)  Elevated troponin  Atrial fibrillation with RVR  HTN   CAD     Consultant(s) Notes Reviewed:  [ X ] YES     Care Discussed with [X] Consultants  [ X ] Patient  [  ] Family [  ] HCP [  ]   [  ] Social Service  [x  ] RN, [  ] Physical Therapy,[  ] Palliative care team  DVT PPX: [  ] Lovenox, [  ] S C Heparin, [  ] Coumadin, [  ] Xarelto, [ X ] Eliquis, [  ] Pradaxa, [  ] IV Heparin drip, [  ] SCD [  ] Contraindication 2 to GI Bleed,[  ] Ambulation [  ] Contraindicated 2 to  bleed [  ] Contraindicated 2 to Brain Bleed  Advanced directive: [ X ] None, [  ] DNR/DNI

## 2020-05-02 NOTE — PHARMACOTHERAPY INTERVENTION NOTE - COMMENTS
Pharmacy providing meds to beds for Eliquis.  Discharge team please call pharmacy to deliver medication and  prior to discharge (note in front of chart as well).

## 2020-05-02 NOTE — PROGRESS NOTE ADULT - PROBLEM SELECTOR PLAN 5
Resolved  -Patient admitted with Cr 1.43, which improved to 0.76.   -Received 1.5 L NS IV. -Patient with a history of CHF due to ischemic cardiomyopathy (EF 25-30%).  -Chronic Systolic CHF   -Echo from 03/2020 post MI revealed EF 25-30%, moderate MR, moderate TR.  -BNP 5963 on admission. CXR showed clear lungs.  -Patient s/p Lasix 40 mg x1 on 4/30. Continue Lasix 20 mg PO daily.  -Monitor urine output. Appears to be in good diuresis currently.  -Monitor electrolytes for goal K > 4 and Mg > 4.  -Continue beta blocker and Entresto (Entresto dose recently decreased to 24/26 BID).  -Continue low sodium, DASH diet.

## 2020-05-02 NOTE — PROGRESS NOTE ADULT - PROBLEM SELECTOR PLAN 2
-Patient found to have elevated troponin, likely due to demand ischemia in the setting of A fib with RVR.  -Troponin peaked and as per cardiology, no need to trend further. -Patient found to have elevated troponin, likely due to demand ischemia in the setting of A fib with RVR.  -Troponin peaked and as per cardiology, no need to trend further.  -On BB,

## 2020-05-02 NOTE — PROGRESS NOTE ADULT - ATTENDING COMMENTS
The patient was personally seen and examined, in addition to being examined and evaluated by NP.  All elements of the note were edited where appropriate.
The patient was personally seen and examined, in addition to being examined and evaluated by NP.  All elements of the note were edited where appropriate.
Pt seen, examined, case & care plan d/w pt, residents at detail.  AM labs   No PT Need  D/C Planning in AM  , Meds to Bed in AM
Pt seen, Examined, Case & care plan d/w pt, residents at detail. continue your Cardiac  Vest on D/C   D/W Cardiologist at detail.  D/C home today  Total d/c care time is 45 minutes.

## 2020-05-02 NOTE — PROGRESS NOTE ADULT - PROBLEM SELECTOR PLAN 7
-Patient with elevated D-dimer initially with SOB. Pt started on Eliquis 5 mg PO BID for A fib.  -Stop Aspirin as patient is at high risk of bleeding with triple therapy. -Patient with elevated D-dimer initially with SOB. Pt started on Eliquis 5 mg PO BID for A fib., as per cardiology-No concern for PE , continue AC   -Stop Aspirin as patient is at high risk of bleeding with triple therapy.

## 2020-05-02 NOTE — PROGRESS NOTE ADULT - PROBLEM SELECTOR PLAN 6
-Patient with a history of HTN. Chronic; stable.  -Continue home medication of metoprolol with hold parameters, Entresto, and Furosemide. Resolved  -Patient admitted with Cr 1.43, which improved to 0.76.   -Received 1.5 L NS IV.

## 2020-05-02 NOTE — PROGRESS NOTE ADULT - ASSESSMENT
84 year old female with PMH significant of HTN, and CAD s/p recent anterior lateral MI 3/14/2020 sp PCI LAD x 3, sp IABP due to hypotension, course complicated with fluid overloaded s/p Lasix at [facility]'s discharged on life vest presenting to the [place] ED with complaint of dyspnea, diarrhea, found with hypotensive by EMS in ED found with atrial fibrillation with RVR, COVID negative as per H & P now transferred to [facility].     Afib  - EKG revealing Afib with RVR, now on telemetry NSR. Patient was on Cardizem gtt initially which was discontinued 2/2 LVSD and patient now on Lopressor.   - CHADSVASC of at least 6  - Continue Eliquis 5mg PO BID  - Patient is at high risk for bleeding on triple therapy. She is greater than one month from MI and PCI. Aspirin discontinued   - Continue Plavix 75 QD  - No need to repeat echocardiogram as it will not     CAD with recent MI and PCI  - Troponin leak likely demand ischemia in setting of afib with RVR,. Troponin peaked. No need to trend further.   - Records obtained from outpatient cardiologist Dr Saha [phone]  - Cath 3/2020 : 3 stents in LAD, residual lesion in OM1  - Continue with Plavix, beta blocker, Statin  - Echo from 3/2020 post MI revealing ef 25-30%, moderate MR, moderate TR, - no need to repeat 2decho    CHF (Ischemic cardiomyopathy EF 25-30%)  - ECHO from 3/2020 post MI revealing ef 25-30%, moderate MR, moderate TR  - BNP 5693, Chest x-ray with clear lungs, On exam, slight LE edema  - Patient s/p Lasix 40 mg x 1 on 4/30.   - Continue Lasix 20 mg PO daily   - Continue to monitor urine output, appears to be good diuresis currently.  - Monitor electrolytes for goal K >4 and Mg >2  - Continue  beta blocker and Entresto. (Patient Entresto dose decreased to 24/26 BID)  - Weight daily  - Strict I/O   - Low sodium DASH diet  - Continue to monitor on telemetry while inpatient. Patient had life vest at home.   - Patient will need to follow up with Dr Davies for reassessment of LV function 3 month post MI for possible ICD placement     HTN  - Continue beta blocker and Entresto   - Monitor routine hemodynamics    - Monitor and replete lytes, keep K>4, Mg>2.  - All other medical needs as per primary team.  - Other cardiovascular workup will depend on clinical course.  - Will continue to follow.    -From a cardiac standpoint the patient may be discharged home    Charlie Chery [facility]  Cardiology   Spectra #[id number]  [phone]

## 2020-05-02 NOTE — PROGRESS NOTE ADULT - PROBLEM SELECTOR PLAN 4
-Patient with a history of CHF due to ischemic cardiomyopathy (EF 25-30%).  -Echo from 03/2020 post MI revealed EF 25-30%, moderate MR, moderate TR.  -BNP 5963 on admission. CXR showed clear lungs.  -Patient s/p Lasix 40 mg x1 on 4/30. Continue Lasix 20 mg PO daily.  -Monitor urine output. Appears to be in good diuresis currently.  -Monitor electrolytes for goal K > 4 and Mg > 4.  -Continue beta blocker and Entresto (Entresto dose recently decreased to 24/26 BID).  -Continue low sodium, DASH diet. -Patient with a history of HTN. Chronic; stable.  -Continue home medication of metoprolol with hold parameters, Entresto, and Furosemide.

## 2020-05-02 NOTE — PROGRESS NOTE ADULT - PROBLEM SELECTOR PLAN 1
-Presented to the ED with atrial fibrillation with RVR. Found to have hypotensive and afib with RVR, elevated d-dimer, and PRoBNP (5693).    -D-dimer decreased to 478 today from 2087 on admission.  -S/p Cardizem drip. Cardiology continued patient on outpatient dose Lopressor 12.5 mg PO BID.  -Cardiology (Dr. Casanova) started patient on Eliquis 5 mg PO BID for A fib. Discussed with pharmacy and will discharge patient today on Eliquis 5 mg PO BID via meds to beds to ease transition of discharge.   -Outpatient Plavix 75 mg PO daily was continued but outpatient aspirin 81 mg PO daily was discontinued as patient is at a high risk of bleeding on triple therapy and she is greater than 1 month from MI and PCI. This was discussed with both patient and patient's daughter.  -Magnesium and phosphorus normal today.   -TSH within normal limits.

## 2020-05-02 NOTE — PROGRESS NOTE ADULT - ASSESSMENT
Patient is an 85 yo female with PMHx significant of HTN, and CAD s/p recent MI(March 2020) that required zoll life vest to be worn presenting to the ED with atrial fibrillation with RVR. Patient was recently transferred from [facility]. Found to have hypotensive and afib with RVR, elevated d-dimer, and PRoBNP. Admitted for afib with RVR.

## 2020-05-02 NOTE — PROGRESS NOTE ADULT - SUBJECTIVE AND OBJECTIVE BOX
[facility] -- Gerri Brothers, Dia, Mario, Denzel Cristobal Savella  Office # [phone]      Follow Up:    HTN, HLD  CAD s/p recent MI 3/14/2020, s/p 3 stents LAD with residual disease OM1, s/p IABP, with post MI EF 30% discharged on life vest  Subjective/Observations:   No events overnight resting comfortably in bed.  No complaints of chest pain, dyspnea, or palpitations reported. No signs of orthopnea or PND.     REVIEW OF SYSTEMS: All other review of systems is negative unless indicated above    PAST MEDICAL & SURGICAL HISTORY:  Mild HTN  CAD (coronary artery disease): s/p Cath &amp; 3 stents on 3/2020  EF ~30%  Rapid atrial fibrillation  CAD (coronary artery disease)  No significant past surgical history      MEDICATIONS  (STANDING):  apixaban 5 milliGRAM(s) Oral every 12 hours  atorvastatin 40 milliGRAM(s) Oral at bedtime  clopidogrel Tablet 75 milliGRAM(s) Oral daily  furosemide    Tablet 20 milliGRAM(s) Oral daily  guaifenesin/dextromethorphan  Syrup 5 milliLiter(s) Oral four times a day  metoprolol tartrate 12.5 milliGRAM(s) Oral two times a day  sacubitril 24 mG/valsartan 26 mG 2 Tablet(s) Oral two times a day    MEDICATIONS  (PRN):      Allergies    Persantine (Rash)  Persantine (Unknown)    Intolerances        Vital Signs Last 24 Hrs  T(C): 36.6 (02 May 2020 04:37), Max: 36.7 (01 May 2020 13:35)  T(F): 97.8 (02 May 2020 04:37), Max: 98.1 (01 May 2020 13:35)  HR: 87 (02 May 2020 05:48) (79 - 87)  BP: 117/71 (02 May 2020 05:48) (117/71 - 130/72)  BP(mean): --  RR: 17 (02 May 2020 04:37) (17 - 18)  SpO2: 92% (02 May 2020 04:37) (92% - 94%)    I&O's Summary    01 May 2020 07:01  -  02 May 2020 07:00  --------------------------------------------------------  IN: 0 mL / OUT: 1650 mL / NET: -1650 mL          PHYSICAL EXAM:  TELE: Off tele   Constitutional: NAD, awake and alert, well-developed  HEENT: Moist Mucous Membranes, Anicteric  Pulmonary: Non-labored, breath sounds are clear bilaterally, No wheezing, crackles or rhonchi  Cardiovascular: Regular, S1 and S2 nl, No murmurs, rubs, gallops or clicks  Gastrointestinal: Bowel Sounds present, soft, nontender.   Lymph: No lymphadenopathy. No peripheral edema.  Skin: No visible rashes or ulcers.  Psych:  Mood & affect appropriate    LABS: All Labs Reviewed:                        11.6   6.71  )-----------( 210      ( 02 May 2020 07:22 )             34.6                         11.7   5.83  )-----------( 208      ( 01 May 2020 09:39 )             35.2                         11.4   7.79  )-----------( 210      ( 30 Apr 2020 09:50 )             34.5     02 May 2020 07:22    144    |  112    |  13     ----------------------------<  107    4.6     |  25     |  0.76   01 May 2020 09:39    145    |  113    |  18     ----------------------------<  133    3.4     |  23     |  0.91   30 Apr 2020 09:50    144    |  114    |  27     ----------------------------<  144    4.3     |  22     |  0.92     Ca    8.1        02 May 2020 07:22  Ca    8.2        01 May 2020 09:39  Ca    7.9        30 Apr 2020 09:50  Phos  3.2       02 May 2020 07:22  Phos  2.4       01 May 2020 09:39  Mg     2.4       02 May 2020 07:22  Mg     1.8       01 May 2020 09:39    TPro  6.0    /  Alb  2.7    /  TBili  0.5    /  DBili  x      /  AST  21     /  ALT  17     /  AlkPhos  63     01 May 2020 09:39  TPro  5.8    /  Alb  2.7    /  TBili  0.4    /  DBili  x      /  AST  21     /  ALT  18     /  AlkPhos  71     30 Apr 2020 09:50  TPro  7.1    /  Alb  3.5    /  TBili  0.4    /  DBili  x      /  AST  21     /  ALT  21     /  AlkPhos  89     30 Apr 2020 02:55      CARDIAC MARKERS ( 30 Apr 2020 21:53 )  .235 ng/mL / x     / 63 U/L / x     / 2.5 ng/mL  CARDIAC MARKERS ( 30 Apr 2020 14:47 )  .248 ng/mL / x     / 61 U/L / x     / 3.4 ng/mL    12 Lead ECG:   Ventricular Rate 145 BPM  Atrial Rate 166 BPM  QRS Duration 78 ms  Q-T Interval 330 ms  QTC Calculation(Bezet) 512 ms  R Axis 48 degrees  T Axis 175 degrees  Diagnosis Line Atrial fibrillation  Low voltage QRS  T wave abnormality, consider lateral ischemia  Abnormal ECG  No previous ECGs available  Confirmed by Kathie Rivers MD ([id number]) on 4/30/2020 11:23:36 AM (04-30-20 @ 02:57)    < from: Xray Chest 1 View-PORTABLE IMMEDIATE (04.30.20 @ 03:32) >  Impression:  1.  Clear lungs. Mild cardiomegaly.      DISCRETE X-RAY DATA:  Percent of LEFT lung opacification: Clear  Percent of RIGHT lung opacification: Clear  Change in lung opacification from most recent x-ray (<=3 days): No Prior  Change from prior dated 3 or more days (same admission): No Prior    < end of copied text >

## 2020-05-02 NOTE — PROGRESS NOTE ADULT - PROBLEM SELECTOR PLAN 3
-Patient with history of cath 03/2020 with 3 stents in LAD, residual lesion in OM1.  -Continue to monitor on telemetry. Patient had life vest at home.  -Discharge summary will be faxed to patient's outpatient cardiologist, Dr. Loi Davies.  -Continue with Plavix, beta blocker, and Statin. Off CCB  -Echo 03/2020 post MI revealed EF 25-30%, moderate MR, moderate TR. As per cardiology, no need to repeat TTE.  -Patient and patient's daughter were told they will need to follow up with Dr. Aguirre for reassessment of LV function post MI for possible ICD placement.

## 2020-05-05 LAB
CULTURE RESULTS: SIGNIFICANT CHANGE UP
CULTURE RESULTS: SIGNIFICANT CHANGE UP
SPECIMEN SOURCE: SIGNIFICANT CHANGE UP
SPECIMEN SOURCE: SIGNIFICANT CHANGE UP

## 2020-05-07 PROBLEM — I25.10 ATHEROSCLEROTIC HEART DISEASE OF NATIVE CORONARY ARTERY WITHOUT ANGINA PECTORIS: Chronic | Status: ACTIVE | Noted: 2020-04-30

## 2020-05-07 PROBLEM — I48.91 UNSPECIFIED ATRIAL FIBRILLATION: Chronic | Status: ACTIVE | Noted: 2020-04-30

## 2020-05-07 PROBLEM — I10 ESSENTIAL (PRIMARY) HYPERTENSION: Chronic | Status: ACTIVE | Noted: 2020-04-30

## 2020-05-10 ENCOUNTER — OUTPATIENT (OUTPATIENT)
Dept: OUTPATIENT SERVICES | Facility: HOSPITAL | Age: 85
LOS: 1 days | End: 2020-05-10
Payer: MEDICARE

## 2020-05-10 DIAGNOSIS — Z11.59 ENCOUNTER FOR SCREENING FOR OTHER VIRAL DISEASES: ICD-10-CM

## 2020-05-10 PROCEDURE — 87635 SARS-COV-2 COVID-19 AMP PRB: CPT

## 2020-05-11 LAB — SARS-COV-2 RNA SPEC QL NAA+PROBE: SIGNIFICANT CHANGE UP

## 2020-05-12 ENCOUNTER — TRANSCRIPTION ENCOUNTER (OUTPATIENT)
Age: 85
End: 2020-05-12

## 2020-05-12 ENCOUNTER — OUTPATIENT (OUTPATIENT)
Dept: OUTPATIENT SERVICES | Facility: HOSPITAL | Age: 85
LOS: 1 days | End: 2020-05-12
Payer: MEDICARE

## 2020-05-12 VITALS
HEART RATE: 58 BPM | SYSTOLIC BLOOD PRESSURE: 124 MMHG | TEMPERATURE: 98 F | RESPIRATION RATE: 16 BRPM | OXYGEN SATURATION: 98 % | DIASTOLIC BLOOD PRESSURE: 57 MMHG

## 2020-05-12 VITALS — DIASTOLIC BLOOD PRESSURE: 71 MMHG | HEART RATE: 70 BPM | RESPIRATION RATE: 18 BRPM | SYSTOLIC BLOOD PRESSURE: 121 MMHG

## 2020-05-12 DIAGNOSIS — I25.10 ATHEROSCLEROTIC HEART DISEASE OF NATIVE CORONARY ARTERY WITHOUT ANGINA PECTORIS: ICD-10-CM

## 2020-05-12 DIAGNOSIS — R07.9 CHEST PAIN, UNSPECIFIED: ICD-10-CM

## 2020-05-12 LAB
ABO RH CONFIRMATION: SIGNIFICANT CHANGE UP
ANION GAP SERPL CALC-SCNC: 13 MMOL/L — SIGNIFICANT CHANGE UP (ref 5–17)
APTT BLD: 28.9 SEC — SIGNIFICANT CHANGE UP (ref 27.5–36.3)
BLD GP AB SCN SERPL QL: SIGNIFICANT CHANGE UP
BUN SERPL-MCNC: 16 MG/DL — SIGNIFICANT CHANGE UP (ref 8–20)
CALCIUM SERPL-MCNC: 9 MG/DL — SIGNIFICANT CHANGE UP (ref 8.6–10.2)
CHLORIDE SERPL-SCNC: 101 MMOL/L — SIGNIFICANT CHANGE UP (ref 98–107)
CO2 SERPL-SCNC: 23 MMOL/L — SIGNIFICANT CHANGE UP (ref 22–29)
CREAT SERPL-MCNC: 0.85 MG/DL — SIGNIFICANT CHANGE UP (ref 0.5–1.3)
GLUCOSE SERPL-MCNC: 121 MG/DL — HIGH (ref 70–99)
HCT VFR BLD CALC: 39.6 % — SIGNIFICANT CHANGE UP (ref 34.5–45)
HGB BLD-MCNC: 13.1 G/DL — SIGNIFICANT CHANGE UP (ref 11.5–15.5)
INR BLD: 1.03 RATIO — SIGNIFICANT CHANGE UP (ref 0.88–1.16)
MAGNESIUM SERPL-MCNC: 2 MG/DL — SIGNIFICANT CHANGE UP (ref 1.6–2.6)
MCHC RBC-ENTMCNC: 32.3 PG — SIGNIFICANT CHANGE UP (ref 27–34)
MCHC RBC-ENTMCNC: 33.1 GM/DL — SIGNIFICANT CHANGE UP (ref 32–36)
MCV RBC AUTO: 97.5 FL — SIGNIFICANT CHANGE UP (ref 80–100)
PLATELET # BLD AUTO: 262 K/UL — SIGNIFICANT CHANGE UP (ref 150–400)
POTASSIUM SERPL-MCNC: 4 MMOL/L — SIGNIFICANT CHANGE UP (ref 3.5–5.3)
POTASSIUM SERPL-SCNC: 4 MMOL/L — SIGNIFICANT CHANGE UP (ref 3.5–5.3)
PROTHROM AB SERPL-ACNC: 11.6 SEC — SIGNIFICANT CHANGE UP (ref 10–12.9)
RBC # BLD: 4.06 M/UL — SIGNIFICANT CHANGE UP (ref 3.8–5.2)
RBC # FLD: 14.9 % — HIGH (ref 10.3–14.5)
SODIUM SERPL-SCNC: 137 MMOL/L — SIGNIFICANT CHANGE UP (ref 135–145)
WBC # BLD: 5.5 K/UL — SIGNIFICANT CHANGE UP (ref 3.8–10.5)
WBC # FLD AUTO: 5.5 K/UL — SIGNIFICANT CHANGE UP (ref 3.8–10.5)

## 2020-05-12 PROCEDURE — 93005 ELECTROCARDIOGRAM TRACING: CPT

## 2020-05-12 PROCEDURE — 36415 COLL VENOUS BLD VENIPUNCTURE: CPT

## 2020-05-12 PROCEDURE — 83735 ASSAY OF MAGNESIUM: CPT

## 2020-05-12 PROCEDURE — 93458 L HRT ARTERY/VENTRICLE ANGIO: CPT | Mod: XU

## 2020-05-12 PROCEDURE — C1874: CPT

## 2020-05-12 PROCEDURE — 85730 THROMBOPLASTIN TIME PARTIAL: CPT

## 2020-05-12 PROCEDURE — C1760: CPT

## 2020-05-12 PROCEDURE — 86900 BLOOD TYPING SEROLOGIC ABO: CPT

## 2020-05-12 PROCEDURE — C1725: CPT

## 2020-05-12 PROCEDURE — 85610 PROTHROMBIN TIME: CPT

## 2020-05-12 PROCEDURE — 86850 RBC ANTIBODY SCREEN: CPT

## 2020-05-12 PROCEDURE — C1769: CPT

## 2020-05-12 PROCEDURE — 99152 MOD SED SAME PHYS/QHP 5/>YRS: CPT

## 2020-05-12 PROCEDURE — 99153 MOD SED SAME PHYS/QHP EA: CPT

## 2020-05-12 PROCEDURE — C9600: CPT | Mod: LC

## 2020-05-12 PROCEDURE — C1887: CPT

## 2020-05-12 PROCEDURE — 86901 BLOOD TYPING SEROLOGIC RH(D): CPT

## 2020-05-12 PROCEDURE — C1894: CPT

## 2020-05-12 PROCEDURE — 80048 BASIC METABOLIC PNL TOTAL CA: CPT

## 2020-05-12 PROCEDURE — 85027 COMPLETE CBC AUTOMATED: CPT

## 2020-05-12 PROCEDURE — 93010 ELECTROCARDIOGRAM REPORT: CPT | Mod: 76

## 2020-05-12 RX ORDER — ASPIRIN/CALCIUM CARB/MAGNESIUM 324 MG
81 TABLET ORAL ONCE
Refills: 0 | Status: COMPLETED | OUTPATIENT
Start: 2020-05-12 | End: 2020-05-12

## 2020-05-12 RX ORDER — METOPROLOL TARTRATE 50 MG
0.5 TABLET ORAL
Qty: 0 | Refills: 0 | DISCHARGE

## 2020-05-12 RX ORDER — APIXABAN 2.5 MG/1
1 TABLET, FILM COATED ORAL
Qty: 60 | Refills: 0
Start: 2020-05-12 | End: 2020-06-10

## 2020-05-12 RX ORDER — ACETAMINOPHEN 500 MG
1000 TABLET ORAL ONCE
Refills: 0 | Status: COMPLETED | OUTPATIENT
Start: 2020-05-12 | End: 2020-05-12

## 2020-05-12 RX ORDER — SACUBITRIL AND VALSARTAN 24; 26 MG/1; MG/1
1 TABLET, FILM COATED ORAL
Qty: 0 | Refills: 0 | DISCHARGE

## 2020-05-12 RX ORDER — ASPIRIN/CALCIUM CARB/MAGNESIUM 324 MG
1 TABLET ORAL
Qty: 0 | Refills: 0 | DISCHARGE
Start: 2020-05-12

## 2020-05-12 RX ADMIN — Medication 400 MILLIGRAM(S): at 12:30

## 2020-05-12 RX ADMIN — Medication 81 MILLIGRAM(S): at 14:28

## 2020-05-12 NOTE — DISCHARGE NOTE NURSING/CASE MANAGEMENT/SOCIAL WORK - PATIENT PORTAL LINK FT
You can access the FollowMyHealth Patient Portal offered by [facility] by registering at the following website: [email or web address]. By joining [employer]’s FollowMyHealth portal, you will also be able to view your health information using other applications (apps) compatible with our system.

## 2020-05-12 NOTE — PROGRESS NOTE ADULT - PROBLEM SELECTOR PLAN 1
Post procedure orders and observations   DAPT: ASA and Plavix  Restart Eliquis on 5/13   Triple therapy for one month then Plavix and Eliquis   Statin therapy continue     Beta blocker therapy   ACE-1/ ARB   Groin / wrist precautions maintained   Bedrest for 4   Ambulate if stable 1-2 hours   If stable possible discharge later tonight   Cardiac rehab information provided / referral and communication to cardiac rehab completed   Follow up with cardiologist

## 2020-05-12 NOTE — DISCHARGE NOTE PROVIDER - CARE PROVIDER_API CALL
Loi Davies  Cardiovascular Disease  [address] [address]  Phone: [phone]  Fax: [phone]  Follow Up Time:

## 2020-05-12 NOTE — H&P PST ADULT - AIRWAY
normal [Negative] : Genitourinary [FreeTextEntry1] : Mom notes occasional growing pains. Mom endorses slight breast budding but no spotting in underwear.

## 2020-05-12 NOTE — DISCHARGE NOTE PROVIDER - NSDCMRMEDTOKEN_GEN_ALL_CORE_FT
apixaban 5 mg oral tablet: 1 tab(s) orally every 12 hours  Restart on 5/13   aspirin 81 mg oral tablet, chewable: 1 tab(s) orally once  atorvastatin 40 mg oral tablet: 1 tab(s) orally once a day  clopidogrel 75 mg oral tablet: 1 tab(s) orally once a day  Entresto 49 mg-51 mg oral tablet: 1 tab(s) orally 2 times a day  furosemide 20 mg oral tablet: 1 tab(s) orally once a day  metoprolol tartrate 25 mg oral tablet: 1 tab(s) orally 2 times a day

## 2020-05-12 NOTE — DISCHARGE NOTE PROVIDER - HOSPITAL COURSE
83 yo female with PMHx significant of HTN, and CAD with multivessel disease  s/p recent anterior lateral MI(March 2020)  with 3 stents LAD. Hospital course complicated by  IABP   EF 30 % with heart failure started on diuretics Entresto  and discharged with a  Zoll  life vest. Pt presented to [place]  ED in April  with complaint of dyspnea, diarrhea, found with hypotensive  with atrial fibrillation with RVR, COVID negative . Pt was  given IV lopressor/ Cardizem and converted to NSR . She was started on Eliquis with a Chads-Vasc of 6 . She was stabilized and discharged and see in follow up. Pt presented to  Dr Davies  5/4  with continued  dyspnea on exertion , SOB and fatigue Her Metoprolol was increased  and she has OM1 disease and was referred for  cardiac cath. Last dose of Eliquis 3 days ago 5/9/20.     She presents today with her Life- vest on appears comfortable and well compensated     s/p Cardiac cath and intervention done Via Left femoral tolerated well     s/p  ED x one OM1  Post procedure reported some groin pain area  soft  no evidence of bleeding + pp doppler      Post procedure reported some groin pain area  soft  no evidence of bleeding + pp doppler treated with IV Tylenol with relief of pain     Post Procedure EKG 5/12/20 NSR rate 61 Q wave in anterior lateral leads no acute ST or T wave changes         DAPT: ASA and Plavix    Restart Eliquis on 5/13     Triple therapy for one month then Plavix and Eliquis     Statin therapy continue     Beta blocker therapy     Continue Entresto and Lasix daily     Salt restriction and water restriction     Groin precautions maintained     Continue to maintain Life vest     Outpatient follow up with Dr Davies 83 yo female with PMHx significant of HTN, and CAD with multivessel disease  s/p recent anterior lateral MI(March 2020)  with 3 stents LAD. Hospital course complicated by  IABP   EF 30 % with heart failure started on diuretics Entresto  and discharged with a  Zoll  life vest. Pt presented to [place]  ED in April  with complaint of dyspnea, diarrhea, found with hypotensive  with atrial fibrillation with RVR, COVID negative . Pt was  given IV lopressor/ Cardizem and converted to NSR . She was started on Eliquis with a Chads-Vasc of 6 . She was stabilized and discharged and see in follow up. Pt presented to  Dr Davies  5/4  with continued  dyspnea on exertion , SOB and fatigue Her Metoprolol was increased  and she has OM1 disease and was referred for  cardiac cath. Last dose of Eliquis 3 days ago 5/9/20.     She presents today with her Life- vest on appears comfortable and well compensated     s/p Cardiac cath and intervention done Via Left femoral tolerated well     s/p  ED x one OM1  Post procedure reported some groin pain area  soft  no evidence of bleeding + pp doppler      Post procedure reported some groin pain area  soft  no evidence of bleeding + pp doppler treated with IV Tylenol with relief of pain     Post Procedure EKG 5/12/20 NSR rate 61 Q wave in anterior lateral leads no acute ST or T wave changes         DAPT: ASA and Plavix    Restart Eliquis on 5/13     Triple therapy for one month then Plavix and Eliquis     Statin therapy continue     Beta blocker therapy     Continue Entresto and Lasix daily     Salt restriction and water restriction     Daily weights notify cardiologist if increase in weight 3-5 pounds overnight     Groin precautions maintained     Continue to maintain Life vest     Outpatient follow up with Dr Davies

## 2020-05-12 NOTE — ASU PATIENT PROFILE, ADULT - PMH
CAD (coronary artery disease)  s/p Cath & 3 stents on 3/2020  EF ~30%  CAD (coronary artery disease)    Mild HTN    Rapid atrial fibrillation

## 2020-05-12 NOTE — H&P PST ADULT - HISTORY OF PRESENT ILLNESS
83 yo female with PMHx significant of HTN, and CAD s/p recent MI(March 2020)  with 3 stents LAD that required IABP during with a EF 30 %  and discharged with a  Zoll  life vest. Pt presented to  [facility]  ED in April  with complaint of dyspnea, diarrhea, found with hypotensive by EMS in ED found with atrial fibrillation with RVR, COVID negative . Pt was initialed on rate control and AC with a ChadsVasc of 6 . She was stabilized and discharged and seem in follow up \    Symptoms:        Angina (Class):        Ischemic Symptoms:     Heart Failure:        Systolic/Diastolic/Combined:        NYHA Class (within 2 weeks):     Assessment of LVEF (Must be within 6 months):  Echo from 3/2020 post MI revealing ef 25-30%, moderate MR, moderate TR, -         Prior Cardiac Interventions: :Cath 3/2020 : 3 stents in LAD, residual lesion in OM1         Noninvasive Testing:   Stress Test: Date:        Protocol:        Duration of Exercise:        Symptoms:        EKG Changes:        DTS:        Myocardial Imaging:        Risk Assessment (Low, Medium, High):     Echo (Date, Findings):     Antianginal Therapies:        Beta Blockers:         Calcium Channel Blockers:        Long Acting Nitrates:        Ranexa:     Associated Risk Factors:        Cerebrovascular Disease: N/A       Chronic Lung Disease: N/A       Peripheral Arterial Disease: N/A       Chronic Kidney Disease (if yes, what is GFR): N/A       Uncontrolled Diabetes (if yes, what is HgbA1C or FBS): N/A       Poorly Controlled Hypertension (if yes, what is SBP): N/A       Morbid Obesity (if yes, what is BMI): N/A       History of Recent Ventricular Arrhythmia: N/A       Inability to Ambulate Safely: N/A       Need for Therapeutic Anticoagulation: N/A       Antiplatelet or Contrast Allergy: N/A 85 yo female with PMHx significant of HTN, and CAD with multivessel disease  s/p recent anterior lateral MI(March 2020)  with 3 stents LAD. Hospital course complicated by  IABP   EF 30 % with heart failure started on diuretics Entresto  and discharged with a  Zoll  life vest. Pt presented to [place]  ED in April  with complaint of dyspnea, diarrhea, found with hypotensive  with atrial fibrillation with RVR, COVID negative . Pt was  given IV lopressor/ Cardizem and converted to NSR . She was started on Eliquis with a Chads-Vasc of 6 . She was stabilized and discharged and see in follow up. Pt presented to  Dr Davies  5/4  with continued  dyspnea on exertion , SOB and fatigue Her Metoprolol was increased  and she has OM1 disease and was referred for  cardiac cath. Last dose of Eliquis 3 days ago   She presents today with her Life- vest on appears comfortable and well compensated   ROS : SOB , dyspnea on exertion and fatigue with period od leg edema   No chest pain , palpitations, syncope dizziness, fever chills, diarrhea black of blood in stool   Mallampati 2  ASA 2  GFR   BRA  Symptoms: Dyspnea       Angina (Class): 2-3 equivalent        Ischemic Symptoms:     Heart Failure: yes      Assessment of LVEF (Must be within 6 months):  Echo from 3/2020 post MI revealing ef 25-30%, moderate MR, moderate TR, -         Prior Cardiac Interventions: :Cath 3/2020 : 3 stents in LAD, residual lesion in OM1       Antianginal Therapies:        Beta Blockers:  yes        Calcium Channel Blockers:        Long Acting Nitrates:        Ranexa:     Associated Risk Factors:        Cerebrovascular Disease: N/A       Chronic Lung Disease: N/A       Peripheral Arterial Disease: N/A       Chronic Kidney Disease (if yes, what is GFR): N/A       Uncontrolled Diabetes (if yes, what is HgbA1C or FBS): N/A       Poorly Controlled Hypertension (if yes, what is SBP): N/A       Morbid Obesity (if yes, what is BMI): N/A       History of Recent Ventricular Arrhythmia: N/A       Inability to Ambulate Safely: N/A       Need for Therapeutic Anticoagulation: N/A       Antiplatelet or Contrast Allergy: N/A 85 yo female with PMHx significant of HTN, and CAD with multivessel disease  s/p recent anterior lateral MI(March 2020)  with 3 stents LAD. Hospital course complicated by  IABP   EF 30 % with heart failure started on diuretics Entresto  and discharged with a  Zoll  life vest. Pt presented to [place]  ED in April  with complaint of dyspnea, diarrhea, found with hypotensive  with atrial fibrillation with RVR, COVID negative . Pt was  given IV lopressor/ Cardizem and converted to NSR . She was started on Eliquis with a Chads-Vasc of 6 . She was stabilized and discharged and see in follow up. Pt presented to  Dr Davies  5/4  with continued  dyspnea on exertion , SOB and fatigue Her Metoprolol was increased  and she has OM1 disease and was referred for  cardiac cath. Last dose of Eliquis 3 days ago 5/9/20.   She presents today with her Life- vest on appears comfortable and well compensated     ROS : SOB , dyspnea on exertion and fatigue with period od leg edema   No chest pain , palpitations, syncope dizziness, fever chills, diarrhea black of blood in stool     Mallampati 2  ASA 2  GFR 63  creatinine 0.85   BRA 3.9 %   Symptoms: Dyspnea       Angina (Class): 2-3 equivalent        Ischemic Symptoms:     Heart Failure: yes      Assessment of LVEF (Must be within 6 months):  Echo from 3/2020 post MI revealing ef 25-30%, moderate MR, moderate TR, -         Prior Cardiac Interventions: :Cath 3/2020 : 3 stents in LAD, residual lesion in OM1       Antianginal Therapies:        Beta Blockers:  yes        Calcium Channel Blockers:        Long Acting Nitrates:        Ranexa:     Associated Risk Factors:        Cerebrovascular Disease: N/A       Chronic Lung Disease: N/A       Peripheral Arterial Disease: N/A       Chronic Kidney Disease (if yes, what is GFR): N/A       Uncontrolled Diabetes (if yes, what is HgbA1C or FBS): N/A       Poorly Controlled Hypertension (if yes, what is SBP): N/A       Morbid Obesity (if yes, what is BMI): yes        History of Recent Ventricular Arrhythmia: N/A       Inability to Ambulate Safely: N/A       Need for Therapeutic Anticoagulation: Yes        Antiplatelet or Contrast Allergy: N/A

## 2020-05-12 NOTE — H&P PST ADULT - NSICDXPASTMEDICALHX_GEN_ALL_CORE_FT
PAST MEDICAL HISTORY:  CAD (coronary artery disease)     CAD (coronary artery disease) s/p Cath & 3 stents on 3/2020  EF ~30%    Mild HTN     Rapid atrial fibrillation

## 2020-05-12 NOTE — H&P PST ADULT - ASSESSMENT
Pt presents for cardiac cath       PRE-PROCEDURE ASSESSMENT  [facility] -Patient seen and examined  -Labs and EKG reviewed   -Pre-procedure teaching completed with patient and family  -  Informed consent obtained  -Questions answered  - Pt received Asprin prior to cardiac cath   Risks & benefits of procedure and sedation and risks and benefits for the alternative therapy have been explained to the patient in layman’s terms including but not limited to: allergic reaction, bleeding, infection, arrhythmia, respiratory compromise, renal and vascular compromise, limb damage, MI, CVA, emergent CABG/Vascular Surgery and death. Informed consent obtained and in chart. 85 yo female with PMHx significant of HTN, and CAD with multivessel disease  s/p recent anterior lateral MI(March 2020)  with 3 stents LAD. Hospital course complicated by  IABP   EF 30 % with heart failure started on diuretics Entresto  and discharged with a  Zoll  life vest. Pt presented to [place]  ED in April  with complaint of dyspnea, diarrhea, found with hypotensive  with atrial fibrillation with RVR, COVID negative . Pt was  given IV lopressor/ Cardizem and converted to NSR . She was started on Eliquis with a Chads-Vasc of 6 . She was stabilized and discharged and see in follow up. Pt presented to  Dr Davies  5/4  with continued  dyspnea on exertion , SOB and fatigue Her Metoprolol was increased  and she has OM1 disease and was referred for  cardiac cath. Last dose of Eliquis 3 days ago   She presents today with her Life- vest on appears comfortable and well compensated     PRE-PROCEDURE ASSESSMENT  [facility] -Patient seen and examined  -Labs and EKG reviewed   - Type and cross sent   -Pre-procedure teaching completed with patient and family  -  Informed consent obtained  -Questions answered  - Pt received Asprin/ Plavix  prior to cardiac cath   Risks & benefits of procedure and sedation and risks and benefits for the alternative therapy have been explained to the patient in layman’s terms including but not limited to: allergic reaction, bleeding, infection, arrhythmia, respiratory compromise, renal and vascular compromise, limb damage, MI, CVA, emergent CABG/Vascular Surgery and death. Informed consent obtained and in chart.

## 2020-05-12 NOTE — PROGRESS NOTE ADULT - ASSESSMENT
85 yo female with PMHx significant of HTN, and CAD with multivessel disease  s/p recent anterior lateral MI(March 2020)  with 3 stents LAD. Hospital course complicated by  IABP   EF 30 % with heart failure started on diuretics Entresto  and discharged with a  Zoll  life vest. Pt presented to [place]  ED in April  with complaint of dyspnea, diarrhea, found with hypotensive  with atrial fibrillation with RVR, COVID negative . Pt was  given IV lopressor/ Cardizem and converted to NSR . She was started on Eliquis with a Chads-Vasc of 6 . She was stabilized and discharged and see in follow up. Pt presented to  Dr Davies  5/4  with continued  dyspnea on exertion , SOB and fatigue Her Metoprolol was increased  and she has OM1 disease and was referred for  cardiac cath. Last dose of Eliquis 3 days ago 5/9/20.   She presents today with her Life- vest on appears comfortable and well compensated   s/p Cardiac cath and intervention done Via Left femoral tolerated well   s/p  ED x one OM1    Post procedure reported some groin pain area  soft  no evidence of bleeding + pp doppler treated with IV Tylenol with relief of pain   Post Procedure EKG 5/12/20 NSR rate 61 Q wave in anterior lateral leads no acute ST or T wave changes

## 2020-05-12 NOTE — PROGRESS NOTE ADULT - SUBJECTIVE AND OBJECTIVE BOX
83 yo female with PMHx significant of HTN, and CAD with multivessel disease  s/p recent anterior lateral MI(2020)  with 3 stents LAD. Hospital course complicated by  IABP   EF 30 % with heart failure started on diuretics Entresto  and discharged with a  Zoll  life vest. Pt presented to [place]  ED in April  with complaint of dyspnea, diarrhea, found with hypotensive  with atrial fibrillation with RVR, COVID negative . Pt was  given IV lopressor/ Cardizem and converted to NSR . She was started on Eliquis with a Chads-Vasc of 6 . She was stabilized and discharged and see in follow up. Pt presented to  Dr Davies    with continued  dyspnea on exertion , SOB and fatigue Her Metoprolol was increased  and she has OM1 disease and was referred for  cardiac cath. Last dose of Eliquis 3 days ago 20.   She presents today with her Life- vest on appears comfortable and well compensated   s/p Cardiac cath and intervention done Via Left femoral tolerated well   s/p  ED x one OM1  Post procedure reported some groin pain area  soft  no evidence of bleeding + pp doppler            Vital Signs Last 24 Hrs  T(C): 36.5 (12 May 2020 09:29), Max: 36.5 (12 May 2020 09:29)  T(F): 97.7 (12 May 2020 09:29), Max: 97.7 (12 May 2020 09:29)  HR: 63 (12 May 2020 12:45) (58 - 67)  BP: 116/58 (12 May 2020 13:00) (116/58 - 129/58)  RR: 16 (12 May 2020 13:00) (16 - 18)  SpO2: 99% (12 May 2020 13:00) (98% - 100%)      Physical Exam:   General: A/ox 3, No acute Distress  Neck: Supple, NO JVD  Cardiac: S1 S2, No M/R/G  Pulmonary: CTAB, Breathing unlabored, No Rhonchi/Rales/Wheezing  Abdomen: Soft, Non -tender, +BS   Extremities: No Rashes, No edema  Left Femoral cath site no evidence of bleeding + pp hemostasis maintained   Neuro: A/o x 3, No focal deficits  Psch: normal mood , normal affect    LABS:                          13.1   5.50  )-----------( 262      ( 12 May 2020 09:40 )             39.6     05-12    137  |  101  |  16.0  ----------------------------<  121<H>  4.0   |  23.0  |  0.85    Ca    9.0      12 May 2020 09:40  Mg     2.0     05-12      PT/INR - ( 12 May 2020 09:40 )   PT: 11.6 sec;   INR: 1.03 ratio         PTT - ( 12 May 2020 09:40 )  PTT:28.9 sec    Cardiac Cath Lab - Adult:   [facility]  Department of Cardiology  [address] [place] [id number]  [phone]  Cath Lab Report -- Comprehensive Report  Patient: KAVIN DENNIS  Study date: 2020  Account number: [id number]  MR number: [id number]  : 1935  Gender: Female  Race: W  Case Physician(s):  MD Loi Haddad MD  Referring Physician:  INDICATIONS: Unstable angina - CCS3.  HISTORY: The patient has a history of coronary artery disease. The patient  has hypertension and medication-treated dyslipidemia. PRIOR CARDIOVASCULAR  PROCEDURES: Stent of the proximal LAD. Stent of the mid LAD.  PROCEDURE:  --  Left heart catheterization with ventriculography.  --  Right coronary angiography.  --  Left coronary angiography.  --  Hemostasis with Angioseal-Intervention.  --  Intervention on OM1: drug-eluting stent.  TECHNIQUE: The risks and alternatives of the procedures and conscious  sedation were explained to the patient and informed consent was obtained.  Cardiac catheterization performed electively. Coronary intervention  performed electively.  Local anesthetic given. Left femoral artery access. Left heart  catheterization. Ventriculography was performed. Right coronary artery  angiography. The vessel was injected utilizing a catheter. Left coronary  artery angiography. The vessel was injected utilizing a catheter.  RADIATION EXPOSURE: 5.54 min. A successful drug-eluting stent was  performed on the 90 % lesion in the 1st obtuse marginal. Following  interventionthere was an excellent angiographic appearance with a 0 %  residual stenosis. There was GERSON 3 flow before the procedure and GERSON 3  flow after the procedure. There was no dissection. Vessel setup was  performed. A 6F EBU3.5 LAUNCHER guiding catheterwas used to intubate the  vessel. A [employer] BLUE 180CM wire was used to cross the lesion. Balloon  angioplasty was performed, using a EMERGE 2.5MM X 8MM balloon, with 2  inflations and a maximum inflation pressure of 15 sylwia. A SYNERGY 2.50MM X  16MM drug-eluting stent was placed across the lesion and deployed at a  maximum inflation pressure of 16 sylwia. Hemostasis with  Angioseal-Intervention.  CONTRAST GIVEN: Omnipaque 80 ml. Omnipaque 31 ml.  MEDICATIONS GIVEN: Fentanyl, 25 mcg, IV. Metoprolol (Lopressor, Toprol), 5  mg, IV. Heparin, 9000 units, IV. Clopidogrel (Plavix), 200 mg, PO. 1%  Lidocaine, 10 ml, subcutaneously. 0.9NS, 250 ml, IV.  VENTRICLES: Analysis of regional contractile function demonstrated severe  anterolateral hypokinesis, severe apical hypokinesis, and severe  diaphragmatic hypokinesis. Global left ventricular function was severely  depressed. EF estimated was 30 %.  VALVES: MITRAL VALVE: The mitral valve exhibited trivial regurgitation  (less than 1+).  CORONARY VESSELS: The coronary circulation is right dominant.  LM:   --  LM: Normal.  LAD:   --  Proximal LAD: There was a diffuse 0 % stenosis at the site of a  prior stent.  --  Mid LAD: There was a diffuse 0 % stenosis at the site of a prior stent.  CX:   --  OM1: There was a tubular 90 % stenosis.  RCA:   --  Proximal RCA: Angiography showed minor luminal irregularities  with no flow limiting lesions.  COMPLICATIONS: No complications occurred during the cath lab visit. No  complications occurred during the cath lab visit.  SUMMARY:  1ST LESION INTERVENTIONS: A successful drug-eluting stent was performed on  the 90 % lesion in the 1st obtuse marginal. Following intervention there  was an excellent angiographic appearance with a 0 % residual stenosis.  INTERVENTIONAL RECOMMENDATIONS: Continue aspirin, 81 mg, PO, daily.  Continue clopidogrel (Plavix), 75 mg, PO, daily.  Prepared and signed by  Loi Davies MD  Signed 2020 12:09:21  HEMODYNAMIC TABLES  Pressures:  Baseline  Pressures:  - HR:81  Pressures:  - Rhythm:  Pressures:  -- Aortic Pressure (S/D/M): 114/52/79  Pressures:  -- Left Ventricle (s/edp): 124/33/--  Outputs:  Baseline  Outputs:  -- CALCULATIONS[age]  Outputs:  -- CALCULATIONS: Body Surface Area: 1.73  Outputs:  -- CALCULATIONS: Height in cm: 152.00  Outputs:  -- CALCULATIONS: Sex: Female  Outputs:  -- CALCULATIONS: Weight in k.70  Outputs:  -- OUTPUTS: O2 consumption: 216.85  Outputs:  -- OUTPUTS: Vo2 Indexed: 125.00 (20 @ 11:21)

## 2020-05-12 NOTE — DISCHARGE NOTE PROVIDER - NSDCCPCAREPLAN_GEN_ALL_CORE_FT
PRINCIPAL DISCHARGE DIAGNOSIS  Diagnosis: CAD S/P percutaneous coronary angioplasty  Assessment and Plan of Treatment: Post stent to OM1   Aspirin and plavix for stent   Eliquis for Atrial  fibrillation to start on 5/13 am   Continue all three blood thinners for one month 6/13   On 6/13  STOP Aspirin and continue  both Plavix and Eliquis   Groin precautions   Continue all other medications   follow up with DR Davies 1 -2 weeks PRINCIPAL DISCHARGE DIAGNOSIS  Diagnosis: CAD S/P percutaneous coronary angioplasty  Assessment and Plan of Treatment: Post stent to OM1   Aspirin and plavix for stent   Eliquis for Atrial  fibrillation to start on 5/13 am   Continue all three blood thinners for one month 6/13   On 6/13  STOP Aspirin and continue  both Plavix and Eliquis   Groin precautions   Continue all other medications   Daily weights   Low salt diet   daily fluid intake approximately 1litr to 1.5 liters   follow up with DR Davies 1 -2 weeks

## 2020-08-07 NOTE — ED ADULT NURSE NOTE - NS ED NURSE AMBULANCES2
[facility]  Radiology calling stating patient needs a non transvaginal ultrasound ordered with the ultrasound of the pelvis. I pended order for physician to sign.  Please advise [facility]

## 2020-08-24 PROBLEM — Z00.00 ENCOUNTER FOR PREVENTIVE HEALTH EXAMINATION: Status: ACTIVE | Noted: 2020-08-24

## 2020-09-22 ENCOUNTER — APPOINTMENT (OUTPATIENT)
Dept: ELECTROPHYSIOLOGY | Facility: CLINIC | Age: 85
End: 2020-09-22
Payer: MEDICARE

## 2020-09-22 ENCOUNTER — NON-APPOINTMENT (OUTPATIENT)
Age: 85
End: 2020-09-22

## 2020-09-22 VITALS
HEIGHT: 60 IN | SYSTOLIC BLOOD PRESSURE: 124 MMHG | WEIGHT: 156 LBS | BODY MASS INDEX: 30.63 KG/M2 | OXYGEN SATURATION: 98 % | DIASTOLIC BLOOD PRESSURE: 78 MMHG | RESPIRATION RATE: 15 BRPM | HEART RATE: 49 BPM

## 2020-09-22 DIAGNOSIS — I25.10 ATHEROSCLEROTIC HEART DISEASE OF NATIVE CORONARY ARTERY W/OUT ANGINA PECTORIS: ICD-10-CM

## 2020-09-22 DIAGNOSIS — I25.2 OLD MYOCARDIAL INFARCTION: ICD-10-CM

## 2020-09-22 DIAGNOSIS — I48.91 UNSPECIFIED ATRIAL FIBRILLATION: ICD-10-CM

## 2020-09-22 DIAGNOSIS — R06.00 DYSPNEA, UNSPECIFIED: ICD-10-CM

## 2020-09-22 DIAGNOSIS — E78.5 HYPERLIPIDEMIA, UNSPECIFIED: ICD-10-CM

## 2020-09-22 DIAGNOSIS — I10 ESSENTIAL (PRIMARY) HYPERTENSION: ICD-10-CM

## 2020-09-22 DIAGNOSIS — I50.9 HEART FAILURE, UNSPECIFIED: ICD-10-CM

## 2020-09-22 PROCEDURE — 99205 OFFICE O/P NEW HI 60 MIN: CPT

## 2020-09-22 PROCEDURE — 93000 ELECTROCARDIOGRAM COMPLETE: CPT

## 2020-09-22 RX ORDER — ATORVASTATIN CALCIUM 40 MG/1
40 TABLET, FILM COATED ORAL
Qty: 90 | Refills: 0 | Status: ACTIVE | COMMUNITY

## 2020-09-22 RX ORDER — CLOPIDOGREL 75 MG/1
75 TABLET, FILM COATED ORAL DAILY
Qty: 90 | Refills: 1 | Status: ACTIVE | COMMUNITY

## 2020-09-22 RX ORDER — APIXABAN 5 MG/1
5 TABLET, FILM COATED ORAL
Qty: 60 | Refills: 0 | Status: ACTIVE | COMMUNITY

## 2020-09-22 RX ORDER — SACUBITRIL AND VALSARTAN 49; 51 MG/1; MG/1
49-51 TABLET, FILM COATED ORAL
Refills: 0 | Status: ACTIVE | COMMUNITY

## 2020-09-22 RX ORDER — METOPROLOL TARTRATE 25 MG/1
25 TABLET, FILM COATED ORAL TWICE DAILY
Refills: 0 | Status: ACTIVE | COMMUNITY

## 2020-09-22 RX ORDER — FUROSEMIDE 20 MG/1
20 TABLET ORAL DAILY
Qty: 10 | Refills: 0 | Status: ACTIVE | COMMUNITY

## 2020-09-22 NOTE — PHYSICAL EXAM
[General Appearance - Well Developed] : well developed [Normal Appearance] : normal appearance [Well Groomed] : well groomed [General Appearance - Well Nourished] : well nourished [No Deformities] : no deformities [General Appearance - In No Acute Distress] : no acute distress [Normal Conjunctiva] : the conjunctiva exhibited no abnormalities [Eyelids - No Xanthelasma] : the eyelids demonstrated no xanthelasmas [Normal Oral Mucosa] : normal oral mucosa [No Oral Pallor] : no oral pallor [No Oral Cyanosis] : no oral cyanosis [Normal Jugular Venous A Waves Present] : normal jugular venous A waves present [Normal Jugular Venous V Waves Present] : normal jugular venous V waves present [No Jugular Venous Bone A Waves] : no jugular venous bone A waves [Heart Rate And Rhythm] : heart rate and rhythm were normal [Heart Sounds] : normal S1 and S2 [Murmurs] : no murmurs present [Respiration, Rhythm And Depth] : normal respiratory rhythm and effort [Exaggerated Use Of Accessory Muscles For Inspiration] : no accessory muscle use [Auscultation Breath Sounds / Voice Sounds] : lungs were clear to auscultation bilaterally [Abdomen Soft] : soft [Abdomen Tenderness] : non-tender [Abdomen Mass (___ Cm)] : no abdominal mass palpated [Abnormal Walk] : normal gait [Gait - Sufficient For Exercise Testing] : the gait was sufficient for exercise testing [Nail Clubbing] : no clubbing of the fingernails [Cyanosis, Localized] : no localized cyanosis [Petechial Hemorrhages (___cm)] : no petechial hemorrhages [Skin Color & Pigmentation] : normal skin color and pigmentation [] : no rash [No Venous Stasis] : no venous stasis [Skin Lesions] : no skin lesions [No Skin Ulcers] : no skin ulcer [No Xanthoma] : no  xanthoma was observed [Oriented To Time, Place, And Person] : oriented to person, place, and time [Affect] : the affect was normal [Mood] : the mood was normal [No Anxiety] : not feeling anxious

## 2020-09-23 PROBLEM — I25.2 PAST MYOCARDIAL INFARCTION: Status: ACTIVE | Noted: 2020-09-22

## 2020-09-23 NOTE — DISCUSSION/SUMMARY
[FreeTextEntry1] : Ms. 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  I did  her that with CAD, HF and an LVEF of > 35% there is data to suggest an increased risk for sudden cardiac death.  However the data on potential benefit is blunted given her advanced age, ie > 85 years.  As a primary prevention intervention now > 3 months post revascularization I did advise her that she can stop wearing the lifevest.  She will consider the option of ICD and contact me if she would like to proceed.

## 2020-09-23 NOTE — REVIEW OF SYSTEMS
[Feeling Fatigued] : feeling fatigued [Dyspnea on exertion] : dyspnea during exertion [see HPI] : see HPI [Negative] : Heme/Lymph

## 2020-09-23 NOTE — HISTORY OF PRESENT ILLNESS
[FreeTextEntry1] : This is a 86 y/o female presenting today for ICD evaluation. PMHx significant of HTN, and CAD with multivessel disease s/p  anterior lateral MI (March 2020) with 3 stents LAD. Hospital course was complicated by IABP EF 30 % with heart failure started on diuretics and Entresto and discharged with a  Zoll  life vest. In April, she presented to [place]  ED with complaint of dyspnea and diarrhea. She was found to be hypotensive with atrial fibrillation with RVR . She was converted sinus rhythm in IV lopressor/ Cardizem . She was discharged home on Eliquis given her Chads-Vasc of 6. She is primarily followed up by Dr. Alvarez. In May 4th, 2020 she presented to Dr. Alvarez's office with complaints dyspnea on exertion , SOB and fatigue. Her Metoprolol was increased and she has OM1 disease and was referred for cardiac cath. In May 12th, 2020, she underwent another Cardiac cath s/p ED x one OM1. \par s/p  ED x one OM1. Her recent echocardiogram showe EF 25-30% and and she is referred for AICD. \par \par Her symptoms have improved since her recent stent. However, experiences some occasional SOB on exertion, fatigue. occasional lightheadedness. No chest pain, orthopnea, dizziness or syncope. She remains fairly active and has been tolerating her physical therapy.

## 2021-01-08 NOTE — ED ADULT TRIAGE NOTE - BP NONINVASIVE DIASTOLIC (MM HG)
Benefits, risks, and possible complications of procedure explained to patient/caregiver who verbalized understanding and gave verbal consent.
57

## 2021-05-23 ENCOUNTER — EMERGENCY (EMERGENCY)
Facility: HOSPITAL | Age: 86
LOS: 1 days | Discharge: HOME | End: 2021-05-23
Attending: EMERGENCY MEDICINE | Admitting: EMERGENCY MEDICINE
Payer: MEDICARE

## 2021-05-23 VITALS
HEART RATE: 77 BPM | RESPIRATION RATE: 16 BRPM | WEIGHT: 184.97 LBS | HEIGHT: 61 IN | SYSTOLIC BLOOD PRESSURE: 122 MMHG | DIASTOLIC BLOOD PRESSURE: 66 MMHG | OXYGEN SATURATION: 99 % | TEMPERATURE: 97 F

## 2021-05-23 VITALS
HEART RATE: 70 BPM | RESPIRATION RATE: 18 BRPM | DIASTOLIC BLOOD PRESSURE: 65 MMHG | SYSTOLIC BLOOD PRESSURE: 104 MMHG | OXYGEN SATURATION: 97 %

## 2021-05-23 LAB
ALBUMIN SERPL ELPH-MCNC: 3.1 G/DL — LOW (ref 3.3–5)
ALP SERPL-CCNC: 91 U/L — SIGNIFICANT CHANGE UP (ref 30–120)
ALT FLD-CCNC: 28 U/L DA — SIGNIFICANT CHANGE UP (ref 10–60)
ANION GAP SERPL CALC-SCNC: 10 MMOL/L — SIGNIFICANT CHANGE UP (ref 5–17)
AST SERPL-CCNC: 27 U/L — SIGNIFICANT CHANGE UP (ref 10–40)
BASOPHILS # BLD AUTO: 0.01 K/UL — SIGNIFICANT CHANGE UP (ref 0–0.2)
BASOPHILS NFR BLD AUTO: 0.1 % — SIGNIFICANT CHANGE UP (ref 0–2)
BILIRUB SERPL-MCNC: 0.3 MG/DL — SIGNIFICANT CHANGE UP (ref 0.2–1.2)
BUN SERPL-MCNC: 28 MG/DL — HIGH (ref 7–23)
CALCIUM SERPL-MCNC: 8.1 MG/DL — LOW (ref 8.4–10.5)
CHLORIDE SERPL-SCNC: 104 MMOL/L — SIGNIFICANT CHANGE UP (ref 96–108)
CO2 SERPL-SCNC: 23 MMOL/L — SIGNIFICANT CHANGE UP (ref 22–31)
CREAT SERPL-MCNC: 1.14 MG/DL — SIGNIFICANT CHANGE UP (ref 0.5–1.3)
EOSINOPHIL # BLD AUTO: 0.05 K/UL — SIGNIFICANT CHANGE UP (ref 0–0.5)
EOSINOPHIL NFR BLD AUTO: 0.7 % — SIGNIFICANT CHANGE UP (ref 0–6)
GLUCOSE BLDC GLUCOMTR-MCNC: 126 MG/DL — HIGH (ref 70–99)
GLUCOSE SERPL-MCNC: 143 MG/DL — HIGH (ref 70–99)
HCT VFR BLD CALC: 34.8 % — SIGNIFICANT CHANGE UP (ref 34.5–45)
HGB BLD-MCNC: 11.9 G/DL — SIGNIFICANT CHANGE UP (ref 11.5–15.5)
IMM GRANULOCYTES NFR BLD AUTO: 0.1 % — SIGNIFICANT CHANGE UP (ref 0–1.5)
LYMPHOCYTES # BLD AUTO: 1.5 K/UL — SIGNIFICANT CHANGE UP (ref 1–3.3)
LYMPHOCYTES # BLD AUTO: 21.5 % — SIGNIFICANT CHANGE UP (ref 13–44)
MCHC RBC-ENTMCNC: 33.3 PG — SIGNIFICANT CHANGE UP (ref 27–34)
MCHC RBC-ENTMCNC: 34.2 GM/DL — SIGNIFICANT CHANGE UP (ref 32–36)
MCV RBC AUTO: 97.5 FL — SIGNIFICANT CHANGE UP (ref 80–100)
MONOCYTES # BLD AUTO: 0.69 K/UL — SIGNIFICANT CHANGE UP (ref 0–0.9)
MONOCYTES NFR BLD AUTO: 9.9 % — SIGNIFICANT CHANGE UP (ref 2–14)
NEUTROPHILS # BLD AUTO: 4.71 K/UL — SIGNIFICANT CHANGE UP (ref 1.8–7.4)
NEUTROPHILS NFR BLD AUTO: 67.7 % — SIGNIFICANT CHANGE UP (ref 43–77)
NRBC # BLD: 0 /100 WBCS — SIGNIFICANT CHANGE UP (ref 0–0)
PLATELET # BLD AUTO: 182 K/UL — SIGNIFICANT CHANGE UP (ref 150–400)
POTASSIUM SERPL-MCNC: 3.9 MMOL/L — SIGNIFICANT CHANGE UP (ref 3.5–5.3)
POTASSIUM SERPL-SCNC: 3.9 MMOL/L — SIGNIFICANT CHANGE UP (ref 3.5–5.3)
PROT SERPL-MCNC: 5.9 G/DL — LOW (ref 6–8.3)
RBC # BLD: 3.57 M/UL — LOW (ref 3.8–5.2)
RBC # FLD: 14.9 % — HIGH (ref 10.3–14.5)
SODIUM SERPL-SCNC: 137 MMOL/L — SIGNIFICANT CHANGE UP (ref 135–145)
TROPONIN I SERPL-MCNC: 0 NG/ML — LOW (ref 0.02–0.06)
TROPONIN I SERPL-MCNC: 0.01 NG/ML — LOW (ref 0.02–0.06)
WBC # BLD: 6.97 K/UL — SIGNIFICANT CHANGE UP (ref 3.8–10.5)
WBC # FLD AUTO: 6.97 K/UL — SIGNIFICANT CHANGE UP (ref 3.8–10.5)

## 2021-05-23 PROCEDURE — 84484 ASSAY OF TROPONIN QUANT: CPT

## 2021-05-23 PROCEDURE — 93010 ELECTROCARDIOGRAM REPORT: CPT | Mod: 76

## 2021-05-23 PROCEDURE — 82962 GLUCOSE BLOOD TEST: CPT

## 2021-05-23 PROCEDURE — 93005 ELECTROCARDIOGRAM TRACING: CPT

## 2021-05-23 PROCEDURE — 99284 EMERGENCY DEPT VISIT MOD MDM: CPT

## 2021-05-23 PROCEDURE — 85025 COMPLETE CBC W/AUTO DIFF WBC: CPT

## 2021-05-23 PROCEDURE — 99285 EMERGENCY DEPT VISIT HI MDM: CPT

## 2021-05-23 PROCEDURE — 80053 COMPREHEN METABOLIC PANEL: CPT

## 2021-05-23 PROCEDURE — 36415 COLL VENOUS BLD VENIPUNCTURE: CPT

## 2021-05-23 NOTE — ED ADULT NURSE NOTE - OBJECTIVE STATEMENT
86yo female biba, as per ems "she almost passed out after using the bathroom". pt indicates she had a near syncope episode and has a similar incident "years ago". pt denies any pain/distress at this time.

## 2021-05-23 NOTE — ED PROVIDER NOTE - CARE PROVIDER_API CALL
Loi Davies)  Cardiovascular Disease; Interventional Cardiology  [address] [place] [address]  Phone: [phone]  Fax: [phone]  Follow Up Time: 1-3 Days

## 2021-05-23 NOTE — ED PROVIDER NOTE - PATIENT PORTAL LINK FT
You can access the FollowMyHealth Patient Portal offered by [facility] by registering at the following website: [email or web address]. By joining [facility]’s FollowMyHealth portal, you will also be able to view your health information using other applications (apps) compatible with our system.

## 2021-05-23 NOTE — ED ADULT TRIAGE NOTE - CHIEF COMPLAINT QUOTE
BIB ambulance for near syncope. Felt dizzy after episode of diarrhea. Accucheck 150 at scene. EMS established IV #20 to left a.c. N/S in progress.

## 2021-05-23 NOTE — ED PROVIDER NOTE - CLINICAL SUMMARY MEDICAL DECISION MAKING FREE TEXT BOX
Patient with near syncope after an episode of diarrhea, c/w vasovagal episode. Will get labs, serial troponins, ekg. If stable with no significant findings, patient can be d/c'ed

## 2021-05-23 NOTE — ED ADULT NURSE NOTE - NSIMPLEMENTINTERV_GEN_ALL_ED
Implemented All Fall Risk Interventions:  [place] to call system. Call bell, personal items and telephone within reach. Instruct patient to call for assistance. Room bathroom lighting operational. Non-slip footwear when patient is off stretcher. Physically safe environment: no spills, clutter or unnecessary equipment. Stretcher in lowest position, wheels locked, appropriate side rails in place. Provide visual cue, wrist band, yellow gown, etc. Monitor gait and stability. Monitor for mental status changes and reorient to person, place, and time. Review medications for side effects contributing to fall risk. Reinforce activity limits and safety measures with patient and family.

## 2021-05-23 NOTE — ED PROVIDER NOTE - OBJECTIVE STATEMENT
Patient states she got out of bed to go to the bathroom. Had a lot of watery diarrhea. After standing up from the toilet, she felt dizzy. Mildly nauseated. Did not throw up. Lowered herself to the floor. Did not pass out. No CP, no palpitations. No abdo pain. No head injury or other injury.

## 2021-07-12 NOTE — H&P PST ADULT - HISTORY OF PRESENT ILLNESS
85 year old female with h/o MI 3/14/20 treated with 3 stents to LAD with low EF (has life vest, declined AICD), OM1 stent 5/2020, and Afib converted spontaneously (on eliquis) who c/o occasional chest pressure and SOB with abnormal EKG and Stress test.  For [facility] to assess coronary anatomy.     Symptoms:        Angina (Class):        Ischemic Symptoms:     Heart Failure:        Systolic/Diastolic/Combined: HRrEF       NYHA Class (within 2 weeks):     Assessment of LVEF:       EF: 30-35%       Assessed by: echo       Date: 8/20/20    Prior Cardiac Interventions:  < from: Cardiac Cath Lab - Adult (05.12.20 @ 11:21) >  VENTRICLES: Analysis of regional contractile function demonstrated severe  anterolateral hypokinesis, severe apical hypokinesis, and severe  diaphragmatic hypokinesis. Global left ventricular function was severely  depressed. EF estimated was 30 %.  VALVES: MITRAL VALVE: The mitral valve exhibited trivial regurgitation  (less than 1+).  CORONARY VESSELS: The coronary circulation is right dominant.  LM:   --  LM: Normal.  LAD:   --  Proximal LAD: There was a diffuse 0 % stenosis at the site of a  prior stent.  --  Mid LAD: There was a diffuse 0 % stenosis at the site of a prior stent.  CX:   --  OM1: There was a tubular 90 % stenosis.  RCA:   --  Proximal RCA: Angiography showed minor luminal irregularities  with no flow limiting lesions.  COMPLICATIONS: No complications occurred during the cath lab visit. No  complications occurred during the cath lab visit.  SUMMARY:  1ST LESION INTERVENTIONS: A successful drug-eluting stent was performed on  the 90 % lesion in the 1st obtuse marginal. Following intervention there  was an excellent angiographic appearance with a 0 % residual stenosis.  INTERVENTIONAL RECOMMENDATIONS: Continue aspirin, 81 mg, PO, daily.  Continue clopidogrel (Plavix), 75 mg, PO, daily.    < end of copied text >      Noninvasive Testing:   Stress Test: Date: 6/17/21       Protocol: Regadenoson       Symptoms: Dyspnea       EKG Changes: Afib during stress       Myocardial Imaging: large sized, severe, apical, mid to distal anterior, and septal, partially reversible at margins, significant teresa-infarct ischemia of the anterior wall.  Dyskinesis in the prox and mid septal segments, akinesis in the prox anteroseptal and anterior and prox inferior segments, hypokinesis in the prox to distal anterior, lateral and inferior segments and apex.        Risk Assessment: High    Echo: 8/20/20  EF 30-35%  multi regional wall motion abnormalities (see chart report)  mild to mod MR  mild to mod TR    Antianginal Therapies:        Beta Blockers:  metoprolol       Calcium Channel Blockers:        Long Acting Nitrates:        Ranexa:     Associated Risk Factors:        Cerebrovascular Disease: N/A       Chronic Lung Disease: N/A       Peripheral Arterial Disease: N/A       Chronic Kidney Disease (if yes, what is GFR): N/A       Uncontrolled Diabetes (if yes, what is HgbA1C or FBS): N/A       Poorly Controlled Hypertension (if yes, what is SBP): N/A       Morbid Obesity (if yes, what is BMI): N/A       History of Recent Ventricular Arrhythmia: N/A       Inability to Ambulate Safely: N/A       Need for Therapeutic Anticoagulation: Yes       Antiplatelet or Contrast Allergy: N/A 85 year old female with h/o MI 3/14/20 treated with 3 stents to LAD with low EF (had life vest, declined AICD), OM1 stent 5/2020, and Afib converted spontaneously (on eliquis) who c/o occasional chest pressure and SOB with abnormal EKG and Stress test.  For [facility] to assess coronary anatomy.     Symptoms:        Angina (Class): 3       Ischemic Symptoms: Fatigue; GUTIERREZ    Heart Failure:        Systolic/Diastolic/Combined: HRrEF       NYHA Class (within 2 weeks): 3    Assessment of LVEF:       EF: 30-35%       Assessed by: echo       Date: 8/20/20    Prior Cardiac Interventions:  < from: Cardiac Cath Lab - Adult (05.12.20 @ 11:21) >  VENTRICLES: Analysis of regional contractile function demonstrated severe  anterolateral hypokinesis, severe apical hypokinesis, and severe  diaphragmatic hypokinesis. Global left ventricular function was severely  depressed. EF estimated was 30 %.  VALVES: MITRAL VALVE: The mitral valve exhibited trivial regurgitation  (less than 1+).  CORONARY VESSELS: The coronary circulation is right dominant.  LM:   --  LM: Normal.  LAD:   --  Proximal LAD: There was a diffuse 0 % stenosis at the site of a  prior stent.  --  Mid LAD: There was a diffuse 0 % stenosis at the site of a prior stent.  CX:   --  OM1: There was a tubular 90 % stenosis.  RCA:   --  Proximal RCA: Angiography showed minor luminal irregularities  with no flow limiting lesions.  COMPLICATIONS: No complications occurred during the cath lab visit. No  complications occurred during the cath lab visit.  SUMMARY:  1ST LESION INTERVENTIONS: A successful drug-eluting stent was performed on  the 90 % lesion in the 1st obtuse marginal. Following intervention there  was an excellent angiographic appearance with a 0 % residual stenosis.  INTERVENTIONAL RECOMMENDATIONS: Continue aspirin, 81 mg, PO, daily.  Continue clopidogrel (Plavix), 75 mg, PO, daily.    < end of copied text >      Noninvasive Testing:   Stress Test: Date: 6/17/21       Protocol: Regadenoson       Symptoms: Dyspnea       EKG Changes: Afib during stress       Myocardial Imaging: large sized, severe, apical, mid to distal anterior, and septal, partially reversible at margins, significant teresa-infarct ischemia of the anterior wall.  Dyskinesis in the prox and mid septal segments, akinesis in the prox anteroseptal and anterior and prox inferior segments, hypokinesis in the prox to distal anterior, lateral and inferior segments and apex.        Risk Assessment: High    Echo: 8/20/20  EF 30-35%  multi regional wall motion abnormalities (see chart report)  mild to mod MR  mild to mod TR    Antianginal Therapies:        Beta Blockers:  metoprolol         Associated Risk Factors:        Cerebrovascular Disease: N/A       Chronic Lung Disease: N/A       Peripheral Arterial Disease: N/A       Chronic Kidney Disease (if yes, what is GFR): N/A       Uncontrolled Diabetes (if yes, what is HgbA1C or FBS): N/A       Poorly Controlled Hypertension (if yes, what is SBP): N/A       Morbid Obesity (if yes, what is BMI): N/A       History of Recent Ventricular Arrhythmia: N/A       Inability to Ambulate Safely: N/A       Need for Therapeutic Anticoagulation: Yes       Antiplatelet or Contrast Allergy: N/A

## 2021-07-12 NOTE — H&P PST ADULT - NSICDXPASTMEDICALHX_GEN_ALL_CORE_FT
PAST MEDICAL HISTORY:  CAD (coronary artery disease)     CAD (coronary artery disease) s/p Cath & 3 stents on 3/2020  EF ~30%    Mild HTN     Rapid atrial fibrillation      PAST MEDICAL HISTORY:  CAD (coronary artery disease)     CAD (coronary artery disease) s/p Cath & 3 stents to LAD on 3/2020; OM1 stent x 1  EF ~30%    History of CHF (congestive heart failure)     Mild HTN     Myocardial infarction     Rapid atrial fibrillation

## 2021-07-12 NOTE — H&P PST ADULT - ASSESSMENT
85 year old female with h/o MI 3/14/20 treated with 3 stents to LAD with low EF (has life vest, declined AICD), OM1 stent 5/2020, and Afib converted spontaneously (on eliquis) who c/o occasional chest pressure and SOB with abnormal EKG and Stress test.  For [facility] to assess coronary anatomy.    ASA  Mallampati  GFR  BRA 85 year old female with h/o MI 3/14/20 treated with 3 stents to LAD with low EF (has life vest, declined AICD), OM1 stent 5/2020, and Afib converted spontaneously (on eliquis) who c/o occasional chest pressure and SOB with abnormal EKG and Stress test.  For [facility] to assess coronary anatomy.    NPO for procedure   Consent obtained by MD

## 2021-07-12 NOTE — H&P PST ADULT - NSICDXPASTSURGICALHX_GEN_ALL_CORE_FT
PAST SURGICAL HISTORY:  No significant past surgical history      PAST SURGICAL HISTORY:  S/P cataract surgery

## 2021-07-12 NOTE — H&P PST ADULT - NSANTHOSAYNRD_GEN_A_CORE
No. JOSSIE screening performed.  STOP BANG Legend: 0-2 = LOW Risk; 3-4 = INTERMEDIATE Risk; 5-8 = HIGH Risk

## 2021-07-13 ENCOUNTER — TRANSCRIPTION ENCOUNTER (OUTPATIENT)
Age: 86
End: 2021-07-13

## 2021-07-13 ENCOUNTER — OUTPATIENT (OUTPATIENT)
Dept: OUTPATIENT SERVICES | Facility: HOSPITAL | Age: 86
LOS: 1 days | End: 2021-07-13
Payer: MEDICARE

## 2021-07-13 VITALS
SYSTOLIC BLOOD PRESSURE: 122 MMHG | OXYGEN SATURATION: 98 % | DIASTOLIC BLOOD PRESSURE: 70 MMHG | RESPIRATION RATE: 16 BRPM | HEART RATE: 72 BPM

## 2021-07-13 VITALS
OXYGEN SATURATION: 98 % | SYSTOLIC BLOOD PRESSURE: 142 MMHG | HEIGHT: 60 IN | RESPIRATION RATE: 16 BRPM | TEMPERATURE: 98 F | DIASTOLIC BLOOD PRESSURE: 66 MMHG | HEART RATE: 52 BPM | WEIGHT: 128.97 LBS

## 2021-07-13 DIAGNOSIS — Z98.49 CATARACT EXTRACTION STATUS, UNSPECIFIED EYE: Chronic | ICD-10-CM

## 2021-07-13 DIAGNOSIS — R94.39 ABNORMAL RESULT OF OTHER CARDIOVASCULAR FUNCTION STUDY: ICD-10-CM

## 2021-07-13 LAB
ANION GAP SERPL CALC-SCNC: 11 MMOL/L — SIGNIFICANT CHANGE UP (ref 5–17)
APTT BLD: 29.6 SEC — SIGNIFICANT CHANGE UP (ref 27.5–35.5)
BASOPHILS # BLD AUTO: 0.01 K/UL — SIGNIFICANT CHANGE UP (ref 0–0.2)
BASOPHILS NFR BLD AUTO: 0.2 % — SIGNIFICANT CHANGE UP (ref 0–2)
BUN SERPL-MCNC: 16.7 MG/DL — SIGNIFICANT CHANGE UP (ref 8–20)
CALCIUM SERPL-MCNC: 9 MG/DL — SIGNIFICANT CHANGE UP (ref 8.6–10.2)
CHLORIDE SERPL-SCNC: 105 MMOL/L — SIGNIFICANT CHANGE UP (ref 98–107)
CO2 SERPL-SCNC: 25 MMOL/L — SIGNIFICANT CHANGE UP (ref 22–29)
CREAT SERPL-MCNC: 0.78 MG/DL — SIGNIFICANT CHANGE UP (ref 0.5–1.3)
EOSINOPHIL # BLD AUTO: 0.02 K/UL — SIGNIFICANT CHANGE UP (ref 0–0.5)
EOSINOPHIL NFR BLD AUTO: 0.3 % — SIGNIFICANT CHANGE UP (ref 0–6)
GLUCOSE SERPL-MCNC: 102 MG/DL — HIGH (ref 70–99)
HCT VFR BLD CALC: 37.8 % — SIGNIFICANT CHANGE UP (ref 34.5–45)
HGB BLD-MCNC: 12.7 G/DL — SIGNIFICANT CHANGE UP (ref 11.5–15.5)
IMM GRANULOCYTES NFR BLD AUTO: 0.2 % — SIGNIFICANT CHANGE UP (ref 0–1.5)
INR BLD: 1.06 RATIO — SIGNIFICANT CHANGE UP (ref 0.88–1.16)
LYMPHOCYTES # BLD AUTO: 1.46 K/UL — SIGNIFICANT CHANGE UP (ref 1–3.3)
LYMPHOCYTES # BLD AUTO: 22.1 % — SIGNIFICANT CHANGE UP (ref 13–44)
MCHC RBC-ENTMCNC: 32.8 PG — SIGNIFICANT CHANGE UP (ref 27–34)
MCHC RBC-ENTMCNC: 33.6 GM/DL — SIGNIFICANT CHANGE UP (ref 32–36)
MCV RBC AUTO: 97.7 FL — SIGNIFICANT CHANGE UP (ref 80–100)
MONOCYTES # BLD AUTO: 0.6 K/UL — SIGNIFICANT CHANGE UP (ref 0–0.9)
MONOCYTES NFR BLD AUTO: 9.1 % — SIGNIFICANT CHANGE UP (ref 2–14)
NEUTROPHILS # BLD AUTO: 4.52 K/UL — SIGNIFICANT CHANGE UP (ref 1.8–7.4)
NEUTROPHILS NFR BLD AUTO: 68.1 % — SIGNIFICANT CHANGE UP (ref 43–77)
PLATELET # BLD AUTO: 190 K/UL — SIGNIFICANT CHANGE UP (ref 150–400)
POTASSIUM SERPL-MCNC: 4.8 MMOL/L — SIGNIFICANT CHANGE UP (ref 3.5–5.3)
POTASSIUM SERPL-SCNC: 4.8 MMOL/L — SIGNIFICANT CHANGE UP (ref 3.5–5.3)
PROTHROM AB SERPL-ACNC: 12.3 SEC — SIGNIFICANT CHANGE UP (ref 10.6–13.6)
RBC # BLD: 3.87 M/UL — SIGNIFICANT CHANGE UP (ref 3.8–5.2)
RBC # FLD: 15.3 % — HIGH (ref 10.3–14.5)
SODIUM SERPL-SCNC: 141 MMOL/L — SIGNIFICANT CHANGE UP (ref 135–145)
WBC # BLD: 6.62 K/UL — SIGNIFICANT CHANGE UP (ref 3.8–10.5)
WBC # FLD AUTO: 6.62 K/UL — SIGNIFICANT CHANGE UP (ref 3.8–10.5)

## 2021-07-13 PROCEDURE — 85610 PROTHROMBIN TIME: CPT

## 2021-07-13 PROCEDURE — 85025 COMPLETE CBC W/AUTO DIFF WBC: CPT

## 2021-07-13 PROCEDURE — 85730 THROMBOPLASTIN TIME PARTIAL: CPT

## 2021-07-13 PROCEDURE — C1769: CPT

## 2021-07-13 PROCEDURE — 93458 L HRT ARTERY/VENTRICLE ANGIO: CPT

## 2021-07-13 PROCEDURE — 80048 BASIC METABOLIC PNL TOTAL CA: CPT

## 2021-07-13 PROCEDURE — 36415 COLL VENOUS BLD VENIPUNCTURE: CPT

## 2021-07-13 PROCEDURE — 93010 ELECTROCARDIOGRAM REPORT: CPT

## 2021-07-13 PROCEDURE — C1887: CPT

## 2021-07-13 PROCEDURE — C1760: CPT

## 2021-07-13 PROCEDURE — 99152 MOD SED SAME PHYS/QHP 5/>YRS: CPT

## 2021-07-13 PROCEDURE — 93005 ELECTROCARDIOGRAM TRACING: CPT

## 2021-07-13 PROCEDURE — C1894: CPT

## 2021-07-13 RX ORDER — APIXABAN 2.5 MG/1
1 TABLET, FILM COATED ORAL
Qty: 60 | Refills: 0
Start: 2021-07-13 | End: 2021-08-11

## 2021-07-13 NOTE — DISCHARGE NOTE PROVIDER - NSDCFUADDAPPT_GEN_ALL_CORE_FT
Follow up with Dr. Davies in one to two weeks    No heavy lifting, driving, sex, tub baths, swimming, or any activity that submerges the lower half of the body in water for 48 hours.  Limited walking and stairs for 48 hours.    Change the bandaid after 24 hours and every 24 hours after that.  Keep the puncture site dry and covered with a bandaid until a scab forms.    Observe the site frequently.  If bleeding or a large lump (the size of a golf ball or bigger) occurs lie flat, apply continuous direct pressure just above the puncture site for at least 10 minutes, and notify your physician immediately.  If the bleeding cannot be controlled, call 911 immediately for assistance.  Notify your physician of pain, swelling or any drainage.    Notify your physician immediately if coldness, numbness, discoloration or pain in your foot occurs.

## 2021-07-13 NOTE — PROGRESS NOTE ADULT - ASSESSMENT
A/P: 85 year old female with h/o MI 3/14/20 treated with 3 stents to LAD with low EF (had life vest, declined AICD), OM1 stent 5/2020, and Afib converted spontaneously (on eliquis) who c/o occasional chest pressure and SOB with abnormal EKG and Stress test.  Now s/p LHC via RFA that revealed patent stents (prelim report; official report to follow).  -Bedrest x 1 hour post procedure then OOB  -May discharge to home after 2 hour recovery if remains stable  -Follow up with Dr. Allen in one to two weeks  -Continue current med regimen  -Activity instructions discussed with patient verbal understanding  -Discussed with Dr. Davies

## 2021-07-13 NOTE — DISCHARGE NOTE PROVIDER - NSDCMRMEDTOKEN_GEN_ALL_CORE_FT
apixaban 5 mg oral tablet: 1 tab(s) orally every 12 hours  Restart tomorrow morning 7/14/21  atorvastatin 40 mg oral tablet: 1 tab(s) orally once a day  clopidogrel 75 mg oral tablet: 1 tab(s) orally once a day  Entresto 49 mg-51 mg oral tablet: 1 tab(s) orally 2 times a day  furosemide 20 mg oral tablet: 1 tab(s) orally once a day  metoprolol tartrate 25 mg oral tablet: 1 tab(s) orally 2 times a day

## 2021-07-13 NOTE — PROGRESS NOTE ADULT - SUBJECTIVE AND OBJECTIVE BOX
Cardiology NP Post procedure note:     -s/p LHC via RFA by Dr. Davies: patent stents (prelim report; official report to follow)    TELE: NSR 70s    Allergies:  Persantine (Rash)  Persantine (Unknown)      PAST MEDICAL & SURGICAL HISTORY:  CAD (coronary artery disease)    Rapid atrial fibrillation    CAD (coronary artery disease)  s/p Cath &amp; 3 stents to LAD on 3/2020; OM1 stent x 1  EF ~30%    Mild HTN    History of CHF (congestive heart failure)    Myocardial infarction    S/P cataract surgery        Vital Signs Last 24 Hrs  T(C): 36.8 (13 Jul 2021 15:19), Max: 36.8 (13 Jul 2021 15:19)  T(F): 98.2 (13 Jul 2021 15:19), Max: 98.2 (13 Jul 2021 15:19)  HR: 70 (13 Jul 2021 18:00) (52 - 78)  BP: 119/58 (13 Jul 2021 18:00) (119/58 - 143/99)  BP(mean): --  RR: 16 (13 Jul 2021 18:00) (16 - 16)  SpO2: 98% (13 Jul 2021 18:00) (98% - 98%)    Physical Exam:  Constitutional: NAD, AAOx3  Cardiovascular: +S1S2 RRR  Pulmonary: CTA b/l, unlabored  GI: soft NTND +BS  Extremities: no pedal edema, +distal pulses b/l  Neuro: non focal, ESPANA x4  Procedure site: RFA angioseal site benign without hematoma/bleeding; no bruit; + right PP    LABS:                        12.7   6.62  )-----------( 190      ( 13 Jul 2021 15:15 )             37.8     07-13    141  |  105  |  16.7  ----------------------------<  102<H>  4.8   |  25.0  |  0.78    Ca    9.0      13 Jul 2021 15:15      PT/INR - ( 13 Jul 2021 15:15 )   PT: 12.3 sec;   INR: 1.06 ratio         PTT - ( 13 Jul 2021 15:15 )  PTT:29.6 sec      RADIOLOGY & ADDITIONAL TESTS:

## 2021-07-13 NOTE — DISCHARGE NOTE PROVIDER - NSDCCPCAREPLAN_GEN_ALL_CORE_FT
PRINCIPAL DISCHARGE DIAGNOSIS  Diagnosis: CAD (coronary atherosclerotic disease)  Assessment and Plan of Treatment: Maintain current med regimen

## 2021-07-13 NOTE — DISCHARGE NOTE PROVIDER - NSDCCPTREATMENT_GEN_ALL_CORE_FT
PRINCIPAL PROCEDURE  Procedure: Left heart cardiac cath  Findings and Treatment: No heavy lifting, driving, sex, tub baths, swimming, or any activity that submerges the lower half of the body in water for 48 hours.  Limited walking and stairs for 48 hours.    Change the bandaid after 24 hours and every 24 hours after that.  Keep the puncture site dry and covered with a bandaid until a scab forms.    Observe the site frequently.  If bleeding or a large lump (the size of a golf ball or bigger) occurs lie flat, apply continuous direct pressure just above the puncture site for at least 10 minutes, and notify your physician immediately.  If the bleeding cannot be controlled, call 911 immediately for assistance.  Notify your physician of pain, swelling or any drainage.    Notify your physician immediately if coldness, numbness, discoloration or pain in your foot occurs.

## 2021-07-13 NOTE — DISCHARGE NOTE NURSING/CASE MANAGEMENT/SOCIAL WORK - PATIENT PORTAL LINK FT
You can access the FollowMyHealth Patient Portal offered by [facility] by registering at the following website: [email or web address]. By joining [employer]’s FollowMyHealth portal, you will also be able to view your health information using other applications (apps) compatible with our system.

## 2021-07-13 NOTE — DISCHARGE NOTE PROVIDER - HOSPITAL COURSE
85 year old female with h/o MI 3/14/20 treated with 3 stents to LAD with low EF (had life vest, declined AICD), OM1 stent 5/2020, and Afib converted spontaneously (on eliquis) who c/o occasional chest pressure and SOB with abnormal EKG and Stress test.  Now s/p LHC via RFA that revealed patent stents (prelim report; official report to follow).  -Bedrest x 1 hour post procedure then OOB  -May discharge to home after 2 hour recovery if remains stable  -Follow up with Dr. Allen in one to two weeks  -Continue current med regimen; restart eliquis tomorrow morning  -Activity instructions discussed with patient verbal understanding  -Discussed with Dr. Davies

## 2021-07-28 NOTE — ED PROVIDER NOTE - NSTIMEPROVIDERCAREINITIATE_GEN_ER
Progress note 30   minutes, exclusive of time spent during teaching or procedures. Including assessment, review of the available laboratory/radiographic data, multidisciplinary discussion, and the development and documentation of the above assessment and plan, attending to this patient's  care needs for the aforementioned issues with complex decision making .       ASSESSMENT     TELLEZ (dyspnea on exertion)  Stable, unchanged.   Negative S Test    Paroxysmal atrial fibrillation (CMS/HCC)  Controlled heart rate and asymptomatic.    We will continue for now heart rate lowering medications and anticoagulation.    D/W patient of indication risk and benefits of blood thinners, risk of CVA with atrial fibrillation and life threatening bleeding complications with anticoagulation Rx.  Discussed about warfarin versus NOAC therapy including comparative efficiency,  effectiveness, and risks of cerebrovascular accident associated with  sudden discontinuation    Uncontrolled hypertension  Controlled    Edema of both legs  Resolved    LBBB (left bundle branch block)   unchanged     Diastolic dysfunction  Compensated clinically , and euvolemic on physical examination .  Last echocardiogram reviewed and discussed with patient  Medications reviewed   Patient was educated about diet, activity level,follow-up appointments, daily weight monitoring, and what to do if heart failure symptoms worsen.      Primary osteoarthritis of both knees  - traMADol (ULTRAM) tablet 50 mg        History and Physical:  Angelic Pedro is a 77 year old female with a history of  has a past medical history of Abnormal ECG, Abnormal mammogram, Asymptomatic postmenopausal state, B12 deficiency, Carotid bruit, Chronic diastolic (congestive) heart failure (CMS/HCC), Diastolic dysfunction, Encounter for screening mammogram for breast cancer, Essential hypertension, Hypocitraturia, LBBB (left bundle branch block), Left nephrolithiasis, Low vitamin D level, LVH (left  ventricular hypertrophy), Mixed hyperlipidemia, Obesity, Osteopenia, Primary osteoarthritis of right knee, and SOB (shortness of breath).   Stable TELLEZ/SOB. No leg edema ; BL knee pain with motion        Review Of Symptoms:     CVS: No orthopnea, paroxysmal nocturnal dyspnea,   No palpitations, dizziness, syncope, or diaphoresis. No exertional typical chest pain, neck, jaw, or arm pain.  Peripheral Vascular disease: Denies intermittent claudications, restless leg syndrome, painful varicose veins.  GI: No history of epigastric pain, nausea, vomiting, hematemesis, or melena. No diarrhea or constipation.  Respiratory: No history of recent fever with chills, purulent expectoration, or hemoptysis.  No history of excessive snoring or day time sleepiness.  CNS: No history of headache, focal weakness, or visual disturbances.  MS: y  joint pain, redness, or swelling.  Constitutional: No history of weight loss, loss of appetite, or weight gain.  : No history of flank pain or hematuria.  Endocrinology: No history of polyuria, polydipsia, polyphagia, or hot flashes.  ENT: No sore throat, difficulty swallowing, or other significant abnormalities.  Psych:  Adult Depression Screening: Result- Negative. Denies feeling down, depressed, or hopeless. Denies feeling little pleasure or interest in doing activities over last 2 weeks.   Skin: no new rashes, lesions, or pruritic.    Cholesterol Status: No recent labs done.  Social History     Tobacco Use   Smoking Status Former Smoker   • Packs/day: 0.00   Smokeless Tobacco Never Used     Current Outpatient Medications   Medication Sig Dispense Refill   • Xarelto 20 MG Tab TAKE 1 TABLET EVERY DAY WITH DINNER 90 tablet 1   • AMIODarone (Pacerone) 200 MG tablet Take 1 tablet by mouth daily. 90 tablet 1   • Omega-3 Fatty Acids (FISH OIL ULTRA PO)      • dilTIAZem (CARDIZEM CD) 180 MG 24 hr capsule Take 1 capsule by mouth daily. 90 capsule 3   • metoPROLOL tartrate (LOPRESSOR) 50 MG tablet  Take 1 tablet by mouth 2 times daily. 180 tablet 3   • labetalol (NORMODYNE) 100 MG tablet Take 0.5 tablets by mouth 2 times daily. 180 tablet 3   • calcium-vitamin D (OSCAL-500) 500-400 MG-UNIT Tab Take 5,000 Units by mouth.     • PARoxetine (PAXIL) 20 MG tablet Seldom per patient     • Cholecalciferol (VITAMIN D3 PO) Take 2,000 Units by mouth daily.      • benazepril (LOTENSIN) 40 MG tablet Take 40 mg by mouth daily. afternoon     • vitamin E 400 UNIT capsule Take 400 Units by mouth 2 days a week.      • Multiple Vitamins-Minerals (MULTIVITAMIN ADULT EXTRA C PO) Take 1 tablet by mouth 3 days a week.      • hydrochlorothiazide (HYDRODIURIL) 25 MG tablet Take 25 mg by mouth daily.      • vitamin B-12 (CYANOCOBALAMIN) 1000 MCG tablet Take 1 tablet by mouth daily. 100 tablet 1   • amLODIPine (NORVASC) 10 MG tablet Take 10 mg by mouth daily.     • glucosamine-chondroitin (OSTEO BI-FLEX REGULAR STRENGTH) 250-200 MG tablet Take 1 tablet by mouth 3 days a week.      • aspirin 81 MG tablet Take by mouth 2 days a week. Takes twice weekly since office visit 8/21/19 (patient states per Dr. Moore)     • Cod Liver Oil 1000 MG Cap Take 1 capsule by mouth daily.       Current Facility-Administered Medications   Medication Dose Route Frequency Provider Last Rate Last Admin   • traMADol (ULTRAM) tablet 50 mg  50 mg Oral Daily PRN Katie Moore MD           Past Medical History:   Diagnosis Date   • Abnormal ECG    • Abnormal mammogram     left breast   • Asymptomatic postmenopausal state    • B12 deficiency    • Carotid bruit    • Chronic diastolic (congestive) heart failure (CMS/HCC)    • Diastolic dysfunction    • Encounter for screening mammogram for breast cancer    • Essential hypertension    • Hypocitraturia    • LBBB (left bundle branch block)    • Left nephrolithiasis    • Low vitamin D level    • LVH (left ventricular hypertrophy)    • Mixed hyperlipidemia    • Obesity    • Osteopenia    • Primary osteoarthritis of right  knee    • SOB (shortness of breath)        Physical Exam:    Visit Vitals  /72 (BP Location: LUE - Left upper extremity, Patient Position: Sitting, Cuff Size: Regular)   Pulse 66   Ht 5' 7\" (1.702 m)   Wt 88.9 kg (196 lb)   BMI 30.70 kg/m²       HEENT: No icterus. No cyanosis. No thyromegaly. Normal carotid upstroke. No carotid bruit.  JVD 4 cm above angle of Delon in 45 degree position.  RESPIRATORY:      Air entry bilaterally equal. No rales, rub or wheezing.  CARDIO VASCULAR :     PMI in the 5th left intercostals space 9 cm from the midsternal line.  S1 N, A2 N, P2 N, S3 No, S4 No. No mid systolic click.  II/VI ESM Mitral area present, Tricuspid area present, Aortic area   -, Pulmonary area -.  No pericardial rub. No chest pain on arm Movement or deep breathing.  Abdomen:     Soft. No hepatosplenomegaly. No palpable or pulsatile mass felt. Bowel sound present.  CNS:    Alert and Oriented x3. No focal motor deficit.              Psych: Normal mood.  Musculoskeletal:    No significant Kyphosis/Scoliosis. Gait not tested.  EXTREMITIES:    NO Edema. No clubbing or cyanosis. DP/PT 1+/2 Bilat. Femoral 2/2 Bilat. No Bruit.  No Brachial femoral delay.  Skin:  No Xanthelasma.     Katie Moore MD, RPVI, ABVLM Diplomate    American College of Cardiology.  American Board of Nuclear Medicine.  ARDMS , Registered Physician Vascular Interpretation.  American Board of Vein and Lymphatic Medicine       30-Apr-2020 05:59

## 2021-09-05 NOTE — DISCHARGE NOTE PROVIDER - CARE PROVIDER_API CALL
English Loi Davies)  Cardiovascular Disease; Interventional Cardiology  [address] [place] [address]  Phone: [phone]  Fax: [phone]  Established Patient  Follow Up Time: 2 weeks

## 2021-09-10 NOTE — SWALLOW BEDSIDE ASSESSMENT ADULT - ASR SWALLOW DENTITION
Patient called stating that at last visit he was told he was to be scheduled for surgery. Please advise on surgery information if surgery is to be scheduled. present and adequate

## 2021-12-21 ENCOUNTER — INPATIENT (INPATIENT)
Facility: HOSPITAL | Age: 86
LOS: 0 days | Discharge: HOME | DRG: 309 | End: 2021-12-22
Attending: HOSPITALIST | Admitting: HOSPITALIST
Payer: COMMERCIAL

## 2021-12-21 ENCOUNTER — TRANSCRIPTION ENCOUNTER (OUTPATIENT)
Age: 86
End: 2021-12-21

## 2021-12-21 VITALS
OXYGEN SATURATION: 99 % | HEIGHT: 60 IN | HEART RATE: 130 BPM | WEIGHT: 125.66 LBS | DIASTOLIC BLOOD PRESSURE: 57 MMHG | SYSTOLIC BLOOD PRESSURE: 123 MMHG | RESPIRATION RATE: 18 BRPM | TEMPERATURE: 98 F

## 2021-12-21 DIAGNOSIS — Z98.49 CATARACT EXTRACTION STATUS, UNSPECIFIED EYE: Chronic | ICD-10-CM

## 2021-12-21 DIAGNOSIS — I48.91 UNSPECIFIED ATRIAL FIBRILLATION: ICD-10-CM

## 2021-12-21 PROBLEM — I25.10 ATHEROSCLEROTIC HEART DISEASE OF NATIVE CORONARY ARTERY WITHOUT ANGINA PECTORIS: Chronic | Status: ACTIVE | Noted: 2020-04-30

## 2021-12-21 PROBLEM — I21.9 ACUTE MYOCARDIAL INFARCTION, UNSPECIFIED: Chronic | Status: ACTIVE | Noted: 2021-07-13

## 2021-12-21 PROBLEM — Z86.79 PERSONAL HISTORY OF OTHER DISEASES OF THE CIRCULATORY SYSTEM: Chronic | Status: ACTIVE | Noted: 2021-07-13

## 2021-12-21 LAB
ALBUMIN SERPL ELPH-MCNC: 2.9 G/DL — LOW (ref 3.3–5)
ALBUMIN SERPL ELPH-MCNC: 3.3 G/DL — SIGNIFICANT CHANGE UP (ref 3.3–5)
ALP SERPL-CCNC: 67 U/L — SIGNIFICANT CHANGE UP (ref 30–120)
ALP SERPL-CCNC: 82 U/L — SIGNIFICANT CHANGE UP (ref 30–120)
ALT FLD-CCNC: 23 U/L DA — SIGNIFICANT CHANGE UP (ref 10–60)
ALT FLD-CCNC: 25 U/L DA — SIGNIFICANT CHANGE UP (ref 10–60)
ANION GAP SERPL CALC-SCNC: 11 MMOL/L — SIGNIFICANT CHANGE UP (ref 5–17)
ANION GAP SERPL CALC-SCNC: 8 MMOL/L — SIGNIFICANT CHANGE UP (ref 5–17)
APTT BLD: 33 SEC — SIGNIFICANT CHANGE UP (ref 27.5–35.5)
AST SERPL-CCNC: 16 U/L — SIGNIFICANT CHANGE UP (ref 10–40)
AST SERPL-CCNC: 19 U/L — SIGNIFICANT CHANGE UP (ref 10–40)
BASOPHILS # BLD AUTO: 0.01 K/UL — SIGNIFICANT CHANGE UP (ref 0–0.2)
BASOPHILS NFR BLD AUTO: 0.1 % — SIGNIFICANT CHANGE UP (ref 0–2)
BILIRUB SERPL-MCNC: 0.5 MG/DL — SIGNIFICANT CHANGE UP (ref 0.2–1.2)
BILIRUB SERPL-MCNC: 0.5 MG/DL — SIGNIFICANT CHANGE UP (ref 0.2–1.2)
BUN SERPL-MCNC: 25 MG/DL — HIGH (ref 7–23)
BUN SERPL-MCNC: 28 MG/DL — HIGH (ref 7–23)
CALCIUM SERPL-MCNC: 8 MG/DL — LOW (ref 8.4–10.5)
CALCIUM SERPL-MCNC: 8.7 MG/DL — SIGNIFICANT CHANGE UP (ref 8.4–10.5)
CHLORIDE SERPL-SCNC: 105 MMOL/L — SIGNIFICANT CHANGE UP (ref 96–108)
CHLORIDE SERPL-SCNC: 108 MMOL/L — SIGNIFICANT CHANGE UP (ref 96–108)
CO2 SERPL-SCNC: 23 MMOL/L — SIGNIFICANT CHANGE UP (ref 22–31)
CO2 SERPL-SCNC: 24 MMOL/L — SIGNIFICANT CHANGE UP (ref 22–31)
CREAT SERPL-MCNC: 0.81 MG/DL — SIGNIFICANT CHANGE UP (ref 0.5–1.3)
CREAT SERPL-MCNC: 0.98 MG/DL — SIGNIFICANT CHANGE UP (ref 0.5–1.3)
EOSINOPHIL # BLD AUTO: 0 K/UL — SIGNIFICANT CHANGE UP (ref 0–0.5)
EOSINOPHIL NFR BLD AUTO: 0 % — SIGNIFICANT CHANGE UP (ref 0–6)
FLUAV AG NPH QL: SIGNIFICANT CHANGE UP
FLUBV AG NPH QL: SIGNIFICANT CHANGE UP
GLUCOSE SERPL-MCNC: 106 MG/DL — HIGH (ref 70–99)
GLUCOSE SERPL-MCNC: 113 MG/DL — HIGH (ref 70–99)
HCT VFR BLD CALC: 32.8 % — LOW (ref 34.5–45)
HCT VFR BLD CALC: 36.9 % — SIGNIFICANT CHANGE UP (ref 34.5–45)
HGB BLD-MCNC: 11.2 G/DL — LOW (ref 11.5–15.5)
HGB BLD-MCNC: 12.6 G/DL — SIGNIFICANT CHANGE UP (ref 11.5–15.5)
IMM GRANULOCYTES NFR BLD AUTO: 0.3 % — SIGNIFICANT CHANGE UP (ref 0–1.5)
INR BLD: 1.26 RATIO — HIGH (ref 0.88–1.16)
LYMPHOCYTES # BLD AUTO: 1.15 K/UL — SIGNIFICANT CHANGE UP (ref 1–3.3)
LYMPHOCYTES # BLD AUTO: 14.6 % — SIGNIFICANT CHANGE UP (ref 13–44)
MAGNESIUM SERPL-MCNC: 1.8 MG/DL — SIGNIFICANT CHANGE UP (ref 1.6–2.6)
MCHC RBC-ENTMCNC: 32.9 PG — SIGNIFICANT CHANGE UP (ref 27–34)
MCHC RBC-ENTMCNC: 33 PG — SIGNIFICANT CHANGE UP (ref 27–34)
MCHC RBC-ENTMCNC: 34.1 GM/DL — SIGNIFICANT CHANGE UP (ref 32–36)
MCHC RBC-ENTMCNC: 34.1 GM/DL — SIGNIFICANT CHANGE UP (ref 32–36)
MCV RBC AUTO: 96.5 FL — SIGNIFICANT CHANGE UP (ref 80–100)
MCV RBC AUTO: 96.6 FL — SIGNIFICANT CHANGE UP (ref 80–100)
MONOCYTES # BLD AUTO: 0.5 K/UL — SIGNIFICANT CHANGE UP (ref 0–0.9)
MONOCYTES NFR BLD AUTO: 6.3 % — SIGNIFICANT CHANGE UP (ref 2–14)
NEUTROPHILS # BLD AUTO: 6.2 K/UL — SIGNIFICANT CHANGE UP (ref 1.8–7.4)
NEUTROPHILS NFR BLD AUTO: 78.7 % — HIGH (ref 43–77)
NRBC # BLD: 0 /100 WBCS — SIGNIFICANT CHANGE UP (ref 0–0)
NRBC # BLD: 0 /100 WBCS — SIGNIFICANT CHANGE UP (ref 0–0)
PHOSPHATE SERPL-MCNC: 3.8 MG/DL — SIGNIFICANT CHANGE UP (ref 2.5–4.5)
PLATELET # BLD AUTO: 179 K/UL — SIGNIFICANT CHANGE UP (ref 150–400)
PLATELET # BLD AUTO: 180 K/UL — SIGNIFICANT CHANGE UP (ref 150–400)
POTASSIUM SERPL-MCNC: 3.8 MMOL/L — SIGNIFICANT CHANGE UP (ref 3.5–5.3)
POTASSIUM SERPL-MCNC: 4.5 MMOL/L — SIGNIFICANT CHANGE UP (ref 3.5–5.3)
POTASSIUM SERPL-SCNC: 3.8 MMOL/L — SIGNIFICANT CHANGE UP (ref 3.5–5.3)
POTASSIUM SERPL-SCNC: 4.5 MMOL/L — SIGNIFICANT CHANGE UP (ref 3.5–5.3)
PROT SERPL-MCNC: 5.5 G/DL — LOW (ref 6–8.3)
PROT SERPL-MCNC: 6 G/DL — SIGNIFICANT CHANGE UP (ref 6–8.3)
PROTHROM AB SERPL-ACNC: 15.1 SEC — HIGH (ref 10.6–13.6)
RBC # BLD: 3.4 M/UL — LOW (ref 3.8–5.2)
RBC # BLD: 3.82 M/UL — SIGNIFICANT CHANGE UP (ref 3.8–5.2)
RBC # FLD: 15.2 % — HIGH (ref 10.3–14.5)
RBC # FLD: 15.5 % — HIGH (ref 10.3–14.5)
RSV RNA NPH QL NAA+NON-PROBE: SIGNIFICANT CHANGE UP
SARS-COV-2 RNA SPEC QL NAA+PROBE: SIGNIFICANT CHANGE UP
SODIUM SERPL-SCNC: 139 MMOL/L — SIGNIFICANT CHANGE UP (ref 135–145)
SODIUM SERPL-SCNC: 140 MMOL/L — SIGNIFICANT CHANGE UP (ref 135–145)
TROPONIN I, HIGH SENSITIVITY RESULT: 114.3 NG/L — HIGH
TROPONIN I, HIGH SENSITIVITY RESULT: 153.8 NG/L — HIGH
TROPONIN I, HIGH SENSITIVITY RESULT: 17.4 NG/L — SIGNIFICANT CHANGE UP
TSH SERPL-MCNC: 3 UIU/ML — SIGNIFICANT CHANGE UP (ref 0.27–4.2)
WBC # BLD: 7.88 K/UL — SIGNIFICANT CHANGE UP (ref 3.8–10.5)
WBC # BLD: 9.3 K/UL — SIGNIFICANT CHANGE UP (ref 3.8–10.5)
WBC # FLD AUTO: 7.88 K/UL — SIGNIFICANT CHANGE UP (ref 3.8–10.5)
WBC # FLD AUTO: 9.3 K/UL — SIGNIFICANT CHANGE UP (ref 3.8–10.5)

## 2021-12-21 PROCEDURE — 99285 EMERGENCY DEPT VISIT HI MDM: CPT

## 2021-12-21 PROCEDURE — 99223 1ST HOSP IP/OBS HIGH 75: CPT | Mod: AI

## 2021-12-21 PROCEDURE — 71045 X-RAY EXAM CHEST 1 VIEW: CPT | Mod: 26

## 2021-12-21 PROCEDURE — 99239 HOSP IP/OBS DSCHRG MGMT >30: CPT

## 2021-12-21 PROCEDURE — 93010 ELECTROCARDIOGRAM REPORT: CPT

## 2021-12-21 PROCEDURE — 93306 TTE W/DOPPLER COMPLETE: CPT | Mod: 26

## 2021-12-21 RX ORDER — ATORVASTATIN CALCIUM 80 MG/1
40 TABLET, FILM COATED ORAL AT BEDTIME
Refills: 0 | Status: DISCONTINUED | OUTPATIENT
Start: 2021-12-21 | End: 2021-12-22

## 2021-12-21 RX ORDER — CLOPIDOGREL BISULFATE 75 MG/1
75 TABLET, FILM COATED ORAL DAILY
Refills: 0 | Status: DISCONTINUED | OUTPATIENT
Start: 2021-12-21 | End: 2021-12-22

## 2021-12-21 RX ORDER — SACUBITRIL AND VALSARTAN 24; 26 MG/1; MG/1
1 TABLET, FILM COATED ORAL
Refills: 0 | Status: DISCONTINUED | OUTPATIENT
Start: 2021-12-21 | End: 2021-12-22

## 2021-12-21 RX ORDER — APIXABAN 2.5 MG/1
2.5 TABLET, FILM COATED ORAL EVERY 12 HOURS
Refills: 0 | Status: DISCONTINUED | OUTPATIENT
Start: 2021-12-21 | End: 2021-12-22

## 2021-12-21 RX ORDER — METOPROLOL TARTRATE 50 MG
25 TABLET ORAL
Refills: 0 | Status: DISCONTINUED | OUTPATIENT
Start: 2021-12-21 | End: 2021-12-22

## 2021-12-21 RX ORDER — SODIUM CHLORIDE 9 MG/ML
500 INJECTION INTRAMUSCULAR; INTRAVENOUS; SUBCUTANEOUS ONCE
Refills: 0 | Status: COMPLETED | OUTPATIENT
Start: 2021-12-21 | End: 2021-12-21

## 2021-12-21 RX ORDER — DILTIAZEM HCL 120 MG
10 CAPSULE, EXT RELEASE 24 HR ORAL ONCE
Refills: 0 | Status: COMPLETED | OUTPATIENT
Start: 2021-12-21 | End: 2021-12-21

## 2021-12-21 RX ADMIN — ATORVASTATIN CALCIUM 40 MILLIGRAM(S): 80 TABLET, FILM COATED ORAL at 21:40

## 2021-12-21 RX ADMIN — SODIUM CHLORIDE 500 MILLILITER(S): 9 INJECTION INTRAMUSCULAR; INTRAVENOUS; SUBCUTANEOUS at 02:42

## 2021-12-21 RX ADMIN — APIXABAN 2.5 MILLIGRAM(S): 2.5 TABLET, FILM COATED ORAL at 17:08

## 2021-12-21 RX ADMIN — SACUBITRIL AND VALSARTAN 1 TABLET(S): 24; 26 TABLET, FILM COATED ORAL at 07:15

## 2021-12-21 RX ADMIN — APIXABAN 2.5 MILLIGRAM(S): 2.5 TABLET, FILM COATED ORAL at 07:15

## 2021-12-21 RX ADMIN — Medication 25 MILLIGRAM(S): at 06:29

## 2021-12-21 RX ADMIN — Medication 10 MILLIGRAM(S): at 02:42

## 2021-12-21 RX ADMIN — CLOPIDOGREL BISULFATE 75 MILLIGRAM(S): 75 TABLET, FILM COATED ORAL at 12:34

## 2021-12-21 NOTE — ED ADULT NURSE NOTE - NSIMPLEMENTINTERV_GEN_ALL_ED
Implemented All Universal Safety Interventions:  [place] to call system. Call bell, personal items and telephone within reach. Instruct patient to call for assistance. Room bathroom lighting operational. Non-slip footwear when patient is off stretcher. Physically safe environment: no spills, clutter or unnecessary equipment. Stretcher in lowest position, wheels locked, appropriate side rails in place. Implemented All Fall with Harm Risk Interventions:  [place] to call system. Call bell, personal items and telephone within reach. Instruct patient to call for assistance. Room bathroom lighting operational. Non-slip footwear when patient is off stretcher. Physically safe environment: no spills, clutter or unnecessary equipment. Stretcher in lowest position, wheels locked, appropriate side rails in place. Provide visual cue, wrist band, yellow gown, etc. Monitor gait and stability. Monitor for mental status changes and reorient to person, place, and time. Review medications for side effects contributing to fall risk. Reinforce activity limits and safety measures with patient and family. Provide visual clues: red socks.

## 2021-12-21 NOTE — H&P ADULT - NSHPLABSRESULTS_GEN_ALL_CORE
LABS:                        12.6   9.30  )-----------( 179      ( 21 Dec 2021 02:32 )             36.9     139    |  105    |  28<H>  ----------------------------<  106<H>    21 Dec 2021 02:32  3.8     |  23     |  0.98         Ca 8.7           21 Dec 2021 02:32        TPro  6.0    /  Alb  3.3    /  TBili  0.5    /  DBili  x      /  AST  19     /  ALT  23     /  AlkPhos  82     21 Dec 2021 02:32    PT/INR - ( 21 Dec 2021 02:32 )   PT: 15.1<H>;   INR: 1.26<H>         PTT - ( 21 Dec 2021 02:32 )  PTT:33.0     Troponin trend:  Troponin I, High Sensitivity Result: 17.4 ng/L (12-21-21 @ 02:32)    EKG:   afib with , RBBB, q waves and TWI in lateral leads

## 2021-12-21 NOTE — CONSULT NOTE ADULT - ASSESSMENT
The patient is an 85 year old female with a history of CAD with prior MI s/p PCI, chronic systolic heart failure, paroxysmal atrial fibrillation who presents with dizziness, palpitations, shortness of breath in the setting of rapid atrial fibrillation.    Plan:  - ECG initially with AF  - Patient converted back to sinus rhythm overnight  - BPs improved to 100s systolic  - Continue apixaban 2.5 mg bid  - Continue Entresto 49-51 mg bid  - Continue metoprolol tartrate 25 mg bid  - Resume furosemide on discharge  - Troponin mildly elevated at 114, likely demand ischemia from rapid AF and hypotension. Will repeat third set. Low suspicion for ACS given recent cardiac cath 7/21 with patent stents and no significant stenosis.  - Echo pending  - Ambulate  - If above work-up not significantly abnormal and patient remains asymptomatic, ok for discharge from my standpoint

## 2021-12-21 NOTE — ED ADULT NURSE REASSESSMENT NOTE - NS ED NURSE REASSESS COMMENT FT1
Report received from Frida SHAW, pt resting comfortably, in no acute distress. Respirations are even and unlabored. pt voices no complaints at this time. pt updated and aware of plan of care. pt repositioned in bed, pt hemodynamically stable. will cont to monitor.

## 2021-12-21 NOTE — ED ADULT NURSE NOTE - BRAND OF COVID-19 VACCINATION
Patient requests all Lab, Cardiology, and Radiology Results on their Discharge Instructions Pfizer dose 1 and 2

## 2021-12-21 NOTE — ED PROVIDER NOTE - CLINICAL SUMMARY MEDICAL DECISION MAKING FREE TEXT BOX
near syncope, hypotension  EKG with / min. .... labs xray ekg enzymes  IV diltiazem  admit to telemetry

## 2021-12-21 NOTE — H&P ADULT - NSHPREVIEWOFSYSTEMS_GEN_ALL_CORE
REVIEW OF SYSTEMS:  CONSTITUTIONAL:    no fever or weight loss or fatigue  EYES:    no eye pain or visual disturbances or discharge  ENMT:     no difficulty hearing or tinnitus or vertigo, no sinus or throat pain  NECK:    no pain or stiffness  RESPIRATORY:    +SOB, no cough or wheezing or chills or hemoptysis  CARDIOVASCULAR:    +chronic leg swelling, +dizziness, no chest pain or palpitations  GASTROINTESTINAL:    no abdominal or epigastric pain. no nausea or vomiting or hematemesis, no diarrhea or constipation. no melena or hematochezia.  GENITOURINARY:    no dysuria or frequency or hematuria or incontinence  NEUROLOGICAL:    no headaches or memory loss or loss of strength or numbness or tremors  SKIN:    no itching or burning or rashes or lesions   LYMPH NODES:    no enlarged glands  ENDOCRINE:    no heat or cold intolerance, no hair loss, no polydipsia or polyuria  MUSCULOSKELETAL:    no joint pain or swelling, no muscle or back or extremity pain  PSYCHIATRIC:    no depression or anxiety or mood swings or difficulty sleeping  HEME/LYMPH:    no easy bruising or bleeding gums  ALLERGY AND IMMUNOLOGIC:    no hives or eczema

## 2021-12-21 NOTE — ED ADULT NURSE NOTE - NSICDXFAMILYHX_GEN_ALL_CORE_FT
FAMILY HISTORY:  Father  Still living? Unknown  FH: dementia, Age at diagnosis: Age Unknown  FH: heart disease, Age at diagnosis: Age Unknown    Mother  Still living? Unknown  Family history of [place]'s disease, Age at diagnosis: Age Unknown    Sibling  Still living? Unknown  FH: heart disease, Age at diagnosis: Age Unknown

## 2021-12-21 NOTE — H&P ADULT - HISTORY OF PRESENT ILLNESS
86F with hx of MI 3/14/20 treated with 3 stents to LAD with low EF (had life vest, declined AICD), OM1 stent 5/2020, and Afib who presented with dizziness and paleness earlier tonight.  Patient was in her usual state of health with no recent illness when tonight she noted to be her BP to be relatively higher than normal (/80s when usually is around 114/60s).  Patient then started to feel unwell.  Associated with feelings of restlessness, dizziness, and SOB (with gasping).  No chest pain.  Patient reports feeling similar to her previous admission to the hospital last year.  EMS was called and EMS noted the patient to be confused and pale with a BP of 65/40 with a HR 58.  Gave 250cc bolus and patient with improved responsiveness and symptoms.  In the ED, patient's triage vitals were /57 with .  Labs were mostly unremarkable.  EKG showed      86F with hx of MI 3/14/20 treated with 3 stents to LAD with low EF (had life vest, declined AICD), OM1 stent 5/2020, chronic LE edema, and afib who presented with dizziness and paleness earlier tonight.  Patient was in her usual state of health with no recent illness when tonight she noted to be her BP to be relatively higher than normal (/80s when usually is around 114/60s).  Patient then started to feel unwell.  Associated with feelings of restlessness, dizziness, and SOB (with gasping).  No chest pain.  Patient reports feeling similar to her previous admission to the hospital last year.  EMS was called and EMS noted the patient to be confused and pale with a BP of 65/40 with a HR 58.  Gave 250cc bolus and patient with improved responsiveness and symptoms.  In the ED, patient's triage vitals were /57 with .  Labs were mostly unremarkable including normal troponin levels.  EKG showed afib with HR 130bpm and RBBB.  Patient was given cardizem 10mg IVP x1.  Though her HR improved to 108, her BP dropped to 98/52.  Patient was given another 250cc bolus with her BP slightly improving to 100/58.  Patient currently feels much better.  Denies any SOB or CP.

## 2021-12-21 NOTE — H&P ADULT - NSICDXFAMILYHX_GEN_ALL_CORE_FT
FAMILY HISTORY:  Father  Still living? Unknown  FH: dementia, Age at diagnosis: Age Unknown  FH: heart disease, Age at diagnosis: Age Unknown    Mother  Still living? Unknown  Family history of [place]'s disease, Age at diagnosis: Age Unknown    Sibling  Still living? Unknown  FH: heart disease, Age at diagnosis: Age Unknown

## 2021-12-21 NOTE — CONSULT NOTE ADULT - SUBJECTIVE AND OBJECTIVE BOX
History of Present Illness: The patient is an 85 year old female with a history of CAD with prior MI s/p PCI, chronic systolic heart failure, paroxysmal atrial fibrillation who presents with dizziness, palpitations, shortness of breath. She states she was lying down yesterday when she felt dizzy as if things were spinning. She called for her daughter who brought her pedialyte as she was concerned she was dehydrated. She then began to feel short of breath and have palpitations. No chest pain. EMS was called and her BP was noted low to 60s systolic.    Past Medical/Surgical History:  CAD with prior MI s/p PCI, chronic systolic heart failure, paroxysmal atrial fibrillation    Medications:  Home Medications:  atorvastatin 40 mg oral tablet: 1 tab(s) orally once a day (21 Dec 2021 02:02)  clopidogrel 75 mg oral tablet: 1 tab(s) orally once a day (21 Dec 2021 02:02)  Entresto 49 mg-51 mg oral tablet: 1 tab(s) orally 2 times a day (21 Dec 2021 02:02)  furosemide 20 mg oral tablet: 1 tab(s) orally once a day (21 Dec 2021 02:02)  metoprolol tartrate 25 mg oral tablet: 1 tab(s) orally 2 times a day (21 Dec 2021 02:02)      Family History: Non-contributory family history of premature cardiovascular atherosclerotic disease    Social History: No tobacco, alcohol or drug use    Review of Systems:  General: No fevers, chills, weight gain  Skin: No rashes, color changes  Cardiovascular: No chest pain, orthopnea  Respiratory: No shortness of breath, cough  Gastrointestinal: No nausea, abdominal pain  Genitourinary: No incontinence, pain with urination  Musculoskeletal: No pain, swelling, decreased range of motion  Neurological: No headache, weakness  Psychiatric: No depression, anxiety  Endocrine: No weight gain, increased thirst  All other systems are comprehensively negative.    Physical Exam:  Vitals:        Vital Signs Last 24 Hrs  T(C): 36.6 (21 Dec 2021 05:49), Max: 36.6 (21 Dec 2021 01:55)  T(F): 97.8 (21 Dec 2021 05:49), Max: 97.8 (21 Dec 2021 01:55)  HR: 114 (21 Dec 2021 05:49) (108 - 130)  BP: 91/50 (21 Dec 2021 05:49) (91/50 - 123/57)  BP(mean): --  RR: 17 (21 Dec 2021 05:49) (16 - 18)  SpO2: 98% (21 Dec 2021 05:49) (98% - 100%)  General: NAD  HEENT: MMM  Neck: No JVD, no carotid bruit  Lungs: CTAB  CV: RRR, nl S1/S2, no M/R/G  Abdomen: S/NT/ND, +BS  Extremities: No LE edema, no cyanosis  Neuro: AAOx3, non-focal  Skin: No rash    Labs:                        11.2   7.88  )-----------( 180      ( 21 Dec 2021 06:50 )             32.8     12-21    140  |  108  |  25<H>  ----------------------------<  113<H>  4.5   |  24  |  0.81    Ca    8.0<L>      21 Dec 2021 06:50  Phos  3.8     12-21  Mg     1.8     12-21    TPro  5.5<L>  /  Alb  2.9<L>  /  TBili  0.5  /  DBili  x   /  AST  16  /  ALT  25  /  AlkPhos  67  12-21        PT/INR - ( 21 Dec 2021 02:32 )   PT: 15.1 sec;   INR: 1.26 ratio         PTT - ( 21 Dec 2021 02:32 )  PTT:33.0 sec    ECG: AF, RBBB    Telemetry: AF -> sinus rhythm

## 2021-12-21 NOTE — ED PROVIDER NOTE - NSICDXPASTMEDICALHX_GEN_ALL_CORE_FT
PAST MEDICAL HISTORY:  CAD (coronary artery disease)     CAD (coronary artery disease) s/p Cath & 3 stents to LAD on 3/2020; OM1 stent x 1  EF ~30%    History of CHF (congestive heart failure)     Mild HTN     Myocardial infarction     Rapid atrial fibrillation

## 2021-12-21 NOTE — ED ADULT TRIAGE NOTE - PRO INTERPRETER NEED 2
Quality 431: Preventive Care And Screening: Unhealthy Alcohol Use - Screening: Patient screened for unhealthy alcohol use using a single question and scores less than 2 times per year
Quality 47: Advance Care Plan: Advance Care Planning discussed and documented; advance care plan or surrogate decision maker documented in the medical record.
Quality 226: Preventive Care And Screening: Tobacco Use: Screening And Cessation Intervention: Patient screened for tobacco use and is an ex/non-smoker
Detail Level: Detailed
Quality 130: Documentation Of Current Medications In The Medical Record: Current Medications Documented
English

## 2021-12-21 NOTE — ED ADULT NURSE NOTE - CHPI ED NUR SYMPTOMS NEG
no back pain/no chest pain/no chills/no congestion/no diaphoresis/no fever/no nausea/no shortness of breath

## 2021-12-21 NOTE — ED PROVIDER NOTE - CONSTITUTIONAL, MLM
normal... Well appearing, awake, alert, oriented to person, place, time/situation and mild  distress.

## 2021-12-21 NOTE — ED ADULT NURSE REASSESSMENT NOTE - NS ED NURSE REASSESS COMMENT FT1
pt resting comfortably, dinner meal provided, tolerating PO, pt denies all complaints, No cp or sob, resp even unlabored, safety maintained, call bell within reach

## 2021-12-21 NOTE — H&P ADULT - ASSESSMENT
86F with hx of MI 3/14/20 treated with 3 stents to LAD with low EF (had life vest, declined AICD), OM1 stent 5/2020, chronic LE edema, and afib who presented with dizziness and paleness earlier tonight.      Rapid afib - EMS documented a HR of 58 with BP 65/40 on arrival but then patient's HR in the ED remained tachycardic  - admitted to telemetry  - will trend troponins   - currently  86F with hx of MI 3/14/20 treated with 3 stents to LAD with low EF (had life vest, declined AICD), OM1 stent 5/2020, chronic LE edema, and afib who presented with dizziness and paleness earlier tonight.      Rapid afib - EMS documented a HR of 58 with BP 65/40 on arrival but then patient's HR in the ED remained tachycardic, currently BP holding  - admitted to telemetry  - will trend troponins   - cardiology consult  - cardizem IVP as needed if BP can tolerate  - if still in afib, may need cardizem drip  - if BP cannot tolerate may need digoxin instead  - cont with eliquis   - cont with home dose of metoprolol 25mg BID with hold parameters  - check TSH    CHF - last known EF was 40%, no worsening signs of exacerbation, BLE edema seem stable  - monitor for now  - hold lasix given relative hypotension  - will recheck TTE    CAD   - cont with plavix, atorvastatin  - cont with entresto    Preventive measures  - will be on eliquis

## 2021-12-21 NOTE — ED PROVIDER NOTE - OBJECTIVE STATEMENT
87 y/o female H/O  CAD x 3 stents , recent MI 3/21 , AF,  C/C acute onset of  dizziness , pale, faint, clammy and  SOB at 12:30am. No chest pain, no syncope no acute stroke symptoms. The paramedics found the patient to be  hypotensive, she was given IVF. In the ED EKG /min   PMD Dr Davies, [facility]

## 2021-12-21 NOTE — H&P ADULT - NSHPPHYSICALEXAM_GEN_ALL_CORE
PHYSICAL EXAM:  Vital Signs Last 24 Hrs  T(C): 36.6 (21 Dec 2021 01:55), Max: 36.6 (21 Dec 2021 01:55)  T(F): 97.8 (21 Dec 2021 01:55), Max: 97.8 (21 Dec 2021 01:55)  HR: 109 (21 Dec 2021 04:25) (108 - 130)  BP: 104/58 (21 Dec 2021 04:25) (98/52 - 123/57)  BP(mean): --  RR: 18 (21 Dec 2021 04:25) (16 - 18)  SpO2: 100% (21 Dec 2021 04:25) (99% - 100%)    GENERAL:     well-appearing female in NAD  HEAD:     atraumatic  EYES:     EOMI  RESPIRATORY:     clear to auscultation bilaterally, no rales or rhonchi or wheezing or rubs  CARDIOVASCULAR:     irregular irregular  GASTROINTESTINAL:     soft, nontender, nondistended, no hepatosplenomegaly palpated, bowel sounds present  EXTREMITIES:     1+ BLE edema  MUSCULOSKELETAL:     no joint pain or swelling or deformities  NERVOUS SYSTEM:     motor strength intact with 5/5 B/L upper and lower extremities, no gross sensory deficits  SKIN:     no rashes or lesions  PSYCH:     appropriate, alert and orientated x3, good concentration

## 2021-12-21 NOTE — H&P ADULT - NSHPSOCIALHISTORY_GEN_ALL_CORE
+ former smoker (quit >55 years ago, was a 1ppd for about 8 years)  - no etoh use   - no illegal drug use  - lives with family

## 2021-12-22 ENCOUNTER — TRANSCRIPTION ENCOUNTER (OUTPATIENT)
Age: 86
End: 2021-12-22

## 2021-12-22 VITALS
OXYGEN SATURATION: 97 % | RESPIRATION RATE: 18 BRPM | SYSTOLIC BLOOD PRESSURE: 136 MMHG | TEMPERATURE: 98 F | HEART RATE: 67 BPM | DIASTOLIC BLOOD PRESSURE: 73 MMHG

## 2021-12-22 LAB
ANION GAP SERPL CALC-SCNC: 7 MMOL/L — SIGNIFICANT CHANGE UP (ref 5–17)
BUN SERPL-MCNC: 20 MG/DL — SIGNIFICANT CHANGE UP (ref 7–23)
CALCIUM SERPL-MCNC: 8.3 MG/DL — LOW (ref 8.4–10.5)
CHLORIDE SERPL-SCNC: 109 MMOL/L — HIGH (ref 96–108)
CO2 SERPL-SCNC: 24 MMOL/L — SIGNIFICANT CHANGE UP (ref 22–31)
CREAT SERPL-MCNC: 0.79 MG/DL — SIGNIFICANT CHANGE UP (ref 0.5–1.3)
GLUCOSE SERPL-MCNC: 97 MG/DL — SIGNIFICANT CHANGE UP (ref 70–99)
HCT VFR BLD CALC: 35.8 % — SIGNIFICANT CHANGE UP (ref 34.5–45)
HGB BLD-MCNC: 12.2 G/DL — SIGNIFICANT CHANGE UP (ref 11.5–15.5)
MAGNESIUM SERPL-MCNC: 2 MG/DL — SIGNIFICANT CHANGE UP (ref 1.6–2.6)
MCHC RBC-ENTMCNC: 33 PG — SIGNIFICANT CHANGE UP (ref 27–34)
MCHC RBC-ENTMCNC: 34.1 GM/DL — SIGNIFICANT CHANGE UP (ref 32–36)
MCV RBC AUTO: 96.8 FL — SIGNIFICANT CHANGE UP (ref 80–100)
NRBC # BLD: 0 /100 WBCS — SIGNIFICANT CHANGE UP (ref 0–0)
PHOSPHATE SERPL-MCNC: 3.6 MG/DL — SIGNIFICANT CHANGE UP (ref 2.5–4.5)
PLATELET # BLD AUTO: 175 K/UL — SIGNIFICANT CHANGE UP (ref 150–400)
POTASSIUM SERPL-MCNC: 4.3 MMOL/L — SIGNIFICANT CHANGE UP (ref 3.5–5.3)
POTASSIUM SERPL-SCNC: 4.3 MMOL/L — SIGNIFICANT CHANGE UP (ref 3.5–5.3)
RBC # BLD: 3.7 M/UL — LOW (ref 3.8–5.2)
RBC # FLD: 15.5 % — HIGH (ref 10.3–14.5)
SODIUM SERPL-SCNC: 140 MMOL/L — SIGNIFICANT CHANGE UP (ref 135–145)
TROPONIN I, HIGH SENSITIVITY RESULT: 105.8 NG/L — HIGH
WBC # BLD: 6.13 K/UL — SIGNIFICANT CHANGE UP (ref 3.8–10.5)
WBC # FLD AUTO: 6.13 K/UL — SIGNIFICANT CHANGE UP (ref 3.8–10.5)

## 2021-12-22 PROCEDURE — 36415 COLL VENOUS BLD VENIPUNCTURE: CPT

## 2021-12-22 PROCEDURE — 80053 COMPREHEN METABOLIC PANEL: CPT

## 2021-12-22 PROCEDURE — 97161 PT EVAL LOW COMPLEX 20 MIN: CPT

## 2021-12-22 PROCEDURE — 80048 BASIC METABOLIC PNL TOTAL CA: CPT

## 2021-12-22 PROCEDURE — 85730 THROMBOPLASTIN TIME PARTIAL: CPT

## 2021-12-22 PROCEDURE — 84100 ASSAY OF PHOSPHORUS: CPT

## 2021-12-22 PROCEDURE — 93005 ELECTROCARDIOGRAM TRACING: CPT

## 2021-12-22 PROCEDURE — 85027 COMPLETE CBC AUTOMATED: CPT

## 2021-12-22 PROCEDURE — 93306 TTE W/DOPPLER COMPLETE: CPT

## 2021-12-22 PROCEDURE — 83735 ASSAY OF MAGNESIUM: CPT

## 2021-12-22 PROCEDURE — 87637 SARSCOV2&INF A&B&RSV AMP PRB: CPT

## 2021-12-22 PROCEDURE — 84484 ASSAY OF TROPONIN QUANT: CPT

## 2021-12-22 PROCEDURE — 96374 THER/PROPH/DIAG INJ IV PUSH: CPT

## 2021-12-22 PROCEDURE — 84443 ASSAY THYROID STIM HORMONE: CPT

## 2021-12-22 PROCEDURE — 71045 X-RAY EXAM CHEST 1 VIEW: CPT

## 2021-12-22 PROCEDURE — 99285 EMERGENCY DEPT VISIT HI MDM: CPT

## 2021-12-22 PROCEDURE — 85610 PROTHROMBIN TIME: CPT

## 2021-12-22 PROCEDURE — 85025 COMPLETE CBC W/AUTO DIFF WBC: CPT

## 2021-12-22 RX ORDER — APIXABAN 2.5 MG/1
1 TABLET, FILM COATED ORAL
Qty: 30 | Refills: 1
Start: 2021-12-22 | End: 2022-02-19

## 2021-12-22 RX ADMIN — APIXABAN 2.5 MILLIGRAM(S): 2.5 TABLET, FILM COATED ORAL at 06:45

## 2021-12-22 RX ADMIN — CLOPIDOGREL BISULFATE 75 MILLIGRAM(S): 75 TABLET, FILM COATED ORAL at 13:31

## 2021-12-22 NOTE — PHYSICAL THERAPY INITIAL EVALUATION ADULT - ADDITIONAL COMMENTS
Pt lives with daughter in a house and can enter through the garage where there are no steps.  There are 5 steps inside with rail.  Pt has access to a rolling walker

## 2021-12-22 NOTE — DISCHARGE NOTE NURSING/CASE MANAGEMENT/SOCIAL WORK - PATIENT PORTAL LINK FT
You can access the FollowMyHealth Patient Portal offered by [facility] by registering at the following website: [email or web address]. By joining [employer]’s FollowMyHealth portal, you will also be able to view your health information using other applications (apps) compatible with our system.

## 2021-12-22 NOTE — DISCHARGE NOTE NURSING/CASE MANAGEMENT/SOCIAL WORK - NSDCPEFALRISK_GEN_ALL_CORE
For information on Fall & Injury Prevention, visit: [email or web address] OR  [email or web address] OR  https://www.cdc.gov/steadi/patient.html

## 2021-12-22 NOTE — DISCHARGE NOTE PROVIDER - ATTENDING DISCHARGE PHYSICAL EXAMINATION:
PHYSICAL EXAM:  Vital Signs Last 24 Hrs  T(C): 36.8 (22 Dec 2021 07:30), Max: 36.9 (21 Dec 2021 14:21)  T(F): 98.3 (22 Dec 2021 07:30), Max: 98.4 (21 Dec 2021 14:21)  HR: 60 (22 Dec 2021 07:30) (57 - 67)  BP: 123/58 (22 Dec 2021 07:30) (104/61 - 123/58)  BP(mean): --  RR: 13 (22 Dec 2021 07:30) (13 - 19)  SpO2: 99% (22 Dec 2021 07:30) (97% - 99%)    GENERAL: NAD, well-groomed, well-developed  HEAD:  Atraumatic, Normocephalic  EYES: EOMI, PERRLA, conjunctiva and sclera clear  ENMT: No tonsillar erythema, exudates, or enlargement; Moist mucous membranes, Good dentition, No lesions  NECK: Supple, No JVD, Normal thyroid  NERVOUS SYSTEM:  Alert & Oriented X3, Good concentration; Motor Strength 5/5 B/L upper and lower extremities; CHEST/LUNG: Clear to auscultation bilaterally; No rales, rhonchi, wheezing, or rubs  HEART: Regular rate and rhythm; No murmurs, rubs, or gallops  ABDOMEN: Soft, Nontender, Nondistended; Bowel sounds present  EXTREMITIES:  2+ Peripheral Pulses, No clubbing, cyanosis, or edema  LYMPH: No lymphadenopathy noted  SKIN: No rashes or lesions

## 2021-12-22 NOTE — PHYSICAL THERAPY INITIAL EVALUATION ADULT - PERTINENT HX OF CURRENT PROBLEM, REHAB EVAL
86F with hx of MI 3/14/20 treated with 3 stents to LAD with low EF (had life vest, declined AICD), OM1 stent 5/2020, chronic LE edema, and afib who presented with dizziness and paleness  Pt with rapid A-fib, CHF

## 2021-12-22 NOTE — DISCHARGE NOTE PROVIDER - NSDCMRMEDTOKEN_GEN_ALL_CORE_FT
atorvastatin 40 mg oral tablet: 1 tab(s) orally once a day  clopidogrel 75 mg oral tablet: 1 tab(s) orally once a day  Eliquis 2.5 mg oral tablet: 1 tab(s) orally 2 times a day  Entresto 49 mg-51 mg oral tablet: 1 tab(s) orally 2 times a day  furosemide 20 mg oral tablet: 1 tab(s) orally once a day  metoprolol tartrate 25 mg oral tablet: 1 tab(s) orally 2 times a day

## 2021-12-22 NOTE — PROGRESS NOTE ADULT - ASSESSMENT
The patient is an 85 year old female with a history of CAD with prior MI s/p PCI, chronic systolic heart failure, paroxysmal atrial fibrillation who presents with dizziness, palpitations, shortness of breath in the setting of rapid atrial fibrillation.    Plan:  - ECG initially with AF  - Patient converted back to sinus rhythm overnight  - BPs improved to 100s systolic  - Continue apixaban 2.5 mg bid  - Continue Entresto 49-51 mg bid  - Continue metoprolol tartrate 25 mg bid  - Resume furosemide on discharge  - Troponin peaked at 153, likely demand ischemia from rapid AF and hypotension  - Echo with mildly reduced LV systolic function, moderate TR  - Ok for discharge from a cardiac standpoint. Outpatient cardiology follow-up for event monitoring. May need referral to EP for ablation if episodes of AF correlate with symptoms and hypotension.

## 2021-12-22 NOTE — DISCHARGE NOTE PROVIDER - NSDCFUADDINST_GEN_ALL_CORE_FT
You were diagnosed with Atrial Fibrillation and in a rapid rate.  Converted back to Sinus rhythm.  Symptoms resolved.  Your AC dosing was adjusted based on age and kidney function. Please  new prescription at your pharmacy.

## 2021-12-22 NOTE — DISCHARGE NOTE PROVIDER - NSDCCPCAREPLAN_GEN_ALL_CORE_FT
PRINCIPAL DISCHARGE DIAGNOSIS  Diagnosis: Atrial fibrillation  Assessment and Plan of Treatment: Paroxysmal. and converted back to Sinus Rhythm      SECONDARY DISCHARGE DIAGNOSES  Diagnosis: Near syncope  Assessment and Plan of Treatment: Due to Atrial Fibrillation and RVR.

## 2021-12-22 NOTE — PROGRESS NOTE ADULT - SUBJECTIVE AND OBJECTIVE BOX
Chief Complaint: Dizziness    Interval Events: No events overnight.    Review of Systems:  General: No fevers, chills, weight gain  Skin: No rashes, color changes  Cardiovascular: No chest pain, orthopnea  Respiratory: No shortness of breath, cough  Gastrointestinal: No nausea, abdominal pain  Genitourinary: No incontinence, pain with urination  Musculoskeletal: No pain, swelling, decreased range of motion  Neurological: No headache, weakness  Psychiatric: No depression, anxiety  Endocrine: No weight gain, increased thirst  All other systems are comprehensively negative.    Physical Exam:  Vitals:        Vital Signs Last 24 Hrs  T(C): 36.8 (22 Dec 2021 07:30), Max: 36.9 (21 Dec 2021 14:21)  T(F): 98.3 (22 Dec 2021 07:30), Max: 98.4 (21 Dec 2021 14:21)  HR: 60 (22 Dec 2021 07:30) (57 - 67)  BP: 123/58 (22 Dec 2021 07:30) (104/61 - 123/58)  BP(mean): --  RR: 13 (22 Dec 2021 07:30) (13 - 19)  SpO2: 99% (22 Dec 2021 07:30) (97% - 99%)  General: NAD  HEENT: MMM  Neck: No JVD, no carotid bruit  Lungs: CTAB  CV: RRR, nl S1/S2, no M/R/G  Abdomen: S/NT/ND, +BS  Extremities: No LE edema, no cyanosis  Neuro: AAOx3, non-focal  Skin: No rash    Labs:                        12.2   6.13  )-----------( 175      ( 22 Dec 2021 08:10 )             35.8     12-22    140  |  109<H>  |  20  ----------------------------<  97  4.3   |  24  |  0.79    Ca    8.3<L>      22 Dec 2021 08:10  Phos  3.6     12-22  Mg     2.0     12-22    TPro  5.5<L>  /  Alb  2.9<L>  /  TBili  0.5  /  DBili  x   /  AST  16  /  ALT  25  /  AlkPhos  67  12-21        PT/INR - ( 21 Dec 2021 02:32 )   PT: 15.1 sec;   INR: 1.26 ratio         PTT - ( 21 Dec 2021 02:32 )  PTT:33.0 sec    Telemetry: Sinus rhythm

## 2021-12-22 NOTE — DISCHARGE NOTE PROVIDER - HOSPITAL COURSE
The patient is an 85 year old female with a history of CAD with prior MI s/p PCI, chronic systolic heart failure, paroxysmal atrial fibrillation who presents with dizziness, palpitations, shortness of breath in the setting of rapid atrial fibrillation.    Plan:  - ECG initially with AF  - Patient converted back to sinus rhythm overnight  - BPs improved to 100s systolic  - Continue apixaban 2.5 mg bid  - Continue Entresto 49-51 mg bid  - Continue metoprolol tartrate 25 mg bid  - Resume furosemide on discharge

## 2022-05-02 ENCOUNTER — INPATIENT (INPATIENT)
Facility: HOSPITAL | Age: 87
LOS: 2 days | Discharge: HOME | DRG: 308 | End: 2022-05-05
Attending: INTERNAL MEDICINE | Admitting: INTERNAL MEDICINE
Payer: MEDICARE

## 2022-05-02 VITALS
OXYGEN SATURATION: 98 % | DIASTOLIC BLOOD PRESSURE: 33 MMHG | SYSTOLIC BLOOD PRESSURE: 69 MMHG | TEMPERATURE: 97 F | HEART RATE: 71 BPM | RESPIRATION RATE: 18 BRPM

## 2022-05-02 DIAGNOSIS — I95.9 HYPOTENSION, UNSPECIFIED: ICD-10-CM

## 2022-05-02 DIAGNOSIS — Z98.49 CATARACT EXTRACTION STATUS, UNSPECIFIED EYE: Chronic | ICD-10-CM

## 2022-05-02 LAB
ALBUMIN SERPL ELPH-MCNC: 3.2 G/DL — LOW (ref 3.3–5)
ALP SERPL-CCNC: 78 U/L — SIGNIFICANT CHANGE UP (ref 30–120)
ALT FLD-CCNC: 15 U/L DA — SIGNIFICANT CHANGE UP (ref 10–60)
ANION GAP SERPL CALC-SCNC: 13 MMOL/L — SIGNIFICANT CHANGE UP (ref 5–17)
APPEARANCE UR: CLEAR — SIGNIFICANT CHANGE UP
APTT BLD: 29.3 SEC — SIGNIFICANT CHANGE UP (ref 27.5–35.5)
AST SERPL-CCNC: 24 U/L — SIGNIFICANT CHANGE UP (ref 10–40)
BASOPHILS # BLD AUTO: 0.02 K/UL — SIGNIFICANT CHANGE UP (ref 0–0.2)
BASOPHILS NFR BLD AUTO: 0.3 % — SIGNIFICANT CHANGE UP (ref 0–2)
BILIRUB SERPL-MCNC: 0.4 MG/DL — SIGNIFICANT CHANGE UP (ref 0.2–1.2)
BILIRUB UR-MCNC: NEGATIVE — SIGNIFICANT CHANGE UP
BUN SERPL-MCNC: 26 MG/DL — HIGH (ref 7–23)
CALCIUM SERPL-MCNC: 9 MG/DL — SIGNIFICANT CHANGE UP (ref 8.4–10.5)
CHLORIDE SERPL-SCNC: 103 MMOL/L — SIGNIFICANT CHANGE UP (ref 96–108)
CO2 SERPL-SCNC: 21 MMOL/L — LOW (ref 22–31)
COLOR SPEC: YELLOW — SIGNIFICANT CHANGE UP
CREAT SERPL-MCNC: 1.18 MG/DL — SIGNIFICANT CHANGE UP (ref 0.5–1.3)
DIFF PNL FLD: NEGATIVE — SIGNIFICANT CHANGE UP
EGFR: 45 ML/MIN/1.73M2 — LOW
EOSINOPHIL # BLD AUTO: 0.06 K/UL — SIGNIFICANT CHANGE UP (ref 0–0.5)
EOSINOPHIL NFR BLD AUTO: 0.9 % — SIGNIFICANT CHANGE UP (ref 0–6)
GLUCOSE BLDC GLUCOMTR-MCNC: 106 MG/DL — HIGH (ref 70–99)
GLUCOSE BLDC GLUCOMTR-MCNC: 90 MG/DL — SIGNIFICANT CHANGE UP (ref 70–99)
GLUCOSE SERPL-MCNC: 147 MG/DL — HIGH (ref 70–99)
GLUCOSE UR QL: NEGATIVE MG/DL — SIGNIFICANT CHANGE UP
HCT VFR BLD CALC: 39.6 % — SIGNIFICANT CHANGE UP (ref 34.5–45)
HGB BLD-MCNC: 13.4 G/DL — SIGNIFICANT CHANGE UP (ref 11.5–15.5)
IMM GRANULOCYTES NFR BLD AUTO: 0.1 % — SIGNIFICANT CHANGE UP (ref 0–1.5)
INR BLD: 1.19 RATIO — HIGH (ref 0.88–1.16)
KETONES UR-MCNC: NEGATIVE — SIGNIFICANT CHANGE UP
LACTATE SERPL-SCNC: 1.7 MMOL/L — SIGNIFICANT CHANGE UP (ref 0.7–2)
LACTATE SERPL-SCNC: 2.3 MMOL/L — HIGH (ref 0.7–2)
LEUKOCYTE ESTERASE UR-ACNC: NEGATIVE — SIGNIFICANT CHANGE UP
LYMPHOCYTES # BLD AUTO: 1.97 K/UL — SIGNIFICANT CHANGE UP (ref 1–3.3)
LYMPHOCYTES # BLD AUTO: 28.6 % — SIGNIFICANT CHANGE UP (ref 13–44)
MCHC RBC-ENTMCNC: 32.8 PG — SIGNIFICANT CHANGE UP (ref 27–34)
MCHC RBC-ENTMCNC: 33.8 GM/DL — SIGNIFICANT CHANGE UP (ref 32–36)
MCV RBC AUTO: 97.1 FL — SIGNIFICANT CHANGE UP (ref 80–100)
MONOCYTES # BLD AUTO: 0.43 K/UL — SIGNIFICANT CHANGE UP (ref 0–0.9)
MONOCYTES NFR BLD AUTO: 6.3 % — SIGNIFICANT CHANGE UP (ref 2–14)
NEUTROPHILS # BLD AUTO: 4.39 K/UL — SIGNIFICANT CHANGE UP (ref 1.8–7.4)
NEUTROPHILS NFR BLD AUTO: 63.8 % — SIGNIFICANT CHANGE UP (ref 43–77)
NITRITE UR-MCNC: NEGATIVE — SIGNIFICANT CHANGE UP
NRBC # BLD: 0 /100 WBCS — SIGNIFICANT CHANGE UP (ref 0–0)
PH UR: 6 — SIGNIFICANT CHANGE UP (ref 5–8)
PLATELET # BLD AUTO: 198 K/UL — SIGNIFICANT CHANGE UP (ref 150–400)
POTASSIUM SERPL-MCNC: 4.2 MMOL/L — SIGNIFICANT CHANGE UP (ref 3.5–5.3)
POTASSIUM SERPL-SCNC: 4.2 MMOL/L — SIGNIFICANT CHANGE UP (ref 3.5–5.3)
PROT SERPL-MCNC: 6.2 G/DL — SIGNIFICANT CHANGE UP (ref 6–8.3)
PROT UR-MCNC: 15 MG/DL
PROTHROM AB SERPL-ACNC: 14.1 SEC — HIGH (ref 10.5–13.4)
RAPID RVP RESULT: SIGNIFICANT CHANGE UP
RBC # BLD: 4.08 M/UL — SIGNIFICANT CHANGE UP (ref 3.8–5.2)
RBC # FLD: 14 % — SIGNIFICANT CHANGE UP (ref 10.3–14.5)
SARS-COV-2 RNA SPEC QL NAA+PROBE: SIGNIFICANT CHANGE UP
SODIUM SERPL-SCNC: 137 MMOL/L — SIGNIFICANT CHANGE UP (ref 135–145)
SP GR SPEC: 1 — LOW (ref 1.01–1.02)
TROPONIN I, HIGH SENSITIVITY RESULT: 9.6 NG/L — SIGNIFICANT CHANGE UP
UROBILINOGEN FLD QL: NEGATIVE MG/DL — SIGNIFICANT CHANGE UP
WBC # BLD: 6.88 K/UL — SIGNIFICANT CHANGE UP (ref 3.8–10.5)
WBC # FLD AUTO: 6.88 K/UL — SIGNIFICANT CHANGE UP (ref 3.8–10.5)

## 2022-05-02 PROCEDURE — 99223 1ST HOSP IP/OBS HIGH 75: CPT

## 2022-05-02 PROCEDURE — 70496 CT ANGIOGRAPHY HEAD: CPT | Mod: 26,MA

## 2022-05-02 PROCEDURE — 99285 EMERGENCY DEPT VISIT HI MDM: CPT

## 2022-05-02 PROCEDURE — 71045 X-RAY EXAM CHEST 1 VIEW: CPT | Mod: 26

## 2022-05-02 PROCEDURE — 70498 CT ANGIOGRAPHY NECK: CPT | Mod: 26,MA

## 2022-05-02 PROCEDURE — 99291 CRITICAL CARE FIRST HOUR: CPT

## 2022-05-02 PROCEDURE — 93010 ELECTROCARDIOGRAM REPORT: CPT | Mod: 76

## 2022-05-02 RX ORDER — DILTIAZEM HCL 120 MG
10 CAPSULE, EXT RELEASE 24 HR ORAL
Qty: 125 | Refills: 0 | Status: DISCONTINUED | OUTPATIENT
Start: 2022-05-02 | End: 2022-05-02

## 2022-05-02 RX ORDER — CLOPIDOGREL BISULFATE 75 MG/1
75 TABLET, FILM COATED ORAL DAILY
Refills: 0 | Status: DISCONTINUED | OUTPATIENT
Start: 2022-05-03 | End: 2022-05-05

## 2022-05-02 RX ORDER — CLOPIDOGREL BISULFATE 75 MG/1
75 TABLET, FILM COATED ORAL ONCE
Refills: 0 | Status: COMPLETED | OUTPATIENT
Start: 2022-05-02 | End: 2022-05-02

## 2022-05-02 RX ORDER — CLOPIDOGREL BISULFATE 75 MG/1
TABLET, FILM COATED ORAL
Refills: 0 | Status: DISCONTINUED | OUTPATIENT
Start: 2022-05-02 | End: 2022-05-05

## 2022-05-02 RX ORDER — APIXABAN 2.5 MG/1
2.5 TABLET, FILM COATED ORAL EVERY 12 HOURS
Refills: 0 | Status: DISCONTINUED | OUTPATIENT
Start: 2022-05-02 | End: 2022-05-05

## 2022-05-02 RX ORDER — CEFTRIAXONE 500 MG/1
1000 INJECTION, POWDER, FOR SOLUTION INTRAMUSCULAR; INTRAVENOUS ONCE
Refills: 0 | Status: COMPLETED | OUTPATIENT
Start: 2022-05-02 | End: 2022-05-02

## 2022-05-02 RX ORDER — AMIODARONE HYDROCHLORIDE 400 MG/1
400 TABLET ORAL EVERY 8 HOURS
Refills: 0 | Status: DISCONTINUED | OUTPATIENT
Start: 2022-05-02 | End: 2022-05-05

## 2022-05-02 RX ORDER — FUROSEMIDE 40 MG
20 TABLET ORAL DAILY
Refills: 0 | Status: DISCONTINUED | OUTPATIENT
Start: 2022-05-02 | End: 2022-05-02

## 2022-05-02 RX ORDER — AMIODARONE HYDROCHLORIDE 400 MG/1
200 TABLET ORAL DAILY
Refills: 0 | Status: DISCONTINUED | OUTPATIENT
Start: 2022-05-06 | End: 2022-05-05

## 2022-05-02 RX ORDER — AMIODARONE HYDROCHLORIDE 400 MG/1
150 TABLET ORAL ONCE
Refills: 0 | Status: COMPLETED | OUTPATIENT
Start: 2022-05-02 | End: 2022-05-02

## 2022-05-02 RX ORDER — AMIODARONE HYDROCHLORIDE 400 MG/1
1 TABLET ORAL
Qty: 900 | Refills: 0 | Status: DISCONTINUED | OUTPATIENT
Start: 2022-05-02 | End: 2022-05-02

## 2022-05-02 RX ORDER — SODIUM CHLORIDE 9 MG/ML
1400 INJECTION INTRAMUSCULAR; INTRAVENOUS; SUBCUTANEOUS ONCE
Refills: 0 | Status: COMPLETED | OUTPATIENT
Start: 2022-05-02 | End: 2022-05-02

## 2022-05-02 RX ORDER — METOPROLOL TARTRATE 50 MG
25 TABLET ORAL
Refills: 0 | Status: DISCONTINUED | OUTPATIENT
Start: 2022-05-02 | End: 2022-05-02

## 2022-05-02 RX ORDER — AMIODARONE HYDROCHLORIDE 400 MG/1
TABLET ORAL
Refills: 0 | Status: DISCONTINUED | OUTPATIENT
Start: 2022-05-02 | End: 2022-05-05

## 2022-05-02 RX ORDER — ATORVASTATIN CALCIUM 80 MG/1
40 TABLET, FILM COATED ORAL AT BEDTIME
Refills: 0 | Status: DISCONTINUED | OUTPATIENT
Start: 2022-05-02 | End: 2022-05-05

## 2022-05-02 RX ADMIN — AMIODARONE HYDROCHLORIDE 400 MILLIGRAM(S): 400 TABLET ORAL at 14:38

## 2022-05-02 RX ADMIN — SODIUM CHLORIDE 1400 MILLILITER(S): 9 INJECTION INTRAMUSCULAR; INTRAVENOUS; SUBCUTANEOUS at 11:04

## 2022-05-02 RX ADMIN — AMIODARONE HYDROCHLORIDE 400 MILLIGRAM(S): 400 TABLET ORAL at 23:18

## 2022-05-02 RX ADMIN — AMIODARONE HYDROCHLORIDE 618 MILLIGRAM(S): 400 TABLET ORAL at 13:17

## 2022-05-02 RX ADMIN — CEFTRIAXONE 100 MILLIGRAM(S): 500 INJECTION, POWDER, FOR SOLUTION INTRAMUSCULAR; INTRAVENOUS at 11:21

## 2022-05-02 RX ADMIN — ATORVASTATIN CALCIUM 40 MILLIGRAM(S): 80 TABLET, FILM COATED ORAL at 23:18

## 2022-05-02 RX ADMIN — APIXABAN 2.5 MILLIGRAM(S): 2.5 TABLET, FILM COATED ORAL at 18:00

## 2022-05-02 RX ADMIN — CLOPIDOGREL BISULFATE 75 MILLIGRAM(S): 75 TABLET, FILM COATED ORAL at 12:53

## 2022-05-02 RX ADMIN — Medication 0.5 MILLIGRAM(S): at 21:00

## 2022-05-02 RX ADMIN — SODIUM CHLORIDE 1400 MILLILITER(S): 9 INJECTION INTRAMUSCULAR; INTRAVENOUS; SUBCUTANEOUS at 12:24

## 2022-05-02 RX ADMIN — AMIODARONE HYDROCHLORIDE 33.3 MG/MIN: 400 TABLET ORAL at 13:19

## 2022-05-02 RX ADMIN — CEFTRIAXONE 1000 MILLIGRAM(S): 500 INJECTION, POWDER, FOR SOLUTION INTRAMUSCULAR; INTRAVENOUS at 12:00

## 2022-05-02 NOTE — CONSULT NOTE ADULT - ASSESSMENT
The patient is an 86 year old female with a history of CAD with prior MI s/p PCI, chronic systolic heart failure, paroxysmal atrial fibrillation who presents with dizziness and AMS in the setting of rapid atrial fibrillation, hypotension.    Plan:  - This is the patient's second occurrence of atrial fibrillation with associated hypotension  - Patient's symptoms presumably due to hypotension and poor cerebral perfusion  - ECG with known AF and RBBB  - Cardiac enzymes negative  - Echo last admission with mildly reduced LV systolic function, moderate TR  - Repeat echo when HR improved  - Labs otherwise unremarkable  - Received about 1.5 L IVF  - Given history of heart failure, will hold off on additional fluids for now unless BP remains low  - BP 80s/40s  - Start amiodarone bolus and drip in attempts to convert to sinus rhythm  - Continue apixaban 2.5 mg bid  - Continue clopidogrel 75 mg daily  - Continue atorvastatin 40 mg daily  - Hold furosemide, Entresto, metoprolol until BPs improve The patient is an 86 year old female with a history of CAD with prior MI s/p PCI, chronic systolic heart failure, paroxysmal atrial fibrillation who presents with dizziness and AMS in the setting of rapid atrial fibrillation, hypotension.    Plan:  - This is the patient's second occurrence of atrial fibrillation with associated hypotension  - Patient's symptoms presumably due to hypotension and poor cerebral perfusion  - ECG with known AF and RBBB  - Cardiac enzymes negative  - Echo last admission with mildly reduced LV systolic function, moderate TR  - Repeat echo when HR improved  - Labs otherwise unremarkable  - Received about 1.5 L IVF  - Given history of heart failure, will hold off on additional fluids for now unless BP remains low  - BP 80s/40s  - Start amiodarone bolus and drip in attempts to convert to sinus rhythm  - Continue apixaban 2.5 mg bid  - Continue clopidogrel 75 mg daily  - Continue atorvastatin 40 mg daily  - Hold furosemide, Entresto, metoprolol until BPs improve    ADDENDUM:  - Converted back to sinus rhythm with amiodarone bolus. Discontinue drip. Start oral amiodarone taper.

## 2022-05-02 NOTE — ED ADULT NURSE NOTE - NSICDXFAMILYHX_GEN_ALL_CORE_FT
FAMILY HISTORY:  Father  Still living? Unknown  FH: dementia, Age at diagnosis: Age Unknown  FH: heart disease, Age at diagnosis: Age Unknown    Mother  Still living? Unknown  Family history of [place]'s disease, Age at diagnosis: Age Unknown    Sibling  Still living? Unknown  FH: heart disease, Age at diagnosis: Age Unknown

## 2022-05-02 NOTE — ED ADULT NURSE REASSESSMENT NOTE - COMFORT CARE
plan of care explained
repositioned
plan of care explained/po fluids offered/repositioned/warm blanket provided

## 2022-05-02 NOTE — H&P ADULT - NSHPPHYSICALEXAM_GEN_ALL_CORE
PHYSICAL EXAM:  Vital Signs Last 24 Hrs  T(C): 35.9 (02 May 2022 10:12), Max: 35.9 (02 May 2022 10:08)  T(F): 96.6 (02 May 2022 10:12), Max: 96.6 (02 May 2022 10:08)  HR: 132 (02 May 2022 12:07) (71 - 143)  BP: 94/55 (02 May 2022 12:07) (69/33 - 105/88)  BP(mean): --  RR: 18 (02 May 2022 12:07) (18 - 19)  SpO2: 100% (02 May 2022 12:07) (95% - 100%)    GENERAL: dyspnea  HEAD:  Atraumatic, Normocephalic  EYES: EOMI, PERRLA, conjunctiva and sclera clear  ENMT: No tonsillar erythema, exudates, or enlargement; Moist mucous membranes, Good dentition, No lesions  NECK: Supple, No JVD, Normal thyroid  NERVOUS SYSTEM:  Alert and oriented times 3  CHEST/LUNG: Clear to auscultation bilaterally; No rales, rhonchi, wheezing, or rubs  HEART: Regular rate and rhythm; No murmurs, rubs, or gallops  ABDOMEN: Soft, Nontender, Nondistended; Bowel sounds present  EXTREMITIES:  2+ Peripheral Pulses, No clubbing, cyanosis, or edema  LYMPH: No lymphadenopathy noted  SKIN: No rashes or lesions

## 2022-05-02 NOTE — ED PROVIDER NOTE - NSSTROKETPAEXCLABS_THROMBIN3
---  If oral direct thrombin inhibitor (e.g. dabigatran) or oral direct factor Xa inhibitors (e.g. apixaban, rivaroxaban) have been taken within 3 hours of presentation, IV rt-PA (alteplase) is contraindicated regardless of coagulation study results

## 2022-05-02 NOTE — ED ADULT TRIAGE NOTE - CHIEF COMPLAINT QUOTE
As per EMS crew " about 35 minutes ago she started to have sensitive to light and complained of headache. We found her unresponsive " Pt presented drowsy and weak Stroke code activated

## 2022-05-02 NOTE — ED ADULT NURSE REASSESSMENT NOTE - NS ED NURSE REASSESS COMMENT FT1
pt remains tachycardia -145, ER MD Terry aware, currently afib on CM, pts blood pressure on the soft side (90/s systolic), as per ER MD no medications to be given at this time, awaiting further orders. pt awake and alert, family at beside aware of plan of care.

## 2022-05-02 NOTE — ED ADULT NURSE REASSESSMENT NOTE - NS ED NURSE REASSESS COMMENT FT1
pt received at change of shift, pt resting in stretcher, bed in low position, call bell within reach, plan of care discussed, will monitor.

## 2022-05-02 NOTE — ED PROVIDER NOTE - CONSTITUTIONAL, MLM
Well appearing, lethargy,  oriented to person, place, time/situation and in no apparent distress. normal...

## 2022-05-02 NOTE — H&P ADULT - ASSESSMENT
86F with hx of MI 3/14/20 treated with 3 stents to LAD with low EF (had life vest, declined AICD), OM1 stent 5/2020, chronic LE edema, and afib who presented with dizziness and paleness earlier tonight.  Patient was in her usual state of health with no recent illness when tonight she noted to be her BP to be relatively higher than normal (/80s when usually is around 114/60s).      Afib c rvr HR ranges from 140s  will admit to [facility] for medication titration   continue on toprol  will start on cardizem drip   hold Entresto   cardio eval    cad/chf  ef 30%  lasix   continue on plavix /statin     HTN  hold entresto  on Cardizem drip    DVT prophalaxis   eliquis       time spent 45 minutes

## 2022-05-02 NOTE — ED ADULT NURSE REASSESSMENT NOTE - NS ED NURSE REASSESS COMMENT FT1
pt moved to cast right , placed on hospital bed, pt appears comfortable. pt moved to cast right , placed on hospital bed, s/p Ativan 0.5 mg IM, pt appears comfortable now.

## 2022-05-02 NOTE — ED ADULT NURSE REASSESSMENT NOTE - NSIMPLEMENTINTERV_GEN_ALL_ED
Implemented All Fall with Harm Risk Interventions:  [place] to call system. Call bell, personal items and telephone within reach. Instruct patient to call for assistance. Room bathroom lighting operational. Non-slip footwear when patient is off stretcher. Physically safe environment: no spills, clutter or unnecessary equipment. Stretcher in lowest position, wheels locked, appropriate side rails in place. Provide visual cue, wrist band, yellow gown, etc. Monitor gait and stability. Monitor for mental status changes and reorient to person, place, and time. Review medications for side effects contributing to fall risk. Reinforce activity limits and safety measures with patient and family. Provide visual clues: red socks.

## 2022-05-02 NOTE — ED PROVIDER NOTE - PROGRESS NOTE DETAILS
SBP improved to ~ 90. pt AAO x 3, now much improved. No longer with any neuro deficits - nihss = 0. Will monitor. Joshua Garcia - will see pt to admit. SBP improved to ~ 90. pt AAO x 3, now much improved. No longer with any neuro deficits - nihss = 0. Will monitor. Pt awake and alert - no acute co.  Dw pts family at length

## 2022-05-02 NOTE — CONSULT NOTE ADULT - SUBJECTIVE AND OBJECTIVE BOX
History of Present Illness: The patient is an 86 year old female with a history of CAD with prior MI s/p PCI, chronic systolic heart failure, paroxysmal atrial fibrillation who presents with dizziness and AMS. The patient states she took some of her medications this morning, went to the bathroom, started to feel lightheaded and room spinning dizziness. No palpitations, chest pain, shortness of breath. She called her son who came to help her. She was very weak, unable to walk, and at times was lethargic and not fully responsive.    Past Medical/Surgical History:  CAD with prior MI s/p PCI, chronic systolic heart failure, paroxysmal atrial fibrillation    Medications:  Home Medications:  atorvastatin 40 mg oral tablet: 1 tab(s) orally once a day (02 May 2022 10:25)  clopidogrel 75 mg oral tablet: 1 tab(s) orally once a day (02 May 2022 10:25)  Entresto 49 mg-51 mg oral tablet: 1 tab(s) orally 2 times a day (02 May 2022 10:25)  furosemide 20 mg oral tablet: 1 tab(s) orally once a day (02 May 2022 10:25)  metoprolol tartrate 25 mg oral tablet: 1 tab(s) orally 2 times a day (02 May 2022 10:25)      Family History: Non-contributory family history of premature cardiovascular atherosclerotic disease    Social History: No tobacco, alcohol or drug use    Review of Systems:  General: No fevers, chills, weight gain  Skin: No rashes, color changes  Cardiovascular: No chest pain, orthopnea  Respiratory: No shortness of breath, cough  Gastrointestinal: No nausea, abdominal pain  Genitourinary: No incontinence, pain with urination  Musculoskeletal: No pain, swelling, decreased range of motion  Neurological: No headache, weakness  Psychiatric: No depression, anxiety  Endocrine: No weight gain, increased thirst  All other systems are comprehensively negative.    Physical Exam:  Vitals:        Vital Signs Last 24 Hrs  T(C): 35.9 (02 May 2022 10:12), Max: 35.9 (02 May 2022 10:08)  T(F): 96.6 (02 May 2022 10:12), Max: 96.6 (02 May 2022 10:08)  HR: 140 (02 May 2022 12:37) (71 - 143)  BP: 113/58 (02 May 2022 12:37) (69/33 - 113/58)  BP(mean): --  RR: 18 (02 May 2022 12:37) (18 - 19)  SpO2: 98% (02 May 2022 12:37) (95% - 100%)  General: NAD  HEENT: MMM  Neck: No JVD, no carotid bruit  Lungs: CTAB  CV: Irregular, nl S1/S2, no M/R/G  Abdomen: S/NT/ND, +BS  Extremities: No LE edema, no cyanosis  Neuro: AAOx3, non-focal  Skin: No rash    Labs:                        13.4   6.88  )-----------( 198      ( 02 May 2022 10:22 )             39.6     05-02    137  |  103  |  26<H>  ----------------------------<  147<H>  4.2   |  21<L>  |  1.18    Ca    9.0      02 May 2022 10:22    TPro  6.2  /  Alb  3.2<L>  /  TBili  0.4  /  DBili  x   /  AST  24  /  ALT  15  /  AlkPhos  78  05-02        PT/INR - ( 02 May 2022 10:22 )   PT: 14.1 sec;   INR: 1.19 ratio         PTT - ( 02 May 2022 10:22 )  PTT:29.3 sec    ECG: AF, RBBB

## 2022-05-02 NOTE — H&P ADULT - NSICDXFAMILYHX_GEN_ALL_CORE_FT
FAMILY HISTORY:  Father  Still living? Unknown  FH: dementia, Age at diagnosis: Age Unknown  FH: heart disease, Age at diagnosis: Age Unknown    Mother  Still living? Unknown  Family history of [place]'s disease, Age at diagnosis: Age Unknown    Sibling  Still living? Unknown  FH: heart disease, Age at diagnosis: Age Unknown

## 2022-05-02 NOTE — ED PROVIDER NOTE - OBJECTIVE STATEMENT
87 yo F w/ hx HTN, Afib, CAD sp stents p/w BIB EMS for sudden AMS. Pt reportedly had woken up this am and ~ 915am she called her son saying she wasn't feeling well. Pt reportedly had taken her am dose of Eliquis ~ 1 hr pta. Pt was not seen by anyone prior to calling for help this am. Last known well was 9pm last night . No fever/chills. no recent illness. no cough/ uri. no recent illness. no agg/allev factors. no other acute co or changes. Pt fully vaccinated for covid.

## 2022-05-02 NOTE — H&P ADULT - NSHPREVIEWOFSYSTEMS_GEN_ALL_CORE
REVIEW OF SYSTEMS:  EYES: No eye pain, visual disturbances, or discharge  BREASTS: No pain, masses, or nipple discharge  RESPIRATORY: decrease air entry at the bases  CARDIOVASCULAR: No chest pain, palpitations, dizziness, or leg swelling  GASTROINTESTINAL: No abdominal or epigastric pain. No nausea, vomiting, or hematemesis; No diarrhea or constipation. No melena or hematochezia.  GENITOURINARY: No dysuria, frequency, hematuria, or incontinence  NEUROLOGICAL: No headaches, memory loss, loss of strength, numbness, or tremors  SKIN: No itching, burning, rashes, or lesions   LYMPH NODES: No enlarged glands  ENDOCRINE: No heat or cold intolerance; No hair loss; No polydipsia or polyuria  MUSCULOSKELETAL: No joint pain or swelling; No muscle, back, or extremity pain  PSYCHIATRIC: No depression, anxiety, mood swings, or difficulty sleeping  HEME/LYMPH: No easy bruising, or bleeding gums  ALLERGY AND IMMUNOLOGIC: No hives or eczema

## 2022-05-02 NOTE — ED PROVIDER NOTE - CLINICAL SUMMARY MEDICAL DECISION MAKING FREE TEXT BOX
Sudden AMS today, possible CVA - no known infxn. Pt on eliquis, took ~ 1 hr pta, and last seen nl 9pm. Pt NOT lytic candidate,

## 2022-05-02 NOTE — ED PROVIDER NOTE - NSICDXFAMILYHX_GEN_ALL_CORE_FT
FAMILY HISTORY:  Father  Still living? Unknown  FH: dementia, Age at diagnosis: Age Unknown  FH: heart disease, Age at diagnosis: Age Unknown    Mother  Still living? Unknown  Family history of [place]'s disease, Age at diagnosis: Age Unknown    Sibling  Still living? Unknown  FH: heart disease, Age at diagnosis: Age Unknown

## 2022-05-02 NOTE — H&P ADULT - NSHPLABSRESULTS_GEN_ALL_CORE
13.4   6.88  )-----------( 198      ( 02 May 2022 10:22 )             39.6       05-    137  |  103  |  26<H>  ----------------------------<  147<H>  4.2   |  21<L>  |  1.18    Ca    9.0      02 May 2022 10:22    TPro  6.2  /  Alb  3.2<L>  /  TBili  0.4  /  DBili  x   /  AST  24  /  ALT  15  /  AlkPhos  78  05-              Urinalysis Basic - ( 02 May 2022 11:27 )    Color: Yellow / Appearance: Clear / S.005 / pH: x  Gluc: x / Ketone: Negative  / Bili: Negative / Urobili: Negative mg/dL   Blood: x / Protein: 15 mg/dL / Nitrite: Negative   Leuk Esterase: Negative / RBC: 3-5 /HPF / WBC 3-5   Sq Epi: x / Non Sq Epi: Moderate / Bacteria: Few        PT/INR - ( 02 May 2022 10:22 )   PT: 14.1 sec;   INR: 1.19 ratio         PTT - ( 02 May 2022 10:22 )  PTT:29.3 sec    Lactate Trend   @ 11:03 Lactate:2.3             CAPILLARY BLOOD GLUCOSE

## 2022-05-03 LAB
ALBUMIN SERPL ELPH-MCNC: 2.9 G/DL — LOW (ref 3.3–5)
ALP SERPL-CCNC: 78 U/L — SIGNIFICANT CHANGE UP (ref 30–120)
ALT FLD-CCNC: 19 U/L DA — SIGNIFICANT CHANGE UP (ref 10–60)
ANION GAP SERPL CALC-SCNC: 7 MMOL/L — SIGNIFICANT CHANGE UP (ref 5–17)
AST SERPL-CCNC: 24 U/L — SIGNIFICANT CHANGE UP (ref 10–40)
BILIRUB SERPL-MCNC: 0.3 MG/DL — SIGNIFICANT CHANGE UP (ref 0.2–1.2)
BUN SERPL-MCNC: 25 MG/DL — HIGH (ref 7–23)
CALCIUM SERPL-MCNC: 8.5 MG/DL — SIGNIFICANT CHANGE UP (ref 8.4–10.5)
CHLORIDE SERPL-SCNC: 104 MMOL/L — SIGNIFICANT CHANGE UP (ref 96–108)
CO2 SERPL-SCNC: 24 MMOL/L — SIGNIFICANT CHANGE UP (ref 22–31)
CREAT SERPL-MCNC: 0.97 MG/DL — SIGNIFICANT CHANGE UP (ref 0.5–1.3)
CULTURE RESULTS: NO GROWTH — SIGNIFICANT CHANGE UP
EGFR: 57 ML/MIN/1.73M2 — LOW
GLUCOSE SERPL-MCNC: 89 MG/DL — SIGNIFICANT CHANGE UP (ref 70–99)
HCT VFR BLD CALC: 34.6 % — SIGNIFICANT CHANGE UP (ref 34.5–45)
HGB BLD-MCNC: 12 G/DL — SIGNIFICANT CHANGE UP (ref 11.5–15.5)
MCHC RBC-ENTMCNC: 33.2 PG — SIGNIFICANT CHANGE UP (ref 27–34)
MCHC RBC-ENTMCNC: 34.7 GM/DL — SIGNIFICANT CHANGE UP (ref 32–36)
MCV RBC AUTO: 95.8 FL — SIGNIFICANT CHANGE UP (ref 80–100)
NRBC # BLD: 0 /100 WBCS — SIGNIFICANT CHANGE UP (ref 0–0)
PLATELET # BLD AUTO: 173 K/UL — SIGNIFICANT CHANGE UP (ref 150–400)
POTASSIUM SERPL-MCNC: 4 MMOL/L — SIGNIFICANT CHANGE UP (ref 3.5–5.3)
POTASSIUM SERPL-SCNC: 4 MMOL/L — SIGNIFICANT CHANGE UP (ref 3.5–5.3)
PROT SERPL-MCNC: 5.6 G/DL — LOW (ref 6–8.3)
RBC # BLD: 3.61 M/UL — LOW (ref 3.8–5.2)
RBC # FLD: 15.2 % — HIGH (ref 10.3–14.5)
SODIUM SERPL-SCNC: 135 MMOL/L — SIGNIFICANT CHANGE UP (ref 135–145)
SPECIMEN SOURCE: SIGNIFICANT CHANGE UP
WBC # BLD: 5.96 K/UL — SIGNIFICANT CHANGE UP (ref 3.8–10.5)
WBC # FLD AUTO: 5.96 K/UL — SIGNIFICANT CHANGE UP (ref 3.8–10.5)

## 2022-05-03 PROCEDURE — 99233 SBSQ HOSP IP/OBS HIGH 50: CPT

## 2022-05-03 PROCEDURE — 93306 TTE W/DOPPLER COMPLETE: CPT | Mod: 26

## 2022-05-03 RX ORDER — SACUBITRIL AND VALSARTAN 24; 26 MG/1; MG/1
1 TABLET, FILM COATED ORAL
Refills: 0 | Status: DISCONTINUED | OUTPATIENT
Start: 2022-05-03 | End: 2022-05-05

## 2022-05-03 RX ORDER — METOPROLOL TARTRATE 50 MG
25 TABLET ORAL DAILY
Refills: 0 | Status: DISCONTINUED | OUTPATIENT
Start: 2022-05-03 | End: 2022-05-05

## 2022-05-03 RX ADMIN — AMIODARONE HYDROCHLORIDE 400 MILLIGRAM(S): 400 TABLET ORAL at 14:55

## 2022-05-03 RX ADMIN — APIXABAN 2.5 MILLIGRAM(S): 2.5 TABLET, FILM COATED ORAL at 18:04

## 2022-05-03 RX ADMIN — Medication 25 MILLIGRAM(S): at 08:35

## 2022-05-03 RX ADMIN — AMIODARONE HYDROCHLORIDE 400 MILLIGRAM(S): 400 TABLET ORAL at 06:51

## 2022-05-03 RX ADMIN — SACUBITRIL AND VALSARTAN 1 TABLET(S): 24; 26 TABLET, FILM COATED ORAL at 08:35

## 2022-05-03 RX ADMIN — CLOPIDOGREL BISULFATE 75 MILLIGRAM(S): 75 TABLET, FILM COATED ORAL at 12:55

## 2022-05-03 RX ADMIN — ATORVASTATIN CALCIUM 40 MILLIGRAM(S): 80 TABLET, FILM COATED ORAL at 21:46

## 2022-05-03 RX ADMIN — APIXABAN 2.5 MILLIGRAM(S): 2.5 TABLET, FILM COATED ORAL at 06:50

## 2022-05-03 RX ADMIN — SACUBITRIL AND VALSARTAN 1 TABLET(S): 24; 26 TABLET, FILM COATED ORAL at 18:04

## 2022-05-03 RX ADMIN — AMIODARONE HYDROCHLORIDE 400 MILLIGRAM(S): 400 TABLET ORAL at 21:47

## 2022-05-03 NOTE — PATIENT PROFILE ADULT - FALL HARM RISK - HARM RISK INTERVENTIONS

## 2022-05-03 NOTE — CHART NOTE - NSCHARTNOTEFT_GEN_A_CORE
Late entry,  RRT was activate as patient was in acute respiratory distress, was placed on non rebreather mask by RN, sating 100% upon my arrival, seen & examined at the bedside, AAO X3, hyperventilating with loud wheezing audible from a distance, anxious & irritable, lying down 30 degrees supine in bed, neck veins collapse with inspiration, Lungs: conducted upper airway sounds, no rhonchi, no rales, Heart: split S1 & Acc S2, no murmurs or extra sounds.  **Vitals: HR: 102/min, /84, Temp 97.7 F, RR: 28/min, SPO2 100% on NRM, F.S. 102 mg/dl.  **Stat EKG as per my review showed MAT at 104/min, normal axis (+15), complete RBBB with 2ry repolarization abnormality.      Patient is a 86y old  Female who presents with a chief complaint of AMS.    Rapid response was called on [id number] patient for  acute respiratory distress.    Patient was seen and examined at the bedside by the rapid response team.    PAST MEDICAL & SURGICAL HISTORY:  CAD (coronary artery disease)    Rapid atrial fibrillation    CAD (coronary artery disease)  s/p Cath &amp; 3 stents to LAD on 3/2020; OM1 stent x 1  EF ~30%    Mild HTN    History of CHF (congestive heart failure)    Myocardial infarction    S/P cataract surgery        MEDICATIONS  (STANDING):  aMIOdarone    Tablet   Oral   aMIOdarone    Tablet 400 milliGRAM(s) Oral every 8 hours  apixaban 2.5 milliGRAM(s) Oral every 12 hours  atorvastatin 40 milliGRAM(s) Oral at bedtime  clopidogrel Tablet      clopidogrel Tablet 75 milliGRAM(s) Oral daily    MEDICATIONS  (PRN):  None.    Allergies  Persantine (Rash)      Vital Signs Last 24 Hrs  T(C): 36.7 (03 May 2022 02:00), Max: 36.8 (02 May 2022 14:37)  T(F): 98 (03 May 2022 02:00), Max: 98.3 (02 May 2022 14:37)  HR: 61 (03 May 2022 02:00) (61 - 143)  BP: 118/56 (03 May 2022 02:00) (69/33 - 140/71)  BP(mean): --  RR: 14 (03 May 2022 02:00) (14 - 32)  SpO2: 97% (03 May 2022 02:00) (95% - 100%)          LABS:                        13.4   6.88  )-----------( 198      ( 02 May 2022 10:22 )             39.6     02 May 2022 10:22    137    |  103    |  26     ----------------------------<  147    4.2     |  21     |  1.18     Ca    9.0        02 May 2022 10:22    TPro  6.2    /  Alb  3.2    /  TBili  0.4    /  DBili  x      /  AST  24     /  ALT  15     /  AlkPhos  78     02 May 2022 10:22    PT/INR - ( 02 May 2022 10:22 )   PT: 14.1 sec;   INR: 1.19 ratio         PTT - ( 02 May 2022 10:22 )  PTT:29.3 sec  Urinalysis Basic - ( 02 May 2022 11:27 )    Color: Yellow / Appearance: Clear / S.005 / pH: x  Gluc: x / Ketone: Negative  / Bili: Negative / Urobili: Negative mg/dL   Blood: x / Protein: 15 mg/dL / Nitrite: Negative   Leuk Esterase: Negative / RBC: 3-5 /HPF / WBC 3-5   Sq Epi: x / Non Sq Epi: Moderate / Bacteria: Few      CAPILLARY BLOOD GLUCOSE    POCT Blood Glucose.: 106 mg/dL (02 May 2022 20:50)  POCT Blood Glucose.: 90 mg/dL (02 May 2022 10:03)        A/P:   Panic attack, gave 0.5 mg of Ativan IM with good response, later after she calmed down, patient stated that the patient in the bed next to her is making a horrible noise & she couldn't take it anymore, moved to another room, currently lying down comfortable, RR 14/min, HR 61/min, SPO2 97% on RA.  CC: 30 min. Late entry,  RRT was activate as patient was in acute respiratory distress, was placed on non rebreather mask by RN, sating 100% upon my arrival, seen & examined at the bedside, AAO X3, hyperventilating with loud wheezing audible from a distance, anxious & irritable, lying down 30 degrees supine in bed, neck veins collapse with inspiration, Lungs: conducted upper airway sounds, no rhonchi, no rales, Heart: split S1 & Acc S2, no murmurs or extra sounds.    **Vitals: HR: 102/min, /84, Temp 97.7 F, RR: 28/min, SPO2 100% on NRM, F.S. 102 mg/dl.  **Stat EKG as per my review showed MAT at 104/min, normal axis (+15), complete RBBB with 2ry repolarization abnormality.      Patient is a 86y old  Female who presents with a chief complaint of AMS.    Rapid response was called on [id number] patient for  acute respiratory distress.    Patient was seen and examined at the bedside by the rapid response team.    PAST MEDICAL & SURGICAL HISTORY:  CAD (coronary artery disease)    Rapid atrial fibrillation    CAD (coronary artery disease)  s/p Cath &amp; 3 stents to LAD on 3/2020; OM1 stent x 1  EF ~30%    Mild HTN    History of CHF (congestive heart failure)    Myocardial infarction    S/P cataract surgery        MEDICATIONS  (STANDING):  aMIOdarone    Tablet   Oral   aMIOdarone    Tablet 400 milliGRAM(s) Oral every 8 hours  apixaban 2.5 milliGRAM(s) Oral every 12 hours  atorvastatin 40 milliGRAM(s) Oral at bedtime  clopidogrel Tablet      clopidogrel Tablet 75 milliGRAM(s) Oral daily    MEDICATIONS  (PRN):  None.    Allergies  Persantine (Rash)      Vital Signs Last 24 Hrs  T(C): 36.7 (03 May 2022 02:00), Max: 36.8 (02 May 2022 14:37)  T(F): 98 (03 May 2022 02:00), Max: 98.3 (02 May 2022 14:37)  HR: 61 (03 May 2022 02:00) (61 - 143)  BP: 118/56 (03 May 2022 02:00) (69/33 - 140/71)  BP(mean): --  RR: 14 (03 May 2022 02:00) (14 - 32)  SpO2: 97% (03 May 2022 02:00) (95% - 100%)          LABS:                        13.4   6.88  )-----------( 198      ( 02 May 2022 10:22 )             39.6     02 May 2022 10:22    137    |  103    |  26     ----------------------------<  147    4.2     |  21     |  1.18     Ca    9.0        02 May 2022 10:22    TPro  6.2    /  Alb  3.2    /  TBili  0.4    /  DBili  x      /  AST  24     /  ALT  15     /  AlkPhos  78     02 May 2022 10:22    PT/INR - ( 02 May 2022 10:22 )   PT: 14.1 sec;   INR: 1.19 ratio         PTT - ( 02 May 2022 10:22 )  PTT:29.3 sec  Urinalysis Basic - ( 02 May 2022 11:27 )    Color: Yellow / Appearance: Clear / S.005 / pH: x  Gluc: x / Ketone: Negative  / Bili: Negative / Urobili: Negative mg/dL   Blood: x / Protein: 15 mg/dL / Nitrite: Negative   Leuk Esterase: Negative / RBC: 3-5 /HPF / WBC 3-5   Sq Epi: x / Non Sq Epi: Moderate / Bacteria: Few      CAPILLARY BLOOD GLUCOSE    POCT Blood Glucose.: 106 mg/dL (02 May 2022 20:50)  POCT Blood Glucose.: 90 mg/dL (02 May 2022 10:03)        A/P:   Panic attack, gave 0.5 mg of Ativan IM with good response, later after she calmed down, patient stated that the patient in the bed next to her is making a horrible noise & she couldn't take it anymore, moved to another room, currently lying down comfortable, RR 14/min, HR 61/min, SPO2 97% on RA.  CC: 30 min.

## 2022-05-04 PROCEDURE — 99233 SBSQ HOSP IP/OBS HIGH 50: CPT

## 2022-05-04 RX ADMIN — ATORVASTATIN CALCIUM 40 MILLIGRAM(S): 80 TABLET, FILM COATED ORAL at 22:12

## 2022-05-04 RX ADMIN — APIXABAN 2.5 MILLIGRAM(S): 2.5 TABLET, FILM COATED ORAL at 05:55

## 2022-05-04 RX ADMIN — AMIODARONE HYDROCHLORIDE 400 MILLIGRAM(S): 400 TABLET ORAL at 15:05

## 2022-05-04 RX ADMIN — SACUBITRIL AND VALSARTAN 1 TABLET(S): 24; 26 TABLET, FILM COATED ORAL at 05:55

## 2022-05-04 RX ADMIN — AMIODARONE HYDROCHLORIDE 400 MILLIGRAM(S): 400 TABLET ORAL at 05:56

## 2022-05-04 RX ADMIN — APIXABAN 2.5 MILLIGRAM(S): 2.5 TABLET, FILM COATED ORAL at 18:44

## 2022-05-04 RX ADMIN — SACUBITRIL AND VALSARTAN 1 TABLET(S): 24; 26 TABLET, FILM COATED ORAL at 18:44

## 2022-05-04 RX ADMIN — CLOPIDOGREL BISULFATE 75 MILLIGRAM(S): 75 TABLET, FILM COATED ORAL at 13:04

## 2022-05-04 RX ADMIN — Medication 25 MILLIGRAM(S): at 05:55

## 2022-05-04 RX ADMIN — AMIODARONE HYDROCHLORIDE 400 MILLIGRAM(S): 400 TABLET ORAL at 22:12

## 2022-05-04 NOTE — PHYSICAL THERAPY INITIAL EVALUATION ADULT - PERTINENT HX OF CURRENT PROBLEM, REHAB EVAL
pt is a 87 y/o female, admitted from home with dizziness, she noted to be her BP to be relatively higher than normal (/80s when usually is around 114/60s).  As per patient, woke up in the morning, felt dizzy, room spinning around, vertigo and hypotensive,

## 2022-05-05 ENCOUNTER — TRANSCRIPTION ENCOUNTER (OUTPATIENT)
Age: 87
End: 2022-05-05

## 2022-05-05 VITALS
SYSTOLIC BLOOD PRESSURE: 121 MMHG | DIASTOLIC BLOOD PRESSURE: 69 MMHG | OXYGEN SATURATION: 96 % | RESPIRATION RATE: 18 BRPM | TEMPERATURE: 98 F | HEART RATE: 65 BPM

## 2022-05-05 LAB
A1C WITH ESTIMATED AVERAGE GLUCOSE RESULT: 5.7 % — HIGH (ref 4–5.6)
CHOLEST SERPL-MCNC: 118 MG/DL — SIGNIFICANT CHANGE UP
ESTIMATED AVERAGE GLUCOSE: 117 MG/DL — HIGH (ref 68–114)
HDLC SERPL-MCNC: 57 MG/DL — SIGNIFICANT CHANGE UP
LIPID PNL WITH DIRECT LDL SERPL: 50 MG/DL — SIGNIFICANT CHANGE UP
NON HDL CHOLESTEROL: 60 MG/DL — SIGNIFICANT CHANGE UP
TRIGL SERPL-MCNC: 54 MG/DL — SIGNIFICANT CHANGE UP

## 2022-05-05 PROCEDURE — 82962 GLUCOSE BLOOD TEST: CPT

## 2022-05-05 PROCEDURE — 97161 PT EVAL LOW COMPLEX 20 MIN: CPT

## 2022-05-05 PROCEDURE — 71045 X-RAY EXAM CHEST 1 VIEW: CPT

## 2022-05-05 PROCEDURE — 0225U NFCT DS DNA&RNA 21 SARSCOV2: CPT

## 2022-05-05 PROCEDURE — 80061 LIPID PANEL: CPT

## 2022-05-05 PROCEDURE — 70496 CT ANGIOGRAPHY HEAD: CPT | Mod: MA

## 2022-05-05 PROCEDURE — 85025 COMPLETE CBC W/AUTO DIFF WBC: CPT

## 2022-05-05 PROCEDURE — 70498 CT ANGIOGRAPHY NECK: CPT | Mod: MA

## 2022-05-05 PROCEDURE — 96365 THER/PROPH/DIAG IV INF INIT: CPT

## 2022-05-05 PROCEDURE — 93306 TTE W/DOPPLER COMPLETE: CPT

## 2022-05-05 PROCEDURE — 36415 COLL VENOUS BLD VENIPUNCTURE: CPT

## 2022-05-05 PROCEDURE — 85610 PROTHROMBIN TIME: CPT

## 2022-05-05 PROCEDURE — 81001 URINALYSIS AUTO W/SCOPE: CPT

## 2022-05-05 PROCEDURE — 83605 ASSAY OF LACTIC ACID: CPT

## 2022-05-05 PROCEDURE — 99239 HOSP IP/OBS DSCHRG MGMT >30: CPT

## 2022-05-05 PROCEDURE — 83036 HEMOGLOBIN GLYCOSYLATED A1C: CPT

## 2022-05-05 PROCEDURE — 97165 OT EVAL LOW COMPLEX 30 MIN: CPT

## 2022-05-05 PROCEDURE — 87086 URINE CULTURE/COLONY COUNT: CPT

## 2022-05-05 PROCEDURE — 80053 COMPREHEN METABOLIC PANEL: CPT

## 2022-05-05 PROCEDURE — 70450 CT HEAD/BRAIN W/O DYE: CPT | Mod: MA

## 2022-05-05 PROCEDURE — 84484 ASSAY OF TROPONIN QUANT: CPT

## 2022-05-05 PROCEDURE — 87040 BLOOD CULTURE FOR BACTERIA: CPT

## 2022-05-05 PROCEDURE — 85730 THROMBOPLASTIN TIME PARTIAL: CPT

## 2022-05-05 PROCEDURE — 93005 ELECTROCARDIOGRAM TRACING: CPT

## 2022-05-05 PROCEDURE — 96361 HYDRATE IV INFUSION ADD-ON: CPT

## 2022-05-05 PROCEDURE — 85027 COMPLETE CBC AUTOMATED: CPT

## 2022-05-05 PROCEDURE — 99285 EMERGENCY DEPT VISIT HI MDM: CPT | Mod: 25

## 2022-05-05 RX ORDER — AMIODARONE HYDROCHLORIDE 400 MG/1
1 TABLET ORAL
Qty: 30 | Refills: 0
Start: 2022-05-05 | End: 2022-06-03

## 2022-05-05 RX ADMIN — CLOPIDOGREL BISULFATE 75 MILLIGRAM(S): 75 TABLET, FILM COATED ORAL at 11:18

## 2022-05-05 RX ADMIN — Medication 25 MILLIGRAM(S): at 06:44

## 2022-05-05 RX ADMIN — AMIODARONE HYDROCHLORIDE 400 MILLIGRAM(S): 400 TABLET ORAL at 06:44

## 2022-05-05 RX ADMIN — SACUBITRIL AND VALSARTAN 1 TABLET(S): 24; 26 TABLET, FILM COATED ORAL at 06:44

## 2022-05-05 RX ADMIN — AMIODARONE HYDROCHLORIDE 400 MILLIGRAM(S): 400 TABLET ORAL at 13:41

## 2022-05-05 RX ADMIN — APIXABAN 2.5 MILLIGRAM(S): 2.5 TABLET, FILM COATED ORAL at 06:44

## 2022-05-05 NOTE — DISCHARGE NOTE PROVIDER - CARE PROVIDER_API CALL
PCP,   Please make appointment to see your PCP Dr Huang within 3-5 days of discharge  Phone: (   )    -  Fax: (   )    -  Follow Up Time:     Vega Rivers)  Cardiovascular Disease; Internal Medicine  [address] [facility] [address]  [place] [address]  Phone: [phone]  Fax: [phone]  Follow Up Time:

## 2022-05-05 NOTE — PROGRESS NOTE ADULT - SUBJECTIVE AND OBJECTIVE BOX
Chief Complaint: Dizziness, AMS    Interval Events: No events overnight.    Review of Systems:  General: No fevers, chills, weight gain  Skin: No rashes, color changes  Cardiovascular: No chest pain, orthopnea  Respiratory: No shortness of breath, cough  Gastrointestinal: No nausea, abdominal pain  Genitourinary: No incontinence, pain with urination  Musculoskeletal: No pain, swelling, decreased range of motion  Neurological: No headache, weakness  Psychiatric: No depression, anxiety  Endocrine: No weight gain, increased thirst  All other systems are comprehensively negative.    Physical Exam:  Vital Signs Last 24 Hrs  T(C): 36.3 (04 May 2022 05:04), Max: 36.9 (03 May 2022 12:47)  T(F): 97.3 (04 May 2022 05:04), Max: 98.4 (03 May 2022 12:47)  HR: 58 (04 May 2022 05:04) (58 - 63)  BP: 102/65 (04 May 2022 05:04) (99/54 - 123/73)  BP(mean): --  RR: 18 (04 May 2022 05:04) (17 - 22)  SpO2: 97% (04 May 2022 05:04) (96% - 98%)  General: NAD  HEENT: MMM  Neck: No JVD, no carotid bruit  Lungs: CTAB  CV: RRR, nl S1/S2, no M/R/G  Abdomen: S/NT/ND, +BS  Extremities: No LE edema, no cyanosis  Neuro: AAOx3, non-focal  Skin: No rash    Labs:             05-03    135  |  104  |  25<H>  ----------------------------<  89  4.0   |  24  |  0.97    Ca    8.5      03 May 2022 05:55    TPro  5.6<L>  /  Alb  2.9<L>  /  TBili  0.3  /  DBili  x   /  AST  24  /  ALT  19  /  AlkPhos  78  05-03                        12.0   5.96  )-----------( 173      ( 03 May 2022 05:55 )             34.6       Telemetry: Sinus rhythm
Patient is a 86y old  Female who presents with a chief complaint of       HPI:  86F with hx of MI 3/14/20 treated with 3 stents to LAD with low EF (had life vest, declined AICD), OM1 stent 2020, chronic LE edema, and afib who presented with dizziness and paleness earlier tonight.  Patient was in her usual state of health with no recent illness when tonight she noted to be her BP to be relatively higher than normal (/80s when usually is around 114/60s).  Patient then started to feel unwell.   as per patient, woke up in the monring, felt dizzy, room spinning around,, vertigo and hypotensive, was sent to hospital, for r/o cva, no weakness or numbness noted, suspect from afib c rvr  will start on cardizem drip, to maintain goal of    (02 May 2022 12:21)      SUBJECTIVE & OBJECTIVE: Pt seen and examined at bedside. nad    PHYSICAL EXAM:  T(C): 36.7 (22 @ 15:20), Max: 36.9 (22 @ 12:47)  HR: 63 (22 @ 15:20) (60 - 102)  BP: 107/55 (22 @ 15:20) (99/54 - 140/71)  RR: 19 (22 @ 15:20) (14 - 32)  SpO2: 97% (22 @ 15:20) (96% - 100%)  Wt(kg): --   GENERAL: NAD, well-groomed, well-developed  NERVOUS SYSTEM:  Alert & Oriented X3,   CHEST/LUNG: Clear to auscultation bilaterally; No rales, rhonchi, wheezing, or rubs  HEART: Regular rate and rhythm; No murmurs, rubs, or gallops  ABDOMEN: Soft, Nontender, Nondistended; Bowel sounds present  EXTREMITIES:  2+ Peripheral Pulses, No clubbing, cyanosis, or edema        MEDICATIONS  (STANDING):  aMIOdarone    Tablet   Oral   aMIOdarone    Tablet 400 milliGRAM(s) Oral every 8 hours  apixaban 2.5 milliGRAM(s) Oral every 12 hours  atorvastatin 40 milliGRAM(s) Oral at bedtime  clopidogrel Tablet      clopidogrel Tablet 75 milliGRAM(s) Oral daily  metoprolol succinate ER 25 milliGRAM(s) Oral daily  sacubitril 24 mG/valsartan 26 mG 1 Tablet(s) Oral two times a day    MEDICATIONS  (PRN):      LABS:                        12.0   5.96  )-----------( 173      ( 03 May 2022 05:55 )             34.6         135  |  104  |  25<H>  ----------------------------<  89  4.0   |  24  |  0.97    Ca    8.5      03 May 2022 05:55    TPro  5.6<L>  /  Alb  2.9<L>  /  TBili  0.3  /  DBili  x   /  AST  24  /  ALT  19  /  AlkPhos  78      PT/INR - ( 02 May 2022 10:22 )   PT: 14.1 sec;   INR: 1.19 ratio         PTT - ( 02 May 2022 10:22 )  PTT:29.3 sec  Urinalysis Basic - ( 02 May 2022 11:27 )    Color: Yellow / Appearance: Clear / S.005 / pH: x  Gluc: x / Ketone: Negative  / Bili: Negative / Urobili: Negative mg/dL   Blood: x / Protein: 15 mg/dL / Nitrite: Negative   Leuk Esterase: Negative / RBC: 3-5 /HPF / WBC 3-5   Sq Epi: x / Non Sq Epi: Moderate / Bacteria: Few        CAPILLARY BLOOD GLUCOSE      POCT Blood Glucose.: 106 mg/dL (02 May 2022 20:50)      CAPILLARY BLOOD GLUCOSE      POCT Blood Glucose.: 106 mg/dL (02 May 2022 20:50)    CAPILLARY BLOOD GLUCOSE      POCT Blood Glucose.: 106 mg/dL (02 May 2022 20:50)            RECENT CULTURES:      RADIOLOGY & ADDITIONAL TESTS:                        DVT/GI ppx  Discussed with pt @ bedside
Patient is a 86y old  Female who presents with a chief complaint of     INTERVAL HPI/OVERNIGHT EVENTS:    no overnight events  seen at bedside, pt says she had trouble sleeping overnight  no issues per nursing.     MEDICATIONS  (STANDING):  aMIOdarone    Tablet   Oral   aMIOdarone    Tablet 400 milliGRAM(s) Oral every 8 hours  apixaban 2.5 milliGRAM(s) Oral every 12 hours  atorvastatin 40 milliGRAM(s) Oral at bedtime  clopidogrel Tablet      clopidogrel Tablet 75 milliGRAM(s) Oral daily  metoprolol succinate ER 25 milliGRAM(s) Oral daily  sacubitril 24 mG/valsartan 26 mG 1 Tablet(s) Oral two times a day    MEDICATIONS  (PRN):      Allergies    Persantine (Rash)  Persantine (Unknown)    Intolerances        REVIEW OF SYSTEMS:  poor historian    Vital Signs Last 24 Hrs  T(C): 36.3 (04 May 2022 10:18), Max: 36.9 (03 May 2022 12:47)  T(F): 97.4 (04 May 2022 10:18), Max: 98.4 (03 May 2022 12:47)  HR: 56 (04 May 2022 10:18) (56 - 63)  BP: 100/63 (04 May 2022 10:18) (99/54 - 123/73)  BP(mean): --  RR: 18 (04 May 2022 10:18) (17 - 22)  SpO2: 99% (04 May 2022 10:18) (96% - 99%)    PHYSICAL EXAM:  General: NAD  HEENT: MMM  Neck: No JVD, no carotid bruit  Lungs: CTAB  CV: RRR, nl S1/S2, no M/R/G  Abdomen: S/NT/ND, +BS  Extremities: No LE edema, no cyanosis  Neuro: AAOx3, non-focal  Skin: No rash    LABS:      Ca    8.5        03 May 2022 05:55          CAPILLARY BLOOD GLUCOSE          RADIOLOGY & ADDITIONAL TESTS:    
Chief Complaint: Dizziness, AMS    Interval Events: No events overnight.    Review of Systems:  General: No fevers, chills, weight gain  Skin: No rashes, color changes  Cardiovascular: No chest pain, orthopnea  Respiratory: No shortness of breath, cough  Gastrointestinal: No nausea, abdominal pain  Genitourinary: No incontinence, pain with urination  Musculoskeletal: No pain, swelling, decreased range of motion  Neurological: No headache, weakness  Psychiatric: No depression, anxiety  Endocrine: No weight gain, increased thirst  All other systems are comprehensively negative.    Physical Exam:  Vital Signs Last 24 Hrs  T(C): 36.8 (05 May 2022 05:20), Max: 37.2 (04 May 2022 18:15)  T(F): 98.3 (05 May 2022 05:20), Max: 98.9 (04 May 2022 18:15)  HR: 61 (05 May 2022 05:20) (56 - 61)  BP: 127/69 (05 May 2022 05:20) (100/63 - 130/68)  BP(mean): --  RR: 18 (05 May 2022 05:20) (17 - 18)  SpO2: 96% (05 May 2022 05:20) (95% - 99%)  General: NAD  HEENT: MMM  Neck: No JVD, no carotid bruit  Lungs: CTAB  CV: RRR, nl S1/S2, no M/R/G  Abdomen: S/NT/ND, +BS  Extremities: No LE edema, no cyanosis  Neuro: AAOx3, non-focal  Skin: No rash    Labs:                 Telemetry: Sinus rhythm
Chief Complaint: Dizziness, AMS    Interval Events: No events overnight.    Review of Systems:  General: No fevers, chills, weight gain  Skin: No rashes, color changes  Cardiovascular: No chest pain, orthopnea  Respiratory: No shortness of breath, cough  Gastrointestinal: No nausea, abdominal pain  Genitourinary: No incontinence, pain with urination  Musculoskeletal: No pain, swelling, decreased range of motion  Neurological: No headache, weakness  Psychiatric: No depression, anxiety  Endocrine: No weight gain, increased thirst  All other systems are comprehensively negative.    Physical Exam:  Vitals:        Vital Signs Last 24 Hrs  T(C): 36.7 (03 May 2022 06:42), Max: 36.8 (02 May 2022 14:37)  T(F): 98 (03 May 2022 06:42), Max: 98.3 (02 May 2022 14:37)  HR: 68 (03 May 2022 06:42) (61 - 143)  BP: 110/83 (03 May 2022 06:42) (69/33 - 140/71)  BP(mean): --  RR: 20 (03 May 2022 06:42) (14 - 32)  SpO2: 97% (03 May 2022 06:42) (95% - 100%)  General: NAD  HEENT: MMM  Neck: No JVD, no carotid bruit  Lungs: CTAB  CV: RRR, nl S1/S2, no M/R/G  Abdomen: S/NT/ND, +BS  Extremities: No LE edema, no cyanosis  Neuro: AAOx3, non-focal  Skin: No rash    Labs:                        12.0   5.96  )-----------( 173      ( 03 May 2022 05:55 )             34.6     05-03    135  |  104  |  25<H>  ----------------------------<  89  4.0   |  24  |  0.97    Ca    8.5      03 May 2022 05:55    TPro  5.6<L>  /  Alb  2.9<L>  /  TBili  0.3  /  DBili  x   /  AST  24  /  ALT  19  /  AlkPhos  78  05-03        PT/INR - ( 02 May 2022 10:22 )   PT: 14.1 sec;   INR: 1.19 ratio         PTT - ( 02 May 2022 10:22 )  PTT:29.3 sec    Telemetry: Sinus rhythm

## 2022-05-05 NOTE — DISCHARGE NOTE PROVIDER - NSDCCPCAREPLAN_GEN_ALL_CORE_FT
PRINCIPAL DISCHARGE DIAGNOSIS  Diagnosis: Atrial fibrillation  Assessment and Plan of Treatment: You were hospitalized due to rapid atrial fibrillation.      SECONDARY DISCHARGE DIAGNOSES  Diagnosis: Altered mental status  Assessment and Plan of Treatment:      PRINCIPAL DISCHARGE DIAGNOSIS  Diagnosis: Atrial fibrillation  Assessment and Plan of Treatment: You were hospitalized due to rapid atrial fibrillation. You were seen in consultation by cardiologist. Your sysmptoms including transinet change in mental status, were thought to be as a result of low BP and poor perfusion in the setting of arrhythmia.   You were treated with Amiodarone and your symptoms gradually resolved.   Continue Amiodarone as prescibed and follow up with the cardiologist within 1 week of discharge. Also, please follow up with your primary care doctor upon discharge.      SECONDARY DISCHARGE DIAGNOSES  Diagnosis: Chronic combined systolic and diastolic heart failure  Assessment and Plan of Treatment: Continue your heart medications as prescibed and follow up with your cardiologist and your PCP

## 2022-05-05 NOTE — DISCHARGE NOTE PROVIDER - HOSPITAL COURSE
HPI:  86F with hx of MI 3/14/20 treated with 3 stents to LAD with low EF (had life vest, declined AICD), OM1 stent 5/2020, chronic LE edema, and afib who presented with dizziness and paleness earlier tonight.  Patient was in her usual state of health with no recent illness when tonight she noted to be her BP to be relatively higher than normal (/80s when usually is around 114/60s).  Patient then started to feel unwell.   as per patient, woke up in the monring, felt dizzy, room spinning around,, vertigo and hypotensive, was sent to hospital, for r/o cva, no weakness or numbness noted, suspect from afib c rvr  will start on cardizem drip, to maintain goal of    (02 May 2022 12:21)   HPI:  86F with hx of MI 3/14/20 treated with 3 stents to LAD with low EF (had life vest, declined AICD), OM1 stent 5/2020, chronic LE edema, and afib who presented with dizziness and paleness earlier tonight.  Patient was in her usual state of health with no recent illness when tonight she noted to be her BP to be relatively higher than normal (/80s when usually is around 114/60s).  Patient then started to feel unwell.   as per patient, woke up in the monring, felt dizzy, room spinning around,, vertigo and hypotensive, was sent to hospital, for r/o cva, no weakness or numbness noted, suspect from afib c rvr  will start on cardizem drip, to maintain goal of    (02 May 2022 12:21)      COURSE    This is an 86 F with a h/o CAD and MI s/p PCI, chronic systolic heart failure, long standing paroxysmal atrial fibrillation who presented with dizziness found to have elevated heart rate and hypotension with rapid atrial fibrillation and transient change in mental status.   Hospital course was uncomplicated. She was seen in consultation by cardiologist and started on Amiodarone . Her heart rate improved and her blood pressure stabilized.     Echo cardiogram was done and revealed- 1. Normal left ventricular size with mild to moderate segmental systolic   dysfunction. The anteroseptal, mid to apical inferoseptum, and apex   appear akinetic. LVEF estimated at 43%.  2. Mild diastolic dysfunction (stage I).  3. Moderate mitral regurgitation.  4. Moderate tricuspid regurgitation. 5. Mild pulmonary hypertension.    Per cardio, this is patient's second occurrence of atrial fibrillation with associated hypotension.  Patient's presenting symptoms of transient encephalopathy (suspect metabolic) is likely as a result of hypotension and poor cerebral perfusion  Her echo shows mild to moderate LV systolic dysfunction and presence of Moderate MR, moderate TR and mild pulm HTN  She will continue amiodarone 200 mg daily upon discharge and will follow up with her PCP ad cardiologist  Continue apixaban and Plavix; also on Atorvastatin, Metoprolol succinate and Entresto.   She is medically stable and agreeable to discharge home today.

## 2022-05-05 NOTE — DISCHARGE NOTE NURSING/CASE MANAGEMENT/SOCIAL WORK - PATIENT PORTAL LINK FT
You can access the FollowMyHealth Patient Portal offered by [facility] by registering at the following website: [email or web address]. By joining [employer]’s FollowMyHealth portal, you will also be able to view your health information using other applications (apps) compatible with our system.

## 2022-05-05 NOTE — DISCHARGE NOTE NURSING/CASE MANAGEMENT/SOCIAL WORK - NSDCPEFALRISK_GEN_ALL_CORE
For information on Fall & Injury Prevention, visit: [email or web address] OR  [email or web address] OR  https://www.cdc.gov/steadi/patient.html

## 2022-05-05 NOTE — DISCHARGE NOTE PROVIDER - ATTENDING DISCHARGE PHYSICAL EXAMINATION:
MEDICAL ATTENDING DISCHARGE NOTE :    Patient is a 86y old  Female who presents with a chief complaint of     INTERVAL HPI / OVERNIGHT EVENTS: patient with uneventful night and offers no new complaints    ----------------------------------------------------------------------------------  REVIEW OF SYSTEMS: no fever; no SOB      Vital Signs Last 24 Hrs  T(C): 36.8 (05 May 2022 09:00), Max: 37.2 (04 May 2022 18:15)  T(F): 98.2 (05 May 2022 09:00), Max: 98.9 (04 May 2022 18:15)  HR: 60 (05 May 2022 09:00) (56 - 61)  BP: 111/68 (05 May 2022 09:00) (111/68 - 130/68)  BP(mean): --  RR: 18 (05 May 2022 09:00) (17 - 18)  SpO2: 94% (05 May 2022 09:00) (94% - 98%)    _________________  PHYSICAL EXAM:  ---------------------------   NAD; Normocephalic  LUNGS - no wheezing  HEART: S1 S2+   ABDOMEN: Soft, Nontender, non distended  EXTREMITIES: no cyanosis; no edema      _________________________________________________  LABS:      86 F admitted for               Plan of care, test results and findings were  discussed with patient; all questions and concerns were addressed and care was aligned with patient's wishes.  PMD and cardiology follow up after discharge from the hospital for continued care and outpatient monitoring was advised.  Discharge plans was discussed with care team including the nursing staff  and unit discharge planner.             MEDICAL ATTENDING DISCHARGE NOTE :    Patient is a 86y old  Female who presents with a chief complaint of dizziness    INTERVAL HPI / OVERNIGHT EVENTS: patient with uneventful night and offers no new complaints    ----------------------------------------------------------------------------------  REVIEW OF SYSTEMS: no fever; no SOB, no palpitations. she feels good.      Vital Signs Last 24 Hrs  T(C): 36.8 (05 May 2022 09:00), Max: 37.2 (04 May 2022 18:15)  T(F): 98.2 (05 May 2022 09:00), Max: 98.9 (04 May 2022 18:15)  HR: 60 (05 May 2022 09:00) (56 - 61)  BP: 111/68 (05 May 2022 09:00) (111/68 - 130/68)  RR: 18 (05 May 2022 09:00) (17 - 18)  SpO2: 94% (05 May 2022 09:00) (94% - 98%)    _________________  PHYSICAL EXAM:  ---------------------------   NAD; Normocephalic  LUNGS - no wheezing, bilateral air entry  HEART: S1 S2+   ABDOMEN: Soft, Nontender, non distended, BS+  EXTREMITIES: no cyanosis; no calf tenderness      _________________________________________________  LABS:    ECHO:   < from: US Transthoracic Echocardiogram w/Doppler Complete (05.03.22 @ 09:09) >    IMPRESSION:  1. Normal left ventricular size with mild to moderate segmental systolic   dysfunction. The anteroseptal, mid to apical inferoseptum, and apex   appear akinetic. LVEF estimated at 43%.  2. Mild diastolic dysfunction (stage I).  3. Moderate mitral regurgitation.  4. Moderate tricuspid regurgitation.  5. Mild pulmonary hypertension.  --- End of Report ---    < end of copied text >         A/P : This is an 86 F with a h/o CAD and MI s/p PCI, chronic systolic heart failure, long standing paroxysmal atrial fibrillation who presented with dizziness found to have elevated heart rate and hypotension with rapid atrial fibrillation and transient change in mental status.   started on Amiodarone now tapered to 200mg daily  Per cardio, this is patient's second occurrence of atrial fibrillation with associated hypotension.  Patient's presenting symptoms of transient encephalopathy (suspect metabolic) is likely as a result of hypotension and poor cerebral perfusion  Her echo shows mild to moderate LV systolic dysfunction and presence of Moderate MR, moderate TR and mild pulm HTN  She will continue amiodarone 200 mg daily upon discharge and will follow up with her PCP ad cardiologist  Continue apixaban and Plavix; also on Atorvastatin Metoprolol succinate and Entresto.   She is medically stable and agreeable to discharge home today.             Plan of care, test results and findings were  discussed with patient; all questions and concerns were addressed and care was aligned with patient's wishes.  PMD and cardiology follow up after discharge from the hospital for continued care and outpatient monitoring was advised.  Discharge plans was discussed with care team including the nursing staff  and unit discharge planner.

## 2022-05-05 NOTE — PROGRESS NOTE ADULT - ASSESSMENT
86F with hx of MI 3/14/20 treated with 3 stents to LAD with low EF (had life vest, declined AICD), OM1 stent 5/2020, chronic LE edema, and afib who presented with dizziness and paleness earlier tonight.  Patient was in her usual state of health with no recent illness when tonight she noted to be her BP to be relatively higher than normal (/80s when usually is around 114/60s).      Afib c rvr HR ranges from 140s  continue on Amio   toprol  rate controlled  will mointor for 24 hours if no further further arrtymia, d/c planning    cad/chf  ef 30%  lasix   continue on plavix /statin     HTN  continue on entresto     DVT prophalaxis   eliquis       time spent 45 minutes   
The patient is an 86 year old female with a history of CAD with prior MI s/p PCI, chronic systolic heart failure, paroxysmal atrial fibrillation who presents with dizziness and AMS in the setting of rapid atrial fibrillation, hypotension.    Plan:  - This is the patient's second occurrence of atrial fibrillation with associated hypotension  - Patient's symptoms presumably due to hypotension and poor cerebral perfusion  - ECG with known AF and RBBB  - Cardiac enzymes negative  - Echo last admission with mildly reduced LV systolic function, moderate TR  - Repeat echo  - Labs otherwise unremarkable  - Back in sinus rhythm. BP improved with conversion.  - Continue amiodarone 400 mg tid and taper to 200 mg daily  - Continue apixaban 2.5 mg bid  - Continue clopidogrel 75 mg daily  - Continue atorvastatin 40 mg daily  - Resume metoprolol at succinate 25 mg daily  - Resume Entresto at 24-26 mg bid
The patient is an 86 year old female with a history of CAD with prior MI s/p PCI, chronic systolic heart failure, paroxysmal atrial fibrillation who presents with dizziness and AMS in the setting of rapid atrial fibrillation, hypotension.    Plan:  - This is the patient's second occurrence of atrial fibrillation with associated hypotension  - Patient's symptoms presumably due to hypotension and poor cerebral perfusion  - ECG with known AF and RBBB  - Echo with mild/mod LV systolic dysfunction, mod MR, mod TR, mild pulm HTN  - Continue amiodarone 400 mg tid and taper to 200 mg daily  - Continue apixaban 2.5 mg bid  - Continue clopidogrel 75 mg daily  - Continue atorvastatin 40 mg daily  - Continue metoprolol at succinate 25 mg daily  - Continue Entresto at 24-26 mg bid  - Discharge planning
86F with hx of MI 3/14/20 treated with 3 stents to LAD with low EF (had life vest, declined AICD), OM1 stent 5/2020, chronic LE edema, and afib who presented with dizziness and paleness earlier tonight.  Patient was in her usual state of health with no recent illness when tonight she noted to be her BP to be relatively higher than normal (/80s when usually is around 114/60s).      Afib c rvr HR ranges from 140s  continue on Amio   toprol  rate controlled  cardio recs appreciated  dc planning    cad/chf  ef 30%  lasix   continue on plavix /statin     HTN  continue on entresto     DVT prophalaxis   eliquis       time spent 45 minutes   
The patient is an 86 year old female with a history of CAD with prior MI s/p PCI, chronic systolic heart failure, paroxysmal atrial fibrillation who presents with dizziness and AMS in the setting of rapid atrial fibrillation, hypotension.    Plan:  - This is the patient's second occurrence of atrial fibrillation with associated hypotension  - Patient's symptoms presumably due to hypotension and poor cerebral perfusion  - ECG with known AF and RBBB  - Echo with mild/mod LV systolic dysfunction, mod MR, mod TR, mild pulm HTN  - Continue amiodarone 400 mg tid and taper to 200 mg daily  - Continue apixaban 2.5 mg bid  - Continue clopidogrel 75 mg daily  - Continue atorvastatin 40 mg daily  - Continue metoprolol at succinate 25 mg daily  - Continue Entresto at 24-26 mg bid

## 2022-05-05 NOTE — DISCHARGE NOTE PROVIDER - NSDCMRMEDTOKEN_GEN_ALL_CORE_FT
amiodarone 200 mg oral tablet: 1 tab(s) orally once a day   atorvastatin 40 mg oral tablet: 1 tab(s) orally once a day  clopidogrel 75 mg oral tablet: 1 tab(s) orally once a day  Eliquis 2.5 mg oral tablet: 1 tab(s) orally 2 times a day  Entresto 49 mg-51 mg oral tablet: 1 tab(s) orally 2 times a day  furosemide 20 mg oral tablet: 1 tab(s) orally once a day  metoprolol tartrate 25 mg oral tablet: 1 tab(s) orally 2 times a day

## 2022-05-05 NOTE — DISCHARGE NOTE PROVIDER - PROVIDER TOKENS
FREE:[LAST:[PCP],PHONE:[(   )    -],FAX:[(   )    -],ADDRESS:[Please make appointment to see your PCP Dr Huagn within 3-5 days of discharge]],PROVIDER:[TOKEN:[[id number]]]

## 2022-05-24 NOTE — H&P PST ADULT - BACK
Next appt none  Last appt 9/23/21    Refill request for  lisinopril (ZESTRIL) 10 MG tablet 30 tablet 5 10/26/2021 10/26/2022    Sig - Route: Take 1 tablet by mouth daily. - Oral    Sent to pharmacy as: Lisinopril 10 MG Oral Tablet (ZESTRIL)    Class: Eprescribe    E-Prescribing Status: Receipt confirmed by pharmacy (10/26/2021 Â 1:06 PM CDT)          Not due for refills.
not examined

## 2022-08-29 NOTE — ED ADULT NURSE NOTE - DRUG PRE-SCREENING (DAST -1)
Updated Dr. Combs that pt was c/o of blurry vision and being a little off balance. Okay with slowly progressing activity.    Statement Selected

## 2023-01-29 NOTE — DISCHARGE NOTE NURSING/CASE MANAGEMENT/SOCIAL WORK - NSDCFUADDAPPT_GEN_ALL_CORE_FT
stretcher Follow up with Dr. Davies in one to two weeks    No heavy lifting, driving, sex, tub baths, swimming, or any activity that submerges the lower half of the body in water for 48 hours.  Limited walking and stairs for 48 hours.    Change the bandaid after 24 hours and every 24 hours after that.  Keep the puncture site dry and covered with a bandaid until a scab forms.    Observe the site frequently.  If bleeding or a large lump (the size of a golf ball or bigger) occurs lie flat, apply continuous direct pressure just above the puncture site for at least 10 minutes, and notify your physician immediately.  If the bleeding cannot be controlled, call 911 immediately for assistance.  Notify your physician of pain, swelling or any drainage.    Notify your physician immediately if coldness, numbness, discoloration or pain in your foot occurs.

## 2023-06-15 NOTE — ED ADULT NURSE NOTE - PRIMARY CARE PROVIDER
[EKG obtained to assist in diagnosis and management of assessed problem(s)] : EKG obtained to assist in diagnosis and management of assessed problem(s) pt unsure of PMD name

## 2023-10-15 NOTE — H&P ADULT - CLICK TO LAUNCH ORM
Patient discharged with instructions; does have a ride home     Lucrecia Monroy [employer]  10/14/23 2025 .

## 2023-12-14 ENCOUNTER — INPATIENT (INPATIENT)
Facility: HOSPITAL | Age: 88
LOS: 6 days | Discharge: HOME | DRG: 177 | End: 2023-12-21
Attending: INTERNAL MEDICINE | Admitting: INTERNAL MEDICINE
Payer: MEDICARE

## 2023-12-14 VITALS
HEART RATE: 64 BPM | DIASTOLIC BLOOD PRESSURE: 76 MMHG | SYSTOLIC BLOOD PRESSURE: 161 MMHG | TEMPERATURE: 99 F | OXYGEN SATURATION: 98 % | WEIGHT: 126.1 LBS | RESPIRATION RATE: 21 BRPM | HEIGHT: 61 IN

## 2023-12-14 DIAGNOSIS — U07.1 COVID-19: ICD-10-CM

## 2023-12-14 DIAGNOSIS — Z98.49 CATARACT EXTRACTION STATUS, UNSPECIFIED EYE: Chronic | ICD-10-CM

## 2023-12-14 LAB
ALBUMIN SERPL ELPH-MCNC: 3.4 G/DL — SIGNIFICANT CHANGE UP (ref 3.3–5)
ALBUMIN SERPL ELPH-MCNC: 3.4 G/DL — SIGNIFICANT CHANGE UP (ref 3.3–5)
ALP SERPL-CCNC: 67 U/L — SIGNIFICANT CHANGE UP (ref 30–120)
ALP SERPL-CCNC: 67 U/L — SIGNIFICANT CHANGE UP (ref 30–120)
ALT FLD-CCNC: 35 U/L — SIGNIFICANT CHANGE UP (ref 10–60)
ALT FLD-CCNC: 35 U/L — SIGNIFICANT CHANGE UP (ref 10–60)
ANION GAP SERPL CALC-SCNC: 11 MMOL/L — SIGNIFICANT CHANGE UP (ref 5–17)
ANION GAP SERPL CALC-SCNC: 11 MMOL/L — SIGNIFICANT CHANGE UP (ref 5–17)
APTT BLD: 33.3 SEC — SIGNIFICANT CHANGE UP (ref 24.5–35.6)
APTT BLD: 33.3 SEC — SIGNIFICANT CHANGE UP (ref 24.5–35.6)
AST SERPL-CCNC: 39 U/L — SIGNIFICANT CHANGE UP (ref 10–40)
AST SERPL-CCNC: 39 U/L — SIGNIFICANT CHANGE UP (ref 10–40)
BASOPHILS # BLD AUTO: 0.01 K/UL — SIGNIFICANT CHANGE UP (ref 0–0.2)
BASOPHILS # BLD AUTO: 0.01 K/UL — SIGNIFICANT CHANGE UP (ref 0–0.2)
BASOPHILS NFR BLD AUTO: 0.1 % — SIGNIFICANT CHANGE UP (ref 0–2)
BASOPHILS NFR BLD AUTO: 0.1 % — SIGNIFICANT CHANGE UP (ref 0–2)
BILIRUB SERPL-MCNC: 0.3 MG/DL — SIGNIFICANT CHANGE UP (ref 0.2–1.2)
BILIRUB SERPL-MCNC: 0.3 MG/DL — SIGNIFICANT CHANGE UP (ref 0.2–1.2)
BUN SERPL-MCNC: 17 MG/DL — SIGNIFICANT CHANGE UP (ref 7–23)
BUN SERPL-MCNC: 17 MG/DL — SIGNIFICANT CHANGE UP (ref 7–23)
CALCIUM SERPL-MCNC: 8.6 MG/DL — SIGNIFICANT CHANGE UP (ref 8.4–10.5)
CALCIUM SERPL-MCNC: 8.6 MG/DL — SIGNIFICANT CHANGE UP (ref 8.4–10.5)
CHLORIDE SERPL-SCNC: 100 MMOL/L — SIGNIFICANT CHANGE UP (ref 96–108)
CHLORIDE SERPL-SCNC: 100 MMOL/L — SIGNIFICANT CHANGE UP (ref 96–108)
CO2 SERPL-SCNC: 26 MMOL/L — SIGNIFICANT CHANGE UP (ref 22–31)
CO2 SERPL-SCNC: 26 MMOL/L — SIGNIFICANT CHANGE UP (ref 22–31)
CREAT SERPL-MCNC: 1.01 MG/DL — SIGNIFICANT CHANGE UP (ref 0.5–1.3)
CREAT SERPL-MCNC: 1.01 MG/DL — SIGNIFICANT CHANGE UP (ref 0.5–1.3)
EGFR: 54 ML/MIN/1.73M2 — LOW
EGFR: 54 ML/MIN/1.73M2 — LOW
EOSINOPHIL # BLD AUTO: 0 K/UL — SIGNIFICANT CHANGE UP (ref 0–0.5)
EOSINOPHIL # BLD AUTO: 0 K/UL — SIGNIFICANT CHANGE UP (ref 0–0.5)
EOSINOPHIL NFR BLD AUTO: 0 % — SIGNIFICANT CHANGE UP (ref 0–6)
EOSINOPHIL NFR BLD AUTO: 0 % — SIGNIFICANT CHANGE UP (ref 0–6)
FLUAV AG NPH QL: SIGNIFICANT CHANGE UP
FLUAV AG NPH QL: SIGNIFICANT CHANGE UP
FLUBV AG NPH QL: SIGNIFICANT CHANGE UP
FLUBV AG NPH QL: SIGNIFICANT CHANGE UP
GLUCOSE SERPL-MCNC: 116 MG/DL — HIGH (ref 70–99)
GLUCOSE SERPL-MCNC: 116 MG/DL — HIGH (ref 70–99)
HCT VFR BLD CALC: 40.7 % — SIGNIFICANT CHANGE UP (ref 34.5–45)
HCT VFR BLD CALC: 40.7 % — SIGNIFICANT CHANGE UP (ref 34.5–45)
HGB BLD-MCNC: 13.6 G/DL — SIGNIFICANT CHANGE UP (ref 11.5–15.5)
HGB BLD-MCNC: 13.6 G/DL — SIGNIFICANT CHANGE UP (ref 11.5–15.5)
IMM GRANULOCYTES NFR BLD AUTO: 0.3 % — SIGNIFICANT CHANGE UP (ref 0–0.9)
IMM GRANULOCYTES NFR BLD AUTO: 0.3 % — SIGNIFICANT CHANGE UP (ref 0–0.9)
INR BLD: 1.25 RATIO — HIGH (ref 0.85–1.18)
INR BLD: 1.25 RATIO — HIGH (ref 0.85–1.18)
LACTATE SERPL-SCNC: 1.1 MMOL/L — SIGNIFICANT CHANGE UP (ref 0.7–2)
LACTATE SERPL-SCNC: 1.1 MMOL/L — SIGNIFICANT CHANGE UP (ref 0.7–2)
LYMPHOCYTES # BLD AUTO: 1.05 K/UL — SIGNIFICANT CHANGE UP (ref 1–3.3)
LYMPHOCYTES # BLD AUTO: 1.05 K/UL — SIGNIFICANT CHANGE UP (ref 1–3.3)
LYMPHOCYTES # BLD AUTO: 14.9 % — SIGNIFICANT CHANGE UP (ref 13–44)
LYMPHOCYTES # BLD AUTO: 14.9 % — SIGNIFICANT CHANGE UP (ref 13–44)
MCHC RBC-ENTMCNC: 33.4 GM/DL — SIGNIFICANT CHANGE UP (ref 32–36)
MCHC RBC-ENTMCNC: 33.4 GM/DL — SIGNIFICANT CHANGE UP (ref 32–36)
MCHC RBC-ENTMCNC: 33.4 PG — SIGNIFICANT CHANGE UP (ref 27–34)
MCHC RBC-ENTMCNC: 33.4 PG — SIGNIFICANT CHANGE UP (ref 27–34)
MCV RBC AUTO: 100 FL — SIGNIFICANT CHANGE UP (ref 80–100)
MCV RBC AUTO: 100 FL — SIGNIFICANT CHANGE UP (ref 80–100)
MONOCYTES # BLD AUTO: 0.79 K/UL — SIGNIFICANT CHANGE UP (ref 0–0.9)
MONOCYTES # BLD AUTO: 0.79 K/UL — SIGNIFICANT CHANGE UP (ref 0–0.9)
MONOCYTES NFR BLD AUTO: 11.2 % — SIGNIFICANT CHANGE UP (ref 2–14)
MONOCYTES NFR BLD AUTO: 11.2 % — SIGNIFICANT CHANGE UP (ref 2–14)
NEUTROPHILS # BLD AUTO: 5.2 K/UL — SIGNIFICANT CHANGE UP (ref 1.8–7.4)
NEUTROPHILS # BLD AUTO: 5.2 K/UL — SIGNIFICANT CHANGE UP (ref 1.8–7.4)
NEUTROPHILS NFR BLD AUTO: 73.5 % — SIGNIFICANT CHANGE UP (ref 43–77)
NEUTROPHILS NFR BLD AUTO: 73.5 % — SIGNIFICANT CHANGE UP (ref 43–77)
NRBC # BLD: 0 /100 WBCS — SIGNIFICANT CHANGE UP (ref 0–0)
NRBC # BLD: 0 /100 WBCS — SIGNIFICANT CHANGE UP (ref 0–0)
PLATELET # BLD AUTO: 145 K/UL — LOW (ref 150–400)
PLATELET # BLD AUTO: 145 K/UL — LOW (ref 150–400)
POTASSIUM SERPL-MCNC: 4 MMOL/L — SIGNIFICANT CHANGE UP (ref 3.5–5.3)
POTASSIUM SERPL-MCNC: 4 MMOL/L — SIGNIFICANT CHANGE UP (ref 3.5–5.3)
POTASSIUM SERPL-SCNC: 4 MMOL/L — SIGNIFICANT CHANGE UP (ref 3.5–5.3)
POTASSIUM SERPL-SCNC: 4 MMOL/L — SIGNIFICANT CHANGE UP (ref 3.5–5.3)
PROT SERPL-MCNC: 6.8 G/DL — SIGNIFICANT CHANGE UP (ref 6–8.3)
PROT SERPL-MCNC: 6.8 G/DL — SIGNIFICANT CHANGE UP (ref 6–8.3)
PROTHROM AB SERPL-ACNC: 13.9 SEC — HIGH (ref 9.5–13)
PROTHROM AB SERPL-ACNC: 13.9 SEC — HIGH (ref 9.5–13)
RBC # BLD: 4.07 M/UL — SIGNIFICANT CHANGE UP (ref 3.8–5.2)
RBC # BLD: 4.07 M/UL — SIGNIFICANT CHANGE UP (ref 3.8–5.2)
RBC # FLD: 15.4 % — HIGH (ref 10.3–14.5)
RBC # FLD: 15.4 % — HIGH (ref 10.3–14.5)
RSV RNA NPH QL NAA+NON-PROBE: SIGNIFICANT CHANGE UP
RSV RNA NPH QL NAA+NON-PROBE: SIGNIFICANT CHANGE UP
SARS-COV-2 RNA SPEC QL NAA+PROBE: DETECTED
SARS-COV-2 RNA SPEC QL NAA+PROBE: DETECTED
SODIUM SERPL-SCNC: 137 MMOL/L — SIGNIFICANT CHANGE UP (ref 135–145)
SODIUM SERPL-SCNC: 137 MMOL/L — SIGNIFICANT CHANGE UP (ref 135–145)
TROPONIN I, HIGH SENSITIVITY RESULT: 25.4 NG/L — SIGNIFICANT CHANGE UP
TROPONIN I, HIGH SENSITIVITY RESULT: 25.4 NG/L — SIGNIFICANT CHANGE UP
WBC # BLD: 7.07 K/UL — SIGNIFICANT CHANGE UP (ref 3.8–10.5)
WBC # BLD: 7.07 K/UL — SIGNIFICANT CHANGE UP (ref 3.8–10.5)
WBC # FLD AUTO: 7.07 K/UL — SIGNIFICANT CHANGE UP (ref 3.8–10.5)
WBC # FLD AUTO: 7.07 K/UL — SIGNIFICANT CHANGE UP (ref 3.8–10.5)

## 2023-12-14 PROCEDURE — 93010 ELECTROCARDIOGRAM REPORT: CPT

## 2023-12-14 PROCEDURE — 99285 EMERGENCY DEPT VISIT HI MDM: CPT

## 2023-12-14 PROCEDURE — 71045 X-RAY EXAM CHEST 1 VIEW: CPT | Mod: 26

## 2023-12-14 PROCEDURE — 70491 CT SOFT TISSUE NECK W/DYE: CPT | Mod: 26,MA

## 2023-12-14 PROCEDURE — 71250 CT THORAX DX C-: CPT | Mod: 26,MA

## 2023-12-14 RX ORDER — REMDESIVIR 5 MG/ML
100 INJECTION INTRAVENOUS EVERY 24 HOURS
Refills: 0 | Status: COMPLETED | OUTPATIENT
Start: 2023-12-14 | End: 2023-12-16

## 2023-12-14 RX ORDER — EPINEPHRINE 11.25MG/ML
0.5 SOLUTION, NON-ORAL INHALATION ONCE
Refills: 0 | Status: COMPLETED | OUTPATIENT
Start: 2023-12-14 | End: 2023-12-14

## 2023-12-14 RX ORDER — AMIODARONE HYDROCHLORIDE 400 MG/1
2 TABLET ORAL
Refills: 0 | DISCHARGE

## 2023-12-14 RX ORDER — AMIODARONE HYDROCHLORIDE 400 MG/1
200 TABLET ORAL DAILY
Refills: 0 | Status: DISCONTINUED | OUTPATIENT
Start: 2023-12-14 | End: 2023-12-21

## 2023-12-14 RX ORDER — DEXAMETHASONE 0.5 MG/5ML
10 ELIXIR ORAL ONCE
Refills: 0 | Status: COMPLETED | OUTPATIENT
Start: 2023-12-14 | End: 2023-12-14

## 2023-12-14 RX ORDER — DEXAMETHASONE 0.5 MG/5ML
6 ELIXIR ORAL DAILY
Refills: 0 | Status: COMPLETED | OUTPATIENT
Start: 2023-12-14 | End: 2023-12-17

## 2023-12-14 RX ORDER — CLOPIDOGREL BISULFATE 75 MG/1
1 TABLET, FILM COATED ORAL
Qty: 0 | Refills: 0 | DISCHARGE

## 2023-12-14 RX ORDER — CLOPIDOGREL BISULFATE 75 MG/1
75 TABLET, FILM COATED ORAL DAILY
Refills: 0 | Status: DISCONTINUED | OUTPATIENT
Start: 2023-12-15 | End: 2023-12-21

## 2023-12-14 RX ORDER — APIXABAN 2.5 MG/1
2.5 TABLET, FILM COATED ORAL EVERY 12 HOURS
Refills: 0 | Status: DISCONTINUED | OUTPATIENT
Start: 2023-12-14 | End: 2023-12-21

## 2023-12-14 RX ORDER — ATORVASTATIN CALCIUM 80 MG/1
40 TABLET, FILM COATED ORAL AT BEDTIME
Refills: 0 | Status: DISCONTINUED | OUTPATIENT
Start: 2023-12-14 | End: 2023-12-21

## 2023-12-14 RX ORDER — METOPROLOL TARTRATE 50 MG
25 TABLET ORAL
Refills: 0 | Status: DISCONTINUED | OUTPATIENT
Start: 2023-12-14 | End: 2023-12-15

## 2023-12-14 RX ORDER — SACUBITRIL AND VALSARTAN 24; 26 MG/1; MG/1
1 TABLET, FILM COATED ORAL
Refills: 0 | Status: DISCONTINUED | OUTPATIENT
Start: 2023-12-14 | End: 2023-12-19

## 2023-12-14 RX ORDER — FUROSEMIDE 40 MG
20 TABLET ORAL DAILY
Refills: 0 | Status: DISCONTINUED | OUTPATIENT
Start: 2023-12-14 | End: 2023-12-19

## 2023-12-14 RX ORDER — ATORVASTATIN CALCIUM 80 MG/1
1 TABLET, FILM COATED ORAL
Qty: 0 | Refills: 0 | DISCHARGE

## 2023-12-14 RX ADMIN — Medication 0.5 MILLILITER(S): at 18:37

## 2023-12-14 RX ADMIN — ATORVASTATIN CALCIUM 40 MILLIGRAM(S): 80 TABLET, FILM COATED ORAL at 22:13

## 2023-12-14 RX ADMIN — Medication 0.5 MILLILITER(S): at 17:33

## 2023-12-14 RX ADMIN — APIXABAN 2.5 MILLIGRAM(S): 2.5 TABLET, FILM COATED ORAL at 22:13

## 2023-12-14 RX ADMIN — Medication 102 MILLIGRAM(S): at 17:32

## 2023-12-14 RX ADMIN — Medication 0.5 MILLILITER(S): at 23:22

## 2023-12-14 NOTE — H&P ADULT - ASSESSMENT
Patient developed cough shortness of breath chest tightness sore throat congestion 2 days ago had a positive COVID test that day complaining of worsening symptoms.  No fevers chills abdominal pain vomiting edema calf.  Patient relates on Eliquis.PMD [id number] PMHx HFrEF (LVEF 43% 5/2022), CAD with prior MI s/p PCI, Afib (on Eliquis) presenting with cough, SOB x2 days . positive for  COVID 2 days ago (vaccinated x3).  cough worsening and  feel more dyspneic prompting ER evaluation. In ER, concern for stridor and was given Racemic Epi x2 and Decadron 10mg x1.   CT Soft Tissue Neck negative. CT Chest with small bilateral pleural effusions/ atelectasis.   Patient and MICU consult , and is stable , and being admitted for further management   # Covid infection  # CAD  # CHF systolic  # Paroxysmal afib  pt started on remdesvir and decadron ,, ID consulted  cardio and pulmonary consult   Patient developed cough shortness of breath chest tightness sore throat congestion 2 days ago had a positive COVID test that day complaining of worsening symptoms.  No fevers chills abdominal pain vomiting edema calf.  Patient relates on Eliquis.PMD [id number] PMHx HFrEF (LVEF 43% 5/2022), CAD with prior MI s/p PCI, Afib (on Eliquis) presenting with cough, SOB x2 days . positive for  COVID 2 days ago (vaccinated x3).  cough worsening and  feel more dyspneic prompting ER evaluation. In ER, concern for stridor and was given Racemic Epi x2 and Decadron 10mg x1.   CT Soft Tissue Neck negative. CT Chest with small bilateral pleural effusions/ atelectasis.   Patient and MICU consult , and is stable , and being admitted for further management   # Covid infection  # CAD  # CHF systolic  # Paroxysmal afib  pt started on remdesvir and decadron ,, ID consulted  cardio and pulmonary consult

## 2023-12-14 NOTE — ED ADULT TRIAGE NOTE - CHIEF COMPLAINT QUOTE
87 y/o female presents axo3 via wheelchair c/o incrasing shortness of breath/difficulty breathing x 3 days, pt tested positive for covid19 two days ago. 89 y/o female presents axo3 via wheelchair c/o incrasing shortness of breath/difficulty breathing x 3 days, pt tested positive for covid19 two days ago.

## 2023-12-14 NOTE — ED PROVIDER NOTE - DIFFERENTIAL DIAGNOSIS
Differential Diagnosis Differential including but not limited to MI ACS COVID pneumonia pneumothorax effusion retropharyngeal abscess epiglottitis

## 2023-12-14 NOTE — H&P ADULT - NSICDXFAMILYHX_GEN_ALL_CORE_FT
FAMILY HISTORY:  Father  Still living? Unknown  FH: dementia, Age at diagnosis: Age Unknown  FH: heart disease, Age at diagnosis: Age Unknown    Mother  Still living? Unknown  Family history of [place]'s disease, Age at diagnosis: Age Unknown    Sibling  Still living? Unknown  FH: heart disease, Age at diagnosis: Age Unknown     FAMILY HISTORY:  Father  Still living? Unknown  FH: dementia, Age at diagnosis: Age Unknown  FH: heart disease, Age at diagnosis: Age Unknown    Mother  Still living? Unknown  Family history of [place]'s disease, Age at diagnosis: Age Unknown    Sibling  Still living? Unknown  FH: heart disease, Age at diagnosis: Age Unknown

## 2023-12-14 NOTE — H&P ADULT - HISTORY OF PRESENT ILLNESS
Patient developed cough shortness of breath chest tightness sore throat congestion 2 days ago had a positive COVID test that day complaining of worsening symptoms.  No fevers chills abdominal pain vomiting edema calf.  Patient relates on Eliquis.PMD [id number] PMHx HFrEF (LVEF 43% 5/2022), CAD with prior MI s/p PCI, Afib (on Eliquis) presenting with cough, SOB x2 days . positive for  COVID 2 days ago (vaccinated x3).  cough worsening and  feel more dyspneic prompting ER evaluation. In ER, concern for stridor and was given Racemic Epi x2 and Decadron 10mg x1.   CT Soft Tissue Neck negative. CT Chest with small bilateral pleural effusions/ atelectasis.   Patient and MICU consult , and is stable , and being admitted for further management Patient developed cough shortness of breath chest tightness sore throat congestion 2 days ago had a positive COVID test that day complaining of worsening symptoms.  No fevers chills abdominal pain vomiting edema calf.  Patient relates on Eliquis.PMD [id number] PMHx HFrEF (LVEF 43% 5/2022), CAD with prior MI s/p PCI, Afib (on Eliquis) presenting with cough, SOB x2 days . positive for  COVID 2 days ago (vaccinated x3).  cough worsening and  feel more dyspneic prompting ER evaluation. In ER, concern for stridor and was given Racemic Epi x2 and Decadron 10mg x1.   CT Soft Tissue Neck negative. CT Chest with small bilateral pleural effusions/ atelectasis.   Patient and MICU consult , and is stable , and being admitted for further management

## 2023-12-14 NOTE — PATIENT PROFILE ADULT - FALL HARM RISK - HARM RISK INTERVENTIONS
Assistance with ambulation/Assistance OOB with selected safe patient handling equipment/Communicate Risk of Fall with Harm to all staff/Discuss with provider need for PT consult/Monitor gait and stability/Provide patient with walking aids - walker, cane, crutches/Reinforce activity limits and safety measures with patient and family/Tailored Fall Risk Interventions/Visual Cue: Yellow wristband and red socks/Bed in lowest position, wheels locked, appropriate side rails in place/Call bell, personal items and telephone in reach/Instruct patient to call for assistance before getting out of bed or chair/Non-slip footwear when patient is out of bed/[place] to call system/Physically safe environment - no spills, clutter or unnecessary equipment/Purposeful Proactive Rounding/Room/bathroom lighting operational, light cord in reach Assistance with ambulation/Assistance OOB with selected safe patient handling equipment/Communicate Risk of Fall with Harm to all staff/Discuss with provider need for PT consult/Monitor gait and stability/Provide patient with walking aids - walker, cane, crutches/Reinforce activity limits and safety measures with patient and family/Tailored Fall Risk Interventions/Visual Cue: Yellow wristband and red socks/Bed in lowest position, wheels locked, appropriate side rails in place/Call bell, personal items and telephone in reach/Instruct patient to call for assistance before getting out of bed or chair/Non-slip footwear when patient is out of bed/[place] to call system/Physically safe environment - no spills, clutter or unnecessary equipment/Purposeful Proactive Rounding/Room/bathroom lighting operational, light cord in reach

## 2023-12-14 NOTE — H&P ADULT - HIV OFFER
[facility]  Nephrology Progress Note    Jin Mckeon Patient Status:  Inpatient    1948 MRN [id number]   [facility] 5NW-A Attending Ta Acosta MD   Hosp Day # 16 PCP LUIS ALFREDO SHAFFER       SUBJECTIVE:  Stab;e this AM, no c/o 02/15/21  0505 02/16/21  0536 02/16/21  1810 02/17/21  0554 02/17/21  1623 02/19/21  0523   * 135*  --  136  --  136  --  138   K 4.4 3.6   < > 3.6 3.9 3.5 4.4 3.9   CL 99 101  --  100  --  99  --  102   CO2 24.0 25.0  --  30.0  --  32.0  --  29.0 hydroxide (MILK OF MAGNESIA) 400 MG/5ML suspension 30 mL, 30 mL, Oral, Daily PRN    •  bisacodyl (DULCOLAX) rectal suppository 10 mg, 10 mg, Rectal, Daily PRN    •  Fleet Enema (FLEET) 7-19 GM/118ML enema 133 mL, 1 enema, Rectal, Once PRN    •  Pantoprazol multispecialty care including vascular surgery, wound care, podiatry    #3. Edema- in setting of AMADO and modest CHF. Markedly better with diuretics     #4. Urinary obstruction- appreciate ongoing  eval     #5.   Hypokalemia- replace prn      Questions/ Opt out

## 2023-12-14 NOTE — ED ADULT NURSE NOTE - OBJECTIVE STATEMENT
87 y/o female received ax4 via wheelchair, brought in by daughter for shortness of breath eval. daughter reports that pt tested positive for covid19 two days ago and has been experiencing increased wheezing, shortness of breath and dyspnea on exertion. placed on cardiac monitor, ekg done, pt's o2 sat noted 96% on room air. isolation precautions started. 89 y/o female received ax4 via wheelchair, brought in by daughter for shortness of breath eval. daughter reports that pt tested positive for covid19 two days ago and has been experiencing increased wheezing, shortness of breath and dyspnea on exertion. placed on cardiac monitor, ekg done, pt's o2 sat noted 96% on room air. isolation precautions started.  uvula swollen and elongated diffuse faint wheezing and stridor noted 87 y/o female received ax4 via wheelchair, brought in by daughter for shortness of breath eval. daughter reports that pt tested positive for covid19 two days ago and has been experiencing increased wheezing, shortness of breath and dyspnea on exertion. placed on cardiac monitor, ekg done, pt's o2 sat noted 96% on room air. isolation precautions started.  uvula swollen and elongated diffuse faint wheezing and stridor noted

## 2023-12-14 NOTE — CONSULT NOTE ADULT - SUBJECTIVE AND OBJECTIVE BOX
Patient is a 88y old  Female who presents with a chief complaint of     BRIEF HOSPITAL COURSE:   86F PMHx HFrEF (LVEF 43% 5/2022), CAD with prior MI s/p PCI, Afib (on Eliquis) presenting with cough, SOB x2 days. Patient diagnosed with COVID 2 days ago (vaccinated x3). States cough worsening and caused her to feel more dyspneic prompting ER evaluation. In ER, concern for stridor and was given Racemic Epi x2 and Decadron 10mg x1.   CT Soft Tissue Neck negative. CT Chest with small bilateral pleural effusions/ atelectasis.   MICU consulted for further management.  Satting well on RA without respiratory compromise. HD stable. States breathing improved.     PAST MEDICAL & SURGICAL HISTORY:  CAD (coronary artery disease)  Rapid atrial fibrillation  CAD (coronary artery disease)  s/p Cath & 3 stents to LAD on 3/2020; OM1 stent x 1  EF ~30%  Mild HTN  History of CHF (congestive heart failure)  Myocardial infarction  S/P cataract surgery    Review of Systems:  CONSTITUTIONAL: No fever, chills, or fatigue  EYES: No eye pain, visual disturbances, or discharge  ENMT:  No difficulty hearing, tinnitus, vertigo; No sinus or throat pain  NECK: No pain or stiffness  RESPIRATORY: (+) cough, no wheezing, chills or hemoptysis; (+) shortness of breath  CARDIOVASCULAR: No chest pain, palpitations, dizziness, or leg swelling  GASTROINTESTINAL: No abdominal or epigastric pain. No nausea, vomiting, or hematemesis; No diarrhea or constipation. No melena or hematochezia.  GENITOURINARY: No dysuria, frequency, hematuria, or incontinence  NEUROLOGICAL: No headaches, memory loss, loss of strength, numbness, or tremors  SKIN: No itching, burning, rashes, or lesions   MUSCULOSKELETAL: No joint pain or swelling; No muscle, back, or extremity pain  PSYCHIATRIC: No depression, anxiety, mood swings, or difficulty sleeping    Medications:    ICU Vital Signs Last 24 Hrs  T(C): 37.2 (14 Dec 2023 16:16), Max: 37.2 (14 Dec 2023 16:16)  T(F): 99 (14 Dec 2023 16:16), Max: 99 (14 Dec 2023 16:16)  HR: 91 (14 Dec 2023 18:55) (64 - 92)  BP: 138/64 (14 Dec 2023 18:45) (138/64 - 168/78)  BP(mean): --  ABP: --  ABP(mean): --  RR: 22 (14 Dec 2023 18:45) (21 - 24)  SpO2: 97% (14 Dec 2023 18:55) (97% - 99%)  O2 Parameters below as of 14 Dec 2023 18:55  Patient On (Oxygen Delivery Method): room air    I&O's Detail    LABS:                      13.6   7.07  )-----------( 145      ( 14 Dec 2023 17:19 )             40.7     12-14  137  |  100  |  17  ----------------------------<  116<H>  4.0   |  26  |  1.01  Ca    8.6      14 Dec 2023 17:19  TPro  6.8  /  Alb  3.4  /  TBili  0.3  /  DBili  x   /  AST  39  /  ALT  35  /  AlkPhos  67  12-14    CAPILLARY BLOOD GLUCOSE    PT/INR - ( 14 Dec 2023 17:19 )   PT: 13.9 sec;   INR: 1.25 ratio     PTT - ( 14 Dec 2023 17:19 )  PTT:33.3 sec    Urinalysis Basic - ( 14 Dec 2023 17:19 )  Color: x / Appearance: x / SG: x / pH: x  Gluc: 116 mg/dL / Ketone: x  / Bili: x / Urobili: x   Blood: x / Protein: x / Nitrite: x   Leuk Esterase: x / RBC: x / WBC x   Sq Epi: x / Non Sq Epi: x / Bacteria: x    CULTURES:    Physical Examination:  General: Alert, oriented, interactive, nonfocal.  HEENT: PERRL.  NECK: Supple.   PULM: Mild scattered wheeze bilaterally.  CVS: s1/s2.  ABD: Soft, nondistended, nontender, normoactive bowel sounds.  EXT: Bilateral LE edema, nontender.  SKIN: Warm.    RADIOLOGY:   < from: CT Neck Soft Tissue w/ IV Cont (12.14.23 @ 18:37) >  ACC: [id number] EXAM:  CT NECK SOFT TISSUE IC   ORDERED BY: ANA SMITH   PROCEDURE DATE:  12/14/2023    IMPRESSION:  No mass lesion or fluid collection. No enlarged lymph nodes   identified    Time spent on this patient encounter, which includes documenting this note in the electronic medical record, was 55 minutes including assessing the presenting problems with associated risks, reviewing the medical record to prepare for the encounter, and meeting face to face with the patient to obtain additional history. I have also performed an appropriate physical exam, made interventions listed and ordered and interpreted appropriate diagnostic studies as documented. To improve communication and patient safety, I have coordinated care with the multidisciplinary team including the bedside nurse, appropriate attending of record and consultants as needed. This time is independent of any procedures performed.    Date of entry of this note is equal to the date of services rendered.  Patient is a 88y old  Female who presents with a chief complaint of     BRIEF HOSPITAL COURSE:   86F PMHx HFrEF (LVEF 43% 5/2022), CAD with prior MI s/p PCI, Afib (on Eliquis) presenting with cough, SOB x2 days. Patient diagnosed with COVID 2 days ago (vaccinated x3). States cough worsening and caused her to feel more dyspneic prompting ER evaluation. In ER, concern for stridor and was given Racemic Epi x2 and Decadron 10mg x1.   CT Soft Tissue Neck negative. CT Chest with small bilateral pleural effusions/ atelectasis.   MICU consulted for further management.  Satting well on RA without respiratory compromise. HD stable. States breathing improved.     PAST MEDICAL & SURGICAL HISTORY:  CAD (coronary artery disease)  Rapid atrial fibrillation  CAD (coronary artery disease)  s/p Cath & 3 stents to LAD on 3/2020; OM1 stent x 1  EF ~30%  Mild HTN  History of CHF (congestive heart failure)  Myocardial infarction  S/P cataract surgery    Review of Systems:  CONSTITUTIONAL: No fever, chills, or fatigue  EYES: No eye pain, visual disturbances, or discharge  ENMT:  No difficulty hearing, tinnitus, vertigo; No sinus or throat pain  NECK: No pain or stiffness  RESPIRATORY: (+) cough, no wheezing, chills or hemoptysis; (+) shortness of breath  CARDIOVASCULAR: No chest pain, palpitations, dizziness, or leg swelling  GASTROINTESTINAL: No abdominal or epigastric pain. No nausea, vomiting, or hematemesis; No diarrhea or constipation. No melena or hematochezia.  GENITOURINARY: No dysuria, frequency, hematuria, or incontinence  NEUROLOGICAL: No headaches, memory loss, loss of strength, numbness, or tremors  SKIN: No itching, burning, rashes, or lesions   MUSCULOSKELETAL: No joint pain or swelling; No muscle, back, or extremity pain  PSYCHIATRIC: No depression, anxiety, mood swings, or difficulty sleeping    Medications:    ICU Vital Signs Last 24 Hrs  T(C): 37.2 (14 Dec 2023 16:16), Max: 37.2 (14 Dec 2023 16:16)  T(F): 99 (14 Dec 2023 16:16), Max: 99 (14 Dec 2023 16:16)  HR: 91 (14 Dec 2023 18:55) (64 - 92)  BP: 138/64 (14 Dec 2023 18:45) (138/64 - 168/78)  BP(mean): --  ABP: --  ABP(mean): --  RR: 22 (14 Dec 2023 18:45) (21 - 24)  SpO2: 97% (14 Dec 2023 18:55) (97% - 99%)  O2 Parameters below as of 14 Dec 2023 18:55  Patient On (Oxygen Delivery Method): room air    I&O's Detail    LABS:                      13.6   7.07  )-----------( 145      ( 14 Dec 2023 17:19 )             40.7     12-14  137  |  100  |  17  ----------------------------<  116<H>  4.0   |  26  |  1.01  Ca    8.6      14 Dec 2023 17:19  TPro  6.8  /  Alb  3.4  /  TBili  0.3  /  DBili  x   /  AST  39  /  ALT  35  /  AlkPhos  67  12-14    CAPILLARY BLOOD GLUCOSE    PT/INR - ( 14 Dec 2023 17:19 )   PT: 13.9 sec;   INR: 1.25 ratio     PTT - ( 14 Dec 2023 17:19 )  PTT:33.3 sec    Urinalysis Basic - ( 14 Dec 2023 17:19 )  Color: x / Appearance: x / SG: x / pH: x  Gluc: 116 mg/dL / Ketone: x  / Bili: x / Urobili: x   Blood: x / Protein: x / Nitrite: x   Leuk Esterase: x / RBC: x / WBC x   Sq Epi: x / Non Sq Epi: x / Bacteria: x    CULTURES:    Physical Examination:  General: Alert, oriented, interactive, nonfocal.  HEENT: PERRL.  NECK: Supple.   PULM: Mild scattered wheeze bilaterally.  CVS: s1/s2.  ABD: Soft, nondistended, nontender, normoactive bowel sounds.  EXT: Bilateral LE edema, nontender.  SKIN: Warm.    RADIOLOGY:   < from: CT Neck Soft Tissue w/ IV Cont (12.14.23 @ 18:37) >  ACC: [id number] EXAM:  CT NECK SOFT TISSUE IC   ORDERED BY: ANA SMITH   PROCEDURE DATE:  12/14/2023    IMPRESSION:  No mass lesion or fluid collection. No enlarged lymph nodes   identified    Time spent on this patient encounter, which includes documenting this note in the electronic medical record, was 55 minutes including assessing the presenting problems with associated risks, reviewing the medical record to prepare for the encounter, and meeting face to face with the patient to obtain additional history. I have also performed an appropriate physical exam, made interventions listed and ordered and interpreted appropriate diagnostic studies as documented. To improve communication and patient safety, I have coordinated care with the multidisciplinary team including the bedside nurse, appropriate attending of record and consultants as needed. This time is independent of any procedures performed.    Date of entry of this note is equal to the date of services rendered.

## 2023-12-14 NOTE — ED ADULT NURSE REASSESSMENT NOTE - NS ED NURSE REASSESS COMMENT FT1
Pt noted to have some stridor again, and is having productive cough, spoke with dr brown over the phone, per she, call the hospitalist to manage the pt. spoke with dr Tong, per him, will come down and assess pt. report given to hold nurse, jamie becerrilRN
pt returned from ct scan. looks better color better. pt able to talk still with stridor but milder monitor a fib pt pending observation and re  evaluation
pt tolerated nebulizer better air exchange initially distant with wheezing now more audible with mild wheezing. stridor still present but less pt looks and feels better. monitor nsr with short period of a fib noted with nebulizer. continue to observe
Pt and daughter at bedside, updated on plan of care, pt waiting for SPCU consult. call bell placed within reach.

## 2023-12-14 NOTE — ED ADULT NURSE REASSESSMENT NOTE - NSFALLRISKINTERV_ED_ALL_ED
Assistance OOB with selected safe patient handling equipment if applicable/Assistance with ambulation/Communicate fall risk and risk factors to all staff, patient, and family/Monitor gait and stability/Provide visual cue: yellow wristband, yellow gown, etc/Reinforce activity limits and safety measures with patient and family/Call bell, personal items and telephone in reach/Instruct patient to call for assistance before getting out of bed/chair/stretcher/Non-slip footwear applied when patient is off stretcher/[place] to call system/Physically safe environment - no spills, clutter or unnecessary equipment/Purposeful Proactive Rounding/Room/bathroom lighting operational, light cord in reach Assistance OOB with selected safe patient handling equipment if applicable/Assistance with ambulation/Communicate fall risk and risk factors to all staff, patient, and family/Monitor gait and stability/Provide visual cue: yellow wristband, yellow gown, etc/Reinforce activity limits and safety measures with patient and family/Call bell, personal items and telephone in reach/Instruct patient to call for assistance before getting out of bed/chair/stretcher/Non-slip footwear applied when patient is off stretcher/[place] to call system/Physically safe environment - no spills, clutter or unnecessary equipment/Purposeful Proactive Rounding/Room/bathroom lighting operational, light cord in reach

## 2023-12-14 NOTE — ED PROVIDER NOTE - MUSCULOSKELETAL, MLM
right ankle swelling tenderness Spine appears normal, range of motion is not limited, no muscle or joint tenderness

## 2023-12-14 NOTE — ED ADULT NURSE NOTE - NSFALLHARMRISKINTERV_ED_ALL_ED
Assistance OOB with selected safe patient handling equipment if applicable/Assistance with ambulation/Communicate risk of Fall with Harm to all staff, patient, and family/Monitor gait and stability/Provide patient with walking aids/Provide visual cue: red socks, yellow wristband, yellow gown, etc/Reinforce activity limits and safety measures with patient and family/Bed in lowest position, wheels locked, appropriate side rails in place/Call bell, personal items and telephone in reach/Instruct patient to call for assistance before getting out of bed/chair/stretcher/Non-slip footwear applied when patient is off stretcher/[place] to call system/Physically safe environment - no spills, clutter or unnecessary equipment/Purposeful Proactive Rounding/Room/bathroom lighting operational, light cord in reach Assistance OOB with selected safe patient handling equipment if applicable/Assistance with ambulation/Communicate risk of Fall with Harm to all staff, patient, and family/Monitor gait and stability/Provide patient with walking aids/Provide visual cue: red socks, yellow wristband, yellow gown, etc/Reinforce activity limits and safety measures with patient and family/Bed in lowest position, wheels locked, appropriate side rails in place/Call bell, personal items and telephone in reach/Instruct patient to call for assistance before getting out of bed/chair/stretcher/Non-slip footwear applied when patient is off stretcher/[place] to call system/Physically safe environment - no spills, clutter or unnecessary equipment/Purposeful Proactive Rounding/Room/bathroom lighting operational, light cord in reach

## 2023-12-14 NOTE — PATIENT PROFILE ADULT - NSPROPOAPRESSUREINJURY_GEN_A_NUR
Per Adrianna in scheduling, pt mom has questions regarding acupuncture.    RNCC left voicemail with call back number [phone] to discuss acupuncture and answer questions.    Also reminded of New IM appt w/Fatuma Yanes for 9/6/23 at 11am.  RNCC will review initial IM appt details upon mom's call back.    Nancy Kearney RN, BSN, [facility]   Pediatric Integrative Health Care Coordinator      no

## 2023-12-14 NOTE — ED ADULT NURSE NOTE - CHIEF COMPLAINT QUOTE
89 y/o female presents axo3 via wheelchair c/o increasing shortness of breath/difficulty breathing x 3 days, pt tested positive for covid19 two days ago. 87 y/o female presents axo3 via wheelchair c/o increasing shortness of breath/difficulty breathing x 3 days, pt tested positive for covid19 two days ago.

## 2023-12-14 NOTE — ED PROVIDER NOTE - OBJECTIVE STATEMENT
Patient developed cough shortness of breath chest tightness sore throat congestion 2 days ago had a positive COVID test that day complaining of worsening symptoms.  No fevers chills abdominal pain vomiting edema calf.  Patient relates on [facility].  [facility] Patient developed cough shortness of breath chest tightness sore throat congestion 2 days ago had a positive COVID test that day complaining of worsening symptoms.  No fevers chills abdominal pain vomiting edema calf.  Patient relates on [facility].  [facility]

## 2023-12-14 NOTE — ED PROVIDER NOTE - CLINICAL SUMMARY MEDICAL DECISION MAKING FREE TEXT BOX
Patient developed cough shortness of breath chest tightness sore throat congestion 2 days ago had a positive COVID test that day complaining of worsening symptoms.  No fevers chills abdominal pain vomiting edema calf.  [facility]  PMD [facility]    Plan EKG CT chest CT soft tissue neck labs Decadron racemic epi IV fluid    Differential including but not limited to MI ACS COVID pneumonia pneumothorax effusion retropharyngeal abscess epiglottitis Patient developed cough shortness of breath chest tightness sore throat congestion 2 days ago had a positive COVID test that day complaining of worsening symptoms.  No fevers chills abdominal pain vomiting edema calf.  [facility]  PMD [facility]    Plan EKG CT chest CT soft tissue neck labs Decadron racemic epi IV fluid    Differential including but not limited to MI ACS COVID pneumonia pneumothorax effusion retropharyngeal abscess epiglottitis

## 2023-12-15 DIAGNOSIS — I48.91 UNSPECIFIED ATRIAL FIBRILLATION: ICD-10-CM

## 2023-12-15 DIAGNOSIS — I25.10 ATHEROSCLEROTIC HEART DISEASE OF NATIVE CORONARY ARTERY WITHOUT ANGINA PECTORIS: ICD-10-CM

## 2023-12-15 DIAGNOSIS — U07.1 COVID-19: ICD-10-CM

## 2023-12-15 DIAGNOSIS — J96.01 ACUTE RESPIRATORY FAILURE WITH HYPOXIA: ICD-10-CM

## 2023-12-15 DIAGNOSIS — I10 ESSENTIAL (PRIMARY) HYPERTENSION: ICD-10-CM

## 2023-12-15 LAB
ALBUMIN SERPL ELPH-MCNC: 2.7 G/DL — LOW (ref 3.3–5)
ALP SERPL-CCNC: 51 U/L — SIGNIFICANT CHANGE UP (ref 30–120)
ALP SERPL-CCNC: 51 U/L — SIGNIFICANT CHANGE UP (ref 30–120)
ALP SERPL-CCNC: 52 U/L — SIGNIFICANT CHANGE UP (ref 30–120)
ALP SERPL-CCNC: 52 U/L — SIGNIFICANT CHANGE UP (ref 30–120)
ALT FLD-CCNC: 29 U/L — SIGNIFICANT CHANGE UP (ref 10–60)
ALT FLD-CCNC: 29 U/L — SIGNIFICANT CHANGE UP (ref 10–60)
ALT FLD-CCNC: 31 U/L — SIGNIFICANT CHANGE UP (ref 10–60)
ALT FLD-CCNC: 31 U/L — SIGNIFICANT CHANGE UP (ref 10–60)
ANION GAP SERPL CALC-SCNC: 9 MMOL/L — SIGNIFICANT CHANGE UP (ref 5–17)
ANION GAP SERPL CALC-SCNC: 9 MMOL/L — SIGNIFICANT CHANGE UP (ref 5–17)
AST SERPL-CCNC: 28 U/L — SIGNIFICANT CHANGE UP (ref 10–40)
AST SERPL-CCNC: 28 U/L — SIGNIFICANT CHANGE UP (ref 10–40)
AST SERPL-CCNC: 31 U/L — SIGNIFICANT CHANGE UP (ref 10–40)
AST SERPL-CCNC: 31 U/L — SIGNIFICANT CHANGE UP (ref 10–40)
BASOPHILS # BLD AUTO: 0 K/UL — SIGNIFICANT CHANGE UP (ref 0–0.2)
BASOPHILS # BLD AUTO: 0 K/UL — SIGNIFICANT CHANGE UP (ref 0–0.2)
BASOPHILS NFR BLD AUTO: 0 % — SIGNIFICANT CHANGE UP (ref 0–2)
BASOPHILS NFR BLD AUTO: 0 % — SIGNIFICANT CHANGE UP (ref 0–2)
BILIRUB DIRECT SERPL-MCNC: 0.1 MG/DL — SIGNIFICANT CHANGE UP (ref 0–0.3)
BILIRUB DIRECT SERPL-MCNC: 0.1 MG/DL — SIGNIFICANT CHANGE UP (ref 0–0.3)
BILIRUB INDIRECT FLD-MCNC: 0.1 MG/DL — LOW (ref 0.2–1)
BILIRUB INDIRECT FLD-MCNC: 0.1 MG/DL — LOW (ref 0.2–1)
BILIRUB SERPL-MCNC: 0.2 MG/DL — SIGNIFICANT CHANGE UP (ref 0.2–1.2)
BUN SERPL-MCNC: 17 MG/DL — SIGNIFICANT CHANGE UP (ref 7–23)
BUN SERPL-MCNC: 17 MG/DL — SIGNIFICANT CHANGE UP (ref 7–23)
CALCIUM SERPL-MCNC: 8.4 MG/DL — SIGNIFICANT CHANGE UP (ref 8.4–10.5)
CALCIUM SERPL-MCNC: 8.4 MG/DL — SIGNIFICANT CHANGE UP (ref 8.4–10.5)
CHLORIDE SERPL-SCNC: 104 MMOL/L — SIGNIFICANT CHANGE UP (ref 96–108)
CHLORIDE SERPL-SCNC: 104 MMOL/L — SIGNIFICANT CHANGE UP (ref 96–108)
CO2 SERPL-SCNC: 25 MMOL/L — SIGNIFICANT CHANGE UP (ref 22–31)
CO2 SERPL-SCNC: 25 MMOL/L — SIGNIFICANT CHANGE UP (ref 22–31)
CREAT SERPL-MCNC: 0.89 MG/DL — SIGNIFICANT CHANGE UP (ref 0.5–1.3)
CREAT SERPL-MCNC: 0.89 MG/DL — SIGNIFICANT CHANGE UP (ref 0.5–1.3)
EGFR: 62 ML/MIN/1.73M2 — SIGNIFICANT CHANGE UP
EGFR: 62 ML/MIN/1.73M2 — SIGNIFICANT CHANGE UP
EOSINOPHIL # BLD AUTO: 0 K/UL — SIGNIFICANT CHANGE UP (ref 0–0.5)
EOSINOPHIL # BLD AUTO: 0 K/UL — SIGNIFICANT CHANGE UP (ref 0–0.5)
EOSINOPHIL NFR BLD AUTO: 0 % — SIGNIFICANT CHANGE UP (ref 0–6)
EOSINOPHIL NFR BLD AUTO: 0 % — SIGNIFICANT CHANGE UP (ref 0–6)
GLUCOSE SERPL-MCNC: 176 MG/DL — HIGH (ref 70–99)
GLUCOSE SERPL-MCNC: 176 MG/DL — HIGH (ref 70–99)
HCT VFR BLD CALC: 34.9 % — SIGNIFICANT CHANGE UP (ref 34.5–45)
HCT VFR BLD CALC: 34.9 % — SIGNIFICANT CHANGE UP (ref 34.5–45)
HGB BLD-MCNC: 11.9 G/DL — SIGNIFICANT CHANGE UP (ref 11.5–15.5)
HGB BLD-MCNC: 11.9 G/DL — SIGNIFICANT CHANGE UP (ref 11.5–15.5)
IMM GRANULOCYTES NFR BLD AUTO: 0.5 % — SIGNIFICANT CHANGE UP (ref 0–0.9)
IMM GRANULOCYTES NFR BLD AUTO: 0.5 % — SIGNIFICANT CHANGE UP (ref 0–0.9)
LYMPHOCYTES # BLD AUTO: 0.4 K/UL — LOW (ref 1–3.3)
LYMPHOCYTES # BLD AUTO: 0.4 K/UL — LOW (ref 1–3.3)
LYMPHOCYTES # BLD AUTO: 9 % — LOW (ref 13–44)
LYMPHOCYTES # BLD AUTO: 9 % — LOW (ref 13–44)
MCHC RBC-ENTMCNC: 33.6 PG — SIGNIFICANT CHANGE UP (ref 27–34)
MCHC RBC-ENTMCNC: 33.6 PG — SIGNIFICANT CHANGE UP (ref 27–34)
MCHC RBC-ENTMCNC: 34.1 GM/DL — SIGNIFICANT CHANGE UP (ref 32–36)
MCHC RBC-ENTMCNC: 34.1 GM/DL — SIGNIFICANT CHANGE UP (ref 32–36)
MCV RBC AUTO: 98.6 FL — SIGNIFICANT CHANGE UP (ref 80–100)
MCV RBC AUTO: 98.6 FL — SIGNIFICANT CHANGE UP (ref 80–100)
MONOCYTES # BLD AUTO: 0.28 K/UL — SIGNIFICANT CHANGE UP (ref 0–0.9)
MONOCYTES # BLD AUTO: 0.28 K/UL — SIGNIFICANT CHANGE UP (ref 0–0.9)
MONOCYTES NFR BLD AUTO: 6.3 % — SIGNIFICANT CHANGE UP (ref 2–14)
MONOCYTES NFR BLD AUTO: 6.3 % — SIGNIFICANT CHANGE UP (ref 2–14)
NEUTROPHILS # BLD AUTO: 3.73 K/UL — SIGNIFICANT CHANGE UP (ref 1.8–7.4)
NEUTROPHILS # BLD AUTO: 3.73 K/UL — SIGNIFICANT CHANGE UP (ref 1.8–7.4)
NEUTROPHILS NFR BLD AUTO: 84.2 % — HIGH (ref 43–77)
NEUTROPHILS NFR BLD AUTO: 84.2 % — HIGH (ref 43–77)
NRBC # BLD: 0 /100 WBCS — SIGNIFICANT CHANGE UP (ref 0–0)
NRBC # BLD: 0 /100 WBCS — SIGNIFICANT CHANGE UP (ref 0–0)
PLATELET # BLD AUTO: 122 K/UL — LOW (ref 150–400)
PLATELET # BLD AUTO: 122 K/UL — LOW (ref 150–400)
POTASSIUM SERPL-MCNC: 3.8 MMOL/L — SIGNIFICANT CHANGE UP (ref 3.5–5.3)
POTASSIUM SERPL-MCNC: 3.8 MMOL/L — SIGNIFICANT CHANGE UP (ref 3.5–5.3)
POTASSIUM SERPL-SCNC: 3.8 MMOL/L — SIGNIFICANT CHANGE UP (ref 3.5–5.3)
POTASSIUM SERPL-SCNC: 3.8 MMOL/L — SIGNIFICANT CHANGE UP (ref 3.5–5.3)
PROT SERPL-MCNC: 5.4 G/DL — LOW (ref 6–8.3)
PROT SERPL-MCNC: 5.4 G/DL — LOW (ref 6–8.3)
PROT SERPL-MCNC: 5.8 G/DL — LOW (ref 6–8.3)
PROT SERPL-MCNC: 5.8 G/DL — LOW (ref 6–8.3)
RBC # BLD: 3.54 M/UL — LOW (ref 3.8–5.2)
RBC # BLD: 3.54 M/UL — LOW (ref 3.8–5.2)
RBC # FLD: 15.2 % — HIGH (ref 10.3–14.5)
RBC # FLD: 15.2 % — HIGH (ref 10.3–14.5)
SODIUM SERPL-SCNC: 138 MMOL/L — SIGNIFICANT CHANGE UP (ref 135–145)
SODIUM SERPL-SCNC: 138 MMOL/L — SIGNIFICANT CHANGE UP (ref 135–145)
WBC # BLD: 4.43 K/UL — SIGNIFICANT CHANGE UP (ref 3.8–10.5)
WBC # BLD: 4.43 K/UL — SIGNIFICANT CHANGE UP (ref 3.8–10.5)
WBC # FLD AUTO: 4.43 K/UL — SIGNIFICANT CHANGE UP (ref 3.8–10.5)
WBC # FLD AUTO: 4.43 K/UL — SIGNIFICANT CHANGE UP (ref 3.8–10.5)

## 2023-12-15 RX ORDER — FAMOTIDINE 10 MG/ML
20 INJECTION INTRAVENOUS DAILY
Refills: 0 | Status: DISCONTINUED | OUTPATIENT
Start: 2023-12-15 | End: 2023-12-21

## 2023-12-15 RX ORDER — BUDESONIDE AND FORMOTEROL FUMARATE DIHYDRATE 160; 4.5 UG/1; UG/1
2 AEROSOL RESPIRATORY (INHALATION)
Refills: 0 | Status: DISCONTINUED | OUTPATIENT
Start: 2023-12-15 | End: 2023-12-21

## 2023-12-15 RX ORDER — METOPROLOL TARTRATE 50 MG
5 TABLET ORAL ONCE
Refills: 0 | Status: COMPLETED | OUTPATIENT
Start: 2023-12-15 | End: 2023-12-15

## 2023-12-15 RX ORDER — LANOLIN ALCOHOL/MO/W.PET/CERES
3 CREAM (GRAM) TOPICAL AT BEDTIME
Refills: 0 | Status: DISCONTINUED | OUTPATIENT
Start: 2023-12-15 | End: 2023-12-21

## 2023-12-15 RX ADMIN — REMDESIVIR 200 MILLIGRAM(S): 5 INJECTION INTRAVENOUS at 00:08

## 2023-12-15 RX ADMIN — SACUBITRIL AND VALSARTAN 1 TABLET(S): 24; 26 TABLET, FILM COATED ORAL at 05:35

## 2023-12-15 RX ADMIN — SACUBITRIL AND VALSARTAN 1 TABLET(S): 24; 26 TABLET, FILM COATED ORAL at 17:41

## 2023-12-15 RX ADMIN — ATORVASTATIN CALCIUM 40 MILLIGRAM(S): 80 TABLET, FILM COATED ORAL at 22:24

## 2023-12-15 RX ADMIN — APIXABAN 2.5 MILLIGRAM(S): 2.5 TABLET, FILM COATED ORAL at 17:41

## 2023-12-15 RX ADMIN — Medication 5 MILLIGRAM(S): at 01:20

## 2023-12-15 RX ADMIN — FAMOTIDINE 20 MILLIGRAM(S): 10 INJECTION INTRAVENOUS at 12:11

## 2023-12-15 RX ADMIN — BUDESONIDE AND FORMOTEROL FUMARATE DIHYDRATE 2 PUFF(S): 160; 4.5 AEROSOL RESPIRATORY (INHALATION) at 19:25

## 2023-12-15 RX ADMIN — Medication 20 MILLIGRAM(S): at 05:35

## 2023-12-15 RX ADMIN — AMIODARONE HYDROCHLORIDE 200 MILLIGRAM(S): 400 TABLET ORAL at 05:35

## 2023-12-15 RX ADMIN — APIXABAN 2.5 MILLIGRAM(S): 2.5 TABLET, FILM COATED ORAL at 05:35

## 2023-12-15 RX ADMIN — CLOPIDOGREL BISULFATE 75 MILLIGRAM(S): 75 TABLET, FILM COATED ORAL at 12:10

## 2023-12-15 RX ADMIN — Medication 3 MILLIGRAM(S): at 22:25

## 2023-12-15 RX ADMIN — Medication 6 MILLIGRAM(S): at 05:35

## 2023-12-15 NOTE — CONSULT NOTE ADULT - SUBJECTIVE AND OBJECTIVE BOX
PULMONARY/CRITICAL CARE        Patient is a 88y old  Female who presents with a chief complaint of sob (14 Dec 2023 21:16)    BRIEF HOSPITAL COURSE: ***  Patient developed cough shortness of breath chest tightness sore throat congestion 2 days ago had a positive COVID test that day complaining of worsening symptoms.  No fevers chills abdominal pain vomiting edema calf.  Patient relates on Eliquis.PMD [id number] PMHx HFrEF (LVEF 43% 5/2022), CAD with prior MI s/p PCI, Afib (on Eliquis) presenting with cough, SOB x2 days . positive for  COVID 2 days ago (vaccinated x3).  cough worsening and  feel more dyspneic prompting ER evaluation. In ER, concern for stridor and was given Racemic Epi x2 and Decadron 10mg x1.   CT Soft Tissue Neck negative. CT Chest with small bilateral pleural effusions/ atelectasis.   Patient and MICU consult , and is stable , and being admitted for further management  Improved with treatment.   Events last 24 hours: ***    PAST MEDICAL & SURGICAL HISTORY:  CAD (coronary artery disease)      Rapid atrial fibrillation      CAD (coronary artery disease)  s/p Cath & 3 stents to LAD on 3/2020; OM1 stent x 1  EF ~30%      Mild HTN      History of CHF (congestive heart failure)      Myocardial infarction      S/P cataract surgery        Allergies    Persantine (Rash)  Persantine (Unknown)    Intolerances      FAMILY HISTORY: denies cigs, etoh  FH: heart disease (Father, Sibling)    FH: dementia (Father)    Family history of [place]'s disease (Mother)        Review of Systems:  CONSTITUTIONAL: No fever, chills, or fatigue  EYES: No eye pain, visual disturbances, or discharge  ENMT:  No difficulty hearing, tinnitus, vertigo; No sinus or throat pain  NECK: No pain or stiffness  RESPIRATORY: had  cough, wheezing, no chills or hemoptysis; had  shortness of breath  CARDIOVASCULAR: No chest pain, palpitations, dizziness, or leg swelling  GASTROINTESTINAL: No abdominal or epigastric pain. No nausea, vomiting, or hematemesis; No diarrhea or constipation. No melena or hematochezia.  GENITOURINARY: No dysuria, frequency, hematuria, or incontinence  NEUROLOGICAL: No headaches, memory loss, loss of strength, numbness, or tremors  SKIN: No itching, burning, rashes, or lesions   MUSCULOSKELETAL: No joint pain or swelling; No muscle, back, or extremity pain  PSYCHIATRIC: No depression, anxiety, mood swings, or difficulty sleeping      Medications:  remdesivir  IVPB 100 milliGRAM(s) IV Intermittent every 24 hours    aMIOdarone    Tablet 200 milliGRAM(s) Oral daily  furosemide    Tablet 20 milliGRAM(s) Oral daily  metoprolol tartrate 25 milliGRAM(s) Oral two times a day  sacubitril 49 mG/valsartan 51 mG 1 Tablet(s) Oral two times a day    budesonide 160 MICROgram(s)/formoterol 4.5 MICROgram(s) Inhaler 2 Puff(s) Inhalation two times a day    melatonin 3 milliGRAM(s) Oral at bedtime      apixaban 2.5 milliGRAM(s) Oral every 12 hours  clopidogrel Tablet 75 milliGRAM(s) Oral daily    famotidine    Tablet 20 milliGRAM(s) Oral daily      atorvastatin 40 milliGRAM(s) Oral at bedtime  dexAMETHasone     Tablet 6 milliGRAM(s) Oral daily                  ICU Vital Signs Last 24 Hrs  T(C): 36.6 (15 Dec 2023 07:57), Max: 37.2 (14 Dec 2023 16:16)  T(F): 97.8 (15 Dec 2023 07:57), Max: 99 (14 Dec 2023 16:16)  HR: 58 (15 Dec 2023 08:00) (52 - 124)  BP: 127/62 (15 Dec 2023 08:00) (104/56 - 168/78)  BP(mean): 81 (15 Dec 2023 08:00) (72 - 99)  ABP: --  ABP(mean): --  RR: 24 (15 Dec 2023 08:00) (17 - 27)  SpO2: 98% (15 Dec 2023 08:00) (92% - 99%)    O2 Parameters below as of 15 Dec 2023 00:55  Patient On (Oxygen Delivery Method): room air          Vital Signs Last 24 Hrs  T(C): 36.6 (15 Dec 2023 07:57), Max: 37.2 (14 Dec 2023 16:16)  T(F): 97.8 (15 Dec 2023 07:57), Max: 99 (14 Dec 2023 16:16)  HR: 58 (15 Dec 2023 08:00) (52 - 124)  BP: 127/62 (15 Dec 2023 08:00) (104/56 - 168/78)  BP(mean): 81 (15 Dec 2023 08:00) (72 - 99)  RR: 24 (15 Dec 2023 08:00) (17 - 27)  SpO2: 98% (15 Dec 2023 08:00) (92% - 99%)    Parameters below as of 15 Dec 2023 00:55  Patient On (Oxygen Delivery Method): room air            I&O's Detail        LABS:                        11.9   4.43  )-----------( 122      ( 15 Dec 2023 05:30 )             34.9     12-15    138  |  104  |  17  ----------------------------<  176<H>  3.8   |  25  |  0.89    Ca    8.4      15 Dec 2023 05:30    TPro  5.4<L>  /  Alb  2.7<L>  /  TBili  0.2  /  DBili  0.1  /  AST  28  /  ALT  31  /  AlkPhos  52  12-15          CAPILLARY BLOOD GLUCOSE        PT/INR - ( 14 Dec 2023 17:19 )   PT: 13.9 sec;   INR: 1.25 ratio         PTT - ( 14 Dec 2023 17:19 )  PTT:33.3 sec  Urinalysis Basic - ( 15 Dec 2023 05:30 )    Color: x / Appearance: x / SG: x / pH: x  Gluc: 176 mg/dL / Ketone: x  / Bili: x / Urobili: x   Blood: x / Protein: x / Nitrite: x   Leuk Esterase: x / RBC: x / WBC x   Sq Epi: x / Non Sq Epi: x / Bacteria: x      CULTURES:      Physical Examination:    General: elderly female mild sob.     HEENT: Pupils equal, reactive to light.  Symmetric.    PULM: mild stridor, wheezing bilat; no rales no change percussion    CVS: Regular rate and rhythm, no murmurs, rubs, or gallops    ABD: Soft, nondistended, nontender, normoactive bowel sounds, no masses    EXT: No edema, nontender calves    SKIN: Warm and well perfused, no rashes noted.    NEURO: Alert, oriented, interactive, nonfocal    RADIOLOGY: ***rad< from: CT Chest No Cont (12.14.23 @ 18:37) >  ACC: [id number] EXAM:  CT CHEST   ORDERED BY: ANA SMITH     PROCEDURE DATE:  12/14/2023          INTERPRETATION:  CT CHEST WITHOUT CONTRAST    INDICATION: History of shortness of breath and cough. History of atypical   viral infection/pelvis.    TECHNIQUE: Unenhanced helical images were obtained of the chest. Coronal   and sagittal images were reconstructed. Maximum intensity projection   images were generated.        COMPARISON: Radiograph chest 12/14/2023.    FINDINGS:    Tubes/Lines:None.    Lungs, airways and pleura: Small bilateral pleural effusions. Bilateral   lower lobe linear and passive atelectasis. Right upper lobe lung cyst.   Within the right lower lobe is a 5 x 3 mm noncalcified nodule (series 8   image 99). The airways are unremarkable.    Mediastinum: The thyroid gland is unremarkable. No enlarged chest lymph   nodes. Intrathoracic stomach with rotation of the greater curvature. The   heart is enlarged. Coronary calcifications/stents. No pericardial   effusion. The aorta is tortuous. The aorta is ectatic. Aortic   calcifications.    Upper Abdomen: Bilateral parapelvic cysts. The left adrenal gland is   thickened. Within the body of the left adrenal gland is a 1.6 x 1.6 cm   nodule.    Bones And Soft Tissues:Spondylosis the thoracic spine. Anterior wedging   of the T11 vertebral body.  The soft tissues are unremarkable.      IMPRESSION:    1.  Small bilateral pleural effusions and bilateral linear atelectasis.  2.  In the right lower lobe is a 5 x 3 mm noncalcified nodule.    --- End of Report ---            FABIAN NOBLE MD; Attending Radiologist  This document has been electronically signed. Dec 14 2023  7:22PM    < end of copied text >  < from: CT Neck Soft Tissue w/ IV Cont (12.14.23 @ 18:37) >  ACC: [id number] EXAM:  CT NECK SOFT TISSUE IC   ORDERED BY: ANA SMITH     PROCEDURE DATE:  12/14/2023          INTERPRETATION:  CT neck with IV contrast    CLINICAL INFORMATION:    r/o swelling  SCT    TECHNIQUE:  Contiguous axial 2 mm thick sections were obtained through   the neck using single helical acquisition.  Images were acquired during   the intravenous administration of nonionic contrast.  Image data was   reconstructed in the coronal and sagittal planes.  CONTRAST:    90 ccadministered  ;  10 cc discarded  DOSE INFORMATION:   This scan was performed using automatic exposure   control (radiation dose reduction software) to obtain a diagnostic image   quality scan with patient dose as low as reasonably achievable.   Total   DLP for this examination is estimated at 182 mGy-cm.    COMPARISON:   CT angiography neck 5/2/2022    FINDINGS:    The mucosal surfaces of the upper aerodigestive tract demonstrate   physiologic enhancement and appear symmetric.  The larynx is intact.  The   preepiglottic and paralaryngeal spaces are intact.  Laryngeal cartilages   remain intact.    No pathologically enlarged lymph nodes are found.  The visualized lymph   nodes demonstrate no central necrosis or extranodal extension.    The nasopharynx is symmetric.  No lateral retropharyngeal mass is found.    The underlying central skull base is intact.  The petrous temporal bones   including mastoid air cells are intact.  The visualized base of brain   appears unremarkable.    The parotid and submandibular glands are intact.  The thyroid gland is   nodular.    The visualized paranasal sinuses are clear.  The nasal cavity is   unremarkable.    The cervical spine demonstrates degenerative features including grade 1   anterior listhesis C4 on C5 and degenerative endplate changes greatest at   C6-C7.    The carotid and vertebral arteries demonstrate enhancement consistent   with their patency. Calcified and noncalcified atherosclerotic plaque is   present. This results in stenosis of the carotid bulbs right more   prominent than left.    The chest is reported separately.      IMPRESSION:  No mass lesion or fluid collection. No enlarged lymph nodes   identified    --- End of Report ---      < end of copied text >      CRITICAL CARE TIME SPENT: ***   PULMONARY/CRITICAL CARE        Patient is a 88y old  Female who presents with a chief complaint of sob (14 Dec 2023 21:16)    BRIEF HOSPITAL COURSE: ***  Patient developed cough shortness of breath chest tightness sore throat congestion 2 days ago had a positive COVID test that day complaining of worsening symptoms.  No fevers chills abdominal pain vomiting edema calf.  Patient relates on Eliquis.PMD [id number] PMHx HFrEF (LVEF 43% 5/2022), CAD with prior MI s/p PCI, Afib (on Eliquis) presenting with cough, SOB x2 days . positive for  COVID 2 days ago (vaccinated x3).  cough worsening and  feel more dyspneic prompting ER evaluation. In ER, concern for stridor and was given Racemic Epi x2 and Decadron 10mg x1.   CT Soft Tissue Neck negative. CT Chest with small bilateral pleural effusions/ atelectasis.   Patient and MICU consult , and is stable , and being admitted for further management  Improved with treatment.   Events last 24 hours: ***    PAST MEDICAL & SURGICAL HISTORY:  CAD (coronary artery disease)      Rapid atrial fibrillation      CAD (coronary artery disease)  s/p Cath & 3 stents to LAD on 3/2020; OM1 stent x 1  EF ~30%      Mild HTN      History of CHF (congestive heart failure)      Myocardial infarction      S/P cataract surgery        Allergies    Persantine (Rash)  Persantine (Unknown)    Intolerances      FAMILY HISTORY: denies cigs, etoh  FH: heart disease (Father, Sibling)    FH: dementia (Father)    Family history of [place]'s disease (Mother)        Review of Systems:  CONSTITUTIONAL: No fever, chills, or fatigue  EYES: No eye pain, visual disturbances, or discharge  ENMT:  No difficulty hearing, tinnitus, vertigo; No sinus or throat pain  NECK: No pain or stiffness  RESPIRATORY: had  cough, wheezing, no chills or hemoptysis; had  shortness of breath  CARDIOVASCULAR: No chest pain, palpitations, dizziness, or leg swelling  GASTROINTESTINAL: No abdominal or epigastric pain. No nausea, vomiting, or hematemesis; No diarrhea or constipation. No melena or hematochezia.  GENITOURINARY: No dysuria, frequency, hematuria, or incontinence  NEUROLOGICAL: No headaches, memory loss, loss of strength, numbness, or tremors  SKIN: No itching, burning, rashes, or lesions   MUSCULOSKELETAL: No joint pain or swelling; No muscle, back, or extremity pain  PSYCHIATRIC: No depression, anxiety, mood swings, or difficulty sleeping      Medications:  remdesivir  IVPB 100 milliGRAM(s) IV Intermittent every 24 hours    aMIOdarone    Tablet 200 milliGRAM(s) Oral daily  furosemide    Tablet 20 milliGRAM(s) Oral daily  metoprolol tartrate 25 milliGRAM(s) Oral two times a day  sacubitril 49 mG/valsartan 51 mG 1 Tablet(s) Oral two times a day    budesonide 160 MICROgram(s)/formoterol 4.5 MICROgram(s) Inhaler 2 Puff(s) Inhalation two times a day    melatonin 3 milliGRAM(s) Oral at bedtime      apixaban 2.5 milliGRAM(s) Oral every 12 hours  clopidogrel Tablet 75 milliGRAM(s) Oral daily    famotidine    Tablet 20 milliGRAM(s) Oral daily      atorvastatin 40 milliGRAM(s) Oral at bedtime  dexAMETHasone     Tablet 6 milliGRAM(s) Oral daily                  ICU Vital Signs Last 24 Hrs  T(C): 36.6 (15 Dec 2023 07:57), Max: 37.2 (14 Dec 2023 16:16)  T(F): 97.8 (15 Dec 2023 07:57), Max: 99 (14 Dec 2023 16:16)  HR: 58 (15 Dec 2023 08:00) (52 - 124)  BP: 127/62 (15 Dec 2023 08:00) (104/56 - 168/78)  BP(mean): 81 (15 Dec 2023 08:00) (72 - 99)  ABP: --  ABP(mean): --  RR: 24 (15 Dec 2023 08:00) (17 - 27)  SpO2: 98% (15 Dec 2023 08:00) (92% - 99%)    O2 Parameters below as of 15 Dec 2023 00:55  Patient On (Oxygen Delivery Method): room air          Vital Signs Last 24 Hrs  T(C): 36.6 (15 Dec 2023 07:57), Max: 37.2 (14 Dec 2023 16:16)  T(F): 97.8 (15 Dec 2023 07:57), Max: 99 (14 Dec 2023 16:16)  HR: 58 (15 Dec 2023 08:00) (52 - 124)  BP: 127/62 (15 Dec 2023 08:00) (104/56 - 168/78)  BP(mean): 81 (15 Dec 2023 08:00) (72 - 99)  RR: 24 (15 Dec 2023 08:00) (17 - 27)  SpO2: 98% (15 Dec 2023 08:00) (92% - 99%)    Parameters below as of 15 Dec 2023 00:55  Patient On (Oxygen Delivery Method): room air            I&O's Detail        LABS:                        11.9   4.43  )-----------( 122      ( 15 Dec 2023 05:30 )             34.9     12-15    138  |  104  |  17  ----------------------------<  176<H>  3.8   |  25  |  0.89    Ca    8.4      15 Dec 2023 05:30    TPro  5.4<L>  /  Alb  2.7<L>  /  TBili  0.2  /  DBili  0.1  /  AST  28  /  ALT  31  /  AlkPhos  52  12-15          CAPILLARY BLOOD GLUCOSE        PT/INR - ( 14 Dec 2023 17:19 )   PT: 13.9 sec;   INR: 1.25 ratio         PTT - ( 14 Dec 2023 17:19 )  PTT:33.3 sec  Urinalysis Basic - ( 15 Dec 2023 05:30 )    Color: x / Appearance: x / SG: x / pH: x  Gluc: 176 mg/dL / Ketone: x  / Bili: x / Urobili: x   Blood: x / Protein: x / Nitrite: x   Leuk Esterase: x / RBC: x / WBC x   Sq Epi: x / Non Sq Epi: x / Bacteria: x      CULTURES:      Physical Examination:    General: elderly female mild sob.     HEENT: Pupils equal, reactive to light.  Symmetric.    PULM: mild stridor, wheezing bilat; no rales no change percussion    CVS: Regular rate and rhythm, no murmurs, rubs, or gallops    ABD: Soft, nondistended, nontender, normoactive bowel sounds, no masses    EXT: No edema, nontender calves    SKIN: Warm and well perfused, no rashes noted.    NEURO: Alert, oriented, interactive, nonfocal    RADIOLOGY: ***rad< from: CT Chest No Cont (12.14.23 @ 18:37) >  ACC: [id number] EXAM:  CT CHEST   ORDERED BY: ANA SMITH     PROCEDURE DATE:  12/14/2023          INTERPRETATION:  CT CHEST WITHOUT CONTRAST    INDICATION: History of shortness of breath and cough. History of atypical   viral infection/pelvis.    TECHNIQUE: Unenhanced helical images were obtained of the chest. Coronal   and sagittal images were reconstructed. Maximum intensity projection   images were generated.        COMPARISON: Radiograph chest 12/14/2023.    FINDINGS:    Tubes/Lines:None.    Lungs, airways and pleura: Small bilateral pleural effusions. Bilateral   lower lobe linear and passive atelectasis. Right upper lobe lung cyst.   Within the right lower lobe is a 5 x 3 mm noncalcified nodule (series 8   image 99). The airways are unremarkable.    Mediastinum: The thyroid gland is unremarkable. No enlarged chest lymph   nodes. Intrathoracic stomach with rotation of the greater curvature. The   heart is enlarged. Coronary calcifications/stents. No pericardial   effusion. The aorta is tortuous. The aorta is ectatic. Aortic   calcifications.    Upper Abdomen: Bilateral parapelvic cysts. The left adrenal gland is   thickened. Within the body of the left adrenal gland is a 1.6 x 1.6 cm   nodule.    Bones And Soft Tissues:Spondylosis the thoracic spine. Anterior wedging   of the T11 vertebral body.  The soft tissues are unremarkable.      IMPRESSION:    1.  Small bilateral pleural effusions and bilateral linear atelectasis.  2.  In the right lower lobe is a 5 x 3 mm noncalcified nodule.    --- End of Report ---            FABIAN NOBLE MD; Attending Radiologist  This document has been electronically signed. Dec 14 2023  7:22PM    < end of copied text >  < from: CT Neck Soft Tissue w/ IV Cont (12.14.23 @ 18:37) >  ACC: [id number] EXAM:  CT NECK SOFT TISSUE IC   ORDERED BY: ANA SMITH     PROCEDURE DATE:  12/14/2023          INTERPRETATION:  CT neck with IV contrast    CLINICAL INFORMATION:    r/o swelling  SCT    TECHNIQUE:  Contiguous axial 2 mm thick sections were obtained through   the neck using single helical acquisition.  Images were acquired during   the intravenous administration of nonionic contrast.  Image data was   reconstructed in the coronal and sagittal planes.  CONTRAST:    90 ccadministered  ;  10 cc discarded  DOSE INFORMATION:   This scan was performed using automatic exposure   control (radiation dose reduction software) to obtain a diagnostic image   quality scan with patient dose as low as reasonably achievable.   Total   DLP for this examination is estimated at 182 mGy-cm.    COMPARISON:   CT angiography neck 5/2/2022    FINDINGS:    The mucosal surfaces of the upper aerodigestive tract demonstrate   physiologic enhancement and appear symmetric.  The larynx is intact.  The   preepiglottic and paralaryngeal spaces are intact.  Laryngeal cartilages   remain intact.    No pathologically enlarged lymph nodes are found.  The visualized lymph   nodes demonstrate no central necrosis or extranodal extension.    The nasopharynx is symmetric.  No lateral retropharyngeal mass is found.    The underlying central skull base is intact.  The petrous temporal bones   including mastoid air cells are intact.  The visualized base of brain   appears unremarkable.    The parotid and submandibular glands are intact.  The thyroid gland is   nodular.    The visualized paranasal sinuses are clear.  The nasal cavity is   unremarkable.    The cervical spine demonstrates degenerative features including grade 1   anterior listhesis C4 on C5 and degenerative endplate changes greatest at   C6-C7.    The carotid and vertebral arteries demonstrate enhancement consistent   with their patency. Calcified and noncalcified atherosclerotic plaque is   present. This results in stenosis of the carotid bulbs right more   prominent than left.    The chest is reported separately.      IMPRESSION:  No mass lesion or fluid collection. No enlarged lymph nodes   identified    --- End of Report ---      < end of copied text >      CRITICAL CARE TIME SPENT: ***

## 2023-12-15 NOTE — PHYSICAL THERAPY INITIAL EVALUATION ADULT - ADDITIONAL COMMENTS
Pt lives in private home with daughter, daughter's family. No GASPER, no steps to primary bedroom, 4 Steps to kitchen +HR. Pt owns cane, states she uses it to negotiate stairs at home, sometimes in community to ambulate. +walk in shower

## 2023-12-15 NOTE — CONSULT NOTE ADULT - SUBJECTIVE AND OBJECTIVE BOX
HPI:  87YO F PMH CAD HTN Afib who presented to the hospital with cc of SOB chest tightness sore throat congestion. Tested positive COVID 19 2 days prior to admission. Pt is vaccinated. + wheeze CT Soft Tissue Neck negative. CT Chest with small bilateral pleural effusions/ atelectasis. On RA PT is vaccinated.    Infectious Disease consult was called to evaluate pt and for antibiotic management.    Past Medical & Surgical Hx:  PAST MEDICAL & SURGICAL HISTORY:  CAD (coronary artery disease)  Rapid atrial fibrillation  CAD (coronary artery disease)  s/p Cath & 3 stents to LAD on 3/2020; OM1 stent x 1  EF ~30%  Mild HTN  History of CHF (congestive heart failure)  Myocardial infarction  S/P cataract surgery      Social History--  EtOH: denies   Tobacco: denies   Drug Use: denies    FAMILY HISTORY:  FH: heart disease (Father, Sibling)  FH: dementia (Father)  Family history of Kevin's disease (Mother)      Allergies  Persantine (Rash)    Intolerances  NONE      Home Medications:  amiodarone 100 mg oral tablet: 2 tab(s) orally once a day (14 Dec 2023 21:03)  atorvastatin 40 mg oral tablet: 1 tab(s) orally once a day (14 Dec 2023 16:31)  clopidogrel 75 mg oral tablet: 1 tab(s) orally once a day (14 Dec 2023 16:31)  Entresto 49 mg-51 mg oral tablet: 1 tab(s) orally 2 times a day (14 Dec 2023 16:31)  furosemide 20 mg oral tablet: 1 tab(s) orally once a day (14 Dec 2023 17:28)  Lagevrio 200 mg oral capsule: 4 cap(s) orally 2 times a day (14 Dec 2023 21:03)  metoprolol tartrate 25 mg oral tablet: 1 tab(s) orally 2 times a day (14 Dec 2023 16:31)    Current Inpatient Medications :    ANTIBIOTICS:   remdesivir  IVPB 100 milliGRAM(s) IV Intermittent every 24 hours      OTHER RELEVANT MEDICATIONS :  aMIOdarone    Tablet 200 milliGRAM(s) Oral daily  apixaban 2.5 milliGRAM(s) Oral every 12 hours  atorvastatin 40 milliGRAM(s) Oral at bedtime  budesonide 160 MICROgram(s)/formoterol 4.5 MICROgram(s) Inhaler 2 Puff(s) Inhalation two times a day  clopidogrel Tablet 75 milliGRAM(s) Oral daily  dexAMETHasone     Tablet 6 milliGRAM(s) Oral daily  famotidine    Tablet 20 milliGRAM(s) Oral daily  furosemide    Tablet 20 milliGRAM(s) Oral daily  melatonin 3 milliGRAM(s) Oral at bedtime  sacubitril 49 mG/valsartan 51 mG 1 Tablet(s) Oral two times a day      ROS:  CONSTITUTIONAL:  Negative fever or chills  EYES:  Negative  blurry vision or double vision  CARDIOVASCULAR:  Negative for chest pain or palpitations  RESPIRATORY:  + cough, wheezing, no SOB   GASTROINTESTINAL:  Negative for nausea, vomiting, diarrhea, constipation, or abdominal pain  GENITOURINARY:  Negative frequency, urgency , dysuria or hematuria   NEUROLOGIC:  No headache, confusion, dizziness, lightheadedness  All other systems were reviewed and are negative      Physical Exam:  Vital Signs Last 24 Hrs  T(C): 36.8 (15 Dec 2023 12:13), Max: 37.2 (14 Dec 2023 16:16)  T(F): 98.2 (15 Dec 2023 12:13), Max: 99 (14 Dec 2023 16:16)  HR: 67 (15 Dec 2023 13:31) (52 - 124)  BP: 142/74 (15 Dec 2023 13:31) (104/56 - 168/78)  BP(mean): 95 (15 Dec 2023 13:31) (72 - 99)  RR: 28 (15 Dec 2023 13:31) (17 - 28)  SpO2: 98% (15 Dec 2023 13:31) (92% - 99%)    Parameters below as of 15 Dec 2023 13:31  Patient On (Oxygen Delivery Method): room air      Height (cm): 154.9 (12-15 @ 00:55)  Weight (kg): 57.5 (12-15 @ 00:55)  BMI (kg/m2): 24 (12-15 @ 00:55)  BSA (m2): 1.56 (12-15 @ 00:55)    General: no acute distress  Neck: supple, trachea midline  Lungs: clear, no wheeze/rhonchi  Cardiovascular: regular rate and rhythm, S1 S2  Abdomen: soft, nontender, ND, bowel sounds normal  Neurological:  alert and oriented x3  Skin: no rash  Extremities: no edema    Labs:                         11.9   4.43  )-----------( 122      ( 15 Dec 2023 05:30 )             34.9     12-15    138  |  104  |  17  ----------------------------<  176<H>  3.8   |  25  |  0.89    Ca    8.4      15 Dec 2023 05:30    TPro  5.4<L>  /  Alb  2.7<L>  /  TBili  0.2  /  DBili  0.1  /  AST  28  /  ALT  31  /  AlkPhos  52  12-15      RECENT CULTURES:      RADIOLOGY & ADDITIONAL STUDIES:  ACC: [id number] EXAM:  CT CHEST   ORDERED BY: ANA SMITH     PROCEDURE DATE:  12/14/2023          INTERPRETATION:  CT CHEST WITHOUT CONTRAST    INDICATION: History of shortness of breath and cough. History of atypical   viral infection/pelvis.    TECHNIQUE: Unenhanced helical images were obtained of the chest. Coronal   and sagittal images were reconstructed. Maximum intensity projection   images were generated.        COMPARISON: Radiograph chest 12/14/2023.    FINDINGS:    Tubes/Lines:None.    Lungs, airways and pleura: Small bilateral pleural effusions. Bilateral   lower lobe linear and passive atelectasis. Right upper lobe lung cyst.   Within the right lower lobe is a 5 x 3 mm noncalcified nodule (series 8   image 99). The airways are unremarkable.    Mediastinum: The thyroid gland is unremarkable. No enlarged chest lymph   nodes. Intrathoracic stomach with rotation of the greater curvature. The   heart is enlarged. Coronary calcifications/stents. No pericardial   effusion. The aorta is tortuous. The aorta is ectatic. Aortic   calcifications.    Upper Abdomen: Bilateral parapelvic cysts. The left adrenal gland is   thickened. Within the body of the left adrenal gland is a 1.6 x 1.6 cm   nodule.    Bones And Soft Tissues:Spondylosis the thoracic spine. Anterior wedging   of the T11 vertebral body.  The soft tissues are unremarkable.      IMPRESSION:    1.  Small bilateral pleural effusions and bilateral linear atelectasis.  2.  In the right lower lobe is a 5 x 3 mm noncalcified nodule.    Assessment :   87YO F PMH CAD HTN Afib admitted with SOB chest tightness sore throat congestion and wheeze sec COVID 19.   CT Chest with small bilateral pleural effusions/ atelectasis.   On RA PT is vaccinated.      Plan :   Remdesivir X 3 doses  Decadron 6mg IV daily - short course  Trend temps and cbc  Monitor resp status and 02 requirements  Pulm toileting  Stable from ID standpoint    Continue with present regiment .  Approptiate use of antibiotics and adverse effects reviewed.      I have discussed the above plan of care with patient in detail. She expressed understanding of the treatment plan . Risks, benefits and alternatives discussed in detail. I have asked if she has any questions or concerns and appropriately addressed them to the best of my ability .      > 45 minutes spent in direct patient care reviewing  the notes, lab data/ imaging , discussion with multidisciplinary team. All questions were addressed and answered to the best of my capacity .    Thank you for allowing me to participate in the care of your patient .      Brady Oneal MD  Infectious Disease  [phone]   HPI:  87YO F PMH CAD HTN Afib who presented to the hospital with cc of SOB chest tightness sore throat congestion. Tested positive COVID 19 2 days prior to admission. Pt is vaccinated. + wheeze CT Soft Tissue Neck negative. CT Chest with small bilateral pleural effusions/ atelectasis. On RA PT is vaccinated.    Infectious Disease consult was called to evaluate pt and for antibiotic management.    Past Medical & Surgical Hx:  PAST MEDICAL & SURGICAL HISTORY:  CAD (coronary artery disease)  Rapid atrial fibrillation  CAD (coronary artery disease)  s/p Cath & 3 stents to LAD on 3/2020; OM1 stent x 1  EF ~30%  Mild HTN  History of CHF (congestive heart failure)  Myocardial infarction  S/P cataract surgery      Social History--  EtOH: denies   Tobacco: denies   Drug Use: denies    FAMILY HISTORY:  FH: heart disease (Father, Sibling)  FH: dementia (Father)  Family history of Kevin's disease (Mother)      Allergies  Persantine (Rash)    Intolerances  NONE      Home Medications:  amiodarone 100 mg oral tablet: 2 tab(s) orally once a day (14 Dec 2023 21:03)  atorvastatin 40 mg oral tablet: 1 tab(s) orally once a day (14 Dec 2023 16:31)  clopidogrel 75 mg oral tablet: 1 tab(s) orally once a day (14 Dec 2023 16:31)  Entresto 49 mg-51 mg oral tablet: 1 tab(s) orally 2 times a day (14 Dec 2023 16:31)  furosemide 20 mg oral tablet: 1 tab(s) orally once a day (14 Dec 2023 17:28)  Lagevrio 200 mg oral capsule: 4 cap(s) orally 2 times a day (14 Dec 2023 21:03)  metoprolol tartrate 25 mg oral tablet: 1 tab(s) orally 2 times a day (14 Dec 2023 16:31)    Current Inpatient Medications :    ANTIBIOTICS:   remdesivir  IVPB 100 milliGRAM(s) IV Intermittent every 24 hours      OTHER RELEVANT MEDICATIONS :  aMIOdarone    Tablet 200 milliGRAM(s) Oral daily  apixaban 2.5 milliGRAM(s) Oral every 12 hours  atorvastatin 40 milliGRAM(s) Oral at bedtime  budesonide 160 MICROgram(s)/formoterol 4.5 MICROgram(s) Inhaler 2 Puff(s) Inhalation two times a day  clopidogrel Tablet 75 milliGRAM(s) Oral daily  dexAMETHasone     Tablet 6 milliGRAM(s) Oral daily  famotidine    Tablet 20 milliGRAM(s) Oral daily  furosemide    Tablet 20 milliGRAM(s) Oral daily  melatonin 3 milliGRAM(s) Oral at bedtime  sacubitril 49 mG/valsartan 51 mG 1 Tablet(s) Oral two times a day      ROS:  CONSTITUTIONAL:  Negative fever or chills  EYES:  Negative  blurry vision or double vision  CARDIOVASCULAR:  Negative for chest pain or palpitations  RESPIRATORY:  + cough, wheezing, no SOB   GASTROINTESTINAL:  Negative for nausea, vomiting, diarrhea, constipation, or abdominal pain  GENITOURINARY:  Negative frequency, urgency , dysuria or hematuria   NEUROLOGIC:  No headache, confusion, dizziness, lightheadedness  All other systems were reviewed and are negative      Physical Exam:  Vital Signs Last 24 Hrs  T(C): 36.8 (15 Dec 2023 12:13), Max: 37.2 (14 Dec 2023 16:16)  T(F): 98.2 (15 Dec 2023 12:13), Max: 99 (14 Dec 2023 16:16)  HR: 67 (15 Dec 2023 13:31) (52 - 124)  BP: 142/74 (15 Dec 2023 13:31) (104/56 - 168/78)  BP(mean): 95 (15 Dec 2023 13:31) (72 - 99)  RR: 28 (15 Dec 2023 13:31) (17 - 28)  SpO2: 98% (15 Dec 2023 13:31) (92% - 99%)    Parameters below as of 15 Dec 2023 13:31  Patient On (Oxygen Delivery Method): room air      Height (cm): 154.9 (12-15 @ 00:55)  Weight (kg): 57.5 (12-15 @ 00:55)  BMI (kg/m2): 24 (12-15 @ 00:55)  BSA (m2): 1.56 (12-15 @ 00:55)    General: no acute distress  Neck: supple, trachea midline  Lungs: clear, no wheeze/rhonchi  Cardiovascular: regular rate and rhythm, S1 S2  Abdomen: soft, nontender, ND, bowel sounds normal  Neurological:  alert and oriented x3  Skin: no rash  Extremities: no edema    Labs:                         11.9   4.43  )-----------( 122      ( 15 Dec 2023 05:30 )             34.9     12-15    138  |  104  |  17  ----------------------------<  176<H>  3.8   |  25  |  0.89    Ca    8.4      15 Dec 2023 05:30    TPro  5.4<L>  /  Alb  2.7<L>  /  TBili  0.2  /  DBili  0.1  /  AST  28  /  ALT  31  /  AlkPhos  52  12-15      RECENT CULTURES:      RADIOLOGY & ADDITIONAL STUDIES:  ACC: [id number] EXAM:  CT CHEST   ORDERED BY: ANA SMITH     PROCEDURE DATE:  12/14/2023          INTERPRETATION:  CT CHEST WITHOUT CONTRAST    INDICATION: History of shortness of breath and cough. History of atypical   viral infection/pelvis.    TECHNIQUE: Unenhanced helical images were obtained of the chest. Coronal   and sagittal images were reconstructed. Maximum intensity projection   images were generated.        COMPARISON: Radiograph chest 12/14/2023.    FINDINGS:    Tubes/Lines:None.    Lungs, airways and pleura: Small bilateral pleural effusions. Bilateral   lower lobe linear and passive atelectasis. Right upper lobe lung cyst.   Within the right lower lobe is a 5 x 3 mm noncalcified nodule (series 8   image 99). The airways are unremarkable.    Mediastinum: The thyroid gland is unremarkable. No enlarged chest lymph   nodes. Intrathoracic stomach with rotation of the greater curvature. The   heart is enlarged. Coronary calcifications/stents. No pericardial   effusion. The aorta is tortuous. The aorta is ectatic. Aortic   calcifications.    Upper Abdomen: Bilateral parapelvic cysts. The left adrenal gland is   thickened. Within the body of the left adrenal gland is a 1.6 x 1.6 cm   nodule.    Bones And Soft Tissues:Spondylosis the thoracic spine. Anterior wedging   of the T11 vertebral body.  The soft tissues are unremarkable.      IMPRESSION:    1.  Small bilateral pleural effusions and bilateral linear atelectasis.  2.  In the right lower lobe is a 5 x 3 mm noncalcified nodule.    Assessment :   87YO F PMH CAD HTN Afib admitted with SOB chest tightness sore throat congestion and wheeze sec COVID 19.   CT Chest with small bilateral pleural effusions/ atelectasis.   On RA PT is vaccinated.      Plan :   Remdesivir X 3 doses  Decadron 6mg IV daily - short course  Trend temps and cbc  Monitor resp status and 02 requirements  Pulm toileting  Stable from ID standpoint    Continue with present regiment .  Approptiate use of antibiotics and adverse effects reviewed.      I have discussed the above plan of care with patient in detail. She expressed understanding of the treatment plan . Risks, benefits and alternatives discussed in detail. I have asked if she has any questions or concerns and appropriately addressed them to the best of my ability .      > 45 minutes spent in direct patient care reviewing  the notes, lab data/ imaging , discussion with multidisciplinary team. All questions were addressed and answered to the best of my capacity .    Thank you for allowing me to participate in the care of your patient .      Brady Oneal MD  Infectious Disease  [phone]

## 2023-12-15 NOTE — PHYSICAL THERAPY INITIAL EVALUATION ADULT - DIAGNOSIS, PT EVAL
General Deconditioning s/p infection, acute respiratory syndrome General Deconditioning s/p infection, acute respiratory syndrome, +COVID

## 2023-12-15 NOTE — CARE COORDINATION ASSESSMENT. - NSCAREPROVIDERS_GEN_ALL_CORE_FT
CARE PROVIDERS:  Accepting Physician: Reema Fox  Access Services: Raulito Mcneal  Administration: Kwasi Abreu  Administration: Alycia Martin  Admitting: Reema Fox  Attending: Reema Fox  Case Management: Mira Estes  Consultant: Vega Rivers  Consultant: Colton Muñoz  Consultant: Brady Oneal  Consultant: Tyrell Smith  Consultant: Leon Ron  Consultant: Joel Pardo  Covering Team: Margret Monroy ED Attending: Melo Knutson ED Nurse: Domingo Mcallister  Infection Control: Lala Jonas  Nurse: Joana Frost  Nurse: Tiff Neal  Nurse: Catrachito Cowan  Nurse: Junaid Mota  Nurse: Tamika Murdock  Nurse: Kwasi Shin  Nurse: Stephanie Suazo  Nurse: Alondra Carter  Nurse: Andreia Clark  Ordered: [facility]Account, [facility]  Outpatient Provider: Kathie Rivers  Override: Junaid Mota  Override: Alondra Carter  Override: Andreia Clark  Override: Domingo Mcallister  PCA/Nursing Assistant: Theo Nichols  Physical Therapy: Pradeep Pelletier  Physical Therapy: Brittany Duncan  Primary Team: Niurka Leon  Registered Dietitian: Cate Marks  Respiratory Therapy: Naga Cuevas  : Magalys Langston  : Rodolfo Jung  Speech Pathology: Radha Gonzalez  UR// Supp. Assoc.: Dunia Stewart   CARE PROVIDERS:  Accepting Physician: Reema Fox  Access Services: Raulito Mcneal  Administration: Kwasi Abreu  Administration: Alycia Martin  Admitting: Reema Fox  Attending: Reema Fox  Case Management: Mira Estes  Consultant: Vega Rivers  Consultant: Colton Muñoz  Consultant: Brady Oneal  Consultant: Tyrell Smith  Consultant: Leon Ron  Consultant: Joel Pardo  Covering Team: Margret Monroy ED Attending: Melo Knutson ED Nurse: Domingo Mcallister  Infection Control: Lala Jonas  Nurse: Joana Frost  Nurse: Tiff Neal  Nurse: Catrachito Cowan  Nurse: Junaid Mota  Nurse: Tamika Murdock  Nurse: Kwasi Shin  Nurse: Stephanie Suazo  Nurse: Alondra Carter  Nurse: Andreia Clark  Ordered: ServiceAccount, [facility]ML  Outpatient Provider: Kathie Rivers  Override: Junaid Mota  Override: Alondra Carter  Override: Andreia Clark  Override: Domingo Mcallister  PCA/Nursing Assistant: Theo Nichols  Physical Therapy: Pradeep Pelletier  Physical Therapy: Brittany Duncan  Primary Team: Niurka Leon  Registered Dietitian: Cate Marks  Respiratory Therapy: Naga Cuevas  : Magalys Langston  : Rodolfo Jung  Speech Pathology: Rdaha Gonzalez  UR// Supp. Assoc.: Dunia Stewart   CARE PROVIDERS:  Accepting Physician: Reema Fox  Access Services: Raulito Mcneal  Administration: Kwasi Abreu  Administration: Balbir Fulton  Administration: Alycia Martin  Admitting: Reema Fox  Attending: Reema Fox  Case Management: Mira Estes  Consultant: Vega Rivers  Consultant: Colton Muñoz  Consultant: Brady Oneal  Consultant: Tyrell Smith  Consultant: Leon Ron  Consultant: Joel Pardo  Covering Team: Margret Monroy ED Attending: Melo Knutson ED Nurse: Domingo Mcallister  Infection Control: Lala Jonas  Nurse: Joana Frost  Nurse: Tiff Neal  Nurse: Catrachito Cowan  Nurse: Junaid Mota  Nurse: Tamika Murdock  Nurse: Kwasi Shin  Nurse: Stephanie Suazo  Nurse: Alondra Carter  Nurse: Andreia Clark  Ordered: ServiceAccount, [facility]ML  Outpatient Provider: Kathie Rivers  Override: Junaid Mota  Override: Alondra Carter  Override: Andreia Clark  Override: Domingo Mcallister  PCA/Nursing Assistant: Theo Nichols  Physical Therapy: Pradeep Pelletier  Physical Therapy: Brittany Duncan  Primary Team: Niurka Leon  Registered Dietitian: Cate Marks  Respiratory Therapy: Naga Cuevas  : Magalys Langston  : Rodolfo Jung  Speech Pathology: Radha Gonzalez  Speech Pathology: Merlin Dyer// Supp. Assoc.: Dunia Stewart   CARE PROVIDERS:  Accepting Physician: Reema Fox  Access Services: Raulito Mcneal  Administration: Kwasi Abreu  Administration: Balbir Fulton  Administration: Alycia Martin  Admitting: Reema Fox  Attending: Reema Fox  Case Management: Mira Estes  Consultant: Vega Rivers  Consultant: Colton Muñoz  Consultant: Brady Oneal  Consultant: Tyrell Smith  Consultant: Leon Ron  Consultant: Joel Pardo  Covering Team: Margret Monroy ED Attending: Melo Knutson ED Nurse: Domingo Mcallister  Infection Control: Lala Jonas  Nurse: Joana Frost  Nurse: Tiff Neal  Nurse: Catrachito Cowan  Nurse: Junaid Mota  Nurse: Tamika Murdock  Nurse: Kwasi Shin  Nurse: Stephanie Suazo  Nurse: Alondra Carter  Nurse: Andreia Clark  Ordered: ServiceAccount, [facility]  Outpatient Provider: Kathie Rivers  Override: Junaid Mota  Override: Alondra Carter  Override: Andreia Clark  Override: Domingo Mcallister  PCA/Nursing Assistant: Theo Nichols  Physical Therapy: Pradeep Pelletier  Physical Therapy: Brittany Duncan  Primary Team: Niurka Leon  Registered Dietitian: Cate Marks  Respiratory Therapy: Naga Cuevas  : Magalys Langston  : Rodolfo Jung  Speech Pathology: Radha Gonzalez  Speech Pathology: Merlin Dyer// Supp. Assoc.: Dunia Stewart

## 2023-12-15 NOTE — SWALLOW BEDSIDE ASSESSMENT ADULT - ASR SWALLOW RECOMMEND DIAG
This writer spoke to the pt's medical provider, Dr. Fox, at which time she reported she does not have concerns for aspiration at this time and does not think an objective swallow test is indicated at this time. However, she stated she will refer for objective testing if concern for aspiration arises. This writer spoke to the pt's medical provider, Dr. oFx, at which time she reported she does not have concerns for aspiration at this time and does not think an objective swallow test is indicated at this time. However, she stated she will refer for objective testing if concern for aspiration arises.

## 2023-12-15 NOTE — PROGRESS NOTE ADULT - ASSESSMENT
Patient developed cough shortness of breath chest tightness sore throat congestion 2 days ago had a positive COVID test that day complaining of worsening symptoms.  No fevers chills abdominal pain vomiting edema calf.  Patient relates on Eliquis.PMD [id number] PMHx HFrEF (LVEF 43% 5/2022), CAD with prior MI s/p PCI, Afib (on Eliquis) presenting with cough, SOB x2 days . positive for  COVID 2 days ago (vaccinated x3).  cough worsening and  feel more dyspneic prompting ER evaluation. In ER, concern for stridor and was given Racemic Epi x2 and Decadron 10mg x1.   CT Soft Tissue Neck negative. CT Chest with small bilateral pleural effusions/ atelectasis.   Patient and MICU consult , and is stable , and being admitted for further management   # Covid infection, with ac resp failure with hypoxia- improved  # CAD  # CHF systolic  # Paroxysmal afib  # B/l Pleural Effusion    pt started on remdesvir and decadron ,, ID consulted  cardio and pulmonary consult noted  PT   Patient developed cough shortness of breath chest tightness sore throat congestion 2 days ago had a positive COVID test that day complaining of worsening symptoms.  No fevers chills abdominal pain vomiting edema calf.  Patient relates on Eliquis.PMD [id number] PMHx HFrEF (LVEF 43% 5/2022), CAD with prior MI s/p PCI, Afib (on Eliquis) presenting with cough, SOB x2 days . positive for  COVID 2 days ago (vaccinated x3).  cough worsening and  feel more dyspneic prompting ER evaluation. In ER, concern for stridor and was given Racemic Epi x2 and Decadron 10mg x1.   CT Soft Tissue Neck negative. CT Chest with small bilateral pleural effusions/ atelectasis.   Patient and MICU consult , and is stable , and being admitted for further management   # Covid infection, with ac resp failure with hypoxia- improved  # CAD  # CHF systolic  # Paroxysmal afib  # B/l Pleural Effusion    pt started on remdesvir and decadron ,, ID consulted  cardio and pulmonary consult noted  PT

## 2023-12-15 NOTE — CARE COORDINATION ASSESSMENT. - PRO ARRIVE FROM
CM met pt. at bedside on SPCU, pt. A&O x 4 resting in bed coughing at times. On room air.  CM explained role of CM and availability to assist  with transition to home planning throughout  stay.   Provided CM contact information and  pt. verbalized understanding.  Pt. gave permission to contact her daughter Bebe Recinos [phone] and CM called Daughter .    Pt. States Transition plan are TBD.  CM explained home care expectations, process, insurance provisions and home  safety.   Offered list of [facility] and pt. awaits PT eval for recommendations and will chose with her daughter - a Home Care agency if needed..  Provided discharge resource folder with list of both [facility] and [facility].  CM made referral accordingly.  Anticipate Transition date TBD pending pt. progress and completion of steroids and Remdesivir and PT eval and progress.      DME : has at home a Cane that she uses outside.  Independent in ambulation PTA.       PCP is Dr. Cary Huang  [address] [place] [address]  Cardio Dr. Kathie Rivers [place]  Pharmacy [facility] [address] in [place]    Pt. verbalizing understanding and in agreement with Transition post acute plans.   CM to follow./home CM met pt. at bedside on SPCU, pt. A&O x 4 resting in bed coughing at times. On room air.  CM explained role of CM and availability to assist  with transition to home planning throughout  stay.   Provided CM contact information and  pt. verbalized understanding.  Pt. gave permission to contact her daughter Bebe Recinos [phone] and CM called Daughter .    Pt. States Transition plan are TBD.  CM explained home care expectations, process, insurance provisions and home  safety.   Offered list of [facility] and pt. awaits PT eval for recommendations and will chose with her daughter - a Home Care agency if needed..  Provided discharge resource folder with list of both [facility] and [facility].  CM made referral accordingly.  Anticipate Transition date TBD pending pt. progress and completion of steroids and Remdesivir and PT eval and progress.      DME : has at home a Cane that she uses outside.  Independent in ambulation PTA.       PCP is Dr. Cary Huang  [address] [place] [address]  Cardio Dr. Kathie Rivers [place]  Pharmacy [facility] [address] in [place]    Pt. verbalizing understanding and in agreement with Transition post acute plans.   CM to follow./home CM met pt. at bedside on SPCU, pt. A&O x 4 resting in bed coughing at times. On room air.  CM explained role of CM and availability to assist  with transition to home planning throughout  stay.   Provided CM contact information and  pt. verbalized understanding.  Pt. gave permission to contact her daughter Bebe Recinos [phone] and CM called Daughter.    Pt/ dtr states pt. lives with daughter and son in law in a split level home.  Has no steps to enter / bedroom level Kitchen is 4 steps up with HR.  Pt. owns a cane that she uses outside the home.  Dtr does the shopping cooking, prepares the med minder for patient.  Dtr makes MD appointments and transports pt.  Pt. Independent with showering - has grab bars and when family home.  CM informed Daughter that we recommend having a follow up MD appointment made with PCP within a week of going home.  Dtr states she will make pt's a[appointments with Dr. Rivers and PCP herself.   States she will come to visit today and review the list of [facility] and [facility].  Aware PT eval pending.    Pt. States Transition plan are TBD.  CM explained home care expectations, process, insurance provisions and home  safety.   Offered list of [facility] and pt. awaits PT eval for recommendations and will chose with her daughter - a Home Care agency if needed..  Provided discharge resource folder with list of both [facility] and [facility].  CM made referral accordingly.  Anticipate Transition date TBD pending pt. progress and completion of steroids and Remdesivir and PT eval and progress.      DME : has at home a Cane that she uses outside.  Independent in ambulation PTA.       PCP is Dr. Cary Huang  [address] [place] [address]  Cardio Dr. Kathie Rivers [place]  Pharmacy [facility] [address] in [place]    Pt. verbalizing understanding and in agreement with Transition post acute plans.   CM to follow./home CM met pt. at bedside on SPCU, pt. A&O x 4 resting in bed coughing at times. On room air.  CM explained role of CM and availability to assist  with transition to home planning throughout  stay.   Provided CM contact information and  pt. verbalized understanding.  Pt. gave permission to contact her daughter Bebe Recinos [phone] and CM called Daughter.    Pt/ dtr states pt. lives with daughter and son in law in a split level home.  Has no steps to enter / bedroom level Kitchen is 4 steps up with HR.  Pt. owns a cane that she uses outside the home.  Dtr does the shopping cooking, prepares the med minder for patient.  Dtr makes MD appointments and transports pt.  Pt. Independent with showering - has grab bars and when family home.  CM informed Daughter that we recommend having a follow up MD appointment made with PCP within a week of going home.  Dtr states she will make pt's a[appointments with Dr. Rivers and PCP herself.   States she will come to visit today and review the list of [facility] and [facility].  Aware PT eval pending.    Pt. States Transition plan are TBD.  CM explained home care expectations, process, insurance provisions and home  safety.   Offered list of [facility] and pt. awaits PT eval for recommendations and will chose with her daughter - a Home Care agency if needed..  Provided discharge resource folder with list of both [facility] and [facility].  CM made referral accordingly.  Anticipate Transition date TBD pending pt. progress and completion of steroids and Remdesivir and PT eval and progress.      DME : has at home a Cane that she uses outside.  Independent in ambulation PTA.       PCP is Dr. Cary Huang  [address] [place] [address]  Cardio Dr. Kathie Rivers [place]  Pharmacy [facility] [address] in [place]    Pt. verbalizing understanding and in agreement with Transition post acute plans.   CM to follow./home

## 2023-12-15 NOTE — CARE COORDINATION ASSESSMENT. - NSDCPLANSERVICES_GEN_ALL_CORE
Per EMR & H&P:  To ED from home c/o sob x3 days - + Covid x2 days,  Stridor -- in SPCU     86F PMHx HFrEF (LVEF 43% 5/2022), CAD with prior MI s/p PCI, Afib (on Eliquis) presenting with cough, SOB x2 days . positive for  COVID 2 days ago (vaccinated x3).  cough worsening and  feel more dyspneic prompting ER evaluation. In ER, concern for stridor and was given Racemic Epi x2 and Decadron 10mg x1. Vaccinated x3. Recommend starting Remdesivir / Decadron courses for COVID-19 given sx. Albuterol PRN for wheezing. CT Soft Tissue Neck negative. CT Chest with small bilateral pleural effusions/ atelectasis.   --------------------    D/c plans TBD // awaits PT eval./Anticipated Needs Unclear at Present

## 2023-12-15 NOTE — SWALLOW BEDSIDE ASSESSMENT ADULT - COMMENTS
"Patient developed cough shortness of breath chest tightness sore throat congestion 2 days ago had a positive COVID test that day complaining of worsening symptoms.  No fevers chills abdominal pain vomiting edema calf.  Patient relates on Eliquis.PMD [id number] PMHx HFrEF (LVEF 43% 5/2022), CAD with prior MI s/p PCI, Afib (on Eliquis) presenting with cough, SOB x2 days . positive for  COVID 2 days ago (vaccinated x3).  cough worsening and  feel more dyspneic prompting ER evaluation. In ER, concern for stridor and was given Racemic Epi x2 and Decadron 10mg x1.   CT Soft Tissue Neck negative. CT Chest with small bilateral pleural effusions/ atelectasis.   Patient and MICU consult , and is stable , and being admitted for further management"    CT Chest 12/14/23:  IMPRESSION:  1.  Small bilateral pleural effusions and bilateral linear atelectasis.  2.  In the right lower lobe is a 5 x 3 mm noncalcified nodule. "Patient developed cough shortness of breath chest tightness sore throat congestion 2 days ago had a positive COVID test that day complaining of worsening symptoms.  No fevers chills abdominal pain vomiting edema calf.  Patient relates on Eliquis.PMD [id number] PMHx HFrEF (LVEF 43% 5/2022), CAD with prior MI s/p PCI, Afib (on Eliquis) presenting with cough, SOB x2 days . positive for  COVID 2 days ago (vaccinated x3).  cough worsening and  feel more dyspneic prompting ER evaluation. In ER, concern for stridor and was given Racemic Epi x2 and Decadron 10mg x1.   CT Soft Tissue Neck negative. CT Chest with small bilateral pleural effusions/ atelectasis.   Patient and MICU consult , and is stable , and being admitted for further management"    CT Chest 12/14/23:  IMPRESSION:  1.  Small bilateral pleural effusions and bilateral linear atelectasis.  2.  In the right lower lobe is a 5 x 3 mm noncalcified nodule.

## 2023-12-15 NOTE — CARE COORDINATION ASSESSMENT. - NSPASTMEDSURGHISTORY_GEN_ALL_CORE_FT
PAST MEDICAL & SURGICAL HISTORY:  CAD (coronary artery disease)      Mild HTN      CAD (coronary artery disease)  s/p Cath & 3 stents to LAD on 3/2020; OM1 stent x 1  EF ~30%      Rapid atrial fibrillation      Myocardial infarction      History of CHF (congestive heart failure)      S/P cataract surgery

## 2023-12-15 NOTE — CONSULT NOTE ADULT - SUBJECTIVE AND OBJECTIVE BOX
History of Present Illness: The patient is an 88 year old female with a history of CAD with prior MI s/p PCI, chronic systolic heart failure, paroxysmal atrial fibrillation who presents with cough, congestion, dyspnea. She has noted nasal congestion and cough over the past couple of days. There has been some dyspnea on exertion. No chest pain or leg swelling. She tested positive for COVID at home.    Past Medical/Surgical History:  CAD with prior MI s/p PCI, chronic systolic heart failure, paroxysmal atrial fibrillation    Medications:  Home Medications:  amiodarone 100 mg oral tablet: 2 tab(s) orally once a day (14 Dec 2023 21:03)  atorvastatin 40 mg oral tablet: 1 tab(s) orally once a day (14 Dec 2023 16:31)  clopidogrel 75 mg oral tablet: 1 tab(s) orally once a day (14 Dec 2023 16:31)  Entresto 49 mg-51 mg oral tablet: 1 tab(s) orally 2 times a day (14 Dec 2023 16:31)  furosemide 20 mg oral tablet: 1 tab(s) orally once a day (14 Dec 2023 17:28)  Lagevrio 200 mg oral capsule: 4 cap(s) orally 2 times a day (14 Dec 2023 21:03)  metoprolol tartrate 25 mg oral tablet: 1 tab(s) orally 2 times a day (14 Dec 2023 16:31)      Family History: Non-contributory family history of premature cardiovascular atherosclerotic disease    Social History: No tobacco, alcohol or drug use    Review of Systems:  General: No fevers, chills, weight gain  Skin: No rashes, color changes  Cardiovascular: No chest pain, orthopnea  Respiratory: No shortness of breath, cough  Gastrointestinal: No nausea, abdominal pain  Genitourinary: No incontinence, pain with urination  Musculoskeletal: No pain, swelling, decreased range of motion  Neurological: No headache, weakness  Psychiatric: No depression, anxiety  Endocrine: No weight gain, increased thirst  All other systems are comprehensively negative.    Physical Exam:  Vitals:        Vital Signs Last 24 Hrs  T(C): 36.6 (15 Dec 2023 07:57), Max: 37.2 (14 Dec 2023 16:16)  T(F): 97.8 (15 Dec 2023 07:57), Max: 99 (14 Dec 2023 16:16)  HR: 58 (15 Dec 2023 08:00) (52 - 124)  BP: 127/62 (15 Dec 2023 08:00) (104/56 - 168/78)  BP(mean): 81 (15 Dec 2023 08:00) (72 - 99)  RR: 24 (15 Dec 2023 08:00) (17 - 27)  SpO2: 98% (15 Dec 2023 08:00) (92% - 99%)  Parameters below as of 15 Dec 2023 00:55  Patient On (Oxygen Delivery Method): room air  General: NAD  HEENT: MMM  Neck: No JVD, no carotid bruit  Lungs: CTAB  CV: RRR, nl S1/S2, no M/R/G  Abdomen: S/NT/ND, +BS  Extremities: No LE edema, no cyanosis  Neuro: AAOx3, non-focal  Skin: No rash    Labs:                        11.9   4.43  )-----------( 122      ( 15 Dec 2023 05:30 )             34.9     12-15    138  |  104  |  17  ----------------------------<  176<H>  3.8   |  25  |  0.89    Ca    8.4      15 Dec 2023 05:30    TPro  5.4<L>  /  Alb  2.7<L>  /  TBili  0.2  /  DBili  0.1  /  AST  28  /  ALT  31  /  AlkPhos  52  12-15        PT/INR - ( 14 Dec 2023 17:19 )   PT: 13.9 sec;   INR: 1.25 ratio         PTT - ( 14 Dec 2023 17:19 )  PTT:33.3 sec    ECG/Telemetry: NSR, RBBB

## 2023-12-15 NOTE — PROGRESS NOTE ADULT - SUBJECTIVE AND OBJECTIVE BOX
Patient is a 88y old  Female who presents with a chief complaint of sob (15 Dec 2023 08:13)      INTERVAL HPI/OVERNIGHT EVENTS:overnight events noted    Home Medications:  amiodarone 100 mg oral tablet: 2 tab(s) orally once a day (14 Dec 2023 21:03)  atorvastatin 40 mg oral tablet: 1 tab(s) orally once a day (14 Dec 2023 16:31)  clopidogrel 75 mg oral tablet: 1 tab(s) orally once a day (14 Dec 2023 16:31)  Entresto 49 mg-51 mg oral tablet: 1 tab(s) orally 2 times a day (14 Dec 2023 16:31)  furosemide 20 mg oral tablet: 1 tab(s) orally once a day (14 Dec 2023 17:28)  Lagevrio 200 mg oral capsule: 4 cap(s) orally 2 times a day (14 Dec 2023 21:03)  metoprolol tartrate 25 mg oral tablet: 1 tab(s) orally 2 times a day (14 Dec 2023 16:31)      MEDICATIONS  (STANDING):  aMIOdarone    Tablet 200 milliGRAM(s) Oral daily  apixaban 2.5 milliGRAM(s) Oral every 12 hours  atorvastatin 40 milliGRAM(s) Oral at bedtime  budesonide 160 MICROgram(s)/formoterol 4.5 MICROgram(s) Inhaler 2 Puff(s) Inhalation two times a day  clopidogrel Tablet 75 milliGRAM(s) Oral daily  dexAMETHasone     Tablet 6 milliGRAM(s) Oral daily  famotidine    Tablet 20 milliGRAM(s) Oral daily  furosemide    Tablet 20 milliGRAM(s) Oral daily  melatonin 3 milliGRAM(s) Oral at bedtime  remdesivir  IVPB 100 milliGRAM(s) IV Intermittent every 24 hours  sacubitril 49 mG/valsartan 51 mG 1 Tablet(s) Oral two times a day    MEDICATIONS  (PRN):      Allergies    Persantine (Rash)  Persantine (Unknown)    Intolerances        REVIEW OF SYSTEMS:  CONSTITUTIONAL: No fever, weight loss, has fatigue  EYES: No eye pain, visual disturbances, or discharge  ENMT:  No difficulty hearing, tinnitus, vertigo; No sinus or throat pain  NECK: No pain or stiffness  BREASTS: No pain, masses, or nipple discharge  RESPIRATORY: has cough,and shortness of breath  CARDIOVASCULAR: No chest pain, palpitations, dizziness, or leg swelling  GASTROINTESTINAL: No abdominal or epigastric pain. No nausea, vomiting, or hematemesis; No diarrhea or constipation. No melena or hematochezia.  GENITOURINARY: No dysuria, frequency, hematuria, or incontinence  NEUROLOGICAL: No headaches, , numbness, or tremors  SKIN: No itching, burning, rashes, or lesions   HR: 58 (15 Dec 2023 08:00) (52 - 124)  BP: 127/62 (15 Dec 2023 08:00) (104/56 - 168/78)  BP(mean): 81 (15 Dec 2023 08:00) (72 - 99)  RR: 24 (15 Dec 2023 08:00) (17 - 27)  SpO2: 98% (15 Dec 2023 08:00) (92% - 99%)    Parameters below as of 15 Dec 2023 00:55  Patient On (Oxygen Delivery Method): room air        PHYSICAL EXAM:  GENERAL: frail  HEAD:  Atraumatic, Normocephalic  EYES: EOMI, PERRLA, conjunctiva and sclera clear  ENMT: Moist mucous membranes,   NECK: Supple, No JVD, Normal thyroid  NERVOUS SYSTEM:  Alert & Oriented X3, non focal  CHEST/LUNG: BS decreased at abses  HEART: Regular rate and rhythm;  ABDOMEN: Soft, Nontender, Nondistended; Bowel sounds present  EXTREMITIES:  2+ Peripheral Pulses,   SKIN: No rashes or lesions    LABS:                        11.9   4.43  )-----------( 122      ( 15 Dec 2023 05:30 )             34.9     12-15    138  |  104  |  17  ----------------------------<  176<H>  3.8   |  25  |  0.89    Ca    8.4      15 Dec 2023 05:30    TPro  5.4<L>  /  Alb  2.7<L>  /  TBili  0.2  /  DBili  0.1  /  AST  28  /  ALT  31  /  AlkPhos  52  12-15    PT/INR - ( 14 Dec 2023 17:19 )   PT: 13.9 sec;   INR: 1.25 ratio         PTT - ( 14 Dec 2023 17:19 )  PTT:33.3 sec  Urinalysis Basic - ( 15 Dec 2023 05:30 )    Color: x / Appearance: x / SG: x / pH: x  Gluc: 176 mg/dL / Ketone: x  / Bili: x / Urobili: x   Blood: x / Protein: x / Nitrite: x   Leuk Esterase: x / RBC: x / WBC x   Sq Epi: x / Non Sq Epi: x / Bacteria: x      CAPILLARY BLOOD GLUCOSE              I&O's Summary      RADIOLOGY & ADDITIONAL TESTS:    Imaging Personally Reviewed:  [x ] YES  [ ] NO    Consultant(s) Notes Reviewed:  [x ] YES  [ ] NO    Care Discussed with Consultants/Other Providers [ x] YES  [ ] NO

## 2023-12-15 NOTE — PHYSICAL THERAPY INITIAL EVALUATION ADULT - PERTINENT HX OF CURRENT PROBLEM, REHAB EVAL
Patient developed cough shortness of breath chest tightness sore throat congestion 2 days ago had a positive COVID test that day complaining of worsening symptoms.  No fevers chills abdominal pain vomiting edema calf.  Patient relates on Eliquis.PMD [id number] PMHx HFrEF (LVEF 43% 5/2022), CAD with prior MI s/p PCI, Afib (on Eliquis) presenting with cough, SOB x2 days . positive for  COVID 2 days ago (vaccinated x3).  cough worsening and  feel more dyspneic prompting ER evaluation. In ER, concern for stridor and was given Racemic Epi x2 and Decadron 10mg x1.   CT Soft Tissue Neck negative. CT Chest with small bilateral pleural effusions/ atelectasis.   Patient and MICU consult , and is stable , and being admitted for further management Patient developed cough shortness of breath chest tightness sore throat congestion 2 days ago had a positive COVID test that day complaining of worsening symptoms.  No fevers chills abdominal pain vomiting edema calf.  Patient relates on Eliquis.PMD [id number] PMHx HFrEF (LVEF 43% 5/2022), CAD with prior MI s/p PCI, Afib (on Eliquis) presenting with cough, SOB x2 days . positive for  COVID 2 days ago (vaccinated x3).  cough worsening and  feel more dyspneic prompting ER evaluation. In ER, concern for stridor and was given Racemic Epi x2 and Decadron 10mg x1.   CT Soft Tissue Neck negative. CT Chest with small bilateral pleural effusions/ atelectasis.   Patient and MICU consult , and is stable , and being admitted for further management Patient developed cough shortness of breath chest tightness sore throat congestion 2 days ago had a positive COVID test that day complaining of worsening symptoms.  No fevers chills abdominal pain vomiting edema calf.  Patient relates on Eliquis.PMD [id number] PMHx HFrEF (LVEF 43% 5/2022), CAD with prior MI s/p PCI, Afib (on Eliquis) presenting with cough, SOB x2 days . positive for  COVID 2 days ago (vaccinated x3).  cough worsening and  feel more dyspneic prompting ER evaluation. In ER, concern for stridor and was given Racemic Epi x2 and Decadron 10mg x1.  CT Soft Tissue Neck negative. CT Chest with small bilateral pleural effusions/ atelectasis.   Patient and MICU consult , and is stable , and being admitted for further management Patient developed cough shortness of breath chest tightness sore throat congestion 2 days ago had a positive COVID test that day complaining of worsening symptoms.  No fevers chills abdominal pain vomiting edema calf.  Patient relates on Eliquis.PMD [id number] PMHx HFrEF (LVEF 43% 5/2022), CAD with prior MI s/p PCI, Afib (on Eliquis) presenting with cough, SOB x2 days . positive for  COVID 2 days ago (vaccinated x3).  cough worsening and  feel more dyspneic prompting ER evaluation. In ER, concern for stridor and was given Racemic Epi x2 and Decadron 10mg x1.  CT Soft Tissue Neck negative. CT Chest with small bilateral pleural effusions/ atelectasis.   Patient and MICU consult , and is stable , and being admitted for further management

## 2023-12-16 LAB
ALBUMIN SERPL ELPH-MCNC: 2.7 G/DL — LOW (ref 3.3–5)
ALP SERPL-CCNC: 58 U/L — SIGNIFICANT CHANGE UP (ref 30–120)
ALT FLD-CCNC: 27 U/L — SIGNIFICANT CHANGE UP (ref 10–60)
ALT FLD-CCNC: 27 U/L — SIGNIFICANT CHANGE UP (ref 10–60)
ALT FLD-CCNC: 28 U/L — SIGNIFICANT CHANGE UP (ref 10–60)
ALT FLD-CCNC: 28 U/L — SIGNIFICANT CHANGE UP (ref 10–60)
ANION GAP SERPL CALC-SCNC: 8 MMOL/L — SIGNIFICANT CHANGE UP (ref 5–17)
ANION GAP SERPL CALC-SCNC: 8 MMOL/L — SIGNIFICANT CHANGE UP (ref 5–17)
AST SERPL-CCNC: 29 U/L — SIGNIFICANT CHANGE UP (ref 10–40)
AST SERPL-CCNC: 29 U/L — SIGNIFICANT CHANGE UP (ref 10–40)
AST SERPL-CCNC: 35 U/L — SIGNIFICANT CHANGE UP (ref 10–40)
AST SERPL-CCNC: 35 U/L — SIGNIFICANT CHANGE UP (ref 10–40)
BASOPHILS # BLD AUTO: 0 K/UL — SIGNIFICANT CHANGE UP (ref 0–0.2)
BASOPHILS # BLD AUTO: 0 K/UL — SIGNIFICANT CHANGE UP (ref 0–0.2)
BASOPHILS NFR BLD AUTO: 0 % — SIGNIFICANT CHANGE UP (ref 0–2)
BASOPHILS NFR BLD AUTO: 0 % — SIGNIFICANT CHANGE UP (ref 0–2)
BILIRUB DIRECT SERPL-MCNC: 0.1 MG/DL — SIGNIFICANT CHANGE UP (ref 0–0.3)
BILIRUB DIRECT SERPL-MCNC: 0.1 MG/DL — SIGNIFICANT CHANGE UP (ref 0–0.3)
BILIRUB INDIRECT FLD-MCNC: 0.1 MG/DL — LOW (ref 0.2–1)
BILIRUB INDIRECT FLD-MCNC: 0.1 MG/DL — LOW (ref 0.2–1)
BILIRUB SERPL-MCNC: 0.2 MG/DL — SIGNIFICANT CHANGE UP (ref 0.2–1.2)
BUN SERPL-MCNC: 25 MG/DL — HIGH (ref 7–23)
BUN SERPL-MCNC: 25 MG/DL — HIGH (ref 7–23)
CALCIUM SERPL-MCNC: 8.2 MG/DL — LOW (ref 8.4–10.5)
CALCIUM SERPL-MCNC: 8.2 MG/DL — LOW (ref 8.4–10.5)
CHLORIDE SERPL-SCNC: 105 MMOL/L — SIGNIFICANT CHANGE UP (ref 96–108)
CHLORIDE SERPL-SCNC: 105 MMOL/L — SIGNIFICANT CHANGE UP (ref 96–108)
CO2 SERPL-SCNC: 26 MMOL/L — SIGNIFICANT CHANGE UP (ref 22–31)
CO2 SERPL-SCNC: 26 MMOL/L — SIGNIFICANT CHANGE UP (ref 22–31)
CREAT SERPL-MCNC: 0.95 MG/DL — SIGNIFICANT CHANGE UP (ref 0.5–1.3)
CREAT SERPL-MCNC: 0.95 MG/DL — SIGNIFICANT CHANGE UP (ref 0.5–1.3)
EGFR: 58 ML/MIN/1.73M2 — LOW
EGFR: 58 ML/MIN/1.73M2 — LOW
EOSINOPHIL # BLD AUTO: 0 K/UL — SIGNIFICANT CHANGE UP (ref 0–0.5)
EOSINOPHIL # BLD AUTO: 0 K/UL — SIGNIFICANT CHANGE UP (ref 0–0.5)
EOSINOPHIL NFR BLD AUTO: 0 % — SIGNIFICANT CHANGE UP (ref 0–6)
EOSINOPHIL NFR BLD AUTO: 0 % — SIGNIFICANT CHANGE UP (ref 0–6)
GLUCOSE SERPL-MCNC: 155 MG/DL — HIGH (ref 70–99)
GLUCOSE SERPL-MCNC: 155 MG/DL — HIGH (ref 70–99)
HCT VFR BLD CALC: 35.1 % — SIGNIFICANT CHANGE UP (ref 34.5–45)
HCT VFR BLD CALC: 35.1 % — SIGNIFICANT CHANGE UP (ref 34.5–45)
HGB BLD-MCNC: 12 G/DL — SIGNIFICANT CHANGE UP (ref 11.5–15.5)
HGB BLD-MCNC: 12 G/DL — SIGNIFICANT CHANGE UP (ref 11.5–15.5)
IMM GRANULOCYTES NFR BLD AUTO: 0.4 % — SIGNIFICANT CHANGE UP (ref 0–0.9)
IMM GRANULOCYTES NFR BLD AUTO: 0.4 % — SIGNIFICANT CHANGE UP (ref 0–0.9)
LYMPHOCYTES # BLD AUTO: 0.65 K/UL — LOW (ref 1–3.3)
LYMPHOCYTES # BLD AUTO: 0.65 K/UL — LOW (ref 1–3.3)
LYMPHOCYTES # BLD AUTO: 8.1 % — LOW (ref 13–44)
LYMPHOCYTES # BLD AUTO: 8.1 % — LOW (ref 13–44)
MCHC RBC-ENTMCNC: 33.1 PG — SIGNIFICANT CHANGE UP (ref 27–34)
MCHC RBC-ENTMCNC: 33.1 PG — SIGNIFICANT CHANGE UP (ref 27–34)
MCHC RBC-ENTMCNC: 34.2 GM/DL — SIGNIFICANT CHANGE UP (ref 32–36)
MCHC RBC-ENTMCNC: 34.2 GM/DL — SIGNIFICANT CHANGE UP (ref 32–36)
MCV RBC AUTO: 96.7 FL — SIGNIFICANT CHANGE UP (ref 80–100)
MCV RBC AUTO: 96.7 FL — SIGNIFICANT CHANGE UP (ref 80–100)
MONOCYTES # BLD AUTO: 0.63 K/UL — SIGNIFICANT CHANGE UP (ref 0–0.9)
MONOCYTES # BLD AUTO: 0.63 K/UL — SIGNIFICANT CHANGE UP (ref 0–0.9)
MONOCYTES NFR BLD AUTO: 7.8 % — SIGNIFICANT CHANGE UP (ref 2–14)
MONOCYTES NFR BLD AUTO: 7.8 % — SIGNIFICANT CHANGE UP (ref 2–14)
NEUTROPHILS # BLD AUTO: 6.75 K/UL — SIGNIFICANT CHANGE UP (ref 1.8–7.4)
NEUTROPHILS # BLD AUTO: 6.75 K/UL — SIGNIFICANT CHANGE UP (ref 1.8–7.4)
NEUTROPHILS NFR BLD AUTO: 83.7 % — HIGH (ref 43–77)
NEUTROPHILS NFR BLD AUTO: 83.7 % — HIGH (ref 43–77)
NRBC # BLD: 0 /100 WBCS — SIGNIFICANT CHANGE UP (ref 0–0)
NRBC # BLD: 0 /100 WBCS — SIGNIFICANT CHANGE UP (ref 0–0)
PLATELET # BLD AUTO: 133 K/UL — LOW (ref 150–400)
PLATELET # BLD AUTO: 133 K/UL — LOW (ref 150–400)
POTASSIUM SERPL-MCNC: 3.9 MMOL/L — SIGNIFICANT CHANGE UP (ref 3.5–5.3)
POTASSIUM SERPL-MCNC: 3.9 MMOL/L — SIGNIFICANT CHANGE UP (ref 3.5–5.3)
POTASSIUM SERPL-SCNC: 3.9 MMOL/L — SIGNIFICANT CHANGE UP (ref 3.5–5.3)
POTASSIUM SERPL-SCNC: 3.9 MMOL/L — SIGNIFICANT CHANGE UP (ref 3.5–5.3)
PROT SERPL-MCNC: 5.3 G/DL — LOW (ref 6–8.3)
PROT SERPL-MCNC: 5.3 G/DL — LOW (ref 6–8.3)
PROT SERPL-MCNC: 5.6 G/DL — LOW (ref 6–8.3)
PROT SERPL-MCNC: 5.6 G/DL — LOW (ref 6–8.3)
RBC # BLD: 3.63 M/UL — LOW (ref 3.8–5.2)
RBC # BLD: 3.63 M/UL — LOW (ref 3.8–5.2)
RBC # FLD: 15 % — HIGH (ref 10.3–14.5)
RBC # FLD: 15 % — HIGH (ref 10.3–14.5)
SODIUM SERPL-SCNC: 139 MMOL/L — SIGNIFICANT CHANGE UP (ref 135–145)
SODIUM SERPL-SCNC: 139 MMOL/L — SIGNIFICANT CHANGE UP (ref 135–145)
WBC # BLD: 8.06 K/UL — SIGNIFICANT CHANGE UP (ref 3.8–10.5)
WBC # BLD: 8.06 K/UL — SIGNIFICANT CHANGE UP (ref 3.8–10.5)
WBC # FLD AUTO: 8.06 K/UL — SIGNIFICANT CHANGE UP (ref 3.8–10.5)
WBC # FLD AUTO: 8.06 K/UL — SIGNIFICANT CHANGE UP (ref 3.8–10.5)

## 2023-12-16 PROCEDURE — 71045 X-RAY EXAM CHEST 1 VIEW: CPT | Mod: 26

## 2023-12-16 RX ADMIN — Medication 3 MILLIGRAM(S): at 21:42

## 2023-12-16 RX ADMIN — CLOPIDOGREL BISULFATE 75 MILLIGRAM(S): 75 TABLET, FILM COATED ORAL at 11:52

## 2023-12-16 RX ADMIN — SACUBITRIL AND VALSARTAN 1 TABLET(S): 24; 26 TABLET, FILM COATED ORAL at 06:00

## 2023-12-16 RX ADMIN — ATORVASTATIN CALCIUM 40 MILLIGRAM(S): 80 TABLET, FILM COATED ORAL at 21:42

## 2023-12-16 RX ADMIN — Medication 20 MILLIGRAM(S): at 06:00

## 2023-12-16 RX ADMIN — BUDESONIDE AND FORMOTEROL FUMARATE DIHYDRATE 2 PUFF(S): 160; 4.5 AEROSOL RESPIRATORY (INHALATION) at 08:05

## 2023-12-16 RX ADMIN — FAMOTIDINE 20 MILLIGRAM(S): 10 INJECTION INTRAVENOUS at 11:52

## 2023-12-16 RX ADMIN — SACUBITRIL AND VALSARTAN 1 TABLET(S): 24; 26 TABLET, FILM COATED ORAL at 17:40

## 2023-12-16 RX ADMIN — APIXABAN 2.5 MILLIGRAM(S): 2.5 TABLET, FILM COATED ORAL at 06:00

## 2023-12-16 RX ADMIN — REMDESIVIR 200 MILLIGRAM(S): 5 INJECTION INTRAVENOUS at 23:53

## 2023-12-16 RX ADMIN — BUDESONIDE AND FORMOTEROL FUMARATE DIHYDRATE 2 PUFF(S): 160; 4.5 AEROSOL RESPIRATORY (INHALATION) at 18:24

## 2023-12-16 RX ADMIN — Medication 6 MILLIGRAM(S): at 06:00

## 2023-12-16 RX ADMIN — REMDESIVIR 200 MILLIGRAM(S): 5 INJECTION INTRAVENOUS at 00:27

## 2023-12-16 RX ADMIN — APIXABAN 2.5 MILLIGRAM(S): 2.5 TABLET, FILM COATED ORAL at 17:41

## 2023-12-16 NOTE — DIETITIAN INITIAL EVALUATION ADULT - ENTER FROM (ML/KG)
recvd order from [facility] for genetic-angel post reviewed-pt to see Penny [place] [id number] [facility]. -faxed order 30

## 2023-12-16 NOTE — DIETITIAN INITIAL EVALUATION ADULT - REASON FOR ADMISSION
Per H&P "Patient developed cough shortness of breath chest tightness sore throat congestion 2 days ago had a positive COVID test that day complaining of worsening symptoms.  No fevers chills abdominal pain vomiting edema calf.  Patient relates on Eliquis.PMD [id number] PMHx HFrEF (LVEF 43% 5/2022), CAD with prior MI s/p PCI, Afib (on Eliquis) presenting with cough, SOB x2 days . positive for  COVID 2 days ago (vaccinated x3).  cough worsening and  feel more dyspneic prompting ER evaluation. In ER, concern for stridor and was given Racemic Epi x2 and Decadron 10mg x1."      Per H&P "Patient developed cough shortness of breath chest tightness sore throat congestion 2 days ago had a positive COVID test that day complaining of worsening symptoms.  No fevers chills abdominal pain vomiting edema calf.  Patient relates on Eliquis.PMD [id number] PMHx HFrEF (LVEF 43% 5/2022), CAD with prior MI s/p PCI, Afib (on Eliquis) presenting with cough, SOB x2 days . positive for  COVID 2 days ago (vaccinated x3).  cough worsening and  feel more dyspneic prompting ER evaluation. In ER, concern for stridor and was given Racemic Epi x2 and Decadron 10mg x1."

## 2023-12-16 NOTE — PROGRESS NOTE ADULT - SUBJECTIVE AND OBJECTIVE BOX
Patient is a 88y old  Female who presents with a chief complaint of sob (15 Dec 2023 15:26)      INTERVAL HPI/OVERNIGHT EVENTS:overnight events noted    Home Medications:  amiodarone 100 mg oral tablet: 2 tab(s) orally once a day (14 Dec 2023 21:03)  atorvastatin 40 mg oral tablet: 1 tab(s) orally once a day (14 Dec 2023 16:31)  clopidogrel 75 mg oral tablet: 1 tab(s) orally once a day (14 Dec 2023 16:31)  Entresto 49 mg-51 mg oral tablet: 1 tab(s) orally 2 times a day (14 Dec 2023 16:31)  furosemide 20 mg oral tablet: 1 tab(s) orally once a day (14 Dec 2023 17:28)  Lagevrio 200 mg oral capsule: 4 cap(s) orally 2 times a day (14 Dec 2023 21:03)  metoprolol tartrate 25 mg oral tablet: 1 tab(s) orally 2 times a day (14 Dec 2023 16:31)      MEDICATIONS  (STANDING):  aMIOdarone    Tablet 200 milliGRAM(s) Oral daily  apixaban 2.5 milliGRAM(s) Oral every 12 hours  atorvastatin 40 milliGRAM(s) Oral at bedtime  budesonide 160 MICROgram(s)/formoterol 4.5 MICROgram(s) Inhaler 2 Puff(s) Inhalation two times a day  clopidogrel Tablet 75 milliGRAM(s) Oral daily  dexAMETHasone     Tablet 6 milliGRAM(s) Oral daily  famotidine    Tablet 20 milliGRAM(s) Oral daily  furosemide    Tablet 20 milliGRAM(s) Oral daily  melatonin 3 milliGRAM(s) Oral at bedtime  remdesivir  IVPB 100 milliGRAM(s) IV Intermittent every 24 hours  sacubitril 49 mG/valsartan 51 mG 1 Tablet(s) Oral two times a day    MEDICATIONS  (PRN):      Allergies    Persantine (Rash)  Persantine (Unknown)    Intolerances        REVIEW OF SYSTEMS:  CONSTITUTIONAL: No fever, weight loss, or fatigue  EYES: No eye pain, visual disturbances, or discharge  ENMT:  No difficulty hearing, tinnitus, vertigo; no throat pain  NECK: No pain or stiffness  BREASTS: No pain, masses, or nipple discharge  RESPIRATORY: mild cough  CARDIOVASCULAR: No chest pain, palpitations, dizziness, or leg swelling  GASTROINTESTINAL: No abdominal or epigastric pain. No nausea, vomiting, or hematemesis; No diarrhea or constipation. No melena or hematochezia.  GENITOURINARY: No dysuria, frequency, hematuria, or incontinence  NEUROLOGICAL: No headaches,numbness, or tremors  SKIN: No itching, burning, rashes, or lesions     Vital Signs Last 24 Hrs  T(C): 36.7 (16 Dec 2023 08:05), Max: 36.8 (15 Dec 2023 12:13)  T(F): 98.1 (16 Dec 2023 08:05), Max: 98.3 (15 Dec 2023 16:24)  HR: 49 (16 Dec 2023 04:00) (49 - 67)  BP: 103/50 (16 Dec 2023 04:00) (103/50 - 142/74)  BP(mean): 67 (16 Dec 2023 04:00) (67 - 95)  RR: 16 (16 Dec 2023 04:00) (16 - 28)  SpO2: 92% (16 Dec 2023 04:00) (92% - 98%)    Parameters below as of 16 Dec 2023 09:27  Patient On (Oxygen Delivery Method): room air        PHYSICAL EXAM:  GENERAL: NAD, well-groomed, well-developed  HEAD:  Atraumatic, Normocephalic  EYES: EOMI, PERRLA, conjunctiva and sclera clear  ENMT: Moist mucous membranes,   NECK: Supple, No JVD, Normal thyroid  NERVOUS SYSTEM:  Alert & Oriented X3, Good concentration; Motor Strength 5/5 B/L upper and lower extremities; DTRs 2+ intact and symmetric  CHEST/LUNG: Clear to percussion bilaterally; No rales, rhonchi, wheezing, or rubs  HEART: Regular rate and rhythm; No murmurs, rubs, or gallops  ABDOMEN: Soft, Nontender, Nondistended; Bowel sounds present  EXTREMITIES:  2+ Peripheral Pulses, No clubbing, cyanosis, or edema  LYMPH: No lymphadenopathy noted  SKIN: No rashes or lesions    LABS:                        12.0   8.06  )-----------( 133      ( 16 Dec 2023 05:43 )             35.1     12-16    139  |  105  |  25<H>  ----------------------------<  155<H>  3.9   |  26  |  0.95    Ca    8.2<L>      16 Dec 2023 05:43    TPro  5.3<L>  /  Alb  2.7<L>  /  TBili  0.2  /  DBili  0.1  /  AST  29  /  ALT  27  /  AlkPhos  58  12-16    PT/INR - ( 14 Dec 2023 17:19 )   PT: 13.9 sec;   INR: 1.25 ratio         PTT - ( 14 Dec 2023 17:19 )  PTT:33.3 sec  Urinalysis Basic - ( 16 Dec 2023 05:43 )    Color: x / Appearance: x / SG: x / pH: x  Gluc: 155 mg/dL / Ketone: x  / Bili: x / Urobili: x   Blood: x / Protein: x / Nitrite: x   Leuk Esterase: x / RBC: x / WBC x   Sq Epi: x / Non Sq Epi: x / Bacteria: x      CAPILLARY BLOOD GLUCOSE            Culture - Blood (collected 12-14-23 @ 17:19)  Source: .Blood Blood-Peripheral  Preliminary Report (12-15-23 @ 22:02):    No growth at 24 hours    Culture - Blood (collected 12-14-23 @ 17:19)  Source: .Blood Blood-Peripheral  Preliminary Report (12-15-23 @ 22:02):    No growth at 24 hours        I&O's Summary      RADIOLOGY & ADDITIONAL TESTS:    Imaging Personally Reviewed:  [x ] YES  [ ] NO    Consultant(s) Notes Reviewed:  [x ] YES  [ ] NO    Care Discussed with Consultants/Other Providers [x ] YES  [ ] NO

## 2023-12-16 NOTE — DIETITIAN INITIAL EVALUATION ADULT - PERTINENT LABORATORY DATA
12-16    139  |  105  |  25<H>  ----------------------------<  155<H>  3.9   |  26  |  0.95    Ca    8.2<L>      16 Dec 2023 05:43    TPro  5.3<L>  /  Alb  2.7<L>  /  TBili  0.2  /  DBili  0.1  /  AST  29  /  ALT  27  /  AlkPhos  58  12-16

## 2023-12-16 NOTE — CHART NOTE - NSCHARTNOTEFT_GEN_A_CORE
Rn informed that pt was bradycardic to 38, Bp stable. pt was sleeping. On amio and Metoprolol. Cardiology on board. will hold metoprolol for now.

## 2023-12-16 NOTE — DIETITIAN INITIAL EVALUATION ADULT - OTHER INFO
Visited patient in room, presents with fair/baseline appetite/po intake, consuming >50-75% of meals. Denies n/v/d/c, last BM 11/16. Seen by Speech-Language Pathologist on 12/15 recommended soft and bite sized/mildly thicken liquid. NKFA. Reported # 1-2 months ago, current adm weight 127#, weight appears to be stable, will continue to monitor weight trends as able.     Pertinent medications/nutrition labs reviewed; receiving antibiotic in house.    Educated on adequate protein/energy intake to prevent weight loss, prioritize protein at each meal. Will add Ensure High Protein plus 8oz (350 kcal, 20g protein) 4oz bid to optimize intake. Pt receptive, no nutrition related questions at this time. RD to continue to monitor nutrition status per protocol.

## 2023-12-16 NOTE — DIETITIAN INITIAL EVALUATION ADULT - ORAL INTAKE PTA/DIET HISTORY
Reported following a low salt diet at home, appetite/po intake was fair/baseline. No vitamin/mineral or other nutrition supplements reported.

## 2023-12-16 NOTE — DIETITIAN INITIAL EVALUATION ADULT - PERTINENT MEDS FT
MEDICATIONS  (STANDING):  aMIOdarone    Tablet 200 milliGRAM(s) Oral daily  apixaban 2.5 milliGRAM(s) Oral every 12 hours  atorvastatin 40 milliGRAM(s) Oral at bedtime  budesonide 160 MICROgram(s)/formoterol 4.5 MICROgram(s) Inhaler 2 Puff(s) Inhalation two times a day  clopidogrel Tablet 75 milliGRAM(s) Oral daily  dexAMETHasone     Tablet 6 milliGRAM(s) Oral daily  famotidine    Tablet 20 milliGRAM(s) Oral daily  furosemide    Tablet 20 milliGRAM(s) Oral daily  melatonin 3 milliGRAM(s) Oral at bedtime  remdesivir  IVPB 100 milliGRAM(s) IV Intermittent every 24 hours  sacubitril 49 mG/valsartan 51 mG 1 Tablet(s) Oral two times a day    MEDICATIONS  (PRN):

## 2023-12-16 NOTE — PROGRESS NOTE ADULT - SUBJECTIVE AND OBJECTIVE BOX
Chief Complaint: Cough    Interval Events: No events overnight.    Review of Systems:  General: No fevers, chills, weight gain  Skin: No rashes, color changes  Cardiovascular: No chest pain, orthopnea  Respiratory: No shortness of breath, cough  Gastrointestinal: No nausea, abdominal pain  Genitourinary: No incontinence, pain with urination  Musculoskeletal: No pain, swelling, decreased range of motion  Neurological: No headache, weakness  Psychiatric: No depression, anxiety  Endocrine: No weight gain, increased thirst  All other systems are comprehensively negative.    Physical Exam:  Vitals:        Vital Signs Last 24 Hrs  T(C): 36.7 (16 Dec 2023 08:05), Max: 36.8 (15 Dec 2023 12:13)  T(F): 98.1 (16 Dec 2023 08:05), Max: 98.3 (15 Dec 2023 16:24)  HR: 49 (16 Dec 2023 04:00) (49 - 67)  BP: 103/50 (16 Dec 2023 04:00) (103/50 - 142/74)  BP(mean): 67 (16 Dec 2023 04:00) (67 - 95)  RR: 16 (16 Dec 2023 04:00) (16 - 28)  SpO2: 92% (16 Dec 2023 04:00) (92% - 98%)  Parameters below as of 16 Dec 2023 09:27  Patient On (Oxygen Delivery Method): room air  General: NAD  HEENT: MMM  Neck: No JVD, no carotid bruit  Lungs: CTAB  CV: RRR, nl S1/S2, no M/R/G  Abdomen: S/NT/ND, +BS  Extremities: No LE edema, no cyanosis  Neuro: AAOx3, non-focal  Skin: No rash    Labs:                        12.0   8.06  )-----------( 133      ( 16 Dec 2023 05:43 )             35.1     12-16    139  |  105  |  25<H>  ----------------------------<  155<H>  3.9   |  26  |  0.95    Ca    8.2<L>      16 Dec 2023 05:43    TPro  5.3<L>  /  Alb  2.7<L>  /  TBili  0.2  /  DBili  0.1  /  AST  29  /  ALT  27  /  AlkPhos  58  12-16        PT/INR - ( 14 Dec 2023 17:19 )   PT: 13.9 sec;   INR: 1.25 ratio         PTT - ( 14 Dec 2023 17:19 )  PTT:33.3 sec    ECG/Telemetry: Sinus rhythm

## 2023-12-16 NOTE — DIETITIAN INITIAL EVALUATION ADULT - REASON
No overt signs of muscle or fat depletion. Nutrition Focused Physical Exam is not indicated at this time  Cibinqo Pregnancy And Lactation Text: It is unknown if this medication will adversely affect pregnancy or breast feeding.  You should not take this medication if you are currently pregnant or planning a pregnancy or while breastfeeding.

## 2023-12-16 NOTE — PROGRESS NOTE ADULT - ASSESSMENT
Patient developed cough shortness of breath chest tightness sore throat congestion 2 days ago had a positive COVID test that day complaining of worsening symptoms.  No fevers chills abdominal pain vomiting edema calf.  Patient relates on Eliquis.PMD [id number] PMHx HFrEF (LVEF 43% 5/2022), CAD with prior MI s/p PCI, Afib (on Eliquis) presenting with cough, SOB x2 days . positive for  COVID 2 days ago (vaccinated x3).  cough worsening and  feel more dyspneic prompting ER evaluation. In ER, concern for stridor and was given Racemic Epi x2 and Decadron 10mg x1.   CT Soft Tissue Neck negative. CT Chest with small bilateral pleural effusions/ atelectasis.   Patient and MICU consult , and is stable , and being admitted for further management   # Covid infection, with ac resp failure with hypoxia- improved  # edema uvula - improved  # CAD  # CHF systolic  # Paroxysmal afib  # B/l Pleural Effusion  # transient bradycardia while asleep- cardio f up  pt started on remdesvir and decadron ,, ID consult noted  cardio and pulmonary consult noted  PT  generally improving Patient developed cough shortness of breath chest tightness sore throat congestion 2 days ago had a positive COVID test that day complaining of worsening symptoms.  No fevers chills abdominal pain vomiting edema calf.  Patient relates on Eliquis.PMD [id number] PMHx HFrEF (LVEF 43% 5/2022), CAD with prior MI s/p PCI, Afib (on Eliquis) presenting with cough, SOB x2 days . positive for  COVID 2 days ago (vaccinated x3).  cough worsening and  feel more dyspneic prompting ER evaluation. In ER, concern for stridor and was given Racemic Epi x2 and Decadron 10mg x1.   CT Soft Tissue Neck negative. CT Chest with small bilateral pleural effusions/ atelectasis.   Patient and MICU consult , and is stable , and being admitted for further management   # Covid infection, with ac resp failure with hypoxia- improved  # edema uvula - improved  # CAD  # CHF systolic  # Paroxysmal afib  # B/l Pleural Effusion  # transient bradycardia while asleep- cardio f up  pt started on remdesvir and decadron ,, ID consult noted  cardio and pulmonary consult noted  PT  generally improving

## 2023-12-16 NOTE — DIETITIAN INITIAL EVALUATION ADULT - ADD RECOMMEND
1. Continue with current diet order  2. Provide ONS: Ensure High Protein plus 8oz (350 kcal, 20g protein) 4oz bid  3. Encourage po intake as needed

## 2023-12-16 NOTE — DIETITIAN INITIAL EVALUATION ADULT - NS FNS DIET ORDER
Diet, DASH/TLC:   Sodium & Cholesterol Restricted  Soft and Bite Sized (SOFTBTSZ)  Mildly Thick Liquids (MILDTHICKLIQS) (12-16-23 @ 10:35)

## 2023-12-16 NOTE — PROGRESS NOTE ADULT - SUBJECTIVE AND OBJECTIVE BOX
KAVIN DENNIS is a 88yFemale , patient examined and chart reviewed.    INTERVAL HPI/ OVERNIGHT EVENTS:   Afebrile. No events. On RA.    PAST MEDICAL & SURGICAL HISTORY:  CAD (coronary artery disease)  Rapid atrial fibrillation  CAD (coronary artery disease)  s/p Cath & 3 stents to LAD on 3/2020; OM1 stent x 1  EF ~30%  Mild HTN  History of CHF (congestive heart failure)  Myocardial infarction  S/P cataract surgery          For details regarding the patient's social history, family history, and other miscellaneous elements, please refer the initial infectious diseases consultation and/or the admitting history and physical examination for this admission.    ROS:  CONSTITUTIONAL:  Negative fever or chill  EYES:  Negative  blurry vision or double vision  CARDIOVASCULAR:  Negative for chest pain or palpitations  RESPIRATORY:  Negative for cough, wheezing, or SOB   GASTROINTESTINAL:  Negative for nausea, vomiting, diarrhea, constipation, or abdominal pain  GENITOURINARY:  Negative frequency, urgency or dysuria  NEUROLOGIC:  No headache, confusion, dizziness, lightheadedness  All other systems were reviewed and are negative     ALLERGIES  Persantine (Rash)      Current inpatient medications :    ANTIBIOTICS/RELEVANT:      aMIOdarone    Tablet 200 milliGRAM(s) Oral daily  apixaban 2.5 milliGRAM(s) Oral every 12 hours  atorvastatin 40 milliGRAM(s) Oral at bedtime  budesonide 160 MICROgram(s)/formoterol 4.5 MICROgram(s) Inhaler 2 Puff(s) Inhalation two times a day  clopidogrel Tablet 75 milliGRAM(s) Oral daily  dexAMETHasone     Tablet 6 milliGRAM(s) Oral daily  famotidine    Tablet 20 milliGRAM(s) Oral daily  furosemide    Tablet 20 milliGRAM(s) Oral daily  melatonin 3 milliGRAM(s) Oral at bedtime  sacubitril 49 mG/valsartan 51 mG 1 Tablet(s) Oral two times a day      Objective:    T(C): 36.7 (12-16-23 @ 20:00), Max: 36.8 (12-16-23 @ 16:06)  HR: 56 (12-16-23 @ 20:00) (49 - 56)  BP: 135/80 (12-16-23 @ 20:00) (103/50 - 135/80)  RR: 12 (12-16-23 @ 20:00) (12 - 16)  SpO2: 96% (12-16-23 @ 20:00) (92% - 96%)      Physical Exam:  General: no acute distress  Neck: supple, trachea midline  Lungs: Decreased, no wheeze/rhonchi  Cardiovascular: regular rate and rhythm, S1 S2  Abdomen: soft, nontender,  bowel sounds normal  Neurological: alert and oriented x3  Skin: no rash  Extremities: no edema            LABS:                          12.0   8.06  )-----------( 133      ( 16 Dec 2023 05:43 )             35.1       12-16    139  |  105  |  25<H>  ----------------------------<  155<H>  3.9   |  26  |  0.95    Ca    8.2<L>      16 Dec 2023 05:43    TPro  5.3<L>  /  Alb  2.7<L>  /  TBili  0.2  /  DBili  0.1  /  AST  29  /  ALT  27  /  AlkPhos  58  12-16      MICROBIOLOGY:    Culture - Blood (collected 14 Dec 2023 17:19)  Source: .Blood Blood-Peripheral  Preliminary Report (16 Dec 2023 22:01):    No growth at 48 Hours    Culture - Blood (collected 14 Dec 2023 17:19)  Source: .Blood Blood-Peripheral  Preliminary Report (16 Dec 2023 22:01):    No growth at 48 Hours      RADIOLOGY & ADDITIONAL STUDIES:  ACC: [id number] EXAM:  CT CHEST   ORDERED BY: ANA SMITH     PROCEDURE DATE:  12/14/2023          INTERPRETATION:  CT CHEST WITHOUT CONTRAST    INDICATION: History of shortness of breath and cough. History of atypical   viral infection/pelvis.    TECHNIQUE: Unenhanced helical images were obtained of the chest. Coronal   and sagittal images were reconstructed. Maximum intensity projection   images were generated.        COMPARISON: Radiograph chest 12/14/2023.    FINDINGS:    Tubes/Lines:None.    Lungs, airways and pleura: Small bilateral pleural effusions. Bilateral   lower lobe linear and passive atelectasis. Right upper lobe lung cyst.   Within the right lower lobe is a 5 x 3 mm noncalcified nodule (series 8   image 99). The airways are unremarkable.    Mediastinum: The thyroid gland is unremarkable. No enlarged chest lymph   nodes. Intrathoracic stomach with rotation of the greater curvature. The   heart is enlarged. Coronary calcifications/stents. No pericardial   effusion. The aorta is tortuous. The aorta is ectatic. Aortic   calcifications.    Upper Abdomen: Bilateral parapelvic cysts. The left adrenal gland is   thickened. Within the body of the left adrenal gland is a 1.6 x 1.6 cm   nodule.    Bones And Soft Tissues:Spondylosis the thoracic spine. Anterior wedging   of the T11 vertebral body.  The soft tissues are unremarkable.      IMPRESSION:    1.  Small bilateral pleural effusions and bilateral linear atelectasis.  2.  In the right lower lobe is a 5 x 3 mm noncalcified nodule.    Assessment :   87YO F PMH CAD HTN Afib admitted with SOB chest tightness sore throat congestion and wheeze sec COVID 19.   CT Chest with small bilateral pleural effusions/ atelectasis.   On RA PT is vaccinated.  Stable    Plan :   Remdesivir X 3 doses  Decadron 6mg IV daily x 3 doses- short course  Trend temps and cbc  Monitor resp status and 02 requirements  Pulm toileting  Stable from ID standpoint      Continue with present regiment.  Appropriate use of antibiotics and adverse effects reviewed.    > 35 minutes were spent in direct patient care reviewing notes, medications ,labs data/ imaging , discussion with multidisciplinary team.    Thank you for allowing me to participate in care of your patient .    Brady Oneal MD  Infectious Disease  [phone]      KAVIN DENNIS is a 88yFemale , patient examined and chart reviewed.    INTERVAL HPI/ OVERNIGHT EVENTS:   Afebrile. No events. On RA.    PAST MEDICAL & SURGICAL HISTORY:  CAD (coronary artery disease)  Rapid atrial fibrillation  CAD (coronary artery disease)  s/p Cath & 3 stents to LAD on 3/2020; OM1 stent x 1  EF ~30%  Mild HTN  History of CHF (congestive heart failure)  Myocardial infarction  S/P cataract surgery          For details regarding the patient's social history, family history, and other miscellaneous elements, please refer the initial infectious diseases consultation and/or the admitting history and physical examination for this admission.    ROS:  CONSTITUTIONAL:  Negative fever or chill  EYES:  Negative  blurry vision or double vision  CARDIOVASCULAR:  Negative for chest pain or palpitations  RESPIRATORY:  Negative for cough, wheezing, or SOB   GASTROINTESTINAL:  Negative for nausea, vomiting, diarrhea, constipation, or abdominal pain  GENITOURINARY:  Negative frequency, urgency or dysuria  NEUROLOGIC:  No headache, confusion, dizziness, lightheadedness  All other systems were reviewed and are negative     ALLERGIES  Persantine (Rash)      Current inpatient medications :    ANTIBIOTICS/RELEVANT:      aMIOdarone    Tablet 200 milliGRAM(s) Oral daily  apixaban 2.5 milliGRAM(s) Oral every 12 hours  atorvastatin 40 milliGRAM(s) Oral at bedtime  budesonide 160 MICROgram(s)/formoterol 4.5 MICROgram(s) Inhaler 2 Puff(s) Inhalation two times a day  clopidogrel Tablet 75 milliGRAM(s) Oral daily  dexAMETHasone     Tablet 6 milliGRAM(s) Oral daily  famotidine    Tablet 20 milliGRAM(s) Oral daily  furosemide    Tablet 20 milliGRAM(s) Oral daily  melatonin 3 milliGRAM(s) Oral at bedtime  sacubitril 49 mG/valsartan 51 mG 1 Tablet(s) Oral two times a day      Objective:    T(C): 36.7 (12-16-23 @ 20:00), Max: 36.8 (12-16-23 @ 16:06)  HR: 56 (12-16-23 @ 20:00) (49 - 56)  BP: 135/80 (12-16-23 @ 20:00) (103/50 - 135/80)  RR: 12 (12-16-23 @ 20:00) (12 - 16)  SpO2: 96% (12-16-23 @ 20:00) (92% - 96%)      Physical Exam:  General: no acute distress  Neck: supple, trachea midline  Lungs: Decreased, no wheeze/rhonchi  Cardiovascular: regular rate and rhythm, S1 S2  Abdomen: soft, nontender,  bowel sounds normal  Neurological: alert and oriented x3  Skin: no rash  Extremities: no edema            LABS:                          12.0   8.06  )-----------( 133      ( 16 Dec 2023 05:43 )             35.1       12-16    139  |  105  |  25<H>  ----------------------------<  155<H>  3.9   |  26  |  0.95    Ca    8.2<L>      16 Dec 2023 05:43    TPro  5.3<L>  /  Alb  2.7<L>  /  TBili  0.2  /  DBili  0.1  /  AST  29  /  ALT  27  /  AlkPhos  58  12-16      MICROBIOLOGY:    Culture - Blood (collected 14 Dec 2023 17:19)  Source: .Blood Blood-Peripheral  Preliminary Report (16 Dec 2023 22:01):    No growth at 48 Hours    Culture - Blood (collected 14 Dec 2023 17:19)  Source: .Blood Blood-Peripheral  Preliminary Report (16 Dec 2023 22:01):    No growth at 48 Hours      RADIOLOGY & ADDITIONAL STUDIES:  ACC: [id number] EXAM:  CT CHEST   ORDERED BY: ANA SMITH     PROCEDURE DATE:  12/14/2023          INTERPRETATION:  CT CHEST WITHOUT CONTRAST    INDICATION: History of shortness of breath and cough. History of atypical   viral infection/pelvis.    TECHNIQUE: Unenhanced helical images were obtained of the chest. Coronal   and sagittal images were reconstructed. Maximum intensity projection   images were generated.        COMPARISON: Radiograph chest 12/14/2023.    FINDINGS:    Tubes/Lines:None.    Lungs, airways and pleura: Small bilateral pleural effusions. Bilateral   lower lobe linear and passive atelectasis. Right upper lobe lung cyst.   Within the right lower lobe is a 5 x 3 mm noncalcified nodule (series 8   image 99). The airways are unremarkable.    Mediastinum: The thyroid gland is unremarkable. No enlarged chest lymph   nodes. Intrathoracic stomach with rotation of the greater curvature. The   heart is enlarged. Coronary calcifications/stents. No pericardial   effusion. The aorta is tortuous. The aorta is ectatic. Aortic   calcifications.    Upper Abdomen: Bilateral parapelvic cysts. The left adrenal gland is   thickened. Within the body of the left adrenal gland is a 1.6 x 1.6 cm   nodule.    Bones And Soft Tissues:Spondylosis the thoracic spine. Anterior wedging   of the T11 vertebral body.  The soft tissues are unremarkable.      IMPRESSION:    1.  Small bilateral pleural effusions and bilateral linear atelectasis.  2.  In the right lower lobe is a 5 x 3 mm noncalcified nodule.    Assessment :   87YO F PMH CAD HTN Afib admitted with SOB chest tightness sore throat congestion and wheeze sec COVID 19.   CT Chest with small bilateral pleural effusions/ atelectasis.   On RA PT is vaccinated.  Stable    Plan :   Remdesivir X 3 doses  Decadron 6mg IV daily x 3 doses- short course  Trend temps and cbc  Monitor resp status and 02 requirements  Pulm toileting  Stable from ID standpoint      Continue with present regiment.  Appropriate use of antibiotics and adverse effects reviewed.    > 35 minutes were spent in direct patient care reviewing notes, medications ,labs data/ imaging , discussion with multidisciplinary team.    Thank you for allowing me to participate in care of your patient .    Brady Oneal MD  Infectious Disease  [phone]

## 2023-12-16 NOTE — PROGRESS NOTE ADULT - ASSESSMENT
The patient is an 88 year old female with a history of CAD with prior MI s/p PCI, chronic systolic heart failure, paroxysmal atrial fibrillation who presents with cough, congestion, dyspnea in the setting of COVID.    Plan:  - ECG with underlying RBBB  - Echo 5/22 with mild/mod LV systolic dysfunction, mod MR, mod TR, mild pulm HTN  - CT chest with small pleural effusions  - Continue amiodarone 200 mg daily  - Continue apixaban 2.5 mg bid  - Continue clopidogrel 75 mg daily  - Continue atorvastatin 40 mg daily  - Sinus bradycardia - hold metoprolol  - Continue Entresto 49-51 mg bid  - COVID positive  - On remdesivir

## 2023-12-17 LAB
ALBUMIN SERPL ELPH-MCNC: 2.6 G/DL — LOW (ref 3.3–5)
ALP SERPL-CCNC: 51 U/L — SIGNIFICANT CHANGE UP (ref 30–120)
ALP SERPL-CCNC: 51 U/L — SIGNIFICANT CHANGE UP (ref 30–120)
ALP SERPL-CCNC: 53 U/L — SIGNIFICANT CHANGE UP (ref 30–120)
ALP SERPL-CCNC: 53 U/L — SIGNIFICANT CHANGE UP (ref 30–120)
ALT FLD-CCNC: 27 U/L — SIGNIFICANT CHANGE UP (ref 10–60)
ALT FLD-CCNC: 27 U/L — SIGNIFICANT CHANGE UP (ref 10–60)
ALT FLD-CCNC: 28 U/L — SIGNIFICANT CHANGE UP (ref 10–60)
ALT FLD-CCNC: 28 U/L — SIGNIFICANT CHANGE UP (ref 10–60)
ANION GAP SERPL CALC-SCNC: 9 MMOL/L — SIGNIFICANT CHANGE UP (ref 5–17)
ANION GAP SERPL CALC-SCNC: 9 MMOL/L — SIGNIFICANT CHANGE UP (ref 5–17)
AST SERPL-CCNC: 30 U/L — SIGNIFICANT CHANGE UP (ref 10–40)
AST SERPL-CCNC: 30 U/L — SIGNIFICANT CHANGE UP (ref 10–40)
AST SERPL-CCNC: 32 U/L — SIGNIFICANT CHANGE UP (ref 10–40)
AST SERPL-CCNC: 32 U/L — SIGNIFICANT CHANGE UP (ref 10–40)
BASOPHILS # BLD AUTO: 0 K/UL — SIGNIFICANT CHANGE UP (ref 0–0.2)
BASOPHILS # BLD AUTO: 0 K/UL — SIGNIFICANT CHANGE UP (ref 0–0.2)
BASOPHILS NFR BLD AUTO: 0 % — SIGNIFICANT CHANGE UP (ref 0–2)
BASOPHILS NFR BLD AUTO: 0 % — SIGNIFICANT CHANGE UP (ref 0–2)
BILIRUB DIRECT SERPL-MCNC: 0.1 MG/DL — SIGNIFICANT CHANGE UP (ref 0–0.3)
BILIRUB DIRECT SERPL-MCNC: 0.1 MG/DL — SIGNIFICANT CHANGE UP (ref 0–0.3)
BILIRUB INDIRECT FLD-MCNC: 0.2 MG/DL — SIGNIFICANT CHANGE UP (ref 0.2–1)
BILIRUB INDIRECT FLD-MCNC: 0.2 MG/DL — SIGNIFICANT CHANGE UP (ref 0.2–1)
BILIRUB SERPL-MCNC: 0.1 MG/DL — LOW (ref 0.2–1.2)
BILIRUB SERPL-MCNC: 0.1 MG/DL — LOW (ref 0.2–1.2)
BILIRUB SERPL-MCNC: 0.3 MG/DL — SIGNIFICANT CHANGE UP (ref 0.2–1.2)
BILIRUB SERPL-MCNC: 0.3 MG/DL — SIGNIFICANT CHANGE UP (ref 0.2–1.2)
BUN SERPL-MCNC: 21 MG/DL — SIGNIFICANT CHANGE UP (ref 7–23)
BUN SERPL-MCNC: 21 MG/DL — SIGNIFICANT CHANGE UP (ref 7–23)
CALCIUM SERPL-MCNC: 7.9 MG/DL — LOW (ref 8.4–10.5)
CALCIUM SERPL-MCNC: 7.9 MG/DL — LOW (ref 8.4–10.5)
CHLORIDE SERPL-SCNC: 105 MMOL/L — SIGNIFICANT CHANGE UP (ref 96–108)
CHLORIDE SERPL-SCNC: 105 MMOL/L — SIGNIFICANT CHANGE UP (ref 96–108)
CO2 SERPL-SCNC: 25 MMOL/L — SIGNIFICANT CHANGE UP (ref 22–31)
CO2 SERPL-SCNC: 25 MMOL/L — SIGNIFICANT CHANGE UP (ref 22–31)
CREAT SERPL-MCNC: 0.86 MG/DL — SIGNIFICANT CHANGE UP (ref 0.5–1.3)
CREAT SERPL-MCNC: 0.86 MG/DL — SIGNIFICANT CHANGE UP (ref 0.5–1.3)
EGFR: 65 ML/MIN/1.73M2 — SIGNIFICANT CHANGE UP
EGFR: 65 ML/MIN/1.73M2 — SIGNIFICANT CHANGE UP
EOSINOPHIL # BLD AUTO: 0 K/UL — SIGNIFICANT CHANGE UP (ref 0–0.5)
EOSINOPHIL # BLD AUTO: 0 K/UL — SIGNIFICANT CHANGE UP (ref 0–0.5)
EOSINOPHIL NFR BLD AUTO: 0 % — SIGNIFICANT CHANGE UP (ref 0–6)
EOSINOPHIL NFR BLD AUTO: 0 % — SIGNIFICANT CHANGE UP (ref 0–6)
GLUCOSE SERPL-MCNC: 116 MG/DL — HIGH (ref 70–99)
GLUCOSE SERPL-MCNC: 116 MG/DL — HIGH (ref 70–99)
HCT VFR BLD CALC: 37.4 % — SIGNIFICANT CHANGE UP (ref 34.5–45)
HCT VFR BLD CALC: 37.4 % — SIGNIFICANT CHANGE UP (ref 34.5–45)
HGB BLD-MCNC: 12.7 G/DL — SIGNIFICANT CHANGE UP (ref 11.5–15.5)
HGB BLD-MCNC: 12.7 G/DL — SIGNIFICANT CHANGE UP (ref 11.5–15.5)
IMM GRANULOCYTES NFR BLD AUTO: 0.3 % — SIGNIFICANT CHANGE UP (ref 0–0.9)
IMM GRANULOCYTES NFR BLD AUTO: 0.3 % — SIGNIFICANT CHANGE UP (ref 0–0.9)
LYMPHOCYTES # BLD AUTO: 0.77 K/UL — LOW (ref 1–3.3)
LYMPHOCYTES # BLD AUTO: 0.77 K/UL — LOW (ref 1–3.3)
LYMPHOCYTES # BLD AUTO: 10.7 % — LOW (ref 13–44)
LYMPHOCYTES # BLD AUTO: 10.7 % — LOW (ref 13–44)
MCHC RBC-ENTMCNC: 32.7 PG — SIGNIFICANT CHANGE UP (ref 27–34)
MCHC RBC-ENTMCNC: 32.7 PG — SIGNIFICANT CHANGE UP (ref 27–34)
MCHC RBC-ENTMCNC: 34 GM/DL — SIGNIFICANT CHANGE UP (ref 32–36)
MCHC RBC-ENTMCNC: 34 GM/DL — SIGNIFICANT CHANGE UP (ref 32–36)
MCV RBC AUTO: 96.4 FL — SIGNIFICANT CHANGE UP (ref 80–100)
MCV RBC AUTO: 96.4 FL — SIGNIFICANT CHANGE UP (ref 80–100)
MONOCYTES # BLD AUTO: 0.6 K/UL — SIGNIFICANT CHANGE UP (ref 0–0.9)
MONOCYTES # BLD AUTO: 0.6 K/UL — SIGNIFICANT CHANGE UP (ref 0–0.9)
MONOCYTES NFR BLD AUTO: 8.3 % — SIGNIFICANT CHANGE UP (ref 2–14)
MONOCYTES NFR BLD AUTO: 8.3 % — SIGNIFICANT CHANGE UP (ref 2–14)
NEUTROPHILS # BLD AUTO: 5.81 K/UL — SIGNIFICANT CHANGE UP (ref 1.8–7.4)
NEUTROPHILS # BLD AUTO: 5.81 K/UL — SIGNIFICANT CHANGE UP (ref 1.8–7.4)
NEUTROPHILS NFR BLD AUTO: 80.7 % — HIGH (ref 43–77)
NEUTROPHILS NFR BLD AUTO: 80.7 % — HIGH (ref 43–77)
NRBC # BLD: 0 /100 WBCS — SIGNIFICANT CHANGE UP (ref 0–0)
NRBC # BLD: 0 /100 WBCS — SIGNIFICANT CHANGE UP (ref 0–0)
PLATELET # BLD AUTO: 142 K/UL — LOW (ref 150–400)
PLATELET # BLD AUTO: 142 K/UL — LOW (ref 150–400)
POTASSIUM SERPL-MCNC: 4 MMOL/L — SIGNIFICANT CHANGE UP (ref 3.5–5.3)
POTASSIUM SERPL-MCNC: 4 MMOL/L — SIGNIFICANT CHANGE UP (ref 3.5–5.3)
POTASSIUM SERPL-SCNC: 4 MMOL/L — SIGNIFICANT CHANGE UP (ref 3.5–5.3)
POTASSIUM SERPL-SCNC: 4 MMOL/L — SIGNIFICANT CHANGE UP (ref 3.5–5.3)
PROT SERPL-MCNC: 5.7 G/DL — LOW (ref 6–8.3)
RBC # BLD: 3.88 M/UL — SIGNIFICANT CHANGE UP (ref 3.8–5.2)
RBC # BLD: 3.88 M/UL — SIGNIFICANT CHANGE UP (ref 3.8–5.2)
RBC # FLD: 15.1 % — HIGH (ref 10.3–14.5)
RBC # FLD: 15.1 % — HIGH (ref 10.3–14.5)
SODIUM SERPL-SCNC: 139 MMOL/L — SIGNIFICANT CHANGE UP (ref 135–145)
SODIUM SERPL-SCNC: 139 MMOL/L — SIGNIFICANT CHANGE UP (ref 135–145)
WBC # BLD: 7.2 K/UL — SIGNIFICANT CHANGE UP (ref 3.8–10.5)
WBC # BLD: 7.2 K/UL — SIGNIFICANT CHANGE UP (ref 3.8–10.5)
WBC # FLD AUTO: 7.2 K/UL — SIGNIFICANT CHANGE UP (ref 3.8–10.5)
WBC # FLD AUTO: 7.2 K/UL — SIGNIFICANT CHANGE UP (ref 3.8–10.5)

## 2023-12-17 RX ADMIN — BUDESONIDE AND FORMOTEROL FUMARATE DIHYDRATE 2 PUFF(S): 160; 4.5 AEROSOL RESPIRATORY (INHALATION) at 20:01

## 2023-12-17 RX ADMIN — APIXABAN 2.5 MILLIGRAM(S): 2.5 TABLET, FILM COATED ORAL at 17:25

## 2023-12-17 RX ADMIN — SACUBITRIL AND VALSARTAN 1 TABLET(S): 24; 26 TABLET, FILM COATED ORAL at 05:29

## 2023-12-17 RX ADMIN — CLOPIDOGREL BISULFATE 75 MILLIGRAM(S): 75 TABLET, FILM COATED ORAL at 12:17

## 2023-12-17 RX ADMIN — SACUBITRIL AND VALSARTAN 1 TABLET(S): 24; 26 TABLET, FILM COATED ORAL at 17:25

## 2023-12-17 RX ADMIN — BUDESONIDE AND FORMOTEROL FUMARATE DIHYDRATE 2 PUFF(S): 160; 4.5 AEROSOL RESPIRATORY (INHALATION) at 06:37

## 2023-12-17 RX ADMIN — FAMOTIDINE 20 MILLIGRAM(S): 10 INJECTION INTRAVENOUS at 12:16

## 2023-12-17 RX ADMIN — APIXABAN 2.5 MILLIGRAM(S): 2.5 TABLET, FILM COATED ORAL at 05:29

## 2023-12-17 RX ADMIN — Medication 6 MILLIGRAM(S): at 05:29

## 2023-12-17 RX ADMIN — Medication 3 MILLIGRAM(S): at 21:23

## 2023-12-17 RX ADMIN — Medication 20 MILLIGRAM(S): at 05:29

## 2023-12-17 RX ADMIN — ATORVASTATIN CALCIUM 40 MILLIGRAM(S): 80 TABLET, FILM COATED ORAL at 21:22

## 2023-12-17 NOTE — PROGRESS NOTE ADULT - SUBJECTIVE AND OBJECTIVE BOX
Patient is a 88y old  Female who presents with a chief complaint of sob (17 Dec 2023 08:16)      INTERVAL HPI/OVERNIGHT EVENTS:overnight events noted    Home Medications:  amiodarone 100 mg oral tablet: 2 tab(s) orally once a day (14 Dec 2023 21:03)  atorvastatin 40 mg oral tablet: 1 tab(s) orally once a day (14 Dec 2023 16:31)  clopidogrel 75 mg oral tablet: 1 tab(s) orally once a day (14 Dec 2023 16:31)  Entresto 49 mg-51 mg oral tablet: 1 tab(s) orally 2 times a day (14 Dec 2023 16:31)  furosemide 20 mg oral tablet: 1 tab(s) orally once a day (14 Dec 2023 17:28)  Lagevrio 200 mg oral capsule: 4 cap(s) orally 2 times a day (14 Dec 2023 21:03)  metoprolol tartrate 25 mg oral tablet: 1 tab(s) orally 2 times a day (14 Dec 2023 16:31)      MEDICATIONS  (STANDING):  aMIOdarone    Tablet 200 milliGRAM(s) Oral daily  apixaban 2.5 milliGRAM(s) Oral every 12 hours  atorvastatin 40 milliGRAM(s) Oral at bedtime  budesonide 160 MICROgram(s)/formoterol 4.5 MICROgram(s) Inhaler 2 Puff(s) Inhalation two times a day  clopidogrel Tablet 75 milliGRAM(s) Oral daily  famotidine    Tablet 20 milliGRAM(s) Oral daily  furosemide    Tablet 20 milliGRAM(s) Oral daily  melatonin 3 milliGRAM(s) Oral at bedtime  sacubitril 49 mG/valsartan 51 mG 1 Tablet(s) Oral two times a day    MEDICATIONS  (PRN):      Allergies    Persantine (Rash)  Persantine (Unknown)    Intolerances        REVIEW OF SYSTEMS:  CONSTITUTIONAL: No fever, weight loss, has fatigue  EYES: No eye pain, visual disturbances, or discharge  ENMT:  No difficulty hearing, tinnitus, vertigo; No sinus or throat pain  NECK: No pain or stiffness  BREASTS: No pain, masses, or nipple discharge  RESPIRATORY: has cough,no  wheezing, chills or hemoptysis; No shortness of breath  CARDIOVASCULAR: No chest pain, palpitations, dizziness, or leg swelling  GASTROINTESTINAL: No abdominal or epigastric pain. No nausea, vomiting, or hematemesis; No diarrhea or constipation. No melena or hematochezia.  GENITOURINARY: No dysuria, frequency, hematuria, or incontinence  NEUROLOGICAL: No headaches, memory loss, loss of strength, numbness, or tremors  SKIN: No itching, burning, rashes, or lesions   Vital Signs Last 24 Hrs  T(C): 36.7 (17 Dec 2023 07:34), Max: 37.6 (17 Dec 2023 06:37)  T(F): 98.1 (17 Dec 2023 07:34), Max: 99.6 (17 Dec 2023 06:37)  HR: 83 (17 Dec 2023 08:00) (44 - 83)  BP: 124/64 (17 Dec 2023 08:00) (104/51 - 135/80)  BP(mean): 83 (17 Dec 2023 08:00) (67 - 95)  RR: 16 (17 Dec 2023 08:00) (12 - 21)  SpO2: 96% (17 Dec 2023 08:00) (92% - 96%)    Parameters below as of 17 Dec 2023 08:00  Patient On (Oxygen Delivery Method): room air  O2 Flow (L/min): 96      PHYSICAL EXAM:  GENERAL: NAD, well-groomed, well-developed  HEAD:  Atraumatic, Normocephalic  EYES: EOMI, PERRLA, conjunctiva and sclera clear  ENMT: Moist mucous membranes,   NECK: Supple, No JVD, Normal thyroid  NERVOUS SYSTEM:  Alert & Oriented X3, Good concentration; Motor Strength 5/5 B/L upper and lower extremities; DTRs 2+ intact and symmetric  CHEST/LUNG: Clear to percussion bilaterally; No rales, rhonchi, wheezing, or rubs  HEART: Regular rate and rhythm; No murmurs, rubs, or gallops  ABDOMEN: Soft, Nontender, Nondistended; Bowel sounds present  EXTREMITIES:  2+ Peripheral Pulses, No clubbing, cyanosis, or edema  LYMPH: No lymphadenopathy noted  SKIN: No rashes or lesions    LABS:                        12.7   7.20  )-----------( 142      ( 17 Dec 2023 05:30 )             37.4     12-17    139  |  105  |  21  ----------------------------<  116<H>  4.0   |  25  |  0.86    Ca    7.9<L>      17 Dec 2023 05:30    TPro  5.7<L>  /  Alb  2.6<L>  /  TBili  0.1<L>  /  DBili  0.1  /  AST  30  /  ALT  27  /  AlkPhos  51  12-17      Urinalysis Basic - ( 17 Dec 2023 05:30 )    Color: x / Appearance: x / SG: x / pH: x  Gluc: 116 mg/dL / Ketone: x  / Bili: x / Urobili: x   Blood: x / Protein: x / Nitrite: x   Leuk Esterase: x / RBC: x / WBC x   Sq Epi: x / Non Sq Epi: x / Bacteria: x      CAPILLARY BLOOD GLUCOSE            Culture - Blood (collected 12-14-23 @ 17:19)  Source: .Blood Blood-Peripheral  Preliminary Report (12-16-23 @ 22:01):    No growth at 48 Hours    Culture - Blood (collected 12-14-23 @ 17:19)  Source: .Blood Blood-Peripheral  Preliminary Report (12-16-23 @ 22:01):    No growth at 48 Hours        I&O's Summary    16 Dec 2023 07:01  -  17 Dec 2023 07:00  --------------------------------------------------------  IN: 250 mL / OUT: 0 mL / NET: 250 mL        RADIOLOGY & ADDITIONAL TESTS:    Imaging Personally Reviewed:  [x ] YES  [ ] NO    Consultant(s) Notes Reviewed:  [x ] YES  [ ] NO    Care Discussed with Consultants/Other Providers [x ] YES  [ ] NO

## 2023-12-17 NOTE — PROGRESS NOTE ADULT - SUBJECTIVE AND OBJECTIVE BOX
KAVIN DENNIS is a 88yFemale , patient examined and chart reviewed.    INTERVAL HPI/ OVERNIGHT EVENTS:   Afebrile. No events. On RA.  In chair. Feeling better. + cough.    PAST MEDICAL & SURGICAL HISTORY:  CAD (coronary artery disease)  Rapid atrial fibrillation  CAD (coronary artery disease)  s/p Cath & 3 stents to LAD on 3/2020; OM1 stent x 1  EF ~30%  Mild HTN  History of CHF (congestive heart failure)  Myocardial infarction  S/P cataract surgery          For details regarding the patient's social history, family history, and other miscellaneous elements, please refer the initial infectious diseases consultation and/or the admitting history and physical examination for this admission.    ROS:  CONSTITUTIONAL:  Negative fever or chill  EYES:  Negative  blurry vision or double vision  CARDIOVASCULAR:  Negative for chest pain or palpitations  RESPIRATORY:  Negative for cough, wheezing, or SOB   GASTROINTESTINAL:  Negative for nausea, vomiting, diarrhea, constipation, or abdominal pain  GENITOURINARY:  Negative frequency, urgency or dysuria  NEUROLOGIC:  No headache, confusion, dizziness, lightheadedness  All other systems were reviewed and are negative     ALLERGIES  Persantine (Rash)      Current inpatient medications :    ANTIBIOTICS/RELEVANT:      MEDICATIONS  (STANDING):  aMIOdarone    Tablet 200 milliGRAM(s) Oral daily  apixaban 2.5 milliGRAM(s) Oral every 12 hours  atorvastatin 40 milliGRAM(s) Oral at bedtime  budesonide 160 MICROgram(s)/formoterol 4.5 MICROgram(s) Inhaler 2 Puff(s) Inhalation two times a day  clopidogrel Tablet 75 milliGRAM(s) Oral daily  famotidine    Tablet 20 milliGRAM(s) Oral daily  furosemide    Tablet 20 milliGRAM(s) Oral daily  melatonin 3 milliGRAM(s) Oral at bedtime  sacubitril 49 mG/valsartan 51 mG 1 Tablet(s) Oral two times a day    MEDICATIONS  (PRN):      Objective:    Vital Signs Last 24 Hrs  T(C): 36.4 (17 Dec 2023 16:01), Max: 37.6 (17 Dec 2023 06:37)  T(F): 97.6 (17 Dec 2023 16:01), Max: 99.6 (17 Dec 2023 06:37)  HR: 55 (17 Dec 2023 20:00) (44 - 83)  BP: 118/63 (17 Dec 2023 20:00) (104/51 - 125/59)  BP(mean): 81 (17 Dec 2023 20:00) (67 - 83)  RR: 17 (17 Dec 2023 20:00) (15 - 21)  SpO2: 95% (17 Dec 2023 20:00) (92% - 96%)    Parameters below as of 17 Dec 2023 20:00  Patient On (Oxygen Delivery Method): room air      Physical Exam:  General: no acute distress  Neck: supple, trachea midline  Lungs: CTA no wheeze/rhonchi  Cardiovascular: regular rate and rhythm, S1 S2  Abdomen: soft, nontender,  bowel sounds normal  Neurological: alert and oriented x3  Skin: no rash  Extremities: no edema            LABS:                        12.7   7.20  )-----------( 142      ( 17 Dec 2023 05:30 )             37.4   12-17    139  |  105  |  21  ----------------------------<  116<H>  4.0   |  25  |  0.86    Ca    7.9<L>      17 Dec 2023 05:30    TPro  5.7<L>  /  Alb  2.6<L>  /  TBili  0.1<L>  /  DBili  0.1  /  AST  30  /  ALT  27  /  AlkPhos  51  12-17      MICROBIOLOGY:  Culture - Blood (collected 14 Dec 2023 17:19)  Source: .Blood Blood-Peripheral  Preliminary Report (16 Dec 2023 22:01):    No growth at 48 Hours    Culture - Blood (collected 14 Dec 2023 17:19)  Source: .Blood Blood-Peripheral  Preliminary Report (16 Dec 2023 22:01):    No growth at 48 Hours      RADIOLOGY & ADDITIONAL STUDIES:  ACC: [id number] EXAM:  CT CHEST   ORDERED BY: ANA SMITH     PROCEDURE DATE:  12/14/2023          INTERPRETATION:  CT CHEST WITHOUT CONTRAST    INDICATION: History of shortness of breath and cough. History of atypical   viral infection/pelvis.    TECHNIQUE: Unenhanced helical images were obtained of the chest. Coronal   and sagittal images were reconstructed. Maximum intensity projection   images were generated.        COMPARISON: Radiograph chest 12/14/2023.    FINDINGS:    Tubes/Lines:None.    Lungs, airways and pleura: Small bilateral pleural effusions. Bilateral   lower lobe linear and passive atelectasis. Right upper lobe lung cyst.   Within the right lower lobe is a 5 x 3 mm noncalcified nodule (series 8   image 99). The airways are unremarkable.    Mediastinum: The thyroid gland is unremarkable. No enlarged chest lymph   nodes. Intrathoracic stomach with rotation of the greater curvature. The   heart is enlarged. Coronary calcifications/stents. No pericardial   effusion. The aorta is tortuous. The aorta is ectatic. Aortic   calcifications.    Upper Abdomen: Bilateral parapelvic cysts. The left adrenal gland is   thickened. Within the body of the left adrenal gland is a 1.6 x 1.6 cm   nodule.    Bones And Soft Tissues:Spondylosis the thoracic spine. Anterior wedging   of the T11 vertebral body.  The soft tissues are unremarkable.      IMPRESSION:    1.  Small bilateral pleural effusions and bilateral linear atelectasis.  2.  In the right lower lobe is a 5 x 3 mm noncalcified nodule.    Assessment :   87YO F PMH CAD HTN Afib admitted with SOB chest tightness sore throat congestion and wheeze sec COVID 19.   CT Chest with small bilateral pleural effusions/ atelectasis.   On RA PT is vaccinated.  Wheeze resolved  Clinically doing well    Plan :   Supportive care  Sp Remdesivir X 3 doses  Sp Decadron 6mg IV daily x 3 doses- short course  Trend temps and cbc  Monitor resp status and 02 requirements  Pulm toileting  Stable from ID standpoint  Dc home per primary team    Continue with present regiment.  Appropriate use of antibiotics and adverse effects reviewed.    > 35 minutes were spent in direct patient care reviewing notes, medications ,labs data/ imaging , discussion with multidisciplinary team.    Thank you for allowing me to participate in care of your patient .    Brady Oneal MD  Infectious Disease  [phone]      KAVIN DENNIS is a 88yFemale , patient examined and chart reviewed.    INTERVAL HPI/ OVERNIGHT EVENTS:   Afebrile. No events. On RA.  In chair. Feeling better. + cough.    PAST MEDICAL & SURGICAL HISTORY:  CAD (coronary artery disease)  Rapid atrial fibrillation  CAD (coronary artery disease)  s/p Cath & 3 stents to LAD on 3/2020; OM1 stent x 1  EF ~30%  Mild HTN  History of CHF (congestive heart failure)  Myocardial infarction  S/P cataract surgery          For details regarding the patient's social history, family history, and other miscellaneous elements, please refer the initial infectious diseases consultation and/or the admitting history and physical examination for this admission.    ROS:  CONSTITUTIONAL:  Negative fever or chill  EYES:  Negative  blurry vision or double vision  CARDIOVASCULAR:  Negative for chest pain or palpitations  RESPIRATORY:  Negative for cough, wheezing, or SOB   GASTROINTESTINAL:  Negative for nausea, vomiting, diarrhea, constipation, or abdominal pain  GENITOURINARY:  Negative frequency, urgency or dysuria  NEUROLOGIC:  No headache, confusion, dizziness, lightheadedness  All other systems were reviewed and are negative     ALLERGIES  Persantine (Rash)      Current inpatient medications :    ANTIBIOTICS/RELEVANT:      MEDICATIONS  (STANDING):  aMIOdarone    Tablet 200 milliGRAM(s) Oral daily  apixaban 2.5 milliGRAM(s) Oral every 12 hours  atorvastatin 40 milliGRAM(s) Oral at bedtime  budesonide 160 MICROgram(s)/formoterol 4.5 MICROgram(s) Inhaler 2 Puff(s) Inhalation two times a day  clopidogrel Tablet 75 milliGRAM(s) Oral daily  famotidine    Tablet 20 milliGRAM(s) Oral daily  furosemide    Tablet 20 milliGRAM(s) Oral daily  melatonin 3 milliGRAM(s) Oral at bedtime  sacubitril 49 mG/valsartan 51 mG 1 Tablet(s) Oral two times a day    MEDICATIONS  (PRN):      Objective:    Vital Signs Last 24 Hrs  T(C): 36.4 (17 Dec 2023 16:01), Max: 37.6 (17 Dec 2023 06:37)  T(F): 97.6 (17 Dec 2023 16:01), Max: 99.6 (17 Dec 2023 06:37)  HR: 55 (17 Dec 2023 20:00) (44 - 83)  BP: 118/63 (17 Dec 2023 20:00) (104/51 - 125/59)  BP(mean): 81 (17 Dec 2023 20:00) (67 - 83)  RR: 17 (17 Dec 2023 20:00) (15 - 21)  SpO2: 95% (17 Dec 2023 20:00) (92% - 96%)    Parameters below as of 17 Dec 2023 20:00  Patient On (Oxygen Delivery Method): room air      Physical Exam:  General: no acute distress  Neck: supple, trachea midline  Lungs: CTA no wheeze/rhonchi  Cardiovascular: regular rate and rhythm, S1 S2  Abdomen: soft, nontender,  bowel sounds normal  Neurological: alert and oriented x3  Skin: no rash  Extremities: no edema            LABS:                        12.7   7.20  )-----------( 142      ( 17 Dec 2023 05:30 )             37.4   12-17    139  |  105  |  21  ----------------------------<  116<H>  4.0   |  25  |  0.86    Ca    7.9<L>      17 Dec 2023 05:30    TPro  5.7<L>  /  Alb  2.6<L>  /  TBili  0.1<L>  /  DBili  0.1  /  AST  30  /  ALT  27  /  AlkPhos  51  12-17      MICROBIOLOGY:  Culture - Blood (collected 14 Dec 2023 17:19)  Source: .Blood Blood-Peripheral  Preliminary Report (16 Dec 2023 22:01):    No growth at 48 Hours    Culture - Blood (collected 14 Dec 2023 17:19)  Source: .Blood Blood-Peripheral  Preliminary Report (16 Dec 2023 22:01):    No growth at 48 Hours      RADIOLOGY & ADDITIONAL STUDIES:  ACC: [id number] EXAM:  CT CHEST   ORDERED BY: ANA SMITH     PROCEDURE DATE:  12/14/2023          INTERPRETATION:  CT CHEST WITHOUT CONTRAST    INDICATION: History of shortness of breath and cough. History of atypical   viral infection/pelvis.    TECHNIQUE: Unenhanced helical images were obtained of the chest. Coronal   and sagittal images were reconstructed. Maximum intensity projection   images were generated.        COMPARISON: Radiograph chest 12/14/2023.    FINDINGS:    Tubes/Lines:None.    Lungs, airways and pleura: Small bilateral pleural effusions. Bilateral   lower lobe linear and passive atelectasis. Right upper lobe lung cyst.   Within the right lower lobe is a 5 x 3 mm noncalcified nodule (series 8   image 99). The airways are unremarkable.    Mediastinum: The thyroid gland is unremarkable. No enlarged chest lymph   nodes. Intrathoracic stomach with rotation of the greater curvature. The   heart is enlarged. Coronary calcifications/stents. No pericardial   effusion. The aorta is tortuous. The aorta is ectatic. Aortic   calcifications.    Upper Abdomen: Bilateral parapelvic cysts. The left adrenal gland is   thickened. Within the body of the left adrenal gland is a 1.6 x 1.6 cm   nodule.    Bones And Soft Tissues:Spondylosis the thoracic spine. Anterior wedging   of the T11 vertebral body.  The soft tissues are unremarkable.      IMPRESSION:    1.  Small bilateral pleural effusions and bilateral linear atelectasis.  2.  In the right lower lobe is a 5 x 3 mm noncalcified nodule.    Assessment :   87YO F PMH CAD HTN Afib admitted with SOB chest tightness sore throat congestion and wheeze sec COVID 19.   CT Chest with small bilateral pleural effusions/ atelectasis.   On RA PT is vaccinated.  Wheeze resolved  Clinically doing well    Plan :   Supportive care  Sp Remdesivir X 3 doses  Sp Decadron 6mg IV daily x 3 doses- short course  Trend temps and cbc  Monitor resp status and 02 requirements  Pulm toileting  Stable from ID standpoint  Dc home per primary team    Continue with present regiment.  Appropriate use of antibiotics and adverse effects reviewed.    > 35 minutes were spent in direct patient care reviewing notes, medications ,labs data/ imaging , discussion with multidisciplinary team.    Thank you for allowing me to participate in care of your patient .    Brady Oneal MD  Infectious Disease  [phone]

## 2023-12-17 NOTE — CONSULT NOTE ADULT - CONSULT REASON
COVID
SOB  GUTIERREZ
Shortness of breath, chronic systolic heart failure, atrial fibrillation
COVID 19
COVID  Acute respiratory insufficiency

## 2023-12-17 NOTE — PROGRESS NOTE ADULT - SUBJECTIVE AND OBJECTIVE BOX
Chief Complaint: Cough    Interval Events: No events overnight.    Review of Systems:  General: No fevers, chills, weight gain  Skin: No rashes, color changes  Cardiovascular: No chest pain, orthopnea  Respiratory: No shortness of breath, cough  Gastrointestinal: No nausea, abdominal pain  Genitourinary: No incontinence, pain with urination  Musculoskeletal: No pain, swelling, decreased range of motion  Neurological: No headache, weakness  Psychiatric: No depression, anxiety  Endocrine: No weight gain, increased thirst  All other systems are comprehensively negative.    Physical Exam:  Vital Signs Last 24 Hrs  T(C): 36.7 (17 Dec 2023 07:34), Max: 37.6 (17 Dec 2023 06:37)  T(F): 98.1 (17 Dec 2023 07:34), Max: 99.6 (17 Dec 2023 06:37)  HR: 83 (17 Dec 2023 08:00) (44 - 83)  BP: 124/64 (17 Dec 2023 08:00) (104/51 - 135/80)  BP(mean): 83 (17 Dec 2023 08:00) (67 - 95)  RR: 16 (17 Dec 2023 08:00) (12 - 21)  SpO2: 96% (17 Dec 2023 08:00) (92% - 96%)  Parameters below as of 17 Dec 2023 08:00  Patient On (Oxygen Delivery Method): room air  O2 Flow (L/min): 96  General: NAD  HEENT: MMM  Neck: No JVD, no carotid bruit  Lungs: CTAB  CV: RRR, nl S1/S2, no M/R/G  Abdomen: S/NT/ND, +BS  Extremities: No LE edema, no cyanosis  Neuro: AAOx3, non-focal  Skin: No rash    Labs:    12-17    139  |  105  |  21  ----------------------------<  116<H>  4.0   |  25  |  0.86    Ca    7.9<L>      17 Dec 2023 05:30    TPro  5.7<L>  /  Alb  2.6<L>  /  TBili  0.1<L>  /  DBili  0.1  /  AST  30  /  ALT  27  /  AlkPhos  51  12-17                        12.7   7.20  )-----------( 142      ( 17 Dec 2023 05:30 )             37.4       ECG/Telemetry: Sinus rhythm

## 2023-12-17 NOTE — CONSULT NOTE ADULT - SUBJECTIVE AND OBJECTIVE BOX
Date/Time Patient Seen:  		  Referring MD:   Data Reviewed	       Patient is a 88y old  Female who presents with a chief complaint of Per H&P "Patient developed cough shortness of breath chest tightness sore throat congestion 2 days ago had a positive COVID test that day complaining of worsening symptoms.  No fevers chills abdominal pain vomiting edema calf.  Patient relates on Eliquis.PMD [id number] PMHx HFrEF (LVEF 43% 5/2022), CAD with prior MI s/p PCI, Afib (on Eliquis) presenting with cough, SOB x2 days . positive for  COVID 2 days ago (vaccinated x3).  cough worsening and  feel more dyspneic prompting ER evaluation. In ER, concern for stridor and was given Racemic Epi x2 and Decadron 10mg x1."      (16 Dec 2023 15:16)      Subjective/HPI     PAST MEDICAL & SURGICAL HISTORY:  CAD (coronary artery disease)    Rapid atrial fibrillation    CAD (coronary artery disease)  s/p Cath & 3 stents to LAD on 3/2020; OM1 stent x 1  EF ~30%    Mild HTN    History of CHF (congestive heart failure)    Myocardial infarction    No significant past surgical history    S/P cataract surgery    PAST MEDICAL HISTORY:  CAD (coronary artery disease)     CAD (coronary artery disease) s/p Cath & 3 stents to LAD on 3/2020; OM1 stent x 1  EF ~30%    History of CHF (congestive heart failure)     Mild HTN     Myocardial infarction     Rapid atrial fibrillation.     PAST SURGICAL HISTORY:  S/P cataract surgery.     FAMILY HISTORY:  Father  Still living? Unknown  FH: dementia, Age at diagnosis: Age Unknown  FH: heart disease, Age at diagnosis: Age Unknown    Mother  Still living? Unknown  Family history of [place]'s disease, Age at diagnosis: Age Unknown    Sibling  Still living? Unknown  FH: heart disease, Age at diagnosis: Age Unknown.     Social History:  · Substance use	No      Tobacco Screening:  · Core Measure Site	No    Risk Assessment:    Present on Admission:  Deep Venous Thrombosis	no  Pulmonary Embolus	no        Medication list         MEDICATIONS  (STANDING):  aMIOdarone    Tablet 200 milliGRAM(s) Oral daily  apixaban 2.5 milliGRAM(s) Oral every 12 hours  atorvastatin 40 milliGRAM(s) Oral at bedtime  budesonide 160 MICROgram(s)/formoterol 4.5 MICROgram(s) Inhaler 2 Puff(s) Inhalation two times a day  clopidogrel Tablet 75 milliGRAM(s) Oral daily  famotidine    Tablet 20 milliGRAM(s) Oral daily  furosemide    Tablet 20 milliGRAM(s) Oral daily  melatonin 3 milliGRAM(s) Oral at bedtime  sacubitril 49 mG/valsartan 51 mG 1 Tablet(s) Oral two times a day    MEDICATIONS  (PRN):         Vitals log        ICU Vital Signs Last 24 Hrs  T(C): 36.7 (17 Dec 2023 07:34), Max: 37.6 (17 Dec 2023 06:37)  T(F): 98.1 (17 Dec 2023 07:34), Max: 99.6 (17 Dec 2023 06:37)  HR: 44 (17 Dec 2023 04:00) (44 - 56)  BP: 104/51 (17 Dec 2023 04:00) (104/51 - 135/80)  BP(mean): 67 (17 Dec 2023 04:00) (67 - 95)  ABP: --  ABP(mean): --  RR: 21 (17 Dec 2023 04:00) (12 - 21)  SpO2: 92% (17 Dec 2023 04:00) (92% - 96%)    O2 Parameters below as of 17 Dec 2023 00:00  Patient On (Oxygen Delivery Method): room air                 Input and Output:  I&O's Detail    16 Dec 2023 07:01  -  17 Dec 2023 07:00  --------------------------------------------------------  IN:    IV PiggyBack: 100 mL    Oral Fluid: 150 mL  Total IN: 250 mL    OUT:  Total OUT: 0 mL    Total NET: 250 mL          Lab Data                        12.7   7.20  )-----------( 142      ( 17 Dec 2023 05:30 )             37.4     12-17    139  |  105  |  21  ----------------------------<  116<H>  4.0   |  25  |  0.86    Ca    7.9<L>      17 Dec 2023 05:30    TPro  5.7<L>  /  Alb  2.6<L>  /  TBili  0.1<L>  /  DBili  0.1  /  AST  30  /  ALT  27  /  AlkPhos  51  12-17            Review of Systems	      Objective     Physical Examination        Pertinent Lab findings & Imaging      Key:  NO   Adequate UO     I&O's Detail    16 Dec 2023 07:01  -  17 Dec 2023 07:00  --------------------------------------------------------  IN:    IV PiggyBack: 100 mL    Oral Fluid: 150 mL  Total IN: 250 mL    OUT:  Total OUT: 0 mL    Total NET: 250 mL               Discussed with:     Cultures:	        Radiology                             Date/Time Patient Seen:  		  Referring MD:   Data Reviewed	       Patient is a 88y old  Female who presents with a chief complaint of Per H&P "Patient developed cough shortness of breath chest tightness sore throat congestion 2 days ago had a positive COVID test that day complaining of worsening symptoms.  No fevers chills abdominal pain vomiting edema calf.  Patient relates on Eliquis.PMD [id number] PMHx HFrEF (LVEF 43% 5/2022), CAD with prior MI s/p PCI, Afib (on Eliquis) presenting with cough, SOB x2 days . positive for  COVID 2 days ago (vaccinated x3).  cough worsening and  feel more dyspneic prompting ER evaluation. In ER, concern for stridor and was given Racemic Epi x2 and Decadron 10mg x1."      (16 Dec 2023 15:16)      Subjective/HPI     PAST MEDICAL & SURGICAL HISTORY:  CAD (coronary artery disease)    Rapid atrial fibrillation    CAD (coronary artery disease)  s/p Cath & 3 stents to LAD on 3/2020; OM1 stent x 1  EF ~30%    Mild HTN    History of CHF (congestive heart failure)    Myocardial infarction    No significant past surgical history    S/P cataract surgery    PAST MEDICAL HISTORY:  CAD (coronary artery disease)     CAD (coronary artery disease) s/p Cath & 3 stents to LAD on 3/2020; OM1 stent x 1  EF ~30%    History of CHF (congestive heart failure)     Mild HTN     Myocardial infarction     Rapid atrial fibrillation.     PAST SURGICAL HISTORY:  S/P cataract surgery.     FAMILY HISTORY:  Father  Still living? Unknown  FH: dementia, Age at diagnosis: Age Unknown  FH: heart disease, Age at diagnosis: Age Unknown    Mother  Still living? Unknown  Family history of [place]'s disease, Age at diagnosis: Age Unknown    Sibling  Still living? Unknown  FH: heart disease, Age at diagnosis: Age Unknown.     Social History:  · Substance use	No      Tobacco Screening:  · Core Measure Site	No    Risk Assessment:    Present on Admission:  Deep Venous Thrombosis	no  Pulmonary Embolus	no        Medication list         MEDICATIONS  (STANDING):  aMIOdarone    Tablet 200 milliGRAM(s) Oral daily  apixaban 2.5 milliGRAM(s) Oral every 12 hours  atorvastatin 40 milliGRAM(s) Oral at bedtime  budesonide 160 MICROgram(s)/formoterol 4.5 MICROgram(s) Inhaler 2 Puff(s) Inhalation two times a day  clopidogrel Tablet 75 milliGRAM(s) Oral daily  famotidine    Tablet 20 milliGRAM(s) Oral daily  furosemide    Tablet 20 milliGRAM(s) Oral daily  melatonin 3 milliGRAM(s) Oral at bedtime  sacubitril 49 mG/valsartan 51 mG 1 Tablet(s) Oral two times a day    MEDICATIONS  (PRN):         Vitals log        ICU Vital Signs Last 24 Hrs  T(C): 36.7 (17 Dec 2023 07:34), Max: 37.6 (17 Dec 2023 06:37)  T(F): 98.1 (17 Dec 2023 07:34), Max: 99.6 (17 Dec 2023 06:37)  HR: 44 (17 Dec 2023 04:00) (44 - 56)  BP: 104/51 (17 Dec 2023 04:00) (104/51 - 135/80)  BP(mean): 67 (17 Dec 2023 04:00) (67 - 95)  ABP: --  ABP(mean): --  RR: 21 (17 Dec 2023 04:00) (12 - 21)  SpO2: 92% (17 Dec 2023 04:00) (92% - 96%)    O2 Parameters below as of 17 Dec 2023 00:00  Patient On (Oxygen Delivery Method): room air                 Input and Output:  I&O's Detail    16 Dec 2023 07:01  -  17 Dec 2023 07:00  --------------------------------------------------------  IN:    IV PiggyBack: 100 mL    Oral Fluid: 150 mL  Total IN: 250 mL    OUT:  Total OUT: 0 mL    Total NET: 250 mL          Lab Data                        12.7   7.20  )-----------( 142      ( 17 Dec 2023 05:30 )             37.4     12-17    139  |  105  |  21  ----------------------------<  116<H>  4.0   |  25  |  0.86    Ca    7.9<L>      17 Dec 2023 05:30    TPro  5.7<L>  /  Alb  2.6<L>  /  TBili  0.1<L>  /  DBili  0.1  /  AST  30  /  ALT  27  /  AlkPhos  51  12-17            Review of Systems	      Objective     Physical Examination        Pertinent Lab findings & Imaging      Key:  NO   Adequate UO     I&O's Detail    16 Dec 2023 07:01  -  17 Dec 2023 07:00  --------------------------------------------------------  IN:    IV PiggyBack: 100 mL    Oral Fluid: 150 mL  Total IN: 250 mL    OUT:  Total OUT: 0 mL    Total NET: 250 mL               Discussed with:     Cultures:	        Radiology

## 2023-12-17 NOTE — PROGRESS NOTE ADULT - ASSESSMENT
Patient developed cough shortness of breath chest tightness sore throat congestion 2 days ago had a positive COVID test that day complaining of worsening symptoms.  No fevers chills abdominal pain vomiting edema calf.  Patient relates on Eliquis.PMD [id number] PMHx HFrEF (LVEF 43% 5/2022), CAD with prior MI s/p PCI, Afib (on Eliquis) presenting with cough, SOB x2 days . positive for  COVID 2 days ago (vaccinated x3).  cough worsening and  feel more dyspneic prompting ER evaluation. In ER, concern for stridor and was given Racemic Epi x2 and Decadron 10mg x1.   CT Soft Tissue Neck negative. CT Chest with small bilateral pleural effusions/ atelectasis.   Patient and MICU consult , and is stable , and being admitted for further management   # Covid infection, with ac resp failure with hypoxia- improved  # edema uvula - improved  # CAD  # CHF systolic HF  # Paroxysmal afib  # B/l Pleural Effusion  #  bradycardia sinus - metoprolol stopped by cardiology, pat observe  pt completed remdesvir and decadron ,, ID f up noted  PT   Patient developed cough shortness of breath chest tightness sore throat congestion 2 days ago had a positive COVID test that day complaining of worsening symptoms.  No fevers chills abdominal pain vomiting edema calf.  Patient relates on Eliquis.PMD [id number] PMHx HFrEF (LVEF 43% 5/2022), CAD with prior MI s/p PCI, Afib (on Eliquis) presenting with cough, SOB x2 days . positive for  COVID 2 days ago (vaccinated x3).  cough worsening and  feel more dyspneic prompting ER evaluation. In ER, concern for stridor and was given Racemic Epi x2 and Decadron 10mg x1.   CT Soft Tissue Neck negative. CT Chest with small bilateral pleural effusions/ atelectasis.   Patient and MICU consult , and is stable , and being admitted for further management   # Covid infection, with ac resp failure with hypoxia- improved  # edema uvula - improved  # CAD  # CHF systolic HF  # Paroxysmal afib  # B/l Pleural Effusion  #  bradycardia sinus - metoprolol stopped by cardiology, pat observe  pt completed remdesvir and decadron ,, ID f up noted  PT

## 2023-12-17 NOTE — PROGRESS NOTE ADULT - ASSESSMENT
The patient is an 88 year old female with a history of CAD with prior MI s/p PCI, chronic systolic heart failure, paroxysmal atrial fibrillation who presents with cough, congestion, dyspnea in the setting of COVID.    Plan:  - ECG with underlying RBBB  - Echo 5/22 with mild/mod LV systolic dysfunction, mod MR, mod TR, mild pulm HTN  - CT chest with small pleural effusions  - Continue amiodarone 200 mg daily  - Continue apixaban 2.5 mg bid  - Continue clopidogrel 75 mg daily  - Continue atorvastatin 40 mg daily  - Sinus bradycardia while sleeping - hold metoprolol  - Continue Entresto 49-51 mg bid  - COVID positive  - On remdesivir

## 2023-12-17 NOTE — CONSULT NOTE ADULT - ASSESSMENT
Pt. with covid and acute respiratory insufficiency--upper airway inflammation and bronchitis.  Suggest Symbicort.  Remdesivir  Decadron  Pepcid, melatonin.  FU CXR  Dw nursing. 
The patient is an 88 year old female with a history of CAD with prior MI s/p PCI, chronic systolic heart failure, paroxysmal atrial fibrillation who presents with cough, congestion, dyspnea in the setting of COVID.    Plan:  - ECG with underlying RBBB  - Echo 5/22 with mild/mod LV systolic dysfunction, mod MR, mod TR, mild pulm HTN  - CT chest with small pleural effusions  - Continue amiodarone 200 mg daily  - Continue apixaban 2.5 mg bid  - Continue clopidogrel 75 mg daily  - Continue atorvastatin 40 mg daily  - Mild sinus bradycardia - lower and change to metoprolol succinate 25 mg daily  - Continue Entresto 49-51 mg bid  - COVID positive  - On remdesivir
86F PMHx HFrEF (LVEF 43% 5/2022), CAD with prior MI s/p PCI, Afib (on Eliquis) presenting with cough, SOB x2 days. Admitted with:  Assessment:  1. COVID-19    Plan:  CT Soft Tissue Neck negative.   Satting well on RA. Utilize supplemental O2 PRN to maintain goal SpO2 >88%.   HD stable.   Vaccinated x3. Recommend starting Remdesivir / Decadron courses for COVID-19 given sx. Albuterol PRN for wheezing.     Case and plan discussed with eICU attending, Dr. Pardo. At this time, patient is unlikely to gain additional benefit from ICU level of care. Please reconsult should clinical condition change. 
88y old  Female who presents with a chief complaint of Per H&P "Patient developed cough shortness of breath chest tightness sore throat congestion 2 days ago had a positive COVID test that day complaining of worsening symptoms.

## 2023-12-18 LAB
ALBUMIN SERPL ELPH-MCNC: 2.6 G/DL — LOW (ref 3.3–5)
ALBUMIN SERPL ELPH-MCNC: 2.6 G/DL — LOW (ref 3.3–5)
ALP SERPL-CCNC: 53 U/L — SIGNIFICANT CHANGE UP (ref 30–120)
ALP SERPL-CCNC: 53 U/L — SIGNIFICANT CHANGE UP (ref 30–120)
ALT FLD-CCNC: 27 U/L — SIGNIFICANT CHANGE UP (ref 10–60)
ALT FLD-CCNC: 27 U/L — SIGNIFICANT CHANGE UP (ref 10–60)
ANION GAP SERPL CALC-SCNC: 10 MMOL/L — SIGNIFICANT CHANGE UP (ref 5–17)
ANION GAP SERPL CALC-SCNC: 10 MMOL/L — SIGNIFICANT CHANGE UP (ref 5–17)
AST SERPL-CCNC: 24 U/L — SIGNIFICANT CHANGE UP (ref 10–40)
AST SERPL-CCNC: 24 U/L — SIGNIFICANT CHANGE UP (ref 10–40)
BILIRUB DIRECT SERPL-MCNC: 0.1 MG/DL — SIGNIFICANT CHANGE UP (ref 0–0.3)
BILIRUB DIRECT SERPL-MCNC: 0.1 MG/DL — SIGNIFICANT CHANGE UP (ref 0–0.3)
BILIRUB INDIRECT FLD-MCNC: 0.3 MG/DL — SIGNIFICANT CHANGE UP (ref 0.2–1)
BILIRUB INDIRECT FLD-MCNC: 0.3 MG/DL — SIGNIFICANT CHANGE UP (ref 0.2–1)
BILIRUB SERPL-MCNC: 0.4 MG/DL — SIGNIFICANT CHANGE UP (ref 0.2–1.2)
BILIRUB SERPL-MCNC: 0.4 MG/DL — SIGNIFICANT CHANGE UP (ref 0.2–1.2)
BUN SERPL-MCNC: 23 MG/DL — SIGNIFICANT CHANGE UP (ref 7–23)
BUN SERPL-MCNC: 23 MG/DL — SIGNIFICANT CHANGE UP (ref 7–23)
CALCIUM SERPL-MCNC: 8.1 MG/DL — LOW (ref 8.4–10.5)
CALCIUM SERPL-MCNC: 8.1 MG/DL — LOW (ref 8.4–10.5)
CHLORIDE SERPL-SCNC: 104 MMOL/L — SIGNIFICANT CHANGE UP (ref 96–108)
CHLORIDE SERPL-SCNC: 104 MMOL/L — SIGNIFICANT CHANGE UP (ref 96–108)
CO2 SERPL-SCNC: 26 MMOL/L — SIGNIFICANT CHANGE UP (ref 22–31)
CO2 SERPL-SCNC: 26 MMOL/L — SIGNIFICANT CHANGE UP (ref 22–31)
CREAT SERPL-MCNC: 0.86 MG/DL — SIGNIFICANT CHANGE UP (ref 0.5–1.3)
CREAT SERPL-MCNC: 0.86 MG/DL — SIGNIFICANT CHANGE UP (ref 0.5–1.3)
CREAT SERPL-MCNC: 0.98 MG/DL — SIGNIFICANT CHANGE UP (ref 0.5–1.3)
CREAT SERPL-MCNC: 0.98 MG/DL — SIGNIFICANT CHANGE UP (ref 0.5–1.3)
EGFR: 56 ML/MIN/1.73M2 — LOW
EGFR: 56 ML/MIN/1.73M2 — LOW
EGFR: 65 ML/MIN/1.73M2 — SIGNIFICANT CHANGE UP
EGFR: 65 ML/MIN/1.73M2 — SIGNIFICANT CHANGE UP
GLUCOSE BLDC GLUCOMTR-MCNC: 115 MG/DL — HIGH (ref 70–99)
GLUCOSE BLDC GLUCOMTR-MCNC: 115 MG/DL — HIGH (ref 70–99)
GLUCOSE SERPL-MCNC: 118 MG/DL — HIGH (ref 70–99)
GLUCOSE SERPL-MCNC: 118 MG/DL — HIGH (ref 70–99)
MAGNESIUM SERPL-MCNC: 2 MG/DL — SIGNIFICANT CHANGE UP (ref 1.6–2.6)
MAGNESIUM SERPL-MCNC: 2 MG/DL — SIGNIFICANT CHANGE UP (ref 1.6–2.6)
POTASSIUM SERPL-MCNC: 4.1 MMOL/L — SIGNIFICANT CHANGE UP (ref 3.5–5.3)
POTASSIUM SERPL-MCNC: 4.1 MMOL/L — SIGNIFICANT CHANGE UP (ref 3.5–5.3)
POTASSIUM SERPL-SCNC: 4.1 MMOL/L — SIGNIFICANT CHANGE UP (ref 3.5–5.3)
POTASSIUM SERPL-SCNC: 4.1 MMOL/L — SIGNIFICANT CHANGE UP (ref 3.5–5.3)
PROT SERPL-MCNC: 5.5 G/DL — LOW (ref 6–8.3)
PROT SERPL-MCNC: 5.5 G/DL — LOW (ref 6–8.3)
SODIUM SERPL-SCNC: 140 MMOL/L — SIGNIFICANT CHANGE UP (ref 135–145)
SODIUM SERPL-SCNC: 140 MMOL/L — SIGNIFICANT CHANGE UP (ref 135–145)

## 2023-12-18 PROCEDURE — 93010 ELECTROCARDIOGRAM REPORT: CPT

## 2023-12-18 RX ADMIN — ATORVASTATIN CALCIUM 40 MILLIGRAM(S): 80 TABLET, FILM COATED ORAL at 21:15

## 2023-12-18 RX ADMIN — Medication 3 MILLIGRAM(S): at 21:15

## 2023-12-18 RX ADMIN — APIXABAN 2.5 MILLIGRAM(S): 2.5 TABLET, FILM COATED ORAL at 17:15

## 2023-12-18 RX ADMIN — APIXABAN 2.5 MILLIGRAM(S): 2.5 TABLET, FILM COATED ORAL at 05:18

## 2023-12-18 RX ADMIN — BUDESONIDE AND FORMOTEROL FUMARATE DIHYDRATE 2 PUFF(S): 160; 4.5 AEROSOL RESPIRATORY (INHALATION) at 07:00

## 2023-12-18 RX ADMIN — SACUBITRIL AND VALSARTAN 1 TABLET(S): 24; 26 TABLET, FILM COATED ORAL at 17:15

## 2023-12-18 RX ADMIN — BUDESONIDE AND FORMOTEROL FUMARATE DIHYDRATE 2 PUFF(S): 160; 4.5 AEROSOL RESPIRATORY (INHALATION) at 21:15

## 2023-12-18 RX ADMIN — CLOPIDOGREL BISULFATE 75 MILLIGRAM(S): 75 TABLET, FILM COATED ORAL at 12:37

## 2023-12-18 RX ADMIN — Medication 20 MILLIGRAM(S): at 05:18

## 2023-12-18 RX ADMIN — SACUBITRIL AND VALSARTAN 1 TABLET(S): 24; 26 TABLET, FILM COATED ORAL at 05:18

## 2023-12-18 RX ADMIN — FAMOTIDINE 20 MILLIGRAM(S): 10 INJECTION INTRAVENOUS at 12:37

## 2023-12-18 RX ADMIN — AMIODARONE HYDROCHLORIDE 200 MILLIGRAM(S): 400 TABLET ORAL at 12:37

## 2023-12-18 NOTE — CHART NOTE - NSCHARTNOTEFT_GEN_A_CORE
88F CAD, CHF-Systolic, AFib admitted for COVID Infection and Respiratory Distress.  Patient was going to bathroom when she LOC and RRT was called.  She was brought back to the bed and set of vitals were stable.  Patient oxygenating on room air with 97%.  /70 and HR 48.  Patient did regain consciousness however was not able to communicate much.  Appeared lehtargic.  Patient was not on Tele while going to bathroom so telemetry could not be reviewed. 12 Lead EKG showing RBBB which is old. Dr. Rivers and Dr. Fox notified.     Appears patient most likely vaso-vagaled.  Will place on telemetry for now. No need to upgrade to SPCU.         PHYSICAL EXAM:  Vital Signs Last 24 Hrs  T(C): 36.6 (18 Dec 2023 08:09), Max: 37.1 (17 Dec 2023 20:00)  T(F): 97.9 (18 Dec 2023 08:09), Max: 98.7 (17 Dec 2023 20:00)  HR: 70 (18 Dec 2023 08:00) (48 - 70)  BP: 120/60 (18 Dec 2023 08:00) (95/52 - 139/66)  BP(mean): 77 (18 Dec 2023 08:00) (66 - 87)  RR: 17 (18 Dec 2023 08:00) (15 - 19)  SpO2: 93% (18 Dec 2023 08:00) (93% - 96%)    Parameters below as of 18 Dec 2023 08:00  Patient On (Oxygen Delivery Method): room air        GENERAL: NAD  HEAD:  Atraumatic, Normocephalic  ENMT: Vaibhav Mucous Membranes  NECK: Supple, No JVD, Normal thyroid  HEART: Regular rate and rhythm; No murmurs, rubs, or gallops  CHEST/LUNG: Clear to auscultation bilaterally; No rales, rhonchi, wheezing, or rubs  ABDOMEN: Soft, Nontender, Nondistended; Bowel sounds present  EXTREMITIES:  2+ Peripheral Pulses, No clubbing, cyanosis, or edema  SKIN: No rashes or lesions

## 2023-12-18 NOTE — PROGRESS NOTE ADULT - ASSESSMENT
The patient is an 88 year old female with a history of CAD with prior MI s/p PCI, chronic systolic heart failure, paroxysmal atrial fibrillation who presents with cough, congestion, dyspnea in the setting of COVID.    Plan:  - ECG with underlying RBBB  - Echo 5/22 with mild/mod LV systolic dysfunction, mod MR, mod TR, mild pulm HTN  - CT chest with small pleural effusions  - Continue amiodarone 200 mg daily  - Continue apixaban 2.5 mg bid  - Continue clopidogrel 75 mg daily  - Continue atorvastatin 40 mg daily  - Sinus bradycardia - remain off metoprolol  - Continue Entresto 49-51 mg bid  - COVID positive  - Completed remdesivir

## 2023-12-18 NOTE — PROGRESS NOTE ADULT - ASSESSMENT
88y old  Female who presents with a chief complaint of Per H&P "Patient developed cough shortness of breath chest tightness sore throat congestion 2 days ago had a positive COVID test that day complaining of worsening symptoms.     bronchodilators  lasix  vs noted  labs reviewed  covid isol  dvt p - on ELIQUIS  assist with needs  oral hygiene  skin care  cough rx regimen

## 2023-12-18 NOTE — PROGRESS NOTE ADULT - SUBJECTIVE AND OBJECTIVE BOX
Chief Complaint: Cough    Interval Events: No events overnight.    Review of Systems:  General: No fevers, chills, weight gain  Skin: No rashes, color changes  Cardiovascular: No chest pain, orthopnea  Respiratory: No shortness of breath, cough  Gastrointestinal: No nausea, abdominal pain  Genitourinary: No incontinence, pain with urination  Musculoskeletal: No pain, swelling, decreased range of motion  Neurological: No headache, weakness  Psychiatric: No depression, anxiety  Endocrine: No weight gain, increased thirst  All other systems are comprehensively negative.    Physical Exam:  Vital Signs Last 24 Hrs  T(C): 36.6 (18 Dec 2023 08:09), Max: 37.1 (17 Dec 2023 20:00)  T(F): 97.9 (18 Dec 2023 08:09), Max: 98.7 (17 Dec 2023 20:00)  HR: 50 (18 Dec 2023 04:00) (48 - 55)  BP: 139/66 (18 Dec 2023 04:00) (95/52 - 139/66)  BP(mean): 87 (18 Dec 2023 04:00) (66 - 87)  RR: 19 (18 Dec 2023 04:00) (15 - 19)  SpO2: 93% (18 Dec 2023 04:00) (93% - 96%)  Parameters below as of 18 Dec 2023 00:00  Patient On (Oxygen Delivery Method): room air  General: NAD  HEENT: MMM  Neck: No JVD, no carotid bruit  Lungs: CTAB  CV: RRR, nl S1/S2, no M/R/G  Abdomen: S/NT/ND, +BS  Extremities: No LE edema, no cyanosis  Neuro: AAOx3, non-focal  Skin: No rash    Labs:    12-18    x   |  x   |  x   ----------------------------<  x   x    |  x   |  0.86    Ca    7.9<L>      17 Dec 2023 05:30    TPro  5.5<L>  /  Alb  2.6<L>  /  TBili  0.4  /  DBili  0.1  /  AST  24  /  ALT  27  /  AlkPhos  53  12-18                        12.7   7.20  )-----------( 142      ( 17 Dec 2023 05:30 )             37.4       ECG/Telemetry: Sinus rhythm

## 2023-12-18 NOTE — PROGRESS NOTE ADULT - ASSESSMENT
Patient developed cough shortness of breath chest tightness sore throat congestion 2 days ago had a positive COVID test that day complaining of worsening symptoms.  No fevers chills abdominal pain vomiting edema calf.  Patient relates on Eliquis.PMD [id number] PMHx HFrEF (LVEF 43% 5/2022), CAD with prior MI s/p PCI, Afib (on Eliquis) presenting with cough, SOB x2 days . positive for  COVID 2 days ago (vaccinated x3).  cough worsening and  feel more dyspneic prompting ER evaluation. In ER, concern for stridor and was given Racemic Epi x2 and Decadron 10mg x1.   CT Soft Tissue Neck negative. CT Chest with small bilateral pleural effusions/ atelectasis.   Patient and MICU consult , and is stable , and being admitted for further management   # Covid infection, with ac resp failure with hypoxia- improved  # edema uvula - improved  # CAD  # CHF systolic HF  # Paroxysmal afib  # B/l Pleural Effusion  #  bradycardia sinus - metoprolol stopped by cardiology,   pt completed remdesvir and decadron ,, ID f up noted  PT  had RRT on 12/18 am for near syncope - likely vasovagal- monitor orthostasis,   cardio f up , continue cardiac monitoring   d/w daughter ada Patient developed cough shortness of breath chest tightness sore throat congestion 2 days ago had a positive COVID test that day complaining of worsening symptoms.  No fevers chills abdominal pain vomiting edema calf.  Patient relates on Eliquis.PMD [id number] PMHx HFrEF (LVEF 43% 5/2022), CAD with prior MI s/p PCI, Afib (on Eliquis) presenting with cough, SOB x2 days . positive for  COVID 2 days ago (vaccinated x3).  cough worsening and  feel more dyspneic prompting ER evaluation. In ER, concern for stridor and was given Racemic Epi x2 and Decadron 10mg x1.   CT Soft Tissue Neck negative. CT Chest with small bilateral pleural effusions/ atelectasis.   Patient and MICU consult , and is stable , and being admitted for further management   # Covid infection, with ac resp failure with hypoxia- improved  # edema uvula - improved  # CAD  # CHF systolic HF  # Paroxysmal afib  # B/l Pleural Effusion  #  bradycardia sinus - metoprolol stopped by cardiology,   pt completed remdesvir and decadron ,, ID f up noted  PT  had RRT on 12/18 am for near syncope - likely vasovagal- monitor orthostasis,   cardio f up , continue cardiac monitoring   d/w daughter ada

## 2023-12-18 NOTE — PROGRESS NOTE ADULT - SUBJECTIVE AND OBJECTIVE BOX
Date/Time Patient Seen:  		  Referring MD:   Data Reviewed	       Patient is a 88y old  Female who presents with a chief complaint of sob (17 Dec 2023 18:42)      Subjective/HPI     PAST MEDICAL & SURGICAL HISTORY:  CAD (coronary artery disease)    Rapid atrial fibrillation    CAD (coronary artery disease)  s/p Cath & 3 stents to LAD on 3/2020; OM1 stent x 1  EF ~30%    Mild HTN    History of CHF (congestive heart failure)    Myocardial infarction    No significant past surgical history    S/P cataract surgery          Medication list         MEDICATIONS  (STANDING):  aMIOdarone    Tablet 200 milliGRAM(s) Oral daily  apixaban 2.5 milliGRAM(s) Oral every 12 hours  atorvastatin 40 milliGRAM(s) Oral at bedtime  budesonide 160 MICROgram(s)/formoterol 4.5 MICROgram(s) Inhaler 2 Puff(s) Inhalation two times a day  clopidogrel Tablet 75 milliGRAM(s) Oral daily  famotidine    Tablet 20 milliGRAM(s) Oral daily  furosemide    Tablet 20 milliGRAM(s) Oral daily  melatonin 3 milliGRAM(s) Oral at bedtime  sacubitril 49 mG/valsartan 51 mG 1 Tablet(s) Oral two times a day    MEDICATIONS  (PRN):         Vitals log        ICU Vital Signs Last 24 Hrs  T(C): 36.9 (18 Dec 2023 00:00), Max: 37.6 (17 Dec 2023 06:37)  T(F): 98.5 (18 Dec 2023 00:00), Max: 99.6 (17 Dec 2023 06:37)  HR: 50 (18 Dec 2023 04:00) (48 - 83)  BP: 139/66 (18 Dec 2023 04:00) (95/52 - 139/66)  BP(mean): 87 (18 Dec 2023 04:00) (66 - 87)  ABP: --  ABP(mean): --  RR: 19 (18 Dec 2023 04:00) (15 - 19)  SpO2: 93% (18 Dec 2023 04:00) (93% - 96%)    O2 Parameters below as of 18 Dec 2023 00:00  Patient On (Oxygen Delivery Method): room air                 Input and Output:  I&O's Detail    16 Dec 2023 07:01  -  17 Dec 2023 07:00  --------------------------------------------------------  IN:    IV PiggyBack: 100 mL    Oral Fluid: 150 mL  Total IN: 250 mL    OUT:  Total OUT: 0 mL    Total NET: 250 mL      17 Dec 2023 07:01  -  18 Dec 2023 05:59  --------------------------------------------------------  IN:    Oral Fluid: 100 mL  Total IN: 100 mL    OUT:  Total OUT: 0 mL    Total NET: 100 mL          Lab Data                        12.7   7.20  )-----------( 142      ( 17 Dec 2023 05:30 )             37.4     12-17    139  |  105  |  21  ----------------------------<  116<H>  4.0   |  25  |  0.86    Ca    7.9<L>      17 Dec 2023 05:30    TPro  5.7<L>  /  Alb  2.6<L>  /  TBili  0.1<L>  /  DBili  0.1  /  AST  30  /  ALT  27  /  AlkPhos  51  12-17            Review of Systems	      Objective     Physical Examination  heart s1s2  lung dc BS  head nc        Pertinent Lab findings & Imaging      Key:  NO   Adequate UO     I&O's Detail    16 Dec 2023 07:01  -  17 Dec 2023 07:00  --------------------------------------------------------  IN:    IV PiggyBack: 100 mL    Oral Fluid: 150 mL  Total IN: 250 mL    OUT:  Total OUT: 0 mL    Total NET: 250 mL      17 Dec 2023 07:01  -  18 Dec 2023 05:59  --------------------------------------------------------  IN:    Oral Fluid: 100 mL  Total IN: 100 mL    OUT:  Total OUT: 0 mL    Total NET: 100 mL               Discussed with:     Cultures:	        Radiology

## 2023-12-18 NOTE — PROGRESS NOTE ADULT - SUBJECTIVE AND OBJECTIVE BOX
Patient is a 88y old  Female who presents with a chief complaint of sob (18 Dec 2023 05:58)      INTERVAL HPI/OVERNIGHT EVENTS overnight events noted    Home Medications:  amiodarone 100 mg oral tablet: 2 tab(s) orally once a day (14 Dec 2023 21:03)  atorvastatin 40 mg oral tablet: 1 tab(s) orally once a day (14 Dec 2023 16:31)  clopidogrel 75 mg oral tablet: 1 tab(s) orally once a day (14 Dec 2023 16:31)  Entresto 49 mg-51 mg oral tablet: 1 tab(s) orally 2 times a day (14 Dec 2023 16:31)  furosemide 20 mg oral tablet: 1 tab(s) orally once a day (14 Dec 2023 17:28)  Lagevrio 200 mg oral capsule: 4 cap(s) orally 2 times a day (14 Dec 2023 21:03)  metoprolol tartrate 25 mg oral tablet: 1 tab(s) orally 2 times a day (14 Dec 2023 16:31)      MEDICATIONS  (STANDING):  aMIOdarone    Tablet 200 milliGRAM(s) Oral daily  apixaban 2.5 milliGRAM(s) Oral every 12 hours  atorvastatin 40 milliGRAM(s) Oral at bedtime  budesonide 160 MICROgram(s)/formoterol 4.5 MICROgram(s) Inhaler 2 Puff(s) Inhalation two times a day  clopidogrel Tablet 75 milliGRAM(s) Oral daily  famotidine    Tablet 20 milliGRAM(s) Oral daily  furosemide    Tablet 20 milliGRAM(s) Oral daily  melatonin 3 milliGRAM(s) Oral at bedtime  sacubitril 49 mG/valsartan 51 mG 1 Tablet(s) Oral two times a day    MEDICATIONS  (PRN):      Allergies    Persantine (Rash)  Persantine (Unknown)    Intolerances        REVIEW OF SYSTEMS:  CONSTITUTIONAL: No fever, weight loss, has fatigue  EYES: No eye pain, visual disturbances, or discharge  ENMT:  No difficulty hearing, tinnitus, vertigo; No sinus or throat pain  NECK: No pain or stiffness  BREASTS: No pain, masses, or nipple discharge  RESPIRATORY: No cough, wheezing, chills or hemoptysis; No shortness of breath  CARDIOVASCULAR: No chest pain, palpitations, dizziness, or leg swelling  GASTROINTESTINAL: No abdominal or epigastric pain. No nausea, vomiting, or hematemesis; No diarrhea or constipation. No melena or hematochezia.  GENITOURINARY: No dysuria, frequency, hematuria, or incontinence  NEUROLOGICAL: had near syncope  SKIN: No itching, burning, rashes, or lesions     Vital Signs Last 24 Hrs  T(C): 36.6 (18 Dec 2023 08:09), Max: 37.1 (17 Dec 2023 20:00)  T(F): 97.9 (18 Dec 2023 08:09), Max: 98.7 (17 Dec 2023 20:00)  HR: 70 (18 Dec 2023 08:00) (48 - 70)  BP: 120/60 (18 Dec 2023 08:00) (95/52 - 139/66)  BP(mean): 77 (18 Dec 2023 08:00) (66 - 87)  RR: 17 (18 Dec 2023 08:00) (15 - 19)  SpO2: 93% (18 Dec 2023 08:00) (93% - 96%)    Parameters below as of 18 Dec 2023 08:00  Patient On (Oxygen Delivery Method): room air        PHYSICAL EXAM:  GENERAL: NAD, well-groomed, well-developed  HEAD:  Atraumatic, Normocephalic  EYES: EOMI, PERRLA, conjunctiva and sclera clear  ENMT: Moist mucous membranes,   NECK: Supple, No JVD, Normal thyroid  NERVOUS SYSTEM:  Alert & Oriented X3, non focal  CHEST/LUNG: Clear to percussion bilaterally;   HEART: Regular rate and rhythm;   ABDOMEN: Soft, Nontender, Nondistended; Bowel sounds present  EXTREMITIES:  2+ Peripheral Pulses, No clubbing, cyanosis, or edema  SKIN: No rashes or lesions    LABS:                        12.7   7.20  )-----------( 142      ( 17 Dec 2023 05:30 )             37.4     12-18    140  |  104  |  23  ----------------------------<  118<H>  4.1   |  26  |  0.98    Ca    8.1<L>      18 Dec 2023 06:00  Mg     2.0     12-18    TPro  5.5<L>  /  Alb  2.6<L>  /  TBili  0.4  /  DBili  0.1  /  AST  24  /  ALT  27  /  AlkPhos  53  12-18      Urinalysis Basic - ( 18 Dec 2023 06:00 )    Color: x / Appearance: x / SG: x / pH: x  Gluc: 118 mg/dL / Ketone: x  / Bili: x / Urobili: x   Blood: x / Protein: x / Nitrite: x   Leuk Esterase: x / RBC: x / WBC x   Sq Epi: x / Non Sq Epi: x / Bacteria: x      CAPILLARY BLOOD GLUCOSE      POCT Blood Glucose.: 115 mg/dL (18 Dec 2023 10:16)        Culture - Blood (collected 12-14-23 @ 17:19)  Source: .Blood Blood-Peripheral  Preliminary Report (12-17-23 @ 22:01):    No growth at 72 Hours    Culture - Blood (collected 12-14-23 @ 17:19)  Source: .Blood Blood-Peripheral  Preliminary Report (12-17-23 @ 22:01):    No growth at 72 Hours        I&O's Summary    17 Dec 2023 07:01  -  18 Dec 2023 07:00  --------------------------------------------------------  IN: 100 mL / OUT: 0 mL / NET: 100 mL        RADIOLOGY & ADDITIONAL TESTS:    Imaging Personally Reviewed:  [x ] YES  [ ] NO    Consultant(s) Notes Reviewed:  [ x] YES  [ ] NO    Care Discussed with Consultants/Other Providers [x ] YES  [ ] NO

## 2023-12-18 NOTE — PROGRESS NOTE ADULT - SUBJECTIVE AND OBJECTIVE BOX
KAVIN DENNIS is a 88yFemale , patient examined and chart reviewed.    INTERVAL HPI/ OVERNIGHT EVENTS:   Events noted- Had vasovagal syncopal episode this am RRT called.   Awake alert NAD. Afebrile.    PAST MEDICAL & SURGICAL HISTORY:  CAD (coronary artery disease)  Rapid atrial fibrillation  CAD (coronary artery disease)  s/p Cath & 3 stents to LAD on 3/2020; OM1 stent x 1  EF ~30%  Mild HTN  History of CHF (congestive heart failure)  Myocardial infarction  S/P cataract surgery          For details regarding the patient's social history, family history, and other miscellaneous elements, please refer the initial infectious diseases consultation and/or the admitting history and physical examination for this admission.    ROS:  CONSTITUTIONAL:  Negative fever or chill  EYES:  Negative  blurry vision or double vision  CARDIOVASCULAR:  Negative for chest pain or palpitations  RESPIRATORY:  Negative for cough, wheezing, or SOB   GASTROINTESTINAL:  Negative for nausea, vomiting, diarrhea, constipation, or abdominal pain  GENITOURINARY:  Negative frequency, urgency or dysuria  NEUROLOGIC:  No headache, confusion, dizziness,+ lightheadedness  All other systems were reviewed and are negative     ALLERGIES  Persantine (Rash)      Current inpatient medications :    ANTIBIOTICS/RELEVANT:    MEDICATIONS  (STANDING):  aMIOdarone    Tablet 200 milliGRAM(s) Oral daily  apixaban 2.5 milliGRAM(s) Oral every 12 hours  atorvastatin 40 milliGRAM(s) Oral at bedtime  budesonide 160 MICROgram(s)/formoterol 4.5 MICROgram(s) Inhaler 2 Puff(s) Inhalation two times a day  clopidogrel Tablet 75 milliGRAM(s) Oral daily  famotidine    Tablet 20 milliGRAM(s) Oral daily  furosemide    Tablet 20 milliGRAM(s) Oral daily  melatonin 3 milliGRAM(s) Oral at bedtime  sacubitril 49 mG/valsartan 51 mG 1 Tablet(s) Oral two times a day    MEDICATIONS  (PRN):      Objective:    ICU Vital Signs Last 24 Hrs  T(C): 36.6 (18 Dec 2023 11:40), Max: 37.1 (17 Dec 2023 20:00)  T(F): 97.9 (18 Dec 2023 11:40), Max: 98.7 (17 Dec 2023 20:00)  HR: 47 (18 Dec 2023 11:00) (43 - 70)  BP: 111/56 (18 Dec 2023 11:00) (95/52 - 139/66)  BP(mean): 74 (18 Dec 2023 11:00) (66 - 89)  RR: 15 (18 Dec 2023 11:00) (14 - 24)  SpO2: 94% (18 Dec 2023 11:00) (93% - 95%)    O2 Parameters below as of 18 Dec 2023 11:00  Patient On (Oxygen Delivery Method): room air        Physical Exam:  General: no acute distress  Neck: supple, trachea midline  Lungs: CTA no wheeze/rhonchi  Cardiovascular: regular rate and rhythm, S1 S2  Abdomen: soft, nontender,  bowel sounds normal  Neurological: alert and oriented x3  Skin: no rash  Extremities: no edema            LABS:                        12.7   7.20  )-----------( 142      ( 17 Dec 2023 05:30 )             37.4   12-18    140  |  104  |  23  ----------------------------<  118<H>  4.1   |  26  |  0.98    Ca    8.1<L>      18 Dec 2023 06:00  Mg     2.0     12-18    TPro  5.5<L>  /  Alb  2.6<L>  /  TBili  0.4  /  DBili  0.1  /  AST  24  /  ALT  27  /  AlkPhos  53  12-18      MICROBIOLOGY:  Culture - Blood (collected 14 Dec 2023 17:19)  Source: .Blood Blood-Peripheral  Preliminary Report (16 Dec 2023 22:01):    No growth at 48 Hours    Culture - Blood (collected 14 Dec 2023 17:19)  Source: .Blood Blood-Peripheral  Preliminary Report (16 Dec 2023 22:01):    No growth at 48 Hours      RADIOLOGY & ADDITIONAL STUDIES:  ACC: [id number] EXAM:  CT CHEST   ORDERED BY: ANA SMITH     PROCEDURE DATE:  12/14/2023          INTERPRETATION:  CT CHEST WITHOUT CONTRAST    INDICATION: History of shortness of breath and cough. History of atypical   viral infection/pelvis.    TECHNIQUE: Unenhanced helical images were obtained of the chest. Coronal   and sagittal images were reconstructed. Maximum intensity projection   images were generated.        COMPARISON: Radiograph chest 12/14/2023.    FINDINGS:    Tubes/Lines:None.    Lungs, airways and pleura: Small bilateral pleural effusions. Bilateral   lower lobe linear and passive atelectasis. Right upper lobe lung cyst.   Within the right lower lobe is a 5 x 3 mm noncalcified nodule (series 8   image 99). The airways are unremarkable.    Mediastinum: The thyroid gland is unremarkable. No enlarged chest lymph   nodes. Intrathoracic stomach with rotation of the greater curvature. The   heart is enlarged. Coronary calcifications/stents. No pericardial   effusion. The aorta is tortuous. The aorta is ectatic. Aortic   calcifications.    Upper Abdomen: Bilateral parapelvic cysts. The left adrenal gland is   thickened. Within the body of the left adrenal gland is a 1.6 x 1.6 cm   nodule.    Bones And Soft Tissues:Spondylosis the thoracic spine. Anterior wedging   of the T11 vertebral body.  The soft tissues are unremarkable.      IMPRESSION:    1.  Small bilateral pleural effusions and bilateral linear atelectasis.  2.  In the right lower lobe is a 5 x 3 mm noncalcified nodule.    Assessment :   87YO F PMH CAD HTN Afib admitted with SOB chest tightness sore throat congestion and wheeze sec COVID 19.   CT Chest with small bilateral pleural effusions/ atelectasis.   On RA PT is vaccinated.  Wheeze resolved  Vasovagal episode - Bradycardic  Clinically doing well    Plan :   Supportive care  Sp Remdesivir X 3 doses  Sp Decadron 6mg IV daily x 3 doses- short course  Trend temps and cbc  Monitor resp status and 02 requirements  Pulm toileting  Stable from ID standpoint    Continue with present regiment.  Appropriate use of antibiotics and adverse effects reviewed.    > 35 minutes were spent in direct patient care reviewing notes, medications ,labs data/ imaging , discussion with multidisciplinary team.    Thank you for allowing me to participate in care of your patient .    Brady Oneal MD  Infectious Disease  [phone]      KAVIN DENNIS is a 88yFemale , patient examined and chart reviewed.    INTERVAL HPI/ OVERNIGHT EVENTS:   Events noted- Had vasovagal syncopal episode this am RRT called.   Awake alert NAD. Afebrile.    PAST MEDICAL & SURGICAL HISTORY:  CAD (coronary artery disease)  Rapid atrial fibrillation  CAD (coronary artery disease)  s/p Cath & 3 stents to LAD on 3/2020; OM1 stent x 1  EF ~30%  Mild HTN  History of CHF (congestive heart failure)  Myocardial infarction  S/P cataract surgery          For details regarding the patient's social history, family history, and other miscellaneous elements, please refer the initial infectious diseases consultation and/or the admitting history and physical examination for this admission.    ROS:  CONSTITUTIONAL:  Negative fever or chill  EYES:  Negative  blurry vision or double vision  CARDIOVASCULAR:  Negative for chest pain or palpitations  RESPIRATORY:  Negative for cough, wheezing, or SOB   GASTROINTESTINAL:  Negative for nausea, vomiting, diarrhea, constipation, or abdominal pain  GENITOURINARY:  Negative frequency, urgency or dysuria  NEUROLOGIC:  No headache, confusion, dizziness,+ lightheadedness  All other systems were reviewed and are negative     ALLERGIES  Persantine (Rash)      Current inpatient medications :    ANTIBIOTICS/RELEVANT:    MEDICATIONS  (STANDING):  aMIOdarone    Tablet 200 milliGRAM(s) Oral daily  apixaban 2.5 milliGRAM(s) Oral every 12 hours  atorvastatin 40 milliGRAM(s) Oral at bedtime  budesonide 160 MICROgram(s)/formoterol 4.5 MICROgram(s) Inhaler 2 Puff(s) Inhalation two times a day  clopidogrel Tablet 75 milliGRAM(s) Oral daily  famotidine    Tablet 20 milliGRAM(s) Oral daily  furosemide    Tablet 20 milliGRAM(s) Oral daily  melatonin 3 milliGRAM(s) Oral at bedtime  sacubitril 49 mG/valsartan 51 mG 1 Tablet(s) Oral two times a day    MEDICATIONS  (PRN):      Objective:    ICU Vital Signs Last 24 Hrs  T(C): 36.6 (18 Dec 2023 11:40), Max: 37.1 (17 Dec 2023 20:00)  T(F): 97.9 (18 Dec 2023 11:40), Max: 98.7 (17 Dec 2023 20:00)  HR: 47 (18 Dec 2023 11:00) (43 - 70)  BP: 111/56 (18 Dec 2023 11:00) (95/52 - 139/66)  BP(mean): 74 (18 Dec 2023 11:00) (66 - 89)  RR: 15 (18 Dec 2023 11:00) (14 - 24)  SpO2: 94% (18 Dec 2023 11:00) (93% - 95%)    O2 Parameters below as of 18 Dec 2023 11:00  Patient On (Oxygen Delivery Method): room air        Physical Exam:  General: no acute distress  Neck: supple, trachea midline  Lungs: CTA no wheeze/rhonchi  Cardiovascular: regular rate and rhythm, S1 S2  Abdomen: soft, nontender,  bowel sounds normal  Neurological: alert and oriented x3  Skin: no rash  Extremities: no edema            LABS:                        12.7   7.20  )-----------( 142      ( 17 Dec 2023 05:30 )             37.4   12-18    140  |  104  |  23  ----------------------------<  118<H>  4.1   |  26  |  0.98    Ca    8.1<L>      18 Dec 2023 06:00  Mg     2.0     12-18    TPro  5.5<L>  /  Alb  2.6<L>  /  TBili  0.4  /  DBili  0.1  /  AST  24  /  ALT  27  /  AlkPhos  53  12-18      MICROBIOLOGY:  Culture - Blood (collected 14 Dec 2023 17:19)  Source: .Blood Blood-Peripheral  Preliminary Report (16 Dec 2023 22:01):    No growth at 48 Hours    Culture - Blood (collected 14 Dec 2023 17:19)  Source: .Blood Blood-Peripheral  Preliminary Report (16 Dec 2023 22:01):    No growth at 48 Hours      RADIOLOGY & ADDITIONAL STUDIES:  ACC: [id number] EXAM:  CT CHEST   ORDERED BY: ANA SMITH     PROCEDURE DATE:  12/14/2023          INTERPRETATION:  CT CHEST WITHOUT CONTRAST    INDICATION: History of shortness of breath and cough. History of atypical   viral infection/pelvis.    TECHNIQUE: Unenhanced helical images were obtained of the chest. Coronal   and sagittal images were reconstructed. Maximum intensity projection   images were generated.        COMPARISON: Radiograph chest 12/14/2023.    FINDINGS:    Tubes/Lines:None.    Lungs, airways and pleura: Small bilateral pleural effusions. Bilateral   lower lobe linear and passive atelectasis. Right upper lobe lung cyst.   Within the right lower lobe is a 5 x 3 mm noncalcified nodule (series 8   image 99). The airways are unremarkable.    Mediastinum: The thyroid gland is unremarkable. No enlarged chest lymph   nodes. Intrathoracic stomach with rotation of the greater curvature. The   heart is enlarged. Coronary calcifications/stents. No pericardial   effusion. The aorta is tortuous. The aorta is ectatic. Aortic   calcifications.    Upper Abdomen: Bilateral parapelvic cysts. The left adrenal gland is   thickened. Within the body of the left adrenal gland is a 1.6 x 1.6 cm   nodule.    Bones And Soft Tissues:Spondylosis the thoracic spine. Anterior wedging   of the T11 vertebral body.  The soft tissues are unremarkable.      IMPRESSION:    1.  Small bilateral pleural effusions and bilateral linear atelectasis.  2.  In the right lower lobe is a 5 x 3 mm noncalcified nodule.    Assessment :   89YO F PMH CAD HTN Afib admitted with SOB chest tightness sore throat congestion and wheeze sec COVID 19.   CT Chest with small bilateral pleural effusions/ atelectasis.   On RA PT is vaccinated.  Wheeze resolved  Vasovagal episode - Bradycardic  Clinically doing well    Plan :   Supportive care  Sp Remdesivir X 3 doses  Sp Decadron 6mg IV daily x 3 doses- short course  Trend temps and cbc  Monitor resp status and 02 requirements  Pulm toileting  Stable from ID standpoint    Continue with present regiment.  Appropriate use of antibiotics and adverse effects reviewed.    > 35 minutes were spent in direct patient care reviewing notes, medications ,labs data/ imaging , discussion with multidisciplinary team.    Thank you for allowing me to participate in care of your patient .    Brady Oneal MD  Infectious Disease  [phone]

## 2023-12-19 ENCOUNTER — TRANSCRIPTION ENCOUNTER (OUTPATIENT)
Age: 88
End: 2023-12-19

## 2023-12-19 LAB
ALBUMIN SERPL ELPH-MCNC: 2.7 G/DL — LOW (ref 3.3–5)
ALBUMIN SERPL ELPH-MCNC: 2.7 G/DL — LOW (ref 3.3–5)
ALBUMIN SERPL ELPH-MCNC: 2.8 G/DL — LOW (ref 3.3–5)
ALBUMIN SERPL ELPH-MCNC: 2.8 G/DL — LOW (ref 3.3–5)
ALP SERPL-CCNC: 57 U/L — SIGNIFICANT CHANGE UP (ref 30–120)
ALP SERPL-CCNC: 57 U/L — SIGNIFICANT CHANGE UP (ref 30–120)
ALP SERPL-CCNC: 58 U/L — SIGNIFICANT CHANGE UP (ref 30–120)
ALP SERPL-CCNC: 58 U/L — SIGNIFICANT CHANGE UP (ref 30–120)
ALT FLD-CCNC: 30 U/L — SIGNIFICANT CHANGE UP (ref 10–60)
ALT FLD-CCNC: 30 U/L — SIGNIFICANT CHANGE UP (ref 10–60)
ALT FLD-CCNC: 33 U/L — SIGNIFICANT CHANGE UP (ref 10–60)
ALT FLD-CCNC: 33 U/L — SIGNIFICANT CHANGE UP (ref 10–60)
ANION GAP SERPL CALC-SCNC: 4 MMOL/L — LOW (ref 5–17)
ANION GAP SERPL CALC-SCNC: 4 MMOL/L — LOW (ref 5–17)
AST SERPL-CCNC: 25 U/L — SIGNIFICANT CHANGE UP (ref 10–40)
AST SERPL-CCNC: 25 U/L — SIGNIFICANT CHANGE UP (ref 10–40)
AST SERPL-CCNC: 27 U/L — SIGNIFICANT CHANGE UP (ref 10–40)
AST SERPL-CCNC: 27 U/L — SIGNIFICANT CHANGE UP (ref 10–40)
BASOPHILS # BLD AUTO: 0.01 K/UL — SIGNIFICANT CHANGE UP (ref 0–0.2)
BASOPHILS # BLD AUTO: 0.01 K/UL — SIGNIFICANT CHANGE UP (ref 0–0.2)
BASOPHILS NFR BLD AUTO: 0.1 % — SIGNIFICANT CHANGE UP (ref 0–2)
BASOPHILS NFR BLD AUTO: 0.1 % — SIGNIFICANT CHANGE UP (ref 0–2)
BILIRUB DIRECT SERPL-MCNC: 0.1 MG/DL — SIGNIFICANT CHANGE UP (ref 0–0.3)
BILIRUB DIRECT SERPL-MCNC: 0.1 MG/DL — SIGNIFICANT CHANGE UP (ref 0–0.3)
BILIRUB INDIRECT FLD-MCNC: 0.3 MG/DL — SIGNIFICANT CHANGE UP (ref 0.2–1)
BILIRUB INDIRECT FLD-MCNC: 0.3 MG/DL — SIGNIFICANT CHANGE UP (ref 0.2–1)
BILIRUB SERPL-MCNC: 0.4 MG/DL — SIGNIFICANT CHANGE UP (ref 0.2–1.2)
BUN SERPL-MCNC: 24 MG/DL — HIGH (ref 7–23)
BUN SERPL-MCNC: 24 MG/DL — HIGH (ref 7–23)
CALCIUM SERPL-MCNC: 8.4 MG/DL — SIGNIFICANT CHANGE UP (ref 8.4–10.5)
CALCIUM SERPL-MCNC: 8.4 MG/DL — SIGNIFICANT CHANGE UP (ref 8.4–10.5)
CHLORIDE SERPL-SCNC: 104 MMOL/L — SIGNIFICANT CHANGE UP (ref 96–108)
CHLORIDE SERPL-SCNC: 104 MMOL/L — SIGNIFICANT CHANGE UP (ref 96–108)
CO2 SERPL-SCNC: 31 MMOL/L — SIGNIFICANT CHANGE UP (ref 22–31)
CO2 SERPL-SCNC: 31 MMOL/L — SIGNIFICANT CHANGE UP (ref 22–31)
CREAT SERPL-MCNC: 0.96 MG/DL — SIGNIFICANT CHANGE UP (ref 0.5–1.3)
CREAT SERPL-MCNC: 0.96 MG/DL — SIGNIFICANT CHANGE UP (ref 0.5–1.3)
CULTURE RESULTS: SIGNIFICANT CHANGE UP
EGFR: 57 ML/MIN/1.73M2 — LOW
EGFR: 57 ML/MIN/1.73M2 — LOW
EOSINOPHIL # BLD AUTO: 0 K/UL — SIGNIFICANT CHANGE UP (ref 0–0.5)
EOSINOPHIL # BLD AUTO: 0 K/UL — SIGNIFICANT CHANGE UP (ref 0–0.5)
EOSINOPHIL NFR BLD AUTO: 0 % — SIGNIFICANT CHANGE UP (ref 0–6)
EOSINOPHIL NFR BLD AUTO: 0 % — SIGNIFICANT CHANGE UP (ref 0–6)
GLUCOSE SERPL-MCNC: 102 MG/DL — HIGH (ref 70–99)
GLUCOSE SERPL-MCNC: 102 MG/DL — HIGH (ref 70–99)
HCT VFR BLD CALC: 40.9 % — SIGNIFICANT CHANGE UP (ref 34.5–45)
HCT VFR BLD CALC: 40.9 % — SIGNIFICANT CHANGE UP (ref 34.5–45)
HGB BLD-MCNC: 13.9 G/DL — SIGNIFICANT CHANGE UP (ref 11.5–15.5)
HGB BLD-MCNC: 13.9 G/DL — SIGNIFICANT CHANGE UP (ref 11.5–15.5)
IMM GRANULOCYTES NFR BLD AUTO: 0.2 % — SIGNIFICANT CHANGE UP (ref 0–0.9)
IMM GRANULOCYTES NFR BLD AUTO: 0.2 % — SIGNIFICANT CHANGE UP (ref 0–0.9)
LYMPHOCYTES # BLD AUTO: 1.92 K/UL — SIGNIFICANT CHANGE UP (ref 1–3.3)
LYMPHOCYTES # BLD AUTO: 1.92 K/UL — SIGNIFICANT CHANGE UP (ref 1–3.3)
LYMPHOCYTES # BLD AUTO: 23.6 % — SIGNIFICANT CHANGE UP (ref 13–44)
LYMPHOCYTES # BLD AUTO: 23.6 % — SIGNIFICANT CHANGE UP (ref 13–44)
MCHC RBC-ENTMCNC: 33.5 PG — SIGNIFICANT CHANGE UP (ref 27–34)
MCHC RBC-ENTMCNC: 33.5 PG — SIGNIFICANT CHANGE UP (ref 27–34)
MCHC RBC-ENTMCNC: 34 GM/DL — SIGNIFICANT CHANGE UP (ref 32–36)
MCHC RBC-ENTMCNC: 34 GM/DL — SIGNIFICANT CHANGE UP (ref 32–36)
MCV RBC AUTO: 98.6 FL — SIGNIFICANT CHANGE UP (ref 80–100)
MCV RBC AUTO: 98.6 FL — SIGNIFICANT CHANGE UP (ref 80–100)
MONOCYTES # BLD AUTO: 0.85 K/UL — SIGNIFICANT CHANGE UP (ref 0–0.9)
MONOCYTES # BLD AUTO: 0.85 K/UL — SIGNIFICANT CHANGE UP (ref 0–0.9)
MONOCYTES NFR BLD AUTO: 10.5 % — SIGNIFICANT CHANGE UP (ref 2–14)
MONOCYTES NFR BLD AUTO: 10.5 % — SIGNIFICANT CHANGE UP (ref 2–14)
NEUTROPHILS # BLD AUTO: 5.33 K/UL — SIGNIFICANT CHANGE UP (ref 1.8–7.4)
NEUTROPHILS # BLD AUTO: 5.33 K/UL — SIGNIFICANT CHANGE UP (ref 1.8–7.4)
NEUTROPHILS NFR BLD AUTO: 65.6 % — SIGNIFICANT CHANGE UP (ref 43–77)
NEUTROPHILS NFR BLD AUTO: 65.6 % — SIGNIFICANT CHANGE UP (ref 43–77)
NRBC # BLD: 0 /100 WBCS — SIGNIFICANT CHANGE UP (ref 0–0)
NRBC # BLD: 0 /100 WBCS — SIGNIFICANT CHANGE UP (ref 0–0)
PLATELET # BLD AUTO: 165 K/UL — SIGNIFICANT CHANGE UP (ref 150–400)
PLATELET # BLD AUTO: 165 K/UL — SIGNIFICANT CHANGE UP (ref 150–400)
POTASSIUM SERPL-MCNC: 4 MMOL/L — SIGNIFICANT CHANGE UP (ref 3.5–5.3)
POTASSIUM SERPL-MCNC: 4 MMOL/L — SIGNIFICANT CHANGE UP (ref 3.5–5.3)
POTASSIUM SERPL-SCNC: 4 MMOL/L — SIGNIFICANT CHANGE UP (ref 3.5–5.3)
POTASSIUM SERPL-SCNC: 4 MMOL/L — SIGNIFICANT CHANGE UP (ref 3.5–5.3)
PROT SERPL-MCNC: 5.3 G/DL — LOW (ref 6–8.3)
PROT SERPL-MCNC: 5.3 G/DL — LOW (ref 6–8.3)
PROT SERPL-MCNC: 5.6 G/DL — LOW (ref 6–8.3)
PROT SERPL-MCNC: 5.6 G/DL — LOW (ref 6–8.3)
RBC # BLD: 4.15 M/UL — SIGNIFICANT CHANGE UP (ref 3.8–5.2)
RBC # BLD: 4.15 M/UL — SIGNIFICANT CHANGE UP (ref 3.8–5.2)
RBC # FLD: 15.3 % — HIGH (ref 10.3–14.5)
RBC # FLD: 15.3 % — HIGH (ref 10.3–14.5)
SODIUM SERPL-SCNC: 139 MMOL/L — SIGNIFICANT CHANGE UP (ref 135–145)
SODIUM SERPL-SCNC: 139 MMOL/L — SIGNIFICANT CHANGE UP (ref 135–145)
SPECIMEN SOURCE: SIGNIFICANT CHANGE UP
WBC # BLD: 8.13 K/UL — SIGNIFICANT CHANGE UP (ref 3.8–10.5)
WBC # BLD: 8.13 K/UL — SIGNIFICANT CHANGE UP (ref 3.8–10.5)
WBC # FLD AUTO: 8.13 K/UL — SIGNIFICANT CHANGE UP (ref 3.8–10.5)
WBC # FLD AUTO: 8.13 K/UL — SIGNIFICANT CHANGE UP (ref 3.8–10.5)

## 2023-12-19 RX ORDER — SODIUM CHLORIDE 9 MG/ML
250 INJECTION INTRAMUSCULAR; INTRAVENOUS; SUBCUTANEOUS ONCE
Refills: 0 | Status: COMPLETED | OUTPATIENT
Start: 2023-12-19 | End: 2023-12-19

## 2023-12-19 RX ADMIN — APIXABAN 2.5 MILLIGRAM(S): 2.5 TABLET, FILM COATED ORAL at 18:22

## 2023-12-19 RX ADMIN — CLOPIDOGREL BISULFATE 75 MILLIGRAM(S): 75 TABLET, FILM COATED ORAL at 11:43

## 2023-12-19 RX ADMIN — Medication 20 MILLIGRAM(S): at 06:12

## 2023-12-19 RX ADMIN — ATORVASTATIN CALCIUM 40 MILLIGRAM(S): 80 TABLET, FILM COATED ORAL at 22:26

## 2023-12-19 RX ADMIN — SACUBITRIL AND VALSARTAN 1 TABLET(S): 24; 26 TABLET, FILM COATED ORAL at 06:13

## 2023-12-19 RX ADMIN — BUDESONIDE AND FORMOTEROL FUMARATE DIHYDRATE 2 PUFF(S): 160; 4.5 AEROSOL RESPIRATORY (INHALATION) at 06:11

## 2023-12-19 RX ADMIN — SODIUM CHLORIDE 250 MILLILITER(S): 9 INJECTION INTRAMUSCULAR; INTRAVENOUS; SUBCUTANEOUS at 15:24

## 2023-12-19 RX ADMIN — Medication 3 MILLIGRAM(S): at 22:26

## 2023-12-19 RX ADMIN — APIXABAN 2.5 MILLIGRAM(S): 2.5 TABLET, FILM COATED ORAL at 06:12

## 2023-12-19 RX ADMIN — FAMOTIDINE 20 MILLIGRAM(S): 10 INJECTION INTRAVENOUS at 11:43

## 2023-12-19 RX ADMIN — AMIODARONE HYDROCHLORIDE 200 MILLIGRAM(S): 400 TABLET ORAL at 06:12

## 2023-12-19 RX ADMIN — BUDESONIDE AND FORMOTEROL FUMARATE DIHYDRATE 2 PUFF(S): 160; 4.5 AEROSOL RESPIRATORY (INHALATION) at 20:26

## 2023-12-19 NOTE — CASE MANAGEMENT PROGRESS NOTE - NSCMPROGRESSNOTE_GEN_ALL_CORE
pt discussed in rounds this morning, remains acute and not ready for discharge today. CM discussed transition of care with patients daughter Bebe CM explained home care expectations, process, insurance provisions and home safety with good understanding.   Offered list of [facility] and pt. chose [facility] at home care agency.  Provided discharge resources folder. Referral sent accordingly. Patients daughter will be available to transport home.  pt discussed in rounds this morning, remains acute and not ready for discharge today. CM discussed transition of care with patients daughter Bebe CM explained home care expectations, process, insurance provisions and home safety with good understanding.   Offered list of [facility] and pt. chose [facility] at home care agency.  Provided discharge resources folder. Referral sent accordingly. Patients daughter will be available to transport home.

## 2023-12-19 NOTE — DISCHARGE NOTE NURSING/CASE MANAGEMENT/SOCIAL WORK - NSDCFUADDAPPT_GEN_ALL_CORE_FT
Physical Therapist to visit the day after hospital discharge; Registered Nurse to follow if there is a need. Please contact the home care agency at the above phone number if you have not heard from them by 12 noon on the day after your hospital discharge.

## 2023-12-19 NOTE — DISCHARGE NOTE NURSING/CASE MANAGEMENT/SOCIAL WORK - NSSCNAMETXT_GEN_ALL_CORE
[facility] care agency [phone] will reach out to you within 24-72 hours of your discharge to schedule home care visit/eval appointment with you. Please call agency for any queries regarding home care services  [facility] care agency [phone] will reach out to you within 24-72 hours of your discharge to schedule home care visit/eval appointment with you. Please call agency for any queries regarding home care services

## 2023-12-19 NOTE — PROGRESS NOTE ADULT - ASSESSMENT
Patient developed cough shortness of breath chest tightness sore throat congestion 2 days ago had a positive COVID test that day complaining of worsening symptoms.  No fevers chills abdominal pain vomiting edema calf.  Patient relates on Eliquis.PMD [id number] PMHx HFrEF (LVEF 43% 5/2022), CAD with prior MI s/p PCI, Afib (on Eliquis) presenting with cough, SOB x2 days . positive for  COVID 2 days ago (vaccinated x3).  cough worsening and  feel more dyspneic prompting ER evaluation. In ER, concern for stridor and was given Racemic Epi x2 and Decadron 10mg x1.   CT Soft Tissue Neck negative. CT Chest with small bilateral pleural effusions/ atelectasis.   Patient and MICU consult , and is stable , and being admitted for further management   # Covid infection, with ac resp failure with hypoxia- improved  # edema uvula - improved  # CAD  # CHF systolic HF- no sob  # Paroxysmal afib  # B/l Pleural Effusion  #  bradycardia sinus - metoprolol stopped by cardiology,   # Hypotension and orthostatic hypotension , metoprolol, entresto and furosemide stopped  pt completed remdesvir and decadron ,, ID f up noted  PT  had RRT on 12/18 am for near syncope - likely vasovagal- with orthostasis,      d/w daughter ada Patient developed cough shortness of breath chest tightness sore throat congestion 2 days ago had a positive COVID test that day complaining of worsening symptoms.  No fevers chills abdominal pain vomiting edema calf.  Patient relates on Eliquis.PMD [id number] PMHx HFrEF (LVEF 43% 5/2022), CAD with prior MI s/p PCI, Afib (on Eliquis) presenting with cough, SOB x2 days . positive for  COVID 2 days ago (vaccinated x3).  cough worsening and  feel more dyspneic prompting ER evaluation. In ER, concern for stridor and was given Racemic Epi x2 and Decadron 10mg x1.   CT Soft Tissue Neck negative. CT Chest with small bilateral pleural effusions/ atelectasis.   Patient and MICU consult , and is stable , and being admitted for further management   # Covid infection, with ac resp failure with hypoxia- improved  # edema uvula - improved  # CAD  # CHF systolic HF- no sob  # Paroxysmal afib  # B/l Pleural Effusion  #  bradycardia sinus - metoprolol stopped by cardiology,   # Hypotension and orthostatic hypotension , metoprolol, entresto and furosemide stopped  pt completed remdesvir and decadron ,, ID f up noted  PT  had RRT on 12/18 am for near syncope - likely vasovagal- with orthostasis,      d/w daughter ada

## 2023-12-19 NOTE — PROGRESS NOTE ADULT - SUBJECTIVE AND OBJECTIVE BOX
Date/Time Patient Seen:  		  Referring MD:   Data Reviewed	       Patient is a 88y old  Female who presents with a chief complaint of sob (18 Dec 2023 12:36)      Subjective/HPI     PAST MEDICAL & SURGICAL HISTORY:  CAD (coronary artery disease)    Rapid atrial fibrillation    CAD (coronary artery disease)  s/p Cath & 3 stents to LAD on 3/2020; OM1 stent x 1  EF ~30%    Mild HTN    History of CHF (congestive heart failure)    Myocardial infarction    No significant past surgical history    S/P cataract surgery          Medication list         MEDICATIONS  (STANDING):  aMIOdarone    Tablet 200 milliGRAM(s) Oral daily  apixaban 2.5 milliGRAM(s) Oral every 12 hours  atorvastatin 40 milliGRAM(s) Oral at bedtime  budesonide 160 MICROgram(s)/formoterol 4.5 MICROgram(s) Inhaler 2 Puff(s) Inhalation two times a day  clopidogrel Tablet 75 milliGRAM(s) Oral daily  famotidine    Tablet 20 milliGRAM(s) Oral daily  furosemide    Tablet 20 milliGRAM(s) Oral daily  melatonin 3 milliGRAM(s) Oral at bedtime  sacubitril 49 mG/valsartan 51 mG 1 Tablet(s) Oral two times a day    MEDICATIONS  (PRN):         Vitals log        ICU Vital Signs Last 24 Hrs  T(C): 36.4 (19 Dec 2023 04:01), Max: 36.7 (18 Dec 2023 16:19)  T(F): 97.5 (19 Dec 2023 04:01), Max: 98.1 (18 Dec 2023 16:19)  HR: 57 (19 Dec 2023 04:07) (43 - 70)  BP: 136/68 (19 Dec 2023 04:07) (90/42 - 136/68)  BP(mean): 87 (19 Dec 2023 04:07) (58 - 89)  ABP: --  ABP(mean): --  RR: 13 (19 Dec 2023 04:07) (13 - 24)  SpO2: 97% (19 Dec 2023 04:07) (92% - 97%)    O2 Parameters below as of 18 Dec 2023 18:00  Patient On (Oxygen Delivery Method): room air                 Input and Output:  I&O's Detail    17 Dec 2023 07:01  -  18 Dec 2023 07:00  --------------------------------------------------------  IN:    Oral Fluid: 100 mL  Total IN: 100 mL    OUT:  Total OUT: 0 mL    Total NET: 100 mL          Lab Data                        13.9   8.13  )-----------( 165      ( 19 Dec 2023 06:00 )             40.9     12-18    140  |  104  |  23  ----------------------------<  118<H>  4.1   |  26  |  0.98    Ca    8.1<L>      18 Dec 2023 06:00  Mg     2.0     12-18    TPro  5.5<L>  /  Alb  2.6<L>  /  TBili  0.4  /  DBili  0.1  /  AST  24  /  ALT  27  /  AlkPhos  53  12-18            Review of Systems	      Objective     Physical Examination    heart s1s2  lung dec BS  head nc      Pertinent Lab findings & Imaging      Key:  NO   Adequate UO     I&O's Detail    17 Dec 2023 07:01  -  18 Dec 2023 07:00  --------------------------------------------------------  IN:    Oral Fluid: 100 mL  Total IN: 100 mL    OUT:  Total OUT: 0 mL    Total NET: 100 mL               Discussed with:     Cultures:	        Radiology

## 2023-12-19 NOTE — PROGRESS NOTE ADULT - ASSESSMENT
The patient is an 88 year old female with a history of CAD with prior MI s/p PCI, chronic systolic heart failure, paroxysmal atrial fibrillation who presents with cough, congestion, dyspnea in the setting of COVID.    Plan:  - ECG with underlying RBBB  - Echo 5/22 with mild/mod LV systolic dysfunction, mod MR, mod TR, mild pulm HTN  - CT chest with small pleural effusions  - Syncope - likely multifactorial with vasovagal episode (occurred while on toilet) with orthostatic hypotension  - Orthostatics positive  - Continue amiodarone 200 mg daily  - Continue apixaban 2.5 mg bid  - Continue clopidogrel 75 mg daily  - Continue atorvastatin 40 mg daily  - Sinus bradycardia - remain off metoprolol  - Hold Entresto due to orthostatic hypotension  - Re-evaluate use of metoprolol and Entresto as outpatient  - COVID positive  - Completed remdesivir  - Discharge planning

## 2023-12-19 NOTE — DISCHARGE NOTE NURSING/CASE MANAGEMENT/SOCIAL WORK - PATIENT PORTAL LINK FT
You can access the FollowMyHealth Patient Portal offered by [facility] by registering at the following website: [email or web address]. By joining [employer]’s FollowMyHealth portal, you will also be able to view your health information using other applications (apps) compatible with our system. You can access the FollowMyHealth Patient Portal offered by [facility] by registering at the following website: [email or web address]. By joining [employer]’s FollowMyHealth portal, you will also be able to view your health information using other applications (apps) compatible with our system.

## 2023-12-19 NOTE — PROGRESS NOTE ADULT - SUBJECTIVE AND OBJECTIVE BOX
KAVIN DENNIS is a 88yFemale , patient examined and chart reviewed.    INTERVAL HPI/ OVERNIGHT EVENTS:   Awake alert NAD. Afebrile.    PAST MEDICAL & SURGICAL HISTORY:  CAD (coronary artery disease)  Rapid atrial fibrillation  CAD (coronary artery disease)  s/p Cath & 3 stents to LAD on 3/2020; OM1 stent x 1  EF ~30%  Mild HTN  History of CHF (congestive heart failure)  Myocardial infarction  S/P cataract surgery          For details regarding the patient's social history, family history, and other miscellaneous elements, please refer the initial infectious diseases consultation and/or the admitting history and physical examination for this admission.    ROS:  CONSTITUTIONAL:  Negative fever or chill  EYES:  Negative  blurry vision or double vision  CARDIOVASCULAR:  Negative for chest pain or palpitations  RESPIRATORY:  Negative for cough, wheezing, or SOB   GASTROINTESTINAL:  Negative for nausea, vomiting, diarrhea, constipation, or abdominal pain  GENITOURINARY:  Negative frequency, urgency or dysuria  NEUROLOGIC:  No headache, confusion, dizziness,+ lightheadedness  All other systems were reviewed and are negative     ALLERGIES  Persantine (Rash)      Current inpatient medications :    ANTIBIOTICS/RELEVANT:    MEDICATIONS  (STANDING):  aMIOdarone    Tablet 200 milliGRAM(s) Oral daily  apixaban 2.5 milliGRAM(s) Oral every 12 hours  atorvastatin 40 milliGRAM(s) Oral at bedtime  budesonide 160 MICROgram(s)/formoterol 4.5 MICROgram(s) Inhaler 2 Puff(s) Inhalation two times a day  clopidogrel Tablet 75 milliGRAM(s) Oral daily  famotidine    Tablet 20 milliGRAM(s) Oral daily  melatonin 3 milliGRAM(s) Oral at bedtime    MEDICATIONS  (PRN):      Objective:    Vital Signs Last 24 Hrs  T(C): 36.3 (19 Dec 2023 15:24), Max: 36.7 (18 Dec 2023 20:36)  T(F): 97.4 (19 Dec 2023 15:24), Max: 98.1 (18 Dec 2023 20:36)  HR: 61 (19 Dec 2023 16:00) (54 - 73)  BP: 113/81 (19 Dec 2023 16:00) (90/42 - 144/129)  BP(mean): 91 (19 Dec 2023 16:00) (58 - 135)  RR: 14 (19 Dec 2023 16:00) (13 - 29)  SpO2: 96% (19 Dec 2023 16:00) (92% - 97%)      Physical Exam:  General: no acute distress  Neck: supple, trachea midline  Lungs: CTA no wheeze/rhonchi  Cardiovascular: regular rate and rhythm, S1 S2  Abdomen: soft, nontender,  bowel sounds normal  Neurological: alert and oriented x3  Skin: no rash  Extremities: no edema        LABS:                        13.9   8.13  )-----------( 165      ( 19 Dec 2023 06:00 )             40.9   12-19    139  |  104  |  24<H>  ----------------------------<  102<H>  4.0   |  31  |  0.96    Ca    8.4      19 Dec 2023 06:00  Mg     2.0     12-18    TPro  5.6<L>  /  Alb  2.7<L>  /  TBili  0.4  /  DBili  0.1  /  AST  27  /  ALT  30  /  AlkPhos  57  12-19        MICROBIOLOGY:  Culture - Blood (collected 14 Dec 2023 17:19)  Source: .Blood Blood-Peripheral  Preliminary Report (16 Dec 2023 22:01):    No growth at 48 Hours    Culture - Blood (collected 14 Dec 2023 17:19)  Source: .Blood Blood-Peripheral  Preliminary Report (16 Dec 2023 22:01):    No growth at 48 Hours      RADIOLOGY & ADDITIONAL STUDIES:  ACC: [id number] EXAM:  CT CHEST   ORDERED BY: ANA SMITH     PROCEDURE DATE:  12/14/2023          INTERPRETATION:  CT CHEST WITHOUT CONTRAST    INDICATION: History of shortness of breath and cough. History of atypical   viral infection/pelvis.    TECHNIQUE: Unenhanced helical images were obtained of the chest. Coronal   and sagittal images were reconstructed. Maximum intensity projection   images were generated.        COMPARISON: Radiograph chest 12/14/2023.    FINDINGS:    Tubes/Lines:None.    Lungs, airways and pleura: Small bilateral pleural effusions. Bilateral   lower lobe linear and passive atelectasis. Right upper lobe lung cyst.   Within the right lower lobe is a 5 x 3 mm noncalcified nodule (series 8   image 99). The airways are unremarkable.    Mediastinum: The thyroid gland is unremarkable. No enlarged chest lymph   nodes. Intrathoracic stomach with rotation of the greater curvature. The   heart is enlarged. Coronary calcifications/stents. No pericardial   effusion. The aorta is tortuous. The aorta is ectatic. Aortic   calcifications.    Upper Abdomen: Bilateral parapelvic cysts. The left adrenal gland is   thickened. Within the body of the left adrenal gland is a 1.6 x 1.6 cm   nodule.    Bones And Soft Tissues:Spondylosis the thoracic spine. Anterior wedging   of the T11 vertebral body.  The soft tissues are unremarkable.      IMPRESSION:    1.  Small bilateral pleural effusions and bilateral linear atelectasis.  2.  In the right lower lobe is a 5 x 3 mm noncalcified nodule.    Assessment :   89YO F PMH CAD HTN Afib admitted with SOB chest tightness sore throat congestion and wheeze sec COVID 19.   CT Chest with small bilateral pleural effusions/ atelectasis.   On RA PT is vaccinated.  Wheeze resolved  Vasovagal episode - Bradycardic- cardiology on case  Clinically doing well otherwise    Plan :   Supportive care  Sp Remdesivir X 3 doses  Sp Decadron 6mg IV daily x 3 doses  Trend temps and cbc  Monitor resp status and 02 requirements  Pulm toileting  Stable from ID standpoint  Dc planning per primary team    Continue with present regiment.  Appropriate use of antibiotics and adverse effects reviewed.    > 35 minutes were spent in direct patient care reviewing notes, medications ,labs data/ imaging , discussion with multidisciplinary team.    Thank you for allowing me to participate in care of your patient .    Brady Oneal MD  Infectious Disease  [phone]      KAVIN DENNIS is a 88yFemale , patient examined and chart reviewed.    INTERVAL HPI/ OVERNIGHT EVENTS:   Awake alert NAD. Afebrile.    PAST MEDICAL & SURGICAL HISTORY:  CAD (coronary artery disease)  Rapid atrial fibrillation  CAD (coronary artery disease)  s/p Cath & 3 stents to LAD on 3/2020; OM1 stent x 1  EF ~30%  Mild HTN  History of CHF (congestive heart failure)  Myocardial infarction  S/P cataract surgery          For details regarding the patient's social history, family history, and other miscellaneous elements, please refer the initial infectious diseases consultation and/or the admitting history and physical examination for this admission.    ROS:  CONSTITUTIONAL:  Negative fever or chill  EYES:  Negative  blurry vision or double vision  CARDIOVASCULAR:  Negative for chest pain or palpitations  RESPIRATORY:  Negative for cough, wheezing, or SOB   GASTROINTESTINAL:  Negative for nausea, vomiting, diarrhea, constipation, or abdominal pain  GENITOURINARY:  Negative frequency, urgency or dysuria  NEUROLOGIC:  No headache, confusion, dizziness,+ lightheadedness  All other systems were reviewed and are negative     ALLERGIES  Persantine (Rash)      Current inpatient medications :    ANTIBIOTICS/RELEVANT:    MEDICATIONS  (STANDING):  aMIOdarone    Tablet 200 milliGRAM(s) Oral daily  apixaban 2.5 milliGRAM(s) Oral every 12 hours  atorvastatin 40 milliGRAM(s) Oral at bedtime  budesonide 160 MICROgram(s)/formoterol 4.5 MICROgram(s) Inhaler 2 Puff(s) Inhalation two times a day  clopidogrel Tablet 75 milliGRAM(s) Oral daily  famotidine    Tablet 20 milliGRAM(s) Oral daily  melatonin 3 milliGRAM(s) Oral at bedtime    MEDICATIONS  (PRN):      Objective:    Vital Signs Last 24 Hrs  T(C): 36.3 (19 Dec 2023 15:24), Max: 36.7 (18 Dec 2023 20:36)  T(F): 97.4 (19 Dec 2023 15:24), Max: 98.1 (18 Dec 2023 20:36)  HR: 61 (19 Dec 2023 16:00) (54 - 73)  BP: 113/81 (19 Dec 2023 16:00) (90/42 - 144/129)  BP(mean): 91 (19 Dec 2023 16:00) (58 - 135)  RR: 14 (19 Dec 2023 16:00) (13 - 29)  SpO2: 96% (19 Dec 2023 16:00) (92% - 97%)      Physical Exam:  General: no acute distress  Neck: supple, trachea midline  Lungs: CTA no wheeze/rhonchi  Cardiovascular: regular rate and rhythm, S1 S2  Abdomen: soft, nontender,  bowel sounds normal  Neurological: alert and oriented x3  Skin: no rash  Extremities: no edema        LABS:                        13.9   8.13  )-----------( 165      ( 19 Dec 2023 06:00 )             40.9   12-19    139  |  104  |  24<H>  ----------------------------<  102<H>  4.0   |  31  |  0.96    Ca    8.4      19 Dec 2023 06:00  Mg     2.0     12-18    TPro  5.6<L>  /  Alb  2.7<L>  /  TBili  0.4  /  DBili  0.1  /  AST  27  /  ALT  30  /  AlkPhos  57  12-19        MICROBIOLOGY:  Culture - Blood (collected 14 Dec 2023 17:19)  Source: .Blood Blood-Peripheral  Preliminary Report (16 Dec 2023 22:01):    No growth at 48 Hours    Culture - Blood (collected 14 Dec 2023 17:19)  Source: .Blood Blood-Peripheral  Preliminary Report (16 Dec 2023 22:01):    No growth at 48 Hours      RADIOLOGY & ADDITIONAL STUDIES:  ACC: [id number] EXAM:  CT CHEST   ORDERED BY: ANA SMITH     PROCEDURE DATE:  12/14/2023          INTERPRETATION:  CT CHEST WITHOUT CONTRAST    INDICATION: History of shortness of breath and cough. History of atypical   viral infection/pelvis.    TECHNIQUE: Unenhanced helical images were obtained of the chest. Coronal   and sagittal images were reconstructed. Maximum intensity projection   images were generated.        COMPARISON: Radiograph chest 12/14/2023.    FINDINGS:    Tubes/Lines:None.    Lungs, airways and pleura: Small bilateral pleural effusions. Bilateral   lower lobe linear and passive atelectasis. Right upper lobe lung cyst.   Within the right lower lobe is a 5 x 3 mm noncalcified nodule (series 8   image 99). The airways are unremarkable.    Mediastinum: The thyroid gland is unremarkable. No enlarged chest lymph   nodes. Intrathoracic stomach with rotation of the greater curvature. The   heart is enlarged. Coronary calcifications/stents. No pericardial   effusion. The aorta is tortuous. The aorta is ectatic. Aortic   calcifications.    Upper Abdomen: Bilateral parapelvic cysts. The left adrenal gland is   thickened. Within the body of the left adrenal gland is a 1.6 x 1.6 cm   nodule.    Bones And Soft Tissues:Spondylosis the thoracic spine. Anterior wedging   of the T11 vertebral body.  The soft tissues are unremarkable.      IMPRESSION:    1.  Small bilateral pleural effusions and bilateral linear atelectasis.  2.  In the right lower lobe is a 5 x 3 mm noncalcified nodule.    Assessment :   87YO F PMH CAD HTN Afib admitted with SOB chest tightness sore throat congestion and wheeze sec COVID 19.   CT Chest with small bilateral pleural effusions/ atelectasis.   On RA PT is vaccinated.  Wheeze resolved  Vasovagal episode - Bradycardic- cardiology on case  Clinically doing well otherwise    Plan :   Supportive care  Sp Remdesivir X 3 doses  Sp Decadron 6mg IV daily x 3 doses  Trend temps and cbc  Monitor resp status and 02 requirements  Pulm toileting  Stable from ID standpoint  Dc planning per primary team    Continue with present regiment.  Appropriate use of antibiotics and adverse effects reviewed.    > 35 minutes were spent in direct patient care reviewing notes, medications ,labs data/ imaging , discussion with multidisciplinary team.    Thank you for allowing me to participate in care of your patient .    Brady Oneal MD  Infectious Disease  [phone]

## 2023-12-19 NOTE — PROGRESS NOTE ADULT - ASSESSMENT
88y old  Female who presents with a chief complaint of Per H&P "Patient developed cough shortness of breath chest tightness sore throat congestion 2 days ago had a positive COVID test that day complaining of worsening symptoms.     RRT reviewed  vs noted    bronchodilators  lasix  vs noted  labs reviewed  covid isol  dvt p - on ELIQUIS  assist with needs  oral hygiene  skin care  cough rx regimen

## 2023-12-19 NOTE — DISCHARGE NOTE NURSING/CASE MANAGEMENT/SOCIAL WORK - NSDCPEFALRISK_GEN_ALL_CORE
For information on Fall & Injury Prevention, visit: [email or web address] OR  [email or web address] OR  https://www.cdc.gov/steadi/patient.html For information on Fall & Injury Prevention, visit: [email or web address] OR  [email or web address] OR  https://www.cdc.gov/steadi/patient.html

## 2023-12-19 NOTE — PROGRESS NOTE ADULT - SUBJECTIVE AND OBJECTIVE BOX
Patient is a 88y old  Female who presents with a chief complaint of sob (19 Dec 2023 06:10)      INTERVAL HPI/OVERNIGHT EVENTS:overnight events noted    Home Medications:  amiodarone 100 mg oral tablet: 2 tab(s) orally once a day (14 Dec 2023 21:03)  atorvastatin 40 mg oral tablet: 1 tab(s) orally once a day (14 Dec 2023 16:31)  clopidogrel 75 mg oral tablet: 1 tab(s) orally once a day (14 Dec 2023 16:31)  Entresto 49 mg-51 mg oral tablet: 1 tab(s) orally 2 times a day (14 Dec 2023 16:31)  furosemide 20 mg oral tablet: 1 tab(s) orally once a day (14 Dec 2023 17:28)  Lagevrio 200 mg oral capsule: 4 cap(s) orally 2 times a day (14 Dec 2023 21:03)  metoprolol tartrate 25 mg oral tablet: 1 tab(s) orally 2 times a day (14 Dec 2023 16:31)      MEDICATIONS  (STANDING):  aMIOdarone    Tablet 200 milliGRAM(s) Oral daily  apixaban 2.5 milliGRAM(s) Oral every 12 hours  atorvastatin 40 milliGRAM(s) Oral at bedtime  budesonide 160 MICROgram(s)/formoterol 4.5 MICROgram(s) Inhaler 2 Puff(s) Inhalation two times a day  clopidogrel Tablet 75 milliGRAM(s) Oral daily  famotidine    Tablet 20 milliGRAM(s) Oral daily  furosemide    Tablet 20 milliGRAM(s) Oral daily  melatonin 3 milliGRAM(s) Oral at bedtime    MEDICATIONS  (PRN):      Allergies    Persantine (Rash)  Persantine (Unknown)    Intolerances        REVIEW OF SYSTEMS:  CONSTITUTIONAL: No fever, weight loss, has fatigue  EYES: No eye pain, visual disturbances, or discharge  ENMT:  No difficulty hearing, tinnitus, vertigo; No sinus or throat pain  NECK: No pain or stiffness  BREASTS: No pain, masses, or nipple discharge  RESPIRATORY: No cough, wheezing, chills or hemoptysis; No shortness of breath  CARDIOVASCULAR: No chest pain, palpitations, dizziness, or leg swelling  GASTROINTESTINAL: No abdominal or epigastric pain. No nausea, vomiting, or hematemesis; No diarrhea or constipation. No melena or hematochezia.  GENITOURINARY: No dysuria, frequency, hematuria, or incontinence  NEUROLOGICAL: No headaches,, numbness, or tremors  SKIN: No itching, burning, rashes, or lesions     Vital Signs Last 24 Hrs  T(C): 36.2 (19 Dec 2023 07:47), Max: 36.7 (18 Dec 2023 16:19)  T(F): 97.1 (19 Dec 2023 07:47), Max: 98.1 (18 Dec 2023 16:19)  HR: 57 (19 Dec 2023 04:07) (47 - 62)  BP: 136/68 (19 Dec 2023 04:07) (90/42 - 136/68)  BP(mean): 87 (19 Dec 2023 04:07) (58 - 89)  RR: 13 (19 Dec 2023 04:07) (13 - 24)  SpO2: 97% (19 Dec 2023 04:07) (92% - 97%)    Parameters below as of 18 Dec 2023 18:00  Patient On (Oxygen Delivery Method): room air        PHYSICAL EXAM:  GENERAL:frail  HEAD:  Atraumatic, Normocephalic  EYES: EOMI, PERRLA, conjunctiva and sclera clear  ENMT: Moist mucous membranes,   NECK: Supple, No JVD, Normal thyroid  NERVOUS SYSTEM:  Alert & Oriented X3, non focal  CHEST/LUNG: Clear to percussion bilaterally;   HEART: Regular rate and rhythm; bradycardia  ABDOMEN: Soft, Nontender, Nondistended; Bowel sounds present  EXTREMITIES:  2+ Peripheral Pulses, No clubbing, cyanosis, or edema  SKIN: No rashes or lesions    LABS:                        13.9   8.13  )-----------( 165      ( 19 Dec 2023 06:00 )             40.9     12-19    139  |  104  |  24<H>  ----------------------------<  102<H>  4.0   |  31  |  0.96    Ca    8.4      19 Dec 2023 06:00  Mg     2.0     12-18    TPro  5.6<L>  /  Alb  2.7<L>  /  TBili  0.4  /  DBili  0.1  /  AST  27  /  ALT  30  /  AlkPhos  57  12-19      Urinalysis Basic - ( 19 Dec 2023 06:00 )    Color: x / Appearance: x / SG: x / pH: x  Gluc: 102 mg/dL / Ketone: x  / Bili: x / Urobili: x   Blood: x / Protein: x / Nitrite: x   Leuk Esterase: x / RBC: x / WBC x   Sq Epi: x / Non Sq Epi: x / Bacteria: x      CAPILLARY BLOOD GLUCOSE      POCT Blood Glucose.: 115 mg/dL (18 Dec 2023 10:16)        Culture - Blood (collected 12-14-23 @ 17:19)  Source: .Blood Blood-Peripheral  Preliminary Report (12-18-23 @ 22:01):    No growth at 4 days    Culture - Blood (collected 12-14-23 @ 17:19)  Source: .Blood Blood-Peripheral  Preliminary Report (12-18-23 @ 22:01):    No growth at 4 days        I&O's Summary      RADIOLOGY & ADDITIONAL TESTS:    Imaging Personally Reviewed:  [x ] YES  [ ] NO    Consultant(s) Notes Reviewed:  [ x] YES  [ ] NO    Care Discussed with Consultants/Other Providers [ x] YES  [ ] NO

## 2023-12-19 NOTE — PROGRESS NOTE ADULT - SUBJECTIVE AND OBJECTIVE BOX
Chief Complaint: Cough    Interval Events: Syncopal episode yesterday    Review of Systems:  General: No fevers, chills, weight gain  Skin: No rashes, color changes  Cardiovascular: No chest pain, orthopnea  Respiratory: No shortness of breath, cough  Gastrointestinal: No nausea, abdominal pain  Genitourinary: No incontinence, pain with urination  Musculoskeletal: No pain, swelling, decreased range of motion  Neurological: No headache, weakness  Psychiatric: No depression, anxiety  Endocrine: No weight gain, increased thirst  All other systems are comprehensively negative.    Physical Exam:  Vital Signs Last 24 Hrs  T(C): 36.2 (19 Dec 2023 07:47), Max: 36.7 (18 Dec 2023 16:19)  T(F): 97.1 (19 Dec 2023 07:47), Max: 98.1 (18 Dec 2023 16:19)  HR: 57 (19 Dec 2023 04:07) (47 - 62)  BP: 136/68 (19 Dec 2023 04:07) (90/42 - 136/68)  BP(mean): 87 (19 Dec 2023 04:07) (58 - 89)  RR: 13 (19 Dec 2023 04:07) (13 - 24)  SpO2: 97% (19 Dec 2023 04:07) (92% - 97%)  Parameters below as of 18 Dec 2023 18:00  Patient On (Oxygen Delivery Method): room air  General: NAD  HEENT: MMM  Neck: No JVD, no carotid bruit  Lungs: CTAB  CV: RRR, nl S1/S2, no M/R/G  Abdomen: S/NT/ND, +BS  Extremities: No LE edema, no cyanosis  Neuro: AAOx3, non-focal  Skin: No rash    Labs:  12-19    139  |  104  |  24<H>  ----------------------------<  102<H>  4.0   |  31  |  0.96    Ca    8.4      19 Dec 2023 06:00  Mg     2.0     12-18    TPro  5.6<L>  /  Alb  2.7<L>  /  TBili  0.4  /  DBili  0.1  /  AST  27  /  ALT  30  /  AlkPhos  57  12-19                        13.9   8.13  )-----------( 165      ( 19 Dec 2023 06:00 )             40.9       ECG/Telemetry: Sinus rhythm

## 2023-12-20 LAB
ALBUMIN SERPL ELPH-MCNC: 2.7 G/DL — LOW (ref 3.3–5)
ALBUMIN SERPL ELPH-MCNC: 2.7 G/DL — LOW (ref 3.3–5)
ALBUMIN SERPL ELPH-MCNC: 2.8 G/DL — LOW (ref 3.3–5)
ALBUMIN SERPL ELPH-MCNC: 2.8 G/DL — LOW (ref 3.3–5)
ALP SERPL-CCNC: 64 U/L — SIGNIFICANT CHANGE UP (ref 30–120)
ALP SERPL-CCNC: 64 U/L — SIGNIFICANT CHANGE UP (ref 30–120)
ALP SERPL-CCNC: 66 U/L — SIGNIFICANT CHANGE UP (ref 30–120)
ALP SERPL-CCNC: 66 U/L — SIGNIFICANT CHANGE UP (ref 30–120)
ALT FLD-CCNC: 26 U/L — SIGNIFICANT CHANGE UP (ref 10–60)
ALT FLD-CCNC: 26 U/L — SIGNIFICANT CHANGE UP (ref 10–60)
ALT FLD-CCNC: 37 U/L — SIGNIFICANT CHANGE UP (ref 10–60)
ALT FLD-CCNC: 37 U/L — SIGNIFICANT CHANGE UP (ref 10–60)
ANION GAP SERPL CALC-SCNC: 6 MMOL/L — SIGNIFICANT CHANGE UP (ref 5–17)
ANION GAP SERPL CALC-SCNC: 6 MMOL/L — SIGNIFICANT CHANGE UP (ref 5–17)
AST SERPL-CCNC: 26 U/L — SIGNIFICANT CHANGE UP (ref 10–40)
AST SERPL-CCNC: 26 U/L — SIGNIFICANT CHANGE UP (ref 10–40)
AST SERPL-CCNC: 28 U/L — SIGNIFICANT CHANGE UP (ref 10–40)
AST SERPL-CCNC: 28 U/L — SIGNIFICANT CHANGE UP (ref 10–40)
BASOPHILS # BLD AUTO: 0.01 K/UL — SIGNIFICANT CHANGE UP (ref 0–0.2)
BASOPHILS # BLD AUTO: 0.01 K/UL — SIGNIFICANT CHANGE UP (ref 0–0.2)
BASOPHILS NFR BLD AUTO: 0.1 % — SIGNIFICANT CHANGE UP (ref 0–2)
BASOPHILS NFR BLD AUTO: 0.1 % — SIGNIFICANT CHANGE UP (ref 0–2)
BILIRUB DIRECT SERPL-MCNC: 0.2 MG/DL — SIGNIFICANT CHANGE UP (ref 0–0.3)
BILIRUB DIRECT SERPL-MCNC: 0.2 MG/DL — SIGNIFICANT CHANGE UP (ref 0–0.3)
BILIRUB INDIRECT FLD-MCNC: 0.3 MG/DL — SIGNIFICANT CHANGE UP (ref 0.2–1)
BILIRUB INDIRECT FLD-MCNC: 0.3 MG/DL — SIGNIFICANT CHANGE UP (ref 0.2–1)
BILIRUB SERPL-MCNC: 0.5 MG/DL — SIGNIFICANT CHANGE UP (ref 0.2–1.2)
BUN SERPL-MCNC: 22 MG/DL — SIGNIFICANT CHANGE UP (ref 7–23)
BUN SERPL-MCNC: 22 MG/DL — SIGNIFICANT CHANGE UP (ref 7–23)
CALCIUM SERPL-MCNC: 8.5 MG/DL — SIGNIFICANT CHANGE UP (ref 8.4–10.5)
CALCIUM SERPL-MCNC: 8.5 MG/DL — SIGNIFICANT CHANGE UP (ref 8.4–10.5)
CHLORIDE SERPL-SCNC: 103 MMOL/L — SIGNIFICANT CHANGE UP (ref 96–108)
CHLORIDE SERPL-SCNC: 103 MMOL/L — SIGNIFICANT CHANGE UP (ref 96–108)
CO2 SERPL-SCNC: 31 MMOL/L — SIGNIFICANT CHANGE UP (ref 22–31)
CO2 SERPL-SCNC: 31 MMOL/L — SIGNIFICANT CHANGE UP (ref 22–31)
CREAT SERPL-MCNC: 0.94 MG/DL — SIGNIFICANT CHANGE UP (ref 0.5–1.3)
CREAT SERPL-MCNC: 0.94 MG/DL — SIGNIFICANT CHANGE UP (ref 0.5–1.3)
EGFR: 58 ML/MIN/1.73M2 — LOW
EGFR: 58 ML/MIN/1.73M2 — LOW
EOSINOPHIL # BLD AUTO: 0.05 K/UL — SIGNIFICANT CHANGE UP (ref 0–0.5)
EOSINOPHIL # BLD AUTO: 0.05 K/UL — SIGNIFICANT CHANGE UP (ref 0–0.5)
EOSINOPHIL NFR BLD AUTO: 0.7 % — SIGNIFICANT CHANGE UP (ref 0–6)
EOSINOPHIL NFR BLD AUTO: 0.7 % — SIGNIFICANT CHANGE UP (ref 0–6)
GLUCOSE SERPL-MCNC: 98 MG/DL — SIGNIFICANT CHANGE UP (ref 70–99)
GLUCOSE SERPL-MCNC: 98 MG/DL — SIGNIFICANT CHANGE UP (ref 70–99)
HCT VFR BLD CALC: 40.8 % — SIGNIFICANT CHANGE UP (ref 34.5–45)
HCT VFR BLD CALC: 40.8 % — SIGNIFICANT CHANGE UP (ref 34.5–45)
HGB BLD-MCNC: 13.8 G/DL — SIGNIFICANT CHANGE UP (ref 11.5–15.5)
HGB BLD-MCNC: 13.8 G/DL — SIGNIFICANT CHANGE UP (ref 11.5–15.5)
IMM GRANULOCYTES NFR BLD AUTO: 0.4 % — SIGNIFICANT CHANGE UP (ref 0–0.9)
IMM GRANULOCYTES NFR BLD AUTO: 0.4 % — SIGNIFICANT CHANGE UP (ref 0–0.9)
LYMPHOCYTES # BLD AUTO: 1.84 K/UL — SIGNIFICANT CHANGE UP (ref 1–3.3)
LYMPHOCYTES # BLD AUTO: 1.84 K/UL — SIGNIFICANT CHANGE UP (ref 1–3.3)
LYMPHOCYTES # BLD AUTO: 25.3 % — SIGNIFICANT CHANGE UP (ref 13–44)
LYMPHOCYTES # BLD AUTO: 25.3 % — SIGNIFICANT CHANGE UP (ref 13–44)
MCHC RBC-ENTMCNC: 32.7 PG — SIGNIFICANT CHANGE UP (ref 27–34)
MCHC RBC-ENTMCNC: 32.7 PG — SIGNIFICANT CHANGE UP (ref 27–34)
MCHC RBC-ENTMCNC: 33.8 GM/DL — SIGNIFICANT CHANGE UP (ref 32–36)
MCHC RBC-ENTMCNC: 33.8 GM/DL — SIGNIFICANT CHANGE UP (ref 32–36)
MCV RBC AUTO: 96.7 FL — SIGNIFICANT CHANGE UP (ref 80–100)
MCV RBC AUTO: 96.7 FL — SIGNIFICANT CHANGE UP (ref 80–100)
MONOCYTES # BLD AUTO: 0.97 K/UL — HIGH (ref 0–0.9)
MONOCYTES # BLD AUTO: 0.97 K/UL — HIGH (ref 0–0.9)
MONOCYTES NFR BLD AUTO: 13.3 % — SIGNIFICANT CHANGE UP (ref 2–14)
MONOCYTES NFR BLD AUTO: 13.3 % — SIGNIFICANT CHANGE UP (ref 2–14)
NEUTROPHILS # BLD AUTO: 4.38 K/UL — SIGNIFICANT CHANGE UP (ref 1.8–7.4)
NEUTROPHILS # BLD AUTO: 4.38 K/UL — SIGNIFICANT CHANGE UP (ref 1.8–7.4)
NEUTROPHILS NFR BLD AUTO: 60.2 % — SIGNIFICANT CHANGE UP (ref 43–77)
NEUTROPHILS NFR BLD AUTO: 60.2 % — SIGNIFICANT CHANGE UP (ref 43–77)
NRBC # BLD: 0 /100 WBCS — SIGNIFICANT CHANGE UP (ref 0–0)
NRBC # BLD: 0 /100 WBCS — SIGNIFICANT CHANGE UP (ref 0–0)
PLATELET # BLD AUTO: 177 K/UL — SIGNIFICANT CHANGE UP (ref 150–400)
PLATELET # BLD AUTO: 177 K/UL — SIGNIFICANT CHANGE UP (ref 150–400)
POTASSIUM SERPL-MCNC: 4.2 MMOL/L — SIGNIFICANT CHANGE UP (ref 3.5–5.3)
POTASSIUM SERPL-MCNC: 4.2 MMOL/L — SIGNIFICANT CHANGE UP (ref 3.5–5.3)
POTASSIUM SERPL-SCNC: 4.2 MMOL/L — SIGNIFICANT CHANGE UP (ref 3.5–5.3)
POTASSIUM SERPL-SCNC: 4.2 MMOL/L — SIGNIFICANT CHANGE UP (ref 3.5–5.3)
PROT SERPL-MCNC: 5.3 G/DL — LOW (ref 6–8.3)
PROT SERPL-MCNC: 5.3 G/DL — LOW (ref 6–8.3)
PROT SERPL-MCNC: 5.6 G/DL — LOW (ref 6–8.3)
PROT SERPL-MCNC: 5.6 G/DL — LOW (ref 6–8.3)
RBC # BLD: 4.22 M/UL — SIGNIFICANT CHANGE UP (ref 3.8–5.2)
RBC # BLD: 4.22 M/UL — SIGNIFICANT CHANGE UP (ref 3.8–5.2)
RBC # FLD: 15.4 % — HIGH (ref 10.3–14.5)
RBC # FLD: 15.4 % — HIGH (ref 10.3–14.5)
SODIUM SERPL-SCNC: 140 MMOL/L — SIGNIFICANT CHANGE UP (ref 135–145)
SODIUM SERPL-SCNC: 140 MMOL/L — SIGNIFICANT CHANGE UP (ref 135–145)
WBC # BLD: 7.28 K/UL — SIGNIFICANT CHANGE UP (ref 3.8–10.5)
WBC # BLD: 7.28 K/UL — SIGNIFICANT CHANGE UP (ref 3.8–10.5)
WBC # FLD AUTO: 7.28 K/UL — SIGNIFICANT CHANGE UP (ref 3.8–10.5)
WBC # FLD AUTO: 7.28 K/UL — SIGNIFICANT CHANGE UP (ref 3.8–10.5)

## 2023-12-20 RX ORDER — MIDODRINE HYDROCHLORIDE 2.5 MG/1
5 TABLET ORAL THREE TIMES A DAY
Refills: 0 | Status: DISCONTINUED | OUTPATIENT
Start: 2023-12-20 | End: 2023-12-21

## 2023-12-20 RX ADMIN — BUDESONIDE AND FORMOTEROL FUMARATE DIHYDRATE 2 PUFF(S): 160; 4.5 AEROSOL RESPIRATORY (INHALATION) at 17:41

## 2023-12-20 RX ADMIN — FAMOTIDINE 20 MILLIGRAM(S): 10 INJECTION INTRAVENOUS at 13:24

## 2023-12-20 RX ADMIN — Medication 3 MILLIGRAM(S): at 21:25

## 2023-12-20 RX ADMIN — MIDODRINE HYDROCHLORIDE 5 MILLIGRAM(S): 2.5 TABLET ORAL at 17:40

## 2023-12-20 RX ADMIN — APIXABAN 2.5 MILLIGRAM(S): 2.5 TABLET, FILM COATED ORAL at 05:51

## 2023-12-20 RX ADMIN — APIXABAN 2.5 MILLIGRAM(S): 2.5 TABLET, FILM COATED ORAL at 17:40

## 2023-12-20 RX ADMIN — MIDODRINE HYDROCHLORIDE 5 MILLIGRAM(S): 2.5 TABLET ORAL at 13:25

## 2023-12-20 RX ADMIN — ATORVASTATIN CALCIUM 40 MILLIGRAM(S): 80 TABLET, FILM COATED ORAL at 21:25

## 2023-12-20 RX ADMIN — CLOPIDOGREL BISULFATE 75 MILLIGRAM(S): 75 TABLET, FILM COATED ORAL at 13:24

## 2023-12-20 RX ADMIN — AMIODARONE HYDROCHLORIDE 200 MILLIGRAM(S): 400 TABLET ORAL at 10:39

## 2023-12-20 RX ADMIN — BUDESONIDE AND FORMOTEROL FUMARATE DIHYDRATE 2 PUFF(S): 160; 4.5 AEROSOL RESPIRATORY (INHALATION) at 07:27

## 2023-12-20 NOTE — PROGRESS NOTE ADULT - ASSESSMENT
The patient is an 88 year old female with a history of CAD with prior MI s/p PCI, chronic systolic heart failure, paroxysmal atrial fibrillation who presents with cough, congestion, dyspnea in the setting of COVID.    Plan:  - ECG with underlying RBBB  - Echo 5/22 with mild/mod LV systolic dysfunction, mod MR, mod TR, mild pulm HTN  - CT chest with small pleural effusions  - COVID positive  - Completed remdesivir  - Syncope - likely multifactorial with vasovagal episode (occurred while on toilet) with orthostatic hypotension  - Continue amiodarone 200 mg daily  - Continue apixaban 2.5 mg bid  - Continue clopidogrel 75 mg daily  - Continue atorvastatin 40 mg daily  - Remain off Entresto, metoprolol, and furosemide until outpatient follow-up due to low BPs at times with orthostatic hypotension  - If orthostatics positive this am, can start midodrine 5 mg tid  - Otherwise, ok for discharge from a cardiac standpoint

## 2023-12-20 NOTE — PROGRESS NOTE ADULT - SUBJECTIVE AND OBJECTIVE BOX
KAVIN DENNIS is a 88yFemale , patient examined and chart reviewed.    INTERVAL HPI/ OVERNIGHT EVENTS:   Awake alert NAD. Afebrile.  Dc held sec orthostatic hypotension.    PAST MEDICAL & SURGICAL HISTORY:  CAD (coronary artery disease)  Rapid atrial fibrillation  CAD (coronary artery disease)  s/p Cath & 3 stents to LAD on 3/2020; OM1 stent x 1  EF ~30%  Mild HTN  History of CHF (congestive heart failure)  Myocardial infarction  S/P cataract surgery        For details regarding the patient's social history, family history, and other miscellaneous elements, please refer the initial infectious diseases consultation and/or the admitting history and physical examination for this admission.    ROS:  CONSTITUTIONAL:  Negative fever or chill  EYES:  Negative  blurry vision or double vision  CARDIOVASCULAR:  Negative for chest pain or palpitations  RESPIRATORY:  Negative for cough, wheezing, or SOB   GASTROINTESTINAL:  Negative for nausea, vomiting, diarrhea, constipation, or abdominal pain  GENITOURINARY:  Negative frequency, urgency or dysuria  NEUROLOGIC:  No headache, confusion, dizziness,+ lightheadedness  All other systems were reviewed and are negative     ALLERGIES  Persantine (Rash)      Current inpatient medications :    ANTIBIOTICS/RELEVANT:    MEDICATIONS  (STANDING):  aMIOdarone    Tablet 200 milliGRAM(s) Oral daily  apixaban 2.5 milliGRAM(s) Oral every 12 hours  atorvastatin 40 milliGRAM(s) Oral at bedtime  budesonide 160 MICROgram(s)/formoterol 4.5 MICROgram(s) Inhaler 2 Puff(s) Inhalation two times a day  clopidogrel Tablet 75 milliGRAM(s) Oral daily  famotidine    Tablet 20 milliGRAM(s) Oral daily  melatonin 3 milliGRAM(s) Oral at bedtime  midodrine. 5 milliGRAM(s) Oral three times a day    MEDICATIONS  (PRN):      Objective:  Vital Signs Last 24 Hrs  T(C): 36.8 (20 Dec 2023 16:17), Max: 36.9 (19 Dec 2023 20:18)  T(F): 98.2 (20 Dec 2023 16:17), Max: 98.5 (19 Dec 2023 20:18)  HR: 60 (20 Dec 2023 16:17) (55 - 62)  BP: 100/63 (20 Dec 2023 16:17) (96/42 - 107/71)  BP(mean): 81 (20 Dec 2023 04:00) (60 - 81)  RR: 18 (20 Dec 2023 16:17) (16 - 18)  SpO2: 96% (20 Dec 2023 16:17) (94% - 99%)    Parameters below as of 20 Dec 2023 16:17  Patient On (Oxygen Delivery Method): room air    Physical Exam:  General: no acute distress  Neck: supple, trachea midline  Lungs: CTA no wheeze/rhonchi  Cardiovascular: regular rate and rhythm, S1 S2  Abdomen: soft, nontender,  bowel sounds normal  Neurological: alert and oriented x3  Skin: no rash  Extremities: no edema        LABS:                                 13.8   7.28  )-----------( 177      ( 20 Dec 2023 06:00 )             40.8   12-20    140  |  103  |  22  ----------------------------<  98  4.2   |  31  |  0.94    Ca    8.5      20 Dec 2023 06:00    TPro  5.3<L>  /  Alb  2.8<L>  /  TBili  0.5  /  DBili  0.2  /  AST  28  /  ALT  37  /  AlkPhos  66  12-20    MICROBIOLOGY:  Culture - Blood (collected 14 Dec 2023 17:19)  Source: .Blood Blood-Peripheral  Preliminary Report (16 Dec 2023 22:01):    No growth at 48 Hours    Culture - Blood (collected 14 Dec 2023 17:19)  Source: .Blood Blood-Peripheral  Preliminary Report (16 Dec 2023 22:01):    No growth at 48 Hours      RADIOLOGY & ADDITIONAL STUDIES:  ACC: [id number] EXAM:  CT CHEST   ORDERED BY: ANA SMITH     PROCEDURE DATE:  12/14/2023          INTERPRETATION:  CT CHEST WITHOUT CONTRAST    INDICATION: History of shortness of breath and cough. History of atypical   viral infection/pelvis.    TECHNIQUE: Unenhanced helical images were obtained of the chest. Coronal   and sagittal images were reconstructed. Maximum intensity projection   images were generated.        COMPARISON: Radiograph chest 12/14/2023.    FINDINGS:    Tubes/Lines:None.    Lungs, airways and pleura: Small bilateral pleural effusions. Bilateral   lower lobe linear and passive atelectasis. Right upper lobe lung cyst.   Within the right lower lobe is a 5 x 3 mm noncalcified nodule (series 8   image 99). The airways are unremarkable.    Mediastinum: The thyroid gland is unremarkable. No enlarged chest lymph   nodes. Intrathoracic stomach with rotation of the greater curvature. The   heart is enlarged. Coronary calcifications/stents. No pericardial   effusion. The aorta is tortuous. The aorta is ectatic. Aortic   calcifications.    Upper Abdomen: Bilateral parapelvic cysts. The left adrenal gland is   thickened. Within the body of the left adrenal gland is a 1.6 x 1.6 cm   nodule.    Bones And Soft Tissues:Spondylosis the thoracic spine. Anterior wedging   of the T11 vertebral body.  The soft tissues are unremarkable.      IMPRESSION:    1.  Small bilateral pleural effusions and bilateral linear atelectasis.  2.  In the right lower lobe is a 5 x 3 mm noncalcified nodule.    Assessment :   87YO F PMH CAD HTN Afib admitted with SOB chest tightness sore throat congestion and wheeze sec COVID 19.   CT Chest with small bilateral pleural effusions/ atelectasis.   On RA PT is vaccinated.  Wheeze resolved  Vasovagal episode - Bradycardic- cardiology on case  Orthostatic hypotension  Clinically doing well otherwise    Plan :   Supportive care  Sp Remdesivir X 3 doses  Sp Decadron 6mg IV daily x 3 doses  Trend temps and cbc  Monitor resp status and 02 requirements  Pulm toileting  Stable from ID standpoint  Dc planning per primary team    Continue with present regiment.  Appropriate use of antibiotics and adverse effects reviewed.    > 35 minutes were spent in direct patient care reviewing notes, medications ,labs data/ imaging , discussion with multidisciplinary team.    Thank you for allowing me to participate in care of your patient .    Brady Oneal MD  Infectious Disease  [phone]      KAVIN DENNIS is a 88yFemale , patient examined and chart reviewed.    INTERVAL HPI/ OVERNIGHT EVENTS:   Awake alert NAD. Afebrile.  Dc held sec orthostatic hypotension.    PAST MEDICAL & SURGICAL HISTORY:  CAD (coronary artery disease)  Rapid atrial fibrillation  CAD (coronary artery disease)  s/p Cath & 3 stents to LAD on 3/2020; OM1 stent x 1  EF ~30%  Mild HTN  History of CHF (congestive heart failure)  Myocardial infarction  S/P cataract surgery        For details regarding the patient's social history, family history, and other miscellaneous elements, please refer the initial infectious diseases consultation and/or the admitting history and physical examination for this admission.    ROS:  CONSTITUTIONAL:  Negative fever or chill  EYES:  Negative  blurry vision or double vision  CARDIOVASCULAR:  Negative for chest pain or palpitations  RESPIRATORY:  Negative for cough, wheezing, or SOB   GASTROINTESTINAL:  Negative for nausea, vomiting, diarrhea, constipation, or abdominal pain  GENITOURINARY:  Negative frequency, urgency or dysuria  NEUROLOGIC:  No headache, confusion, dizziness,+ lightheadedness  All other systems were reviewed and are negative     ALLERGIES  Persantine (Rash)      Current inpatient medications :    ANTIBIOTICS/RELEVANT:    MEDICATIONS  (STANDING):  aMIOdarone    Tablet 200 milliGRAM(s) Oral daily  apixaban 2.5 milliGRAM(s) Oral every 12 hours  atorvastatin 40 milliGRAM(s) Oral at bedtime  budesonide 160 MICROgram(s)/formoterol 4.5 MICROgram(s) Inhaler 2 Puff(s) Inhalation two times a day  clopidogrel Tablet 75 milliGRAM(s) Oral daily  famotidine    Tablet 20 milliGRAM(s) Oral daily  melatonin 3 milliGRAM(s) Oral at bedtime  midodrine. 5 milliGRAM(s) Oral three times a day    MEDICATIONS  (PRN):      Objective:  Vital Signs Last 24 Hrs  T(C): 36.8 (20 Dec 2023 16:17), Max: 36.9 (19 Dec 2023 20:18)  T(F): 98.2 (20 Dec 2023 16:17), Max: 98.5 (19 Dec 2023 20:18)  HR: 60 (20 Dec 2023 16:17) (55 - 62)  BP: 100/63 (20 Dec 2023 16:17) (96/42 - 107/71)  BP(mean): 81 (20 Dec 2023 04:00) (60 - 81)  RR: 18 (20 Dec 2023 16:17) (16 - 18)  SpO2: 96% (20 Dec 2023 16:17) (94% - 99%)    Parameters below as of 20 Dec 2023 16:17  Patient On (Oxygen Delivery Method): room air    Physical Exam:  General: no acute distress  Neck: supple, trachea midline  Lungs: CTA no wheeze/rhonchi  Cardiovascular: regular rate and rhythm, S1 S2  Abdomen: soft, nontender,  bowel sounds normal  Neurological: alert and oriented x3  Skin: no rash  Extremities: no edema        LABS:                                 13.8   7.28  )-----------( 177      ( 20 Dec 2023 06:00 )             40.8   12-20    140  |  103  |  22  ----------------------------<  98  4.2   |  31  |  0.94    Ca    8.5      20 Dec 2023 06:00    TPro  5.3<L>  /  Alb  2.8<L>  /  TBili  0.5  /  DBili  0.2  /  AST  28  /  ALT  37  /  AlkPhos  66  12-20    MICROBIOLOGY:  Culture - Blood (collected 14 Dec 2023 17:19)  Source: .Blood Blood-Peripheral  Preliminary Report (16 Dec 2023 22:01):    No growth at 48 Hours    Culture - Blood (collected 14 Dec 2023 17:19)  Source: .Blood Blood-Peripheral  Preliminary Report (16 Dec 2023 22:01):    No growth at 48 Hours      RADIOLOGY & ADDITIONAL STUDIES:  ACC: [id number] EXAM:  CT CHEST   ORDERED BY: ANA SMITH     PROCEDURE DATE:  12/14/2023          INTERPRETATION:  CT CHEST WITHOUT CONTRAST    INDICATION: History of shortness of breath and cough. History of atypical   viral infection/pelvis.    TECHNIQUE: Unenhanced helical images were obtained of the chest. Coronal   and sagittal images were reconstructed. Maximum intensity projection   images were generated.        COMPARISON: Radiograph chest 12/14/2023.    FINDINGS:    Tubes/Lines:None.    Lungs, airways and pleura: Small bilateral pleural effusions. Bilateral   lower lobe linear and passive atelectasis. Right upper lobe lung cyst.   Within the right lower lobe is a 5 x 3 mm noncalcified nodule (series 8   image 99). The airways are unremarkable.    Mediastinum: The thyroid gland is unremarkable. No enlarged chest lymph   nodes. Intrathoracic stomach with rotation of the greater curvature. The   heart is enlarged. Coronary calcifications/stents. No pericardial   effusion. The aorta is tortuous. The aorta is ectatic. Aortic   calcifications.    Upper Abdomen: Bilateral parapelvic cysts. The left adrenal gland is   thickened. Within the body of the left adrenal gland is a 1.6 x 1.6 cm   nodule.    Bones And Soft Tissues:Spondylosis the thoracic spine. Anterior wedging   of the T11 vertebral body.  The soft tissues are unremarkable.      IMPRESSION:    1.  Small bilateral pleural effusions and bilateral linear atelectasis.  2.  In the right lower lobe is a 5 x 3 mm noncalcified nodule.    Assessment :   87YO F PMH CAD HTN Afib admitted with SOB chest tightness sore throat congestion and wheeze sec COVID 19.   CT Chest with small bilateral pleural effusions/ atelectasis.   On RA PT is vaccinated.  Wheeze resolved  Vasovagal episode - Bradycardic- cardiology on case  Orthostatic hypotension  Clinically doing well otherwise    Plan :   Supportive care  Sp Remdesivir X 3 doses  Sp Decadron 6mg IV daily x 3 doses  Trend temps and cbc  Monitor resp status and 02 requirements  Pulm toileting  Stable from ID standpoint  Dc planning per primary team    Continue with present regiment.  Appropriate use of antibiotics and adverse effects reviewed.    > 35 minutes were spent in direct patient care reviewing notes, medications ,labs data/ imaging , discussion with multidisciplinary team.    Thank you for allowing me to participate in care of your patient .    Brady Oneal MD  Infectious Disease  [phone]

## 2023-12-20 NOTE — PROGRESS NOTE ADULT - ASSESSMENT
Patient developed cough shortness of breath chest tightness sore throat congestion 2 days ago had a positive COVID test that day complaining of worsening symptoms.  No fevers chills abdominal pain vomiting edema calf.  Patient relates on Eliquis.PMD [id number] PMHx HFrEF (LVEF 43% 5/2022), CAD with prior MI s/p PCI, Afib (on Eliquis) presenting with cough, SOB x2 days . positive for  COVID 2 days ago (vaccinated x3).  cough worsening and  feel more dyspneic prompting ER evaluation. In ER, concern for stridor and was given Racemic Epi x2 and Decadron 10mg x1.   CT Soft Tissue Neck negative. CT Chest with small bilateral pleural effusions/ atelectasis.   Patient and MICU consult , and is stable , and being admitted for further management   # Covid infection, with ac resp failure with hypoxia- improved  # edema uvula - improved  # CAD  # CHF systolic HF- no sob  # Paroxysmal afib  # B/l Pleural Effusion  #  bradycardia sinus - metoprolol stopped by cardiology,   # Hypotension and orthostatic hypotension , metoprolol, entresto and furosemide stopped  pt completed remdesvir and decadron ,, ID f up noted  PT  had RRT on 12/18 am for near syncope - likely vasovagal- with orthostasis, pt still symptomatic after stopping Entresto, metoprolol, and furosemide  cardio f up noted advised If orthostatics positive this am, can start midodrine 5 mg tid     d/w daughter ada Patient developed cough shortness of breath chest tightness sore throat congestion 2 days ago had a positive COVID test that day complaining of worsening symptoms.  No fevers chills abdominal pain vomiting edema calf.  Patient relates on Eliquis.PMD [id number] PMHx HFrEF (LVEF 43% 5/2022), CAD with prior MI s/p PCI, Afib (on Eliquis) presenting with cough, SOB x2 days . positive for  COVID 2 days ago (vaccinated x3).  cough worsening and  feel more dyspneic prompting ER evaluation. In ER, concern for stridor and was given Racemic Epi x2 and Decadron 10mg x1.   CT Soft Tissue Neck negative. CT Chest with small bilateral pleural effusions/ atelectasis.   Patient and MICU consult , and is stable , and being admitted for further management   # Covid infection, with ac resp failure with hypoxia- improved  # edema uvula - improved  # CAD  # CHF systolic HF- no sob  # Paroxysmal afib  # B/l Pleural Effusion  #  bradycardia sinus - metoprolol stopped by cardiology,   # Hypotension and orthostatic hypotension , metoprolol, entresto and furosemide stopped  pt completed remdesvir and decadron ,, ID f up noted  PT  had RRT on 12/18 am for near syncope - likely vasovagal- with orthostasis, pt still symptomatic after stopping Entresto, metoprolol, and furosemide  cardio f up noted advised If orthostatics positive this am, can start midodrine 5 mg tid     d/w daughter ada

## 2023-12-20 NOTE — SWALLOW BEDSIDE ASSESSMENT ADULT - ADDITIONAL RECOMMENDATIONS
1. ST to f/u while the pt is in-house for diet tolerance.
1. ST to f/u while pt is in-house as schedule permits.

## 2023-12-20 NOTE — SWALLOW BEDSIDE ASSESSMENT ADULT - COMMENTS
"Patient developed cough shortness of breath chest tightness sore throat congestion 2 days ago had a positive COVID test that day complaining of worsening symptoms.  No fevers chills abdominal pain vomiting edema calf.  Patient relates on Eliquis.PMD [id number] PMHx HFrEF (LVEF 43% 5/2022), CAD with prior MI s/p PCI, Afib (on Eliquis) presenting with cough, SOB x2 days . positive for  COVID 2 days ago (vaccinated x3).  cough worsening and  feel more dyspneic prompting ER evaluation. In ER, concern for stridor and was given Racemic Epi x2 and Decadron 10mg x1.   CT Soft Tissue Neck negative. CT Chest with small bilateral pleural effusions/ atelectasis.   Patient and MICU consult , and is stable , and being admitted for further management"    CT Chest 12/14/23:  IMPRESSION:  1.  Small bilateral pleural effusions and bilateral linear atelectasis.  2.  In the right lower lobe is a 5 x 3 mm noncalcified nodule.    X-ray Chest 12/16/23:   Patient has   sore throat and Covid.    Gross heart enlargement again noted.    There is slight fluid at left base.    Chest is similar to December 14.    IMPRESSION: Stable findings as above. "Patient developed cough shortness of breath chest tightness sore throat congestion 2 days ago had a positive COVID test that day complaining of worsening symptoms.  No fevers chills abdominal pain vomiting edema calf.  Patient relates on Eliquis.PMD [id number] PMHx HFrEF (LVEF 43% 5/2022), CAD with prior MI s/p PCI, Afib (on Eliquis) presenting with cough, SOB x2 days . positive for  COVID 2 days ago (vaccinated x3).  cough worsening and  feel more dyspneic prompting ER evaluation. In ER, concern for stridor and was given Racemic Epi x2 and Decadron 10mg x1.   CT Soft Tissue Neck negative. CT Chest with small bilateral pleural effusions/ atelectasis.   Patient and MICU consult , and is stable , and being admitted for further management"    CT Chest 12/14/23:  IMPRESSION:  1.  Small bilateral pleural effusions and bilateral linear atelectasis.  2.  In the right lower lobe is a 5 x 3 mm noncalcified nodule.    X-ray Chest 12/16/23:   Patient has   sore throat and Covid.    Gross heart enlargement again noted.    There is slight fluid at left base.    Chest is similar to December 14.    IMPRESSION: Stable findings as above.

## 2023-12-20 NOTE — PROGRESS NOTE ADULT - SUBJECTIVE AND OBJECTIVE BOX
Patient is a 88y old  Female who presents with a chief complaint of sob (20 Dec 2023 05:55)      INTERVAL HPI/OVERNIGHT EVENTS:overnight events noted    Home Medications:  amiodarone 100 mg oral tablet: 2 tab(s) orally once a day (14 Dec 2023 21:03)  atorvastatin 40 mg oral tablet: 1 tab(s) orally once a day (14 Dec 2023 16:31)  clopidogrel 75 mg oral tablet: 1 tab(s) orally once a day (14 Dec 2023 16:31)  Entresto 49 mg-51 mg oral tablet: 1 tab(s) orally 2 times a day (14 Dec 2023 16:31)  furosemide 20 mg oral tablet: 1 tab(s) orally once a day (14 Dec 2023 17:28)  Lagevrio 200 mg oral capsule: 4 cap(s) orally 2 times a day (14 Dec 2023 21:03)  metoprolol tartrate 25 mg oral tablet: 1 tab(s) orally 2 times a day (14 Dec 2023 16:31)      MEDICATIONS  (STANDING):  aMIOdarone    Tablet 200 milliGRAM(s) Oral daily  apixaban 2.5 milliGRAM(s) Oral every 12 hours  atorvastatin 40 milliGRAM(s) Oral at bedtime  budesonide 160 MICROgram(s)/formoterol 4.5 MICROgram(s) Inhaler 2 Puff(s) Inhalation two times a day  clopidogrel Tablet 75 milliGRAM(s) Oral daily  famotidine    Tablet 20 milliGRAM(s) Oral daily  melatonin 3 milliGRAM(s) Oral at bedtime    MEDICATIONS  (PRN):      Allergies    Persantine (Rash)  Persantine (Unknown)    Intolerances        REVIEW OF SYSTEMS:  CONSTITUTIONAL: No fever, weight loss,has fatigue  EYES: No eye pain, visual disturbances, or discharge  ENMT:  No difficulty hearing, tinnitus, vertigo; No sinus or throat pain  NECK: No pain or stiffness  BREASTS: No pain, masses, or nipple discharge  RESPIRATORY: No cough, wheezing, chills or hemoptysis; No shortness of breath  CARDIOVASCULAR: No chest pain, palpitations, dizziness, or leg swelling  GASTROINTESTINAL: No abdominal or epigastric pain. No nausea, vomiting, or hematemesis; No diarrhea or constipation. No melena or hematochezia.  GENITOURINARY: No dysuria, frequency, hematuria, or incontinence  NEUROLOGICAL: dizziness  SKIN: No itching, burning, rashes, or lesions   Vital Signs Last 24 Hrs  T(C): 36.6 (20 Dec 2023 04:01), Max: 36.9 (19 Dec 2023 20:18)  T(F): 97.9 (20 Dec 2023 04:01), Max: 98.5 (19 Dec 2023 20:18)  HR: 55 (20 Dec 2023 04:00) (54 - 70)  BP: 107/71 (20 Dec 2023 04:00) (96/42 - 113/81)  BP(mean): 81 (20 Dec 2023 04:00) (60 - 91)  RR: 16 (20 Dec 2023 04:00) (13 - 29)  SpO2: 94% (20 Dec 2023 04:00) (94% - 99%)    Parameters below as of 20 Dec 2023 04:00  Patient On (Oxygen Delivery Method): room air        PHYSICAL EXAM:  GENERAL: NAD, well-groomed, well-developed  HEAD:  Atraumatic, Normocephalic  EYES: EOMI, PERRLA, conjunctiva and sclera clear  ENMT: Moist mucous membranes,   NECK: Supple, No JVD, Normal thyroid  NERVOUS SYSTEM:  Alert & Oriented X3,difficulty ambulating due to dizziness  CHEST/LUNG: Clear to percussion bilaterally;  HEART: Regular rate and rhythm;   ABDOMEN: Soft, Nontender, Nondistended; Bowel sounds present  EXTREMITIES:  2+ Peripheral Pulses, No clubbing, cyanosis, or edema    SKIN: No rashes or lesions    LABS:                        13.8   7.28  )-----------( 177      ( 20 Dec 2023 06:00 )             40.8     12-20    140  |  103  |  22  ----------------------------<  98  4.2   |  31  |  0.94    Ca    8.5      20 Dec 2023 06:00    TPro  5.3<L>  /  Alb  2.8<L>  /  TBili  0.5  /  DBili  0.2  /  AST  28  /  ALT  37  /  AlkPhos  66  12-20      Urinalysis Basic - ( 20 Dec 2023 06:00 )    Color: x / Appearance: x / SG: x / pH: x  Gluc: 98 mg/dL / Ketone: x  / Bili: x / Urobili: x   Blood: x / Protein: x / Nitrite: x   Leuk Esterase: x / RBC: x / WBC x   Sq Epi: x / Non Sq Epi: x / Bacteria: x      CAPILLARY BLOOD GLUCOSE            Culture - Blood (collected 12-14-23 @ 17:19)  Source: .Blood Blood-Peripheral  Final Report (12-19-23 @ 22:01):    No growth at 5 days    Culture - Blood (collected 12-14-23 @ 17:19)  Source: .Blood Blood-Peripheral  Final Report (12-19-23 @ 22:01):    No growth at 5 days        I&O's Summary    19 Dec 2023 07:01  -  20 Dec 2023 07:00  --------------------------------------------------------  IN: 450 mL / OUT: 0 mL / NET: 450 mL        RADIOLOGY & ADDITIONAL TESTS:    Imaging Personally Reviewed:  [x ] YES  [ ] NO    Consultant(s) Notes Reviewed:  [x ] YES  [ ] NO    Care Discussed with Consultants/Other Providers [ x] YES  [ ] NO

## 2023-12-20 NOTE — CASE MANAGEMENT PROGRESS NOTE - NSCMPROGRESSNOTE_GEN_ALL_CORE
Patient discussed during morning rounds, cont to have low bp. Patient started on midodrine by Dr Tucker. Patient not cleared for dc today, CM will cont to be available.

## 2023-12-20 NOTE — PROGRESS NOTE ADULT - ASSESSMENT
88y old  Female who presents with a chief complaint of Per H&P "Patient developed cough shortness of breath chest tightness sore throat congestion 2 days ago had a positive COVID test that day complaining of worsening symptoms.     cm follow up  vs noted    bronchodilators  vs noted  labs reviewed  covid isol  dvt p - on ELIQUIS  assist with needs  oral hygiene  skin care  cough rx regimen

## 2023-12-20 NOTE — SWALLOW BEDSIDE ASSESSMENT ADULT - SLP GENERAL OBSERVATIONS
The pt was seen upright in her chair at which time she was awake, alert, and in NAD. Pt's daughter was present during the time of the evaluation. The pt required increased loudness during the evaluation as she is hard of hearing. She was able to follow directions provided during the evaluation, and she was able to communicate her wants/needs. The pt denied any past hx of difficulty swallowing. However, she stated that presently it is "hard to swallow". Pt had baseline cough with (+) mucous production.  The pt's coordination of swallowing and respiration is suspected to be impaired given current COVID-19 dx which may be impacting the pt's current swallow function. Pt endorsed that regular solid trial "felt hard to swallow." She did not endorse any pain or discomfort with soft and bite sized trials. She also stated that mildly thick liquids were "easier to swallow" in comparison to thin liquids. Education was provided to the pt and the pt's daughter regarding evaluation results, recommendations, safe swallow strategies, and aspiration precautions at which time they provided verbal understanding. Following the evaluation, the pt was left upright, awake, alert, and in NAD.
The pt was received today at bedside. The pt was awake, alert, and in NAD. She was on RA. During the pt's previous evaluation, the pt was receiving supplemental 02 via NC. Today, the pt endorsed that she continues to have a baseline cough; however, she feels she is breathing better. The pt's coordination of respiration and swallowing appeared to be improved in comparison to the previous evaluation. No increased WOB noted. Following today's evaluation, the pt was awake, alert, and in NAD. The pt's oral cavity was clear.

## 2023-12-20 NOTE — CHART NOTE - NSCHARTNOTEFT_GEN_A_CORE
Assessment:   brief history/hospital course: Per H&P "Patient developed cough shortness of breath chest tightness sore throat congestion 2 days ago had a positive COVID test that day complaining of worsening symptoms.  No fevers chills abdominal pain vomiting edema calf.  Patient relates on Eliquis.PMD [id number] PMHx HFrEF (LVEF 43% 5/2022), CAD with prior MI s/p PCI, Afib (on Eliquis) presenting with cough, SOB x2 days . positive for  COVID 2 days ago (vaccinated x3).  cough worsening and  feel more dyspneic prompting ER evaluation. In ER, concern for stridor and was given Racemic Epi x2 and Decadron 10mg x1."    (12/20) Patient seen for nutrition follow up, eating lunch at time of visit. Reports appetite remains decreased compared to baseline, but mild improvement. Reports she feels swallowing difficulty has resolved compared to beginning of admission, where she acknowledged having difficulty with swallowing. Noted swallow evaluation from 12/15 with recommendations for soft and bite sized , with mildly thick liquids. Spoke with RN to see if speech and swallow can reconsult to determine if diet texture can now be upgraded. Patient made aware for need to remain on current modified diet, until she can be reassessed, as deemed medically appropriate. Food preferences obtained for current diet Rx to help increase patient's PO intake and acceptance of diet. She is receptive to Ensure Plus high protein and is consuming them.    Factors impacting intake: [ ] none [ ] nausea  [ ] vomiting [ ] diarrhea [ ] constipation  [ ]chewing problems [ ] swallowing issues  [x ] other: decreased appetite    Diet Presciption: Diet, DASH/TLC:   Sodium & Cholesterol Restricted  Soft and Bite Sized (SOFTBTSZ)  Mildly Thick Liquids (MILDTHICKLIQS) (12-16-23 @ 10:35)    Intake: poor to fair, she reports this morning only consuming peaches and yogurt, but enjoyed meatloaf entree last night with good PO intake, nursing assistant reports patient with decreased PO intake    Current Weight: Weight (kg): 57.5 (12-15 @ 00:55)  % Weight Change  overall, weight relatively stable (+/- 5 lbs) which can be related to fluid shifts  (12/20)125.8 lbs (12/19) 127.8 lbs (12/18) 130.5 lbs (12/17) 130.9 pounds (12/11) 126.7 pounds    Pertinent Medications: MEDICATIONS  (STANDING):  aMIOdarone    Tablet 200 milliGRAM(s) Oral daily  apixaban 2.5 milliGRAM(s) Oral every 12 hours  atorvastatin 40 milliGRAM(s) Oral at bedtime  budesonide 160 MICROgram(s)/formoterol 4.5 MICROgram(s) Inhaler 2 Puff(s) Inhalation two times a day  clopidogrel Tablet 75 milliGRAM(s) Oral daily  famotidine    Tablet 20 milliGRAM(s) Oral daily  melatonin 3 milliGRAM(s) Oral at bedtime  midodrine. 5 milliGRAM(s) Oral three times a day    MEDICATIONS  (PRN):    Pertinent Labs: 12-20 Na140 mmol/L Glu 98 mg/dL K+ 4.2 mmol/L Cr  0.94 mg/dL BUN 22 mg/dL 12-20 Alb 2.8 g/dL<L>     CAPILLARY BLOOD GLUCOSE        Skin: + 1 left leg edema per documentation    Estimated Needs:   [x ] no change since previous assessment  [ ] recalculated:     Previous Nutrition Diagnosis:   [ ] Inadequate Energy Intake [ ]Inadequate Oral Intake [ ] Excessive Energy Intake   [ ] Underweight [ x] Increased Nutrient Needs [ ] Overweight/Obesity   [ ] Altered GI Function [ ] Unintended Weight Loss [ ] Food & Nutrition Related Knowledge Deficit [ ] Malnutrition     Nutrition Diagnosis is [x ] ongoing  [ ] resolved [ ] not applicable     New Nutrition Diagnosis: [ ] not applicable   Patient now with inadequate oral intake related to decreased appetite on modified texture diet as evidenced by reports of variable PO intake per patient and staff      Interventions:   Recommend  [ ] Change Diet To:  [x ] Nutrition Supplement: continue Ensure Plus High protein 4 oz  BID  [ ] Nutrition Support  [ x] Other: recommend speech/swallow consult to reassess to determine if PO diet texture eligible for upgrade  continue to provide patient food preferences    Monitoring and Evaluation:   [ x] PO intake [ x ] Tolerance to diet prescription [ x ] weights [ x ] labs[ x ] follow up per protocol  [ ] other: Assessment:   brief history/hospital course: Per H&P "Patient developed cough shortness of breath chest tightness sore throat congestion 2 days ago had a positive COVID test that day complaining of worsening symptoms.  No fevers chills abdominal pain vomiting edema calf.  Patient relates on Eliquis.PMD [id number] PMHx HFrEF (LVEF 43% 5/2022), CAD with prior MI s/p PCI, Afib (on Eliquis) presenting with cough, SOB x2 days . positive for  COVID 2 days ago (vaccinated x3).  cough worsening and  feel more dyspneic prompting ER evaluation. In ER, concern for stridor and was given Racemic Epi x2 and Decadron 10mg x1."    (12/20) Patient seen for nutrition follow up, eating lunch at time of visit. Reports appetite remains decreased compared to baseline, but mild improvement. Reports she feels swallowing difficulty has resolved compared to beginning of admission, where she acknowledged having difficulty with swallowing. Noted swallow evaluation from 12/15 with recommendations for soft and bite sized , with mildly thick liquids. Spoke with RN to see if speech and swallow can reconsult to determine if diet texture can now be upgraded. Patient made aware for need to remain on current modified diet, until she can be reassessed, as deemed medically appropriate. Food preferences obtained for current diet Rx to help increase patient's PO intake and acceptance of diet. She is receptive to Ensure Plus high protein and is consuming them.    Factors impacting intake: [ ] none [ ] nausea  [ ] vomiting [ ] diarrhea [ ] constipation  [ ]chewing problems [ ] swallowing issues  [x ] other: decreased appetite    Diet Presciption: Diet, DASH/TLC:   Sodium & Cholesterol Restricted  Soft and Bite Sized (SOFTBTSZ)  Mildly Thick Liquids (MILDTHICKLIQS) (12-16-23 @ 10:35)    Intake: poor to fair, she reports this morning only consuming peaches and yogurt, but enjoyed meatloaf entree last night with good PO intake, nursing assistant reports patient with decreased PO intake    Current Weight: Weight (kg): 57.5 (12-15 @ 00:55)  % Weight Change  overall, weight relatively stable (+/- 5 lbs) which can be related to fluid shifts  (12/20)125.8 lbs (12/19) 127.8 lbs (12/18) 130.5 lbs (12/17) 130.9 pounds (12/11) 126.7 pounds    Pertinent Medications: MEDICATIONS  (STANDING):  aMIOdarone    Tablet 200 milliGRAM(s) Oral daily  apixaban 2.5 milliGRAM(s) Oral every 12 hours  atorvastatin 40 milliGRAM(s) Oral at bedtime  budesonide 160 MICROgram(s)/formoterol 4.5 MICROgram(s) Inhaler 2 Puff(s) Inhalation two times a day  clopidogrel Tablet 75 milliGRAM(s) Oral daily  famotidine    Tablet 20 milliGRAM(s) Oral daily  melatonin 3 milliGRAM(s) Oral at bedtime  midodrine. 5 milliGRAM(s) Oral three times a day    MEDICATIONS  (PRN):    Pertinent Labs: 12-20 Na140 mmol/L Glu 98 mg/dL K+ 4.2 mmol/L Cr  0.94 mg/dL BUN 22 mg/dL 12-20 Alb 2.8 g/dL<L>     CAPILLARY BLOOD GLUCOSE        Skin: + 1 left leg edema per documentation    Estimated Needs:   [x ] no change since previous assessment  [ ] recalculated:     Previous Nutrition Diagnosis:   [ ] Inadequate Energy Intake [ ]Inadequate Oral Intake [ ] Excessive Energy Intake   [ ] Underweight [ x] Increased Nutrient Needs [ ] Overweight/Obesity   [ ] Altered GI Function [ ] Unintended Weight Loss [ ] Food & Nutrition Related Knowledge Deficit [ ] Malnutrition     Nutrition Diagnosis is [x ] ongoing  [ ] resolved [ ] not applicable     New Nutrition Diagnosis: [ ] not applicable   Patient now with inadequate oral intake related to decreased appetite on modified texture diet as evidenced by reports of variable PO intake per patient and staff      Interventions:   Recommend  [ ] Change Diet To:  [x ] Nutrition Supplement: continue Ensure Plus High protein 4 oz  BID  [ ] Nutrition Support  [ x] Other: recommend speech/swallow consult to reassess to determine if PO diet texture eligible for upgrade  continue to provide patient food preferences    Monitoring and Evaluation:   [ x] PO intake [ x ] Tolerance to diet prescription [ x ] weights [ x ] labs[ x ] follow up per protocol  [ ] other:

## 2023-12-20 NOTE — PROGRESS NOTE ADULT - SUBJECTIVE AND OBJECTIVE BOX
Date/Time Patient Seen:  		  Referring MD:   Data Reviewed	       Patient is a 88y old  Female who presents with a chief complaint of sob (19 Dec 2023 11:30)      Subjective/HPI     PAST MEDICAL & SURGICAL HISTORY:  CAD (coronary artery disease)    Rapid atrial fibrillation    CAD (coronary artery disease)  s/p Cath & 3 stents to LAD on 3/2020; OM1 stent x 1  EF ~30%    Mild HTN    History of CHF (congestive heart failure)    Myocardial infarction    No significant past surgical history    S/P cataract surgery          Medication list         MEDICATIONS  (STANDING):  aMIOdarone    Tablet 200 milliGRAM(s) Oral daily  apixaban 2.5 milliGRAM(s) Oral every 12 hours  atorvastatin 40 milliGRAM(s) Oral at bedtime  budesonide 160 MICROgram(s)/formoterol 4.5 MICROgram(s) Inhaler 2 Puff(s) Inhalation two times a day  clopidogrel Tablet 75 milliGRAM(s) Oral daily  famotidine    Tablet 20 milliGRAM(s) Oral daily  melatonin 3 milliGRAM(s) Oral at bedtime    MEDICATIONS  (PRN):         Vitals log        ICU Vital Signs Last 24 Hrs  T(C): 36.6 (20 Dec 2023 04:01), Max: 36.9 (19 Dec 2023 20:18)  T(F): 97.9 (20 Dec 2023 04:01), Max: 98.5 (19 Dec 2023 20:18)  HR: 55 (20 Dec 2023 04:00) (54 - 73)  BP: 107/71 (20 Dec 2023 04:00) (96/42 - 144/129)  BP(mean): 81 (20 Dec 2023 04:00) (60 - 135)  ABP: --  ABP(mean): --  RR: 16 (20 Dec 2023 04:00) (13 - 29)  SpO2: 94% (20 Dec 2023 04:00) (94% - 99%)    O2 Parameters below as of 20 Dec 2023 04:00  Patient On (Oxygen Delivery Method): room air                 Input and Output:  I&O's Detail    19 Dec 2023 07:01  -  20 Dec 2023 05:55  --------------------------------------------------------  IN:    Oral Fluid: 200 mL    Sodium Chloride 0.9% Bolus: 250 mL  Total IN: 450 mL    OUT:  Total OUT: 0 mL    Total NET: 450 mL          Lab Data                        13.9   8.13  )-----------( 165      ( 19 Dec 2023 06:00 )             40.9     12-19    139  |  104  |  24<H>  ----------------------------<  102<H>  4.0   |  31  |  0.96    Ca    8.4      19 Dec 2023 06:00  Mg     2.0     12-18    TPro  5.6<L>  /  Alb  2.7<L>  /  TBili  0.4  /  DBili  0.1  /  AST  27  /  ALT  30  /  AlkPhos  57  12-19            Review of Systems	      Objective     Physical Examination    heart s1s2  lung dc bS  head nc      Pertinent Lab findings & Imaging      Key:  NO   Adequate UO     I&O's Detail    19 Dec 2023 07:01  -  20 Dec 2023 05:55  --------------------------------------------------------  IN:    Oral Fluid: 200 mL    Sodium Chloride 0.9% Bolus: 250 mL  Total IN: 450 mL    OUT:  Total OUT: 0 mL    Total NET: 450 mL               Discussed with:     Cultures:	        Radiology

## 2023-12-20 NOTE — SWALLOW BEDSIDE ASSESSMENT ADULT - SWALLOW EVAL: DIAGNOSIS
The pt presented with oral dysphagia and suspected pharyngeal dysphagia. Given thin liquids (x4oz), soft and bite sized (x5), and regular solids (x2), the pt presented with the following: adequate oral acceptance, timely oral transit, suspected timely pharyngeal swallow trigger, (+) hyolaryngeal excursion/elevation upon palpation, and adequate oral clearance. Mastication was judged to be functional for soft and bite sized and regular solids. No overt s/s of aspiration or penetration noted with thin liquids or soft and bite sized trials. However, reflexive coughing and multiple swallows was noted following 1 regular solid trial. Pt endorsed that the regular solids are harder to chew at this time as she gets tired. It should be noted that the pt has a baseline cough suspected to be 2/2 COVID-19 dx; therefore, said coughing may not be in direct response to swallow function.
The pt presented with oral dysphagia and suspected pharyngeal dysphagia. Given thin liquids (x5), mildly thick liquids (x4oz), puree (x5), and soft and bite sized (x5), the pt presented with the following: adequate oral acceptance, timely oral transit, suspected delayed pharyngeal swallow trigger, (+) hyolaryngeal excursion/elevation upon palpation though suspected to be reduced. Mastication was judged to be functional for soft and bite sized and adequate oral clearance was appreciated. No overt s/s of aspiration or penetration noted with mildly thick liquids, soft and bite sized, or puree trials. However, reflexive coughing was noted following thin liquid trials. Given regular solids (x1), pt was noted with the following: adequate oral containment, functional mastication, mildly prolonged oral transit, suspected delayed pharyngeal swallow trigger, (+) hyolaryngeal excursion/elevation though suspected to be reduced, adequate oral clearance, and reflexive coughing.

## 2023-12-20 NOTE — SWALLOW BEDSIDE ASSESSMENT ADULT - ASR SWALLOW RECOMMEND DIAG
Objective testing is not warranted given the pt's clinical performance. This writer spoke to the pt's MD at which time there is no active concern for aspiration. However, it is recommended that the pt's MD inform this service if concern for aspiration arises.

## 2023-12-20 NOTE — SWALLOW BEDSIDE ASSESSMENT ADULT - PHARYNGEAL PHASE
Decreased laryngeal elevation Decreased laryngeal elevation/Cough post oral intake/Multiple swallows

## 2023-12-20 NOTE — SWALLOW BEDSIDE ASSESSMENT ADULT - MUCOSAL QUALITY
The pt's oral cavity appeared pink and adequately hydrated.
The pt's oral cavity appeared pink and adequately hydrated.

## 2023-12-20 NOTE — SWALLOW BEDSIDE ASSESSMENT ADULT - SWALLOW EVAL: RECOMMENDED FEEDING/EATING TECHNIQUES
allow for swallow between intakes/alternate food with liquid/maintain upright posture during/after eating for 30 mins/oral hygiene/position upright (90 degrees)/small sips/bites
allow for swallow between intakes/alternate food with liquid/check mouth frequently for oral residue/pocketing/maintain upright posture during/after eating for 30 mins/no straws/oral hygiene/position upright (90 degrees)/small sips/bites

## 2023-12-20 NOTE — PROGRESS NOTE ADULT - SUBJECTIVE AND OBJECTIVE BOX
Chief Complaint: Cough    Interval Events: No events overnight.    Review of Systems:  General: No fevers, chills, weight gain  Skin: No rashes, color changes  Cardiovascular: No chest pain, orthopnea  Respiratory: No shortness of breath, cough  Gastrointestinal: No nausea, abdominal pain  Genitourinary: No incontinence, pain with urination  Musculoskeletal: No pain, swelling, decreased range of motion  Neurological: No headache, weakness  Psychiatric: No depression, anxiety  Endocrine: No weight gain, increased thirst  All other systems are comprehensively negative.    Physical Exam:  Vital Signs Last 24 Hrs  T(C): 36.6 (20 Dec 2023 04:01), Max: 36.9 (19 Dec 2023 20:18)  T(F): 97.9 (20 Dec 2023 04:01), Max: 98.5 (19 Dec 2023 20:18)  HR: 55 (20 Dec 2023 04:00) (54 - 70)  BP: 107/71 (20 Dec 2023 04:00) (96/42 - 113/81)  BP(mean): 81 (20 Dec 2023 04:00) (60 - 91)  RR: 16 (20 Dec 2023 04:00) (13 - 29)  SpO2: 94% (20 Dec 2023 04:00) (94% - 99%)  Parameters below as of 20 Dec 2023 04:00  Patient On (Oxygen Delivery Method): room air  General: NAD  HEENT: MMM  Neck: No JVD, no carotid bruit  Lungs: CTAB  CV: RRR, nl S1/S2, no M/R/G  Abdomen: S/NT/ND, +BS  Extremities: No LE edema, no cyanosis  Neuro: AAOx3, non-focal  Skin: No rash    Labs:    12-20    140  |  103  |  22  ----------------------------<  98  4.2   |  31  |  0.94    Ca    8.5      20 Dec 2023 06:00    TPro  5.3<L>  /  Alb  2.8<L>  /  TBili  0.5  /  DBili  0.2  /  AST  28  /  ALT  37  /  AlkPhos  66  12-20                        13.8   7.28  )-----------( 177      ( 20 Dec 2023 06:00 )             40.8       ECG/Telemetry: Sinus rhythm

## 2023-12-21 ENCOUNTER — TRANSCRIPTION ENCOUNTER (OUTPATIENT)
Age: 88
End: 2023-12-21

## 2023-12-21 VITALS — TEMPERATURE: 98 F

## 2023-12-21 LAB
ALBUMIN SERPL ELPH-MCNC: 2.6 G/DL — LOW (ref 3.3–5)
ALBUMIN SERPL ELPH-MCNC: 2.6 G/DL — LOW (ref 3.3–5)
ALBUMIN SERPL ELPH-MCNC: 2.7 G/DL — LOW (ref 3.3–5)
ALBUMIN SERPL ELPH-MCNC: 2.7 G/DL — LOW (ref 3.3–5)
ALP SERPL-CCNC: 61 U/L — SIGNIFICANT CHANGE UP (ref 30–120)
ALP SERPL-CCNC: 61 U/L — SIGNIFICANT CHANGE UP (ref 30–120)
ALP SERPL-CCNC: 65 U/L — SIGNIFICANT CHANGE UP (ref 30–120)
ALP SERPL-CCNC: 65 U/L — SIGNIFICANT CHANGE UP (ref 30–120)
ALT FLD-CCNC: 33 U/L — SIGNIFICANT CHANGE UP (ref 10–60)
ALT FLD-CCNC: 33 U/L — SIGNIFICANT CHANGE UP (ref 10–60)
ALT FLD-CCNC: 37 U/L — SIGNIFICANT CHANGE UP (ref 10–60)
ALT FLD-CCNC: 37 U/L — SIGNIFICANT CHANGE UP (ref 10–60)
ANION GAP SERPL CALC-SCNC: 6 MMOL/L — SIGNIFICANT CHANGE UP (ref 5–17)
ANION GAP SERPL CALC-SCNC: 6 MMOL/L — SIGNIFICANT CHANGE UP (ref 5–17)
AST SERPL-CCNC: 27 U/L — SIGNIFICANT CHANGE UP (ref 10–40)
AST SERPL-CCNC: 27 U/L — SIGNIFICANT CHANGE UP (ref 10–40)
AST SERPL-CCNC: 30 U/L — SIGNIFICANT CHANGE UP (ref 10–40)
AST SERPL-CCNC: 30 U/L — SIGNIFICANT CHANGE UP (ref 10–40)
BILIRUB DIRECT SERPL-MCNC: 0.1 MG/DL — SIGNIFICANT CHANGE UP (ref 0–0.3)
BILIRUB DIRECT SERPL-MCNC: 0.1 MG/DL — SIGNIFICANT CHANGE UP (ref 0–0.3)
BILIRUB INDIRECT FLD-MCNC: 0.5 MG/DL — SIGNIFICANT CHANGE UP (ref 0.2–1)
BILIRUB INDIRECT FLD-MCNC: 0.5 MG/DL — SIGNIFICANT CHANGE UP (ref 0.2–1)
BILIRUB SERPL-MCNC: 0.6 MG/DL — SIGNIFICANT CHANGE UP (ref 0.2–1.2)
BUN SERPL-MCNC: 20 MG/DL — SIGNIFICANT CHANGE UP (ref 7–23)
BUN SERPL-MCNC: 20 MG/DL — SIGNIFICANT CHANGE UP (ref 7–23)
CALCIUM SERPL-MCNC: 8.2 MG/DL — LOW (ref 8.4–10.5)
CALCIUM SERPL-MCNC: 8.2 MG/DL — LOW (ref 8.4–10.5)
CHLORIDE SERPL-SCNC: 103 MMOL/L — SIGNIFICANT CHANGE UP (ref 96–108)
CHLORIDE SERPL-SCNC: 103 MMOL/L — SIGNIFICANT CHANGE UP (ref 96–108)
CO2 SERPL-SCNC: 30 MMOL/L — SIGNIFICANT CHANGE UP (ref 22–31)
CO2 SERPL-SCNC: 30 MMOL/L — SIGNIFICANT CHANGE UP (ref 22–31)
CREAT SERPL-MCNC: 0.79 MG/DL — SIGNIFICANT CHANGE UP (ref 0.5–1.3)
CREAT SERPL-MCNC: 0.79 MG/DL — SIGNIFICANT CHANGE UP (ref 0.5–1.3)
EGFR: 72 ML/MIN/1.73M2 — SIGNIFICANT CHANGE UP
EGFR: 72 ML/MIN/1.73M2 — SIGNIFICANT CHANGE UP
GLUCOSE SERPL-MCNC: 91 MG/DL — SIGNIFICANT CHANGE UP (ref 70–99)
GLUCOSE SERPL-MCNC: 91 MG/DL — SIGNIFICANT CHANGE UP (ref 70–99)
HCT VFR BLD CALC: 35.9 % — SIGNIFICANT CHANGE UP (ref 34.5–45)
HCT VFR BLD CALC: 35.9 % — SIGNIFICANT CHANGE UP (ref 34.5–45)
HGB BLD-MCNC: 12.2 G/DL — SIGNIFICANT CHANGE UP (ref 11.5–15.5)
HGB BLD-MCNC: 12.2 G/DL — SIGNIFICANT CHANGE UP (ref 11.5–15.5)
MCHC RBC-ENTMCNC: 33 PG — SIGNIFICANT CHANGE UP (ref 27–34)
MCHC RBC-ENTMCNC: 33 PG — SIGNIFICANT CHANGE UP (ref 27–34)
MCHC RBC-ENTMCNC: 34 GM/DL — SIGNIFICANT CHANGE UP (ref 32–36)
MCHC RBC-ENTMCNC: 34 GM/DL — SIGNIFICANT CHANGE UP (ref 32–36)
MCV RBC AUTO: 97 FL — SIGNIFICANT CHANGE UP (ref 80–100)
MCV RBC AUTO: 97 FL — SIGNIFICANT CHANGE UP (ref 80–100)
NRBC # BLD: 0 /100 WBCS — SIGNIFICANT CHANGE UP (ref 0–0)
NRBC # BLD: 0 /100 WBCS — SIGNIFICANT CHANGE UP (ref 0–0)
PLATELET # BLD AUTO: 203 K/UL — SIGNIFICANT CHANGE UP (ref 150–400)
PLATELET # BLD AUTO: 203 K/UL — SIGNIFICANT CHANGE UP (ref 150–400)
POTASSIUM SERPL-MCNC: 4.6 MMOL/L — SIGNIFICANT CHANGE UP (ref 3.5–5.3)
POTASSIUM SERPL-MCNC: 4.6 MMOL/L — SIGNIFICANT CHANGE UP (ref 3.5–5.3)
POTASSIUM SERPL-SCNC: 4.6 MMOL/L — SIGNIFICANT CHANGE UP (ref 3.5–5.3)
POTASSIUM SERPL-SCNC: 4.6 MMOL/L — SIGNIFICANT CHANGE UP (ref 3.5–5.3)
PROT SERPL-MCNC: 5 G/DL — LOW (ref 6–8.3)
PROT SERPL-MCNC: 5 G/DL — LOW (ref 6–8.3)
PROT SERPL-MCNC: 5.3 G/DL — LOW (ref 6–8.3)
PROT SERPL-MCNC: 5.3 G/DL — LOW (ref 6–8.3)
RBC # BLD: 3.7 M/UL — LOW (ref 3.8–5.2)
RBC # BLD: 3.7 M/UL — LOW (ref 3.8–5.2)
RBC # FLD: 15.2 % — HIGH (ref 10.3–14.5)
RBC # FLD: 15.2 % — HIGH (ref 10.3–14.5)
SODIUM SERPL-SCNC: 139 MMOL/L — SIGNIFICANT CHANGE UP (ref 135–145)
SODIUM SERPL-SCNC: 139 MMOL/L — SIGNIFICANT CHANGE UP (ref 135–145)
WBC # BLD: 7.78 K/UL — SIGNIFICANT CHANGE UP (ref 3.8–10.5)
WBC # BLD: 7.78 K/UL — SIGNIFICANT CHANGE UP (ref 3.8–10.5)
WBC # FLD AUTO: 7.78 K/UL — SIGNIFICANT CHANGE UP (ref 3.8–10.5)
WBC # FLD AUTO: 7.78 K/UL — SIGNIFICANT CHANGE UP (ref 3.8–10.5)

## 2023-12-21 PROCEDURE — 83735 ASSAY OF MAGNESIUM: CPT

## 2023-12-21 PROCEDURE — 80048 BASIC METABOLIC PNL TOTAL CA: CPT

## 2023-12-21 PROCEDURE — 83605 ASSAY OF LACTIC ACID: CPT

## 2023-12-21 PROCEDURE — 94640 AIRWAY INHALATION TREATMENT: CPT

## 2023-12-21 PROCEDURE — 96374 THER/PROPH/DIAG INJ IV PUSH: CPT

## 2023-12-21 PROCEDURE — 82962 GLUCOSE BLOOD TEST: CPT

## 2023-12-21 PROCEDURE — 87637 SARSCOV2&INF A&B&RSV AMP PRB: CPT

## 2023-12-21 PROCEDURE — 82565 ASSAY OF CREATININE: CPT

## 2023-12-21 PROCEDURE — 85027 COMPLETE CBC AUTOMATED: CPT

## 2023-12-21 PROCEDURE — 71045 X-RAY EXAM CHEST 1 VIEW: CPT

## 2023-12-21 PROCEDURE — 85610 PROTHROMBIN TIME: CPT

## 2023-12-21 PROCEDURE — 97116 GAIT TRAINING THERAPY: CPT

## 2023-12-21 PROCEDURE — 93005 ELECTROCARDIOGRAM TRACING: CPT

## 2023-12-21 PROCEDURE — 71250 CT THORAX DX C-: CPT | Mod: MA

## 2023-12-21 PROCEDURE — 84484 ASSAY OF TROPONIN QUANT: CPT

## 2023-12-21 PROCEDURE — 99285 EMERGENCY DEPT VISIT HI MDM: CPT | Mod: 25

## 2023-12-21 PROCEDURE — 80076 HEPATIC FUNCTION PANEL: CPT

## 2023-12-21 PROCEDURE — 80053 COMPREHEN METABOLIC PANEL: CPT

## 2023-12-21 PROCEDURE — 36415 COLL VENOUS BLD VENIPUNCTURE: CPT

## 2023-12-21 PROCEDURE — 87040 BLOOD CULTURE FOR BACTERIA: CPT

## 2023-12-21 PROCEDURE — 85730 THROMBOPLASTIN TIME PARTIAL: CPT

## 2023-12-21 PROCEDURE — 85025 COMPLETE CBC W/AUTO DIFF WBC: CPT

## 2023-12-21 PROCEDURE — 97110 THERAPEUTIC EXERCISES: CPT

## 2023-12-21 PROCEDURE — 97161 PT EVAL LOW COMPLEX 20 MIN: CPT

## 2023-12-21 PROCEDURE — 70491 CT SOFT TISSUE NECK W/DYE: CPT | Mod: MA

## 2023-12-21 RX ORDER — FUROSEMIDE 40 MG
1 TABLET ORAL
Qty: 0 | Refills: 0 | DISCHARGE

## 2023-12-21 RX ORDER — METOPROLOL TARTRATE 50 MG
1 TABLET ORAL
Qty: 0 | Refills: 0 | DISCHARGE

## 2023-12-21 RX ORDER — FAMOTIDINE 10 MG/ML
1 INJECTION INTRAVENOUS
Qty: 30 | Refills: 0
Start: 2023-12-21 | End: 2024-01-19

## 2023-12-21 RX ORDER — APIXABAN 2.5 MG/1
1 TABLET, FILM COATED ORAL
Qty: 0 | Refills: 0 | DISCHARGE
Start: 2023-12-21

## 2023-12-21 RX ORDER — SACUBITRIL AND VALSARTAN 24; 26 MG/1; MG/1
1 TABLET, FILM COATED ORAL
Qty: 0 | Refills: 0 | DISCHARGE

## 2023-12-21 RX ORDER — MIDODRINE HYDROCHLORIDE 2.5 MG/1
1 TABLET ORAL
Qty: 45 | Refills: 0
Start: 2023-12-21 | End: 2024-01-04

## 2023-12-21 RX ORDER — MOLNUPIRAVIR 200 MG/1
4 CAPSULE ORAL
Refills: 0 | DISCHARGE

## 2023-12-21 RX ADMIN — BUDESONIDE AND FORMOTEROL FUMARATE DIHYDRATE 2 PUFF(S): 160; 4.5 AEROSOL RESPIRATORY (INHALATION) at 08:22

## 2023-12-21 RX ADMIN — CLOPIDOGREL BISULFATE 75 MILLIGRAM(S): 75 TABLET, FILM COATED ORAL at 12:39

## 2023-12-21 RX ADMIN — MIDODRINE HYDROCHLORIDE 5 MILLIGRAM(S): 2.5 TABLET ORAL at 05:58

## 2023-12-21 RX ADMIN — AMIODARONE HYDROCHLORIDE 200 MILLIGRAM(S): 400 TABLET ORAL at 05:58

## 2023-12-21 RX ADMIN — FAMOTIDINE 20 MILLIGRAM(S): 10 INJECTION INTRAVENOUS at 12:39

## 2023-12-21 RX ADMIN — APIXABAN 2.5 MILLIGRAM(S): 2.5 TABLET, FILM COATED ORAL at 05:58

## 2023-12-21 RX ADMIN — MIDODRINE HYDROCHLORIDE 5 MILLIGRAM(S): 2.5 TABLET ORAL at 12:40

## 2023-12-21 NOTE — PROGRESS NOTE ADULT - SUBJECTIVE AND OBJECTIVE BOX
Date/Time Patient Seen:  		  Referring MD:   Data Reviewed	       Patient is a 88y old  Female who presents with a chief complaint of sob (20 Dec 2023 12:56)      Subjective/HPI     PAST MEDICAL & SURGICAL HISTORY:  CAD (coronary artery disease)    Rapid atrial fibrillation    CAD (coronary artery disease)  s/p Cath & 3 stents to LAD on 3/2020; OM1 stent x 1  EF ~30%    Mild HTN    History of CHF (congestive heart failure)    Myocardial infarction    No significant past surgical history    S/P cataract surgery          Medication list         MEDICATIONS  (STANDING):  aMIOdarone    Tablet 200 milliGRAM(s) Oral daily  apixaban 2.5 milliGRAM(s) Oral every 12 hours  atorvastatin 40 milliGRAM(s) Oral at bedtime  budesonide 160 MICROgram(s)/formoterol 4.5 MICROgram(s) Inhaler 2 Puff(s) Inhalation two times a day  clopidogrel Tablet 75 milliGRAM(s) Oral daily  famotidine    Tablet 20 milliGRAM(s) Oral daily  melatonin 3 milliGRAM(s) Oral at bedtime  midodrine. 5 milliGRAM(s) Oral three times a day    MEDICATIONS  (PRN):         Vitals log        ICU Vital Signs Last 24 Hrs  T(C): 36.8 (21 Dec 2023 05:14), Max: 36.8 (20 Dec 2023 10:40)  T(F): 98.2 (21 Dec 2023 05:14), Max: 98.2 (20 Dec 2023 10:40)  HR: 62 (21 Dec 2023 01:11) (60 - 62)  BP: 101/68 (21 Dec 2023 01:11) (100/63 - 101/68)  BP(mean): --  ABP: --  ABP(mean): --  RR: 18 (21 Dec 2023 01:11) (18 - 18)  SpO2: 96% (21 Dec 2023 01:11) (96% - 96%)    O2 Parameters below as of 21 Dec 2023 01:11  Patient On (Oxygen Delivery Method): room air                 Input and Output:  I&O's Detail    19 Dec 2023 07:01  -  20 Dec 2023 07:00  --------------------------------------------------------  IN:    Oral Fluid: 200 mL    Sodium Chloride 0.9% Bolus: 250 mL  Total IN: 450 mL    OUT:  Total OUT: 0 mL    Total NET: 450 mL      20 Dec 2023 07:01  -  21 Dec 2023 05:45  --------------------------------------------------------  IN:    Oral Fluid: 300 mL  Total IN: 300 mL    OUT:  Total OUT: 0 mL    Total NET: 300 mL          Lab Data                        13.8   7.28  )-----------( 177      ( 20 Dec 2023 06:00 )             40.8     12-20    140  |  103  |  22  ----------------------------<  98  4.2   |  31  |  0.94    Ca    8.5      20 Dec 2023 06:00    TPro  5.3<L>  /  Alb  2.8<L>  /  TBili  0.5  /  DBili  0.2  /  AST  28  /  ALT  37  /  AlkPhos  66  12-20            Review of Systems	      Objective     Physical Examination  heart 1s2  lung dc BS  head nc        Pertinent Lab findings & Imaging      Key:  NO   Adequate UO     I&O's Detail    19 Dec 2023 07:01  -  20 Dec 2023 07:00  --------------------------------------------------------  IN:    Oral Fluid: 200 mL    Sodium Chloride 0.9% Bolus: 250 mL  Total IN: 450 mL    OUT:  Total OUT: 0 mL    Total NET: 450 mL      20 Dec 2023 07:01  -  21 Dec 2023 05:45  --------------------------------------------------------  IN:    Oral Fluid: 300 mL  Total IN: 300 mL    OUT:  Total OUT: 0 mL    Total NET: 300 mL               Discussed with:     Cultures:	        Radiology

## 2023-12-21 NOTE — PROGRESS NOTE ADULT - SUBJECTIVE AND OBJECTIVE BOX
Chief Complaint: Cough    Interval Events: No events overnight.    Review of Systems:  General: No fevers, chills, weight gain  Skin: No rashes, color changes  Cardiovascular: No chest pain, orthopnea  Respiratory: No shortness of breath, cough  Gastrointestinal: No nausea, abdominal pain  Genitourinary: No incontinence, pain with urination  Musculoskeletal: No pain, swelling, decreased range of motion  Neurological: No headache, weakness  Psychiatric: No depression, anxiety  Endocrine: No weight gain, increased thirst  All other systems are comprehensively negative.    Physical Exam:  Vital Signs Last 24 Hrs  T(C): 36.8 (21 Dec 2023 05:14), Max: 36.8 (20 Dec 2023 10:40)  T(F): 98.2 (21 Dec 2023 05:14), Max: 98.2 (20 Dec 2023 10:40)  HR: 62 (21 Dec 2023 01:11) (60 - 62)  BP: 101/68 (21 Dec 2023 01:11) (100/63 - 101/68)  BP(mean): --  RR: 18 (21 Dec 2023 01:11) (18 - 18)  SpO2: 96% (21 Dec 2023 01:11) (96% - 96%)  Parameters below as of 21 Dec 2023 01:11  Patient On (Oxygen Delivery Method): room air  General: NAD  HEENT: MMM  Neck: No JVD, no carotid bruit  Lungs: CTAB  CV: RRR, nl S1/S2, no M/R/G  Abdomen: S/NT/ND, +BS  Extremities: No LE edema, no cyanosis  Neuro: AAOx3, non-focal  Skin: No rash    Labs:    12-21    139  |  103  |  20  ----------------------------<  91  4.6   |  30  |  0.79    Ca    8.2<L>      21 Dec 2023 08:15    TPro  5.0<L>  /  Alb  2.7<L>  /  TBili  0.6  /  DBili  0.1  /  AST  27  /  ALT  37  /  AlkPhos  65  12-21                        12.2   7.78  )-----------( 203      ( 21 Dec 2023 08:15 )             35.9       ECG/Telemetry: Sinus rhythm

## 2023-12-21 NOTE — PROGRESS NOTE ADULT - ASSESSMENT
Patient developed cough shortness of breath chest tightness sore throat congestion 2 days ago had a positive COVID test that day complaining of worsening symptoms.  No fevers chills abdominal pain vomiting edema calf.  Patient relates on Eliquis.PMD [id number] PMHx HFrEF (LVEF 43% 5/2022), CAD with prior MI s/p PCI, Afib (on Eliquis) presenting with cough, SOB x2 days . positive for  COVID 2 days ago (vaccinated x3).  cough worsening and  feel more dyspneic prompting ER evaluation. In ER, concern for stridor and was given Racemic Epi x2 and Decadron 10mg x1.   CT Soft Tissue Neck negative. CT Chest with small bilateral pleural effusions/ atelectasis.   Patient and MICU consult , and is stable , and being admitted for further management   # Covid infection, with ac resp failure with hypoxia- improved  # edema uvula - improved  # CAD  # CHF systolic HF- no sob  # Paroxysmal afib  # B/l Pleural Effusion  #  bradycardia sinus - metoprolol stopped by cardiology,   # Hypotension and orthostatic hypotension , metoprolol, entresto and furosemide stopped  pt completed remdesvir and decadron ,, ID f up noted  PT  had RRT on 12/18 am for near syncope - likely vasovagal- with orthostasis, Entresto, metoprolol, and furosemide stopped   started  midodrine 5 mg tid and improved   Goals of care discussion with daughter    Dc plan     d/w daughter ada Patient developed cough shortness of breath chest tightness sore throat congestion 2 days ago had a positive COVID test that day complaining of worsening symptoms.  No fevers chills abdominal pain vomiting edema calf.  Patient relates on Eliquis.PMD [id number] PMHx HFrEF (LVEF 43% 5/2022), CAD with prior MI s/p PCI, Afib (on Eliquis) presenting with cough, SOB x2 days . positive for  COVID 2 days ago (vaccinated x3).  cough worsening and  feel more dyspneic prompting ER evaluation. In ER, concern for stridor and was given Racemic Epi x2 and Decadron 10mg x1.   CT Soft Tissue Neck negative. CT Chest with small bilateral pleural effusions/ atelectasis.   Patient and MICU consult , and is stable , and being admitted for further management   # Covid infection, with ac resp failure with hypoxia- improved  # edema uvula - improved  # CAD  # CHF systolic HF- no sob  # Paroxysmal afib  # B/l Pleural Effusion  #  bradycardia sinus - metoprolol stopped by cardiology,   # Hypotension and orthostatic hypotension , metoprolol, entresto and furosemide stopped  pt completed remdesvir and decadron ,, ID f up noted  PT  had RRT on 12/18 am for near syncope - likely vasovagal- with orthostasis, Entresto, metoprolol, and furosemide stopped   started  midodrine 5 mg tid and improved   Goals of care discussion with daughter    Dc plan     d/w daughter ada

## 2023-12-21 NOTE — CASE MANAGEMENT PROGRESS NOTE - NSCMPROGRESSNOTE_GEN_ALL_CORE
CM spoke with daughter Bebe face to face at bedside  stated her mother has followup appointment with her PCP DR. Cary Hernandez [phone]  January 9.  Daughter stated she will make a followup appointment with Cardiologist Dr. Rivers. Will continue to follow patient CM spoke with daughter Bebe face to face at bedside  stated her mother has followup appointment with her PCP DR. Cary Hernandez [phone]  January 9.  Daughter stated she will make a followup appointment with Cardiologist Dr. Rivers. Will continue to follow patient

## 2023-12-21 NOTE — DISCHARGE NOTE PROVIDER - NSDCQMPCI_CARD_ALL_CORE
[facility] Emergency Department  Arrival Date/Time: 9/10/2019  9:10 AM      Amol Simon  MRN: [id number]    YOB: 1947   67 y.o. male    [facility] EMERGENCY DEPT ER10/10  Seen on 9/10/2019 @ 1:30 PM      Today's Chief Complaint:   Chief Complaint   Patient presents with   Balbina Miller     HPI: 68 yo male with hx of cirrhosis  Has a Pleurx catheter. Has had several falls. Her family was concerned that it was displaced because the catheter has not been putting out as much    He is followed by hospice. Family states that the fluid from the catheter was less and changed in color    Patient looks edematous. He is pale. He has multiple bruises on the left side from recurrent falls       HPI    Review of Systems: Review of Systems   Constitutional: Positive for activity change, appetite change and fatigue. HENT: Negative. Respiratory: Negative. Cardiovascular: Positive for leg swelling. Negative for chest pain. Gastrointestinal: Positive for abdominal distention. Negative for abdominal pain, nausea and vomiting. Genitourinary: Negative. Musculoskeletal: Negative. Skin: Negative. Psychiatric/Behavioral: Negative. Past Medical History: Primary Care Doctor: Miguel Silva MD  Meds, PMH, PSHx, SocHx at end of this note     Allergies: Allergies   Allergen Reactions    Hydromet [Hydrocodone-Homatropine] Nausea Only     Dry heaves         Key Anti-Platelet Anticoagulant Meds     The patient is on no antiplatelet meds or anticoagulants. Physical Exam:  Nursing documentation reviewed. Vitals:    09/10/19 0904 09/10/19 1000 09/10/19 1119   BP: 115/70 137/63    Pulse: (!) 103 95 93   Resp: 20     Temp: 98.3 °F (36.8 °C)     SpO2: 92% 96% 100%    Vital signs were reviewed. Physical Exam   Constitutional:   Elderly nontoxic but chronically ill-appearing male   HENT:   Head: Normocephalic and atraumatic. Eyes: Pupils are equal, round, and reactive to light.  EOM are normal. Scleral icterus is present. Neck: Normal range of motion. Neck supple. Cardiovascular: Normal rate and regular rhythm. Murmur heard. Pulmonary/Chest: Breath sounds normal. No respiratory distress. He has no wheezes. Abdominal: He exhibits no distension. There is no tenderness. Patient with pitting edema of the abdomen   Musculoskeletal: He exhibits edema. Patient has pitting edema of the bilateral lower extremities from the feet to the lower abdomen   Skin: Skin is dry. Capillary refill takes less than 2 seconds. There is pallor. Psychiatric: He has a normal mood and affect. His behavior is normal.   Nursing note and vitals reviewed. MEDICAL DECISION MAKING:   Differential Diagnosis:    MDM  Number of Diagnoses or Management Options  Anasarca:   Cirrhosis of liver with ascites, unspecified hepatic cirrhosis type [facility]):   Diagnosis management comments: 54-year-old male history of cirrhosis presents with increased edema. Family is concerned about his catheter    IR was contacted and they graciously took him ultrasound at his abdomen    He had a small amount of ascites. The catheter was in place and functioning.   He has significant pitting edema from his feet to his lower abdomen which appears to be the source of the abdominal distention           Amount and/or Complexity of Data Reviewed  Clinical lab tests: ordered and reviewed  Tests in the radiology section of CPT®: reviewed and ordered  Discussion of test results with the performing providers: yes  Decide to obtain previous medical records or to obtain history from someone other than the patient: yes  Obtain history from someone other than the patient: yes  Review and summarize past medical records: yes  Discuss the patient with other providers: yes  Independent visualization of images, tracings, or specimens: yes    Risk of Complications, Morbidity, and/or Mortality  Presenting problems: high  Diagnostic procedures: minimal  Management options: moderate          Data/Management:      Lab findings during this visit (only abnormal values will be noted, if no value noted then the result   was normal range):   Recent Results (from the past 48 hour(s))   CBC W/O DIFF    Collection Time: 09/10/19 10:19 AM   Result Value Ref Range    WBC 9.8 4.3 - 11.1 K/uL    RBC 3.08 (L) 4.23 - 5.6 M/uL    HGB 8.2 (L) 13.6 - 17.2 g/dL    HCT 25.9 (L) 41.1 - 50.3 %    MCV 84.1 79.6 - 97.8 FL    MCH 26.6 26.1 - 32.9 PG    MCHC 31.7 31.4 - 35.0 g/dL    RDW 19.9 (H) 11.9 - 14.6 %    PLATELET 713 150 - 392 K/uL    MPV 9.9 9.4 - 12.3 FL    ABSOLUTE NRBC 0.00 0.0 - 0.2 K/uL   METABOLIC PANEL, COMPREHENSIVE    Collection Time: 09/10/19 10:19 AM   Result Value Ref Range    Sodium 127 (L) 136 - 145 mmol/L    Potassium 4.9 3.5 - 5.1 mmol/L    Chloride 94 (L) 98 - 107 mmol/L    CO2 26 21 - 32 mmol/L    Anion gap 7 7 - 16 mmol/L    Glucose 101 (H) 65 - 100 mg/dL    BUN 22 8 - 23 MG/DL    Creatinine 1.52 (H) 0.8 - 1.5 MG/DL    GFR est AA 58 (L) >60 ml/min/1.73m2    GFR est non-AA 48 (L) >60 ml/min/1.73m2    Calcium 8.3 8.3 - 10.4 MG/DL    Bilirubin, total 2.0 (H) 0.2 - 1.1 MG/DL    ALT (SGPT) 34 12 - 65 U/L    AST (SGOT) 71 (H) 15 - 37 U/L    Alk. phosphatase 472 (H) 50 - 136 U/L    Protein, total 6.4 6.3 - 8.2 g/dL    Albumin 1.6 (L) 3.2 - 4.6 g/dL    Globulin 4.8 (H) 2.3 - 3.5 g/dL    A-G Ratio 0.3 (L) 1.2 - 3.5     PROTHROMBIN TIME + INR    Collection Time: 09/10/19 10:19 AM   Result Value Ref Range    Prothrombin time 17.5 (H) 11.7 - 14.5 sec    INR 1.4         Radiology studies during this visit: Xr Chest Pa Lat    Result Date: 9/10/2019  IMPRESSION: 1. New moderate size right pleural effusion. 2. Stable, likely loculated left pleural effusion. Xr Abd (kub)    Result Date: 9/10/2019  IMPRESSION: 1. Pleurx catheter in the left lower abdominal quadrant 2.  Bilateral pleural effusions    Us Abd Ltd    Result Date: 9/10/2019  IMPRESSION: Small volume ascites with a Pleurx catheter in the left lower abdominal quadrant. Medications given in the ED:   Medications   0.9% sodium chloride infusion 500 mL (has no administration in time range)   furosemide (LASIX) injection 80 mg (has no administration in time range)       Recheck and Additional Documentation (Sepsis, Stroke, Hip):  (use .addsepsis   . addstroke   . addhip)     Patient resting. Respirations are easy nonlabored. No significant ascites    He does have pitting edema to the abdominal wall    We will increase his Lasix    Encouraged him to increase fluid intake and make sure he is replacing sodium    We will increase his Lasix for a few days and see if that helps with the anasarca      Procedure Documentation: Procedures     Assessment and Plan:      Impression:     ICD-10-CM ICD-9-CM   1. Anasarca R60.1 782.3   2.  Cirrhosis of liver with ascites, unspecified hepatic cirrhosis type ([facility] [address] K74.60 571.5    R18.8        Disposition: Discharged    Follow-up:   Follow-up Information     Follow up With Specialties Details Why Contact Info    Cayden Cárdenas MD [address]  [phone]      [facility] EMERGENCY DEPT Emergency Medicine   [place] [id number]  [phone]          Discharge Medications:   Current Discharge Medication List          Chaz Abraham MD; 09/10/19  1:30 PM    Other ED Course Notes:        Past Medical History:      Past Medical History:   Diagnosis Date    Bronchitis     Cirrhosis of liver with ascites ([address]     Hypertension     Ulcer of ileum     ulcer     Past Surgical History:   Procedure Laterality Date    HX APPENDECTOMY      HX ORTHOPAEDIC      carpal tunnel    HX ORTHOPAEDIC      back surgery    IR PARACENTESIS ABD W IMAGE  5/29/2019    IR PARACENTESIS ABD W IMAGE  6/14/2019    IR PARACENTESIS ABD W IMAGE  6/26/2019    IR PARACENTESIS ABD W IMAGE  7/10/2019     Social History     Tobacco Use    Smoking status: Never Smoker    Smokeless tobacco: Never Used   Substance Use Topics    Alcohol use: No     Comment: None since 7/2018    Drug use: No     Home Medication:   Prior to Admission Medications   Prescriptions Last Dose Informant Patient Reported? Taking?   ciprofloxacin HCl (CIPRO) 500 mg tablet   No No   Sig: Take 1 Tab by mouth two (2) times a day. escitalopram oxalate (LEXAPRO) 10 mg tablet   No No   Sig: Take 1 Tab by mouth daily. ferrous sulfate (IRON) 325 mg (65 mg iron) tablet   Yes No   Sig: Take 27 mg by mouth Daily (before breakfast). furosemide (LASIX) 20 mg tablet   Yes No   Sig: Take 20 mg by mouth daily. gabapentin (NEURONTIN) 400 mg capsule   No No   Sig: Take 1 Cap by mouth three (3) times daily. Patient taking differently: Take 400 mg by mouth nightly. lactulose (GENERLAC) 10 gram/15 mL solution   Yes No   Sig: Take  by mouth three (3) times daily. magnesium oxide (MAG-OX) 400 mg tablet   No No   Sig: Take 1 Tab by mouth daily. Indications: low amount of magnesium in the blood   mirtazapine (REMERON) 15 mg tablet   No No   Sig: Take 1 Tab by mouth nightly. pantoprazole (PROTONIX) 40 mg tablet   Yes No   Sig: Take 40 mg by mouth daily. spironolactone (ALDACTONE) 50 mg tablet   Yes No   Sig: Take 50 mg by mouth daily.       Facility-Administered Medications: None No

## 2023-12-21 NOTE — CASE MANAGEMENT PROGRESS NOTE - NSCMPROGRESSNOTE_GEN_ALL_CORE
Update Communication Note:  CM called  patients daughter Bebe [phone] to make aware as per DR. Fox patient is ready for Discharge today.  Patient being Discharge with [facility] services , CM informed daughter  she verbalized understanding and will be here this afternoon to  her mom. Will continue to follow patient. Update Communication Note:  CM called  patients daughter Bebe [phone] to make aware as per DR. Fox patient is ready for Discharge today.  Patient being Discharge with [facility] services , CM informed daughter  she verbalized understanding and will be here this afternoon to  her mom. Will continue to follow patient.

## 2023-12-21 NOTE — DISCHARGE NOTE PROVIDER - NSDCMRMEDTOKEN_GEN_ALL_CORE_FT
amiodarone 100 mg oral tablet: 2 tab(s) orally once a day  apixaban 2.5 mg oral tablet: 1 tab(s) orally every 12 hours  atorvastatin 40 mg oral tablet: 1 tab(s) orally once a day  clopidogrel 75 mg oral tablet: 1 tab(s) orally once a day  famotidine 20 mg oral tablet: 1 tab(s) orally once a day  Lagevrio 200 mg oral capsule: 4 cap(s) orally 2 times a day  midodrine 5 mg oral tablet: 1 tab(s) orally 3 times a day   amiodarone 100 mg oral tablet: 2 tab(s) orally once a day  apixaban 2.5 mg oral tablet: 1 tab(s) orally every 12 hours  atorvastatin 40 mg oral tablet: 1 tab(s) orally once a day  clopidogrel 75 mg oral tablet: 1 tab(s) orally once a day  famotidine 20 mg oral tablet: 1 tab(s) orally once a day  midodrine 5 mg oral tablet: 1 tab(s) orally 3 times a day

## 2023-12-21 NOTE — DISCHARGE NOTE PROVIDER - CARE PROVIDER_API CALL
your pcp and cardiologist,   Phone: (   )    -  Fax: (   )    -  Follow Up Time:    your pcp and cardiologist,   Phone: (   )    -  Fax: (   )    -  Follow Up Time:     Vega Rivers  Cardiology  [address], [address]  [place] [address]  Phone: [phone]  Fax: [phone]  Established Patient  Follow Up Time: 1 week   your pcp and cardiologist,   Phone: (   )    -  Fax: (   )    -  Follow Up Time:     Vega Rivers  Cardiology  [address], [address]  [place] [address]  Phone: [phone]  Fax: [phone]  Established Patient  Follow Up Time: 1 week

## 2023-12-21 NOTE — PROGRESS NOTE ADULT - SUBJECTIVE AND OBJECTIVE BOX
Patient is a 88y old  Female who presents with a chief complaint of sob (21 Dec 2023 05:45)      INTERVAL HPI/OVERNIGHT EVENTS:overnight events noted    Home Medications:  amiodarone 100 mg oral tablet: 2 tab(s) orally once a day (14 Dec 2023 21:03)  atorvastatin 40 mg oral tablet: 1 tab(s) orally once a day (14 Dec 2023 16:31)  clopidogrel 75 mg oral tablet: 1 tab(s) orally once a day (14 Dec 2023 16:31)  Entresto 49 mg-51 mg oral tablet: 1 tab(s) orally 2 times a day (14 Dec 2023 16:31)  furosemide 20 mg oral tablet: 1 tab(s) orally once a day (14 Dec 2023 17:28)  Lagevrio 200 mg oral capsule: 4 cap(s) orally 2 times a day (14 Dec 2023 21:03)  metoprolol tartrate 25 mg oral tablet: 1 tab(s) orally 2 times a day (14 Dec 2023 16:31)      MEDICATIONS  (STANDING):  aMIOdarone    Tablet 200 milliGRAM(s) Oral daily  apixaban 2.5 milliGRAM(s) Oral every 12 hours  atorvastatin 40 milliGRAM(s) Oral at bedtime  budesonide 160 MICROgram(s)/formoterol 4.5 MICROgram(s) Inhaler 2 Puff(s) Inhalation two times a day  clopidogrel Tablet 75 milliGRAM(s) Oral daily  famotidine    Tablet 20 milliGRAM(s) Oral daily  melatonin 3 milliGRAM(s) Oral at bedtime  midodrine. 5 milliGRAM(s) Oral three times a day    MEDICATIONS  (PRN):      Allergies    Persantine (Rash)  Persantine (Unknown)    Intolerances        REVIEW OF SYSTEMS:  CONSTITUTIONAL: No fever, weight loss, has fatigue  EYES: No eye pain, visual disturbances, or discharge  ENMT:  No difficulty hearing, tinnitus, vertigo; No sinus or throat pain  NECK: No pain or stiffness  BREASTS: No pain, masses, or nipple discharge  RESPIRATORY: No cough, wheezing, chills or hemoptysis; No shortness of breath  CARDIOVASCULAR: No chest pain, palpitations, dizziness, or leg swelling  GASTROINTESTINAL: No abdominal or epigastric pain. No nausea, vomiting, or hematemesis; No diarrhea or constipation. No melena or hematochezia.  GENITOURINARY: No dysuria, frequency, hematuria, or incontinence  NEUROLOGICAL: Nodizziness  SKIN: No itching, burning, rashes, or lesions     Vital Signs Last 24 Hrs  T(C): 36.8 (21 Dec 2023 05:14), Max: 36.8 (20 Dec 2023 10:40)  T(F): 98.2 (21 Dec 2023 05:14), Max: 98.2 (20 Dec 2023 10:40)  HR: 62 (21 Dec 2023 01:11) (60 - 62)  BP: 101/68 (21 Dec 2023 01:11) (100/63 - 101/68)  BP(mean): --  RR: 18 (21 Dec 2023 01:11) (18 - 18)  SpO2: 96% (21 Dec 2023 01:11) (96% - 96%)    Parameters below as of 21 Dec 2023 01:11  Patient On (Oxygen Delivery Method): room air        PHYSICAL EXAM:  GENERAL: NAD, well-groomed, well-developed  HEAD:  Atraumatic, Normocephalic  EYES: EOMI, PERRLA, conjunctiva and sclera clear  ENMT: Moist mucous membranes,   NECK: Supple, No JVD, Normal thyroid  NERVOUS SYSTEM:  Alert & Oriented X3, non focal  CHEST/LUNG: Clear to percussion bilaterally;   HEART: Regular rate and rhythm;   ABDOMEN: Soft, Nontender, Nondistended; Bowel sounds present  EXTREMITIES:  2+ Peripheral Pulses, No clubbing, cyanosis, or edema  SKIN: No rashes or lesions    LABS:                        12.2   7.78  )-----------( 203      ( 21 Dec 2023 08:15 )             35.9     12-21    139  |  103  |  20  ----------------------------<  91  4.6   |  30  |  0.79    Ca    8.2<L>      21 Dec 2023 08:15    TPro  5.0<L>  /  Alb  2.7<L>  /  TBili  0.6  /  DBili  0.1  /  AST  27  /  ALT  37  /  AlkPhos  65  12-21      Urinalysis Basic - ( 21 Dec 2023 08:15 )    Color: x / Appearance: x / SG: x / pH: x  Gluc: 91 mg/dL / Ketone: x  / Bili: x / Urobili: x   Blood: x / Protein: x / Nitrite: x   Leuk Esterase: x / RBC: x / WBC x   Sq Epi: x / Non Sq Epi: x / Bacteria: x      CAPILLARY BLOOD GLUCOSE            Culture - Blood (collected 12-14-23 @ 17:19)  Source: .Blood Blood-Peripheral  Final Report (12-19-23 @ 22:01):    No growth at 5 days    Culture - Blood (collected 12-14-23 @ 17:19)  Source: .Blood Blood-Peripheral  Final Report (12-19-23 @ 22:01):    No growth at 5 days        I&O's Summary    20 Dec 2023 07:01  -  21 Dec 2023 07:00  --------------------------------------------------------  IN: 300 mL / OUT: 0 mL / NET: 300 mL        RADIOLOGY & ADDITIONAL TESTS:    Imaging Personally Reviewed:  [ x] YES  [ ] NO    Consultant(s) Notes Reviewed:  [ x] YES  [ ] NO    Care Discussed with Consultants/Other Providers [x ] YES  [ ] NO

## 2023-12-21 NOTE — PROGRESS NOTE ADULT - SUBJECTIVE AND OBJECTIVE BOX
KAVIN DENNIS is a 88yFemale , patient examined and chart reviewed.    INTERVAL HPI/ OVERNIGHT EVENTS:   Awake alert NAD. Afebrile.  No events. BP stable.    PAST MEDICAL & SURGICAL HISTORY:  CAD (coronary artery disease)  Rapid atrial fibrillation  CAD (coronary artery disease)  s/p Cath & 3 stents to LAD on 3/2020; OM1 stent x 1  EF ~30%  Mild HTN  History of CHF (congestive heart failure)  Myocardial infarction  S/P cataract surgery        For details regarding the patient's social history, family history, and other miscellaneous elements, please refer the initial infectious diseases consultation and/or the admitting history and physical examination for this admission.    ROS:  CONSTITUTIONAL:  Negative fever or chill  EYES:  Negative  blurry vision or double vision  CARDIOVASCULAR:  Negative for chest pain or palpitations  RESPIRATORY:  Negative for cough, wheezing, or SOB   GASTROINTESTINAL:  Negative for nausea, vomiting, diarrhea, constipation, or abdominal pain  GENITOURINARY:  Negative frequency, urgency or dysuria  NEUROLOGIC:  No headache, confusion, dizziness,+ lightheadedness  All other systems were reviewed and are negative     ALLERGIES  Persantine (Rash)      Current inpatient medications :    ANTIBIOTICS/RELEVANT:    MEDICATIONS  (STANDING):  aMIOdarone    Tablet 200 milliGRAM(s) Oral daily  apixaban 2.5 milliGRAM(s) Oral every 12 hours  atorvastatin 40 milliGRAM(s) Oral at bedtime  budesonide 160 MICROgram(s)/formoterol 4.5 MICROgram(s) Inhaler 2 Puff(s) Inhalation two times a day  clopidogrel Tablet 75 milliGRAM(s) Oral daily  famotidine    Tablet 20 milliGRAM(s) Oral daily  melatonin 3 milliGRAM(s) Oral at bedtime  midodrine. 5 milliGRAM(s) Oral three times a day    MEDICATIONS  (PRN):      Objective:  Vital Signs Last 24 Hrs  T(C): 36.8 (21 Dec 2023 05:14), Max: 36.8 (20 Dec 2023 16:17)  T(F): 98.2 (21 Dec 2023 05:14), Max: 98.2 (20 Dec 2023 16:17)  HR: 62 (21 Dec 2023 01:11) (60 - 62)  BP: 101/68 (21 Dec 2023 01:11) (100/63 - 101/68)  RR: 18 (21 Dec 2023 01:11) (18 - 18)  SpO2: 96% (21 Dec 2023 01:11) (96% - 96%)    Parameters below as of 21 Dec 2023 01:11  Patient On (Oxygen Delivery Method): room air      Physical Exam:  General: no acute distress  Neck: supple, trachea midline  Lungs: CTA no wheeze/rhonchi  Cardiovascular: regular rate and rhythm, S1 S2  Abdomen: soft, nontender,  bowel sounds normal  Neurological: alert and oriented x3  Skin: no rash  Extremities: no edema        LABS:                        12.2   7.78  )-----------( 203      ( 21 Dec 2023 08:15 )             35.9   12-21    139  |  103  |  20  ----------------------------<  91  4.6   |  30  |  0.79    Ca    8.2<L>      21 Dec 2023 08:15    TPro  5.0<L>  /  Alb  2.7<L>  /  TBili  0.6  /  DBili  0.1  /  AST  27  /  ALT  37  /  AlkPhos  65  12-21    MICROBIOLOGY:  Culture - Blood (collected 14 Dec 2023 17:19)  Source: .Blood Blood-Peripheral  Preliminary Report (16 Dec 2023 22:01):    No growth at 48 Hours    Culture - Blood (collected 14 Dec 2023 17:19)  Source: .Blood Blood-Peripheral  Preliminary Report (16 Dec 2023 22:01):    No growth at 48 Hours      RADIOLOGY & ADDITIONAL STUDIES:  ACC: [id number] EXAM:  CT CHEST   ORDERED BY: ANA SMITH     PROCEDURE DATE:  12/14/2023          INTERPRETATION:  CT CHEST WITHOUT CONTRAST    INDICATION: History of shortness of breath and cough. History of atypical   viral infection/pelvis.    TECHNIQUE: Unenhanced helical images were obtained of the chest. Coronal   and sagittal images were reconstructed. Maximum intensity projection   images were generated.        COMPARISON: Radiograph chest 12/14/2023.    FINDINGS:    Tubes/Lines:None.    Lungs, airways and pleura: Small bilateral pleural effusions. Bilateral   lower lobe linear and passive atelectasis. Right upper lobe lung cyst.   Within the right lower lobe is a 5 x 3 mm noncalcified nodule (series 8   image 99). The airways are unremarkable.    Mediastinum: The thyroid gland is unremarkable. No enlarged chest lymph   nodes. Intrathoracic stomach with rotation of the greater curvature. The   heart is enlarged. Coronary calcifications/stents. No pericardial   effusion. The aorta is tortuous. The aorta is ectatic. Aortic   calcifications.    Upper Abdomen: Bilateral parapelvic cysts. The left adrenal gland is   thickened. Within the body of the left adrenal gland is a 1.6 x 1.6 cm   nodule.    Bones And Soft Tissues:Spondylosis the thoracic spine. Anterior wedging   of the T11 vertebral body.  The soft tissues are unremarkable.      IMPRESSION:    1.  Small bilateral pleural effusions and bilateral linear atelectasis.  2.  In the right lower lobe is a 5 x 3 mm noncalcified nodule.    Assessment :   87YO F PMH CAD HTN Afib admitted with SOB chest tightness sore throat congestion and wheeze sec COVID 19.   CT Chest with small bilateral pleural effusions/ atelectasis.   On RA PT is vaccinated.  Wheeze resolved  Vasovagal episode - Bradycardic- cardiology on case  Orthostatic hypotension- resolved  Clinically doing well otherwise    Plan :   Supportive care  Sp Remdesivir X 3 doses  Sp Decadron 6mg IV daily x 3 doses  Trend temps and cbc  Monitor resp status and 02 requirements  Pulm toileting  Stable from ID standpoint  Dc home today    D/w pt and daughter    Continue with present regiment.  Appropriate use of antibiotics and adverse effects reviewed.    > 35 minutes were spent in direct patient care reviewing notes, medications ,labs data/ imaging , discussion with multidisciplinary team.    Thank you for allowing me to participate in care of your patient .    Brady Oneal MD  Infectious Disease  [phone]      KAVIN DENNIS is a 88yFemale , patient examined and chart reviewed.    INTERVAL HPI/ OVERNIGHT EVENTS:   Awake alert NAD. Afebrile.  No events. BP stable.    PAST MEDICAL & SURGICAL HISTORY:  CAD (coronary artery disease)  Rapid atrial fibrillation  CAD (coronary artery disease)  s/p Cath & 3 stents to LAD on 3/2020; OM1 stent x 1  EF ~30%  Mild HTN  History of CHF (congestive heart failure)  Myocardial infarction  S/P cataract surgery        For details regarding the patient's social history, family history, and other miscellaneous elements, please refer the initial infectious diseases consultation and/or the admitting history and physical examination for this admission.    ROS:  CONSTITUTIONAL:  Negative fever or chill  EYES:  Negative  blurry vision or double vision  CARDIOVASCULAR:  Negative for chest pain or palpitations  RESPIRATORY:  Negative for cough, wheezing, or SOB   GASTROINTESTINAL:  Negative for nausea, vomiting, diarrhea, constipation, or abdominal pain  GENITOURINARY:  Negative frequency, urgency or dysuria  NEUROLOGIC:  No headache, confusion, dizziness,+ lightheadedness  All other systems were reviewed and are negative     ALLERGIES  Persantine (Rash)      Current inpatient medications :    ANTIBIOTICS/RELEVANT:    MEDICATIONS  (STANDING):  aMIOdarone    Tablet 200 milliGRAM(s) Oral daily  apixaban 2.5 milliGRAM(s) Oral every 12 hours  atorvastatin 40 milliGRAM(s) Oral at bedtime  budesonide 160 MICROgram(s)/formoterol 4.5 MICROgram(s) Inhaler 2 Puff(s) Inhalation two times a day  clopidogrel Tablet 75 milliGRAM(s) Oral daily  famotidine    Tablet 20 milliGRAM(s) Oral daily  melatonin 3 milliGRAM(s) Oral at bedtime  midodrine. 5 milliGRAM(s) Oral three times a day    MEDICATIONS  (PRN):      Objective:  Vital Signs Last 24 Hrs  T(C): 36.8 (21 Dec 2023 05:14), Max: 36.8 (20 Dec 2023 16:17)  T(F): 98.2 (21 Dec 2023 05:14), Max: 98.2 (20 Dec 2023 16:17)  HR: 62 (21 Dec 2023 01:11) (60 - 62)  BP: 101/68 (21 Dec 2023 01:11) (100/63 - 101/68)  RR: 18 (21 Dec 2023 01:11) (18 - 18)  SpO2: 96% (21 Dec 2023 01:11) (96% - 96%)    Parameters below as of 21 Dec 2023 01:11  Patient On (Oxygen Delivery Method): room air      Physical Exam:  General: no acute distress  Neck: supple, trachea midline  Lungs: CTA no wheeze/rhonchi  Cardiovascular: regular rate and rhythm, S1 S2  Abdomen: soft, nontender,  bowel sounds normal  Neurological: alert and oriented x3  Skin: no rash  Extremities: no edema        LABS:                        12.2   7.78  )-----------( 203      ( 21 Dec 2023 08:15 )             35.9   12-21    139  |  103  |  20  ----------------------------<  91  4.6   |  30  |  0.79    Ca    8.2<L>      21 Dec 2023 08:15    TPro  5.0<L>  /  Alb  2.7<L>  /  TBili  0.6  /  DBili  0.1  /  AST  27  /  ALT  37  /  AlkPhos  65  12-21    MICROBIOLOGY:  Culture - Blood (collected 14 Dec 2023 17:19)  Source: .Blood Blood-Peripheral  Preliminary Report (16 Dec 2023 22:01):    No growth at 48 Hours    Culture - Blood (collected 14 Dec 2023 17:19)  Source: .Blood Blood-Peripheral  Preliminary Report (16 Dec 2023 22:01):    No growth at 48 Hours      RADIOLOGY & ADDITIONAL STUDIES:  ACC: [id number] EXAM:  CT CHEST   ORDERED BY: ANA SMITH     PROCEDURE DATE:  12/14/2023          INTERPRETATION:  CT CHEST WITHOUT CONTRAST    INDICATION: History of shortness of breath and cough. History of atypical   viral infection/pelvis.    TECHNIQUE: Unenhanced helical images were obtained of the chest. Coronal   and sagittal images were reconstructed. Maximum intensity projection   images were generated.        COMPARISON: Radiograph chest 12/14/2023.    FINDINGS:    Tubes/Lines:None.    Lungs, airways and pleura: Small bilateral pleural effusions. Bilateral   lower lobe linear and passive atelectasis. Right upper lobe lung cyst.   Within the right lower lobe is a 5 x 3 mm noncalcified nodule (series 8   image 99). The airways are unremarkable.    Mediastinum: The thyroid gland is unremarkable. No enlarged chest lymph   nodes. Intrathoracic stomach with rotation of the greater curvature. The   heart is enlarged. Coronary calcifications/stents. No pericardial   effusion. The aorta is tortuous. The aorta is ectatic. Aortic   calcifications.    Upper Abdomen: Bilateral parapelvic cysts. The left adrenal gland is   thickened. Within the body of the left adrenal gland is a 1.6 x 1.6 cm   nodule.    Bones And Soft Tissues:Spondylosis the thoracic spine. Anterior wedging   of the T11 vertebral body.  The soft tissues are unremarkable.      IMPRESSION:    1.  Small bilateral pleural effusions and bilateral linear atelectasis.  2.  In the right lower lobe is a 5 x 3 mm noncalcified nodule.    Assessment :   87YO F PMH CAD HTN Afib admitted with SOB chest tightness sore throat congestion and wheeze sec COVID 19.   CT Chest with small bilateral pleural effusions/ atelectasis.   On RA PT is vaccinated.  Wheeze resolved  Vasovagal episode - Bradycardic- cardiology on case  Orthostatic hypotension- resolved  Clinically doing well otherwise    Plan :   Supportive care  Sp Remdesivir X 3 doses  Sp Decadron 6mg IV daily x 3 doses  Trend temps and cbc  Monitor resp status and 02 requirements  Pulm toileting  Stable from ID standpoint  Dc home today    D/w pt and daughter    Continue with present regiment.  Appropriate use of antibiotics and adverse effects reviewed.    > 35 minutes were spent in direct patient care reviewing notes, medications ,labs data/ imaging , discussion with multidisciplinary team.    Thank you for allowing me to participate in care of your patient .    Brady Oneal MD  Infectious Disease  [phone]

## 2023-12-21 NOTE — DISCHARGE NOTE PROVIDER - PROVIDER TOKENS
FREE:[LAST:[your pcp and cardiologist],PHONE:[(   )    -],FAX:[(   )    -]] FREE:[LAST:[your pcp and cardiologist],PHONE:[(   )    -],FAX:[(   )    -]],PROVIDER:[TOKEN:[[id number]],FOLLOWUP:[1 week],ESTABLISHEDPATIENT:[T]] FREE:[LAST:[your pcp and cardiologist],PHONE:[(   )    -],FAX:[(   )    -]],PROVIDER:[TOKEN:[[id number]],FOLLOWUP:[1 week],ESTABLISHEDPATIENT:[T]]

## 2023-12-21 NOTE — DISCHARGE NOTE PROVIDER - NSDCCPCAREPLAN_GEN_ALL_CORE_FT
PRINCIPAL DISCHARGE DIAGNOSIS  Diagnosis: 2019 novel coronavirus disease (COVID-19)  Assessment and Plan of Treatment: completed tt      SECONDARY DISCHARGE DIAGNOSES  Diagnosis: Orthostasis  Assessment and Plan of Treatment: trudy tid, f up with cardiologist, stopped entresto, furosemide and metoprolol, had sinus bradycardia

## 2023-12-21 NOTE — PROGRESS NOTE ADULT - ASSESSMENT
The patient is an 88 year old female with a history of CAD with prior MI s/p PCI, chronic systolic heart failure, paroxysmal atrial fibrillation who presents with cough, congestion, dyspnea in the setting of COVID.    Plan:  - ECG with underlying RBBB  - Echo 5/22 with mild/mod LV systolic dysfunction, mod MR, mod TR, mild pulm HTN  - CT chest with small pleural effusions  - COVID positive  - Completed remdesivir  - Syncope - likely multifactorial with vasovagal episode (occurred while on toilet) with orthostatic hypotension  - Continue amiodarone 200 mg daily  - Continue apixaban 2.5 mg bid  - Continue clopidogrel 75 mg daily  - Continue atorvastatin 40 mg daily  - Remain off Entresto, metoprolol, and furosemide until outpatient follow-up due to low BPs at times with orthostatic hypotension  - Orthostatics now negative  - Discharge planning

## 2023-12-21 NOTE — PROGRESS NOTE ADULT - PROVIDER SPECIALTY LIST ADULT
Cardiology
Cardiology
Infectious Disease
Internal Medicine
Internal Medicine
Pulmonology
Pulmonology
Cardiology
Internal Medicine
Internal Medicine
Cardiology
Infectious Disease
Cardiology
Cardiology
Infectious Disease
Infectious Disease
Internal Medicine
Internal Medicine
Pulmonology
Pulmonology
Internal Medicine

## 2023-12-21 NOTE — DISCHARGE NOTE PROVIDER - HOSPITAL COURSE
PMD [id number] PMHx HFrEF (LVEF 43% 5/2022), CAD with prior MI s/p PCI, Afib (on Eliquis) presenting with cough, SOB x2 days . positive for  COVID 2 days ago (vaccinated x3).  cough worsening and  feel more dyspneic prompting ER evaluation. In ER, concern for stridor and was given Racemic Epi x2 and Decadron 10mg x1.   CT Soft Tissue Neck negative. CT Chest with small bilateral pleural effusions/ atelectasis.   admitted for    # Covid infection, with ac resp failure with hypoxia- improved, completed tt  # edema uvula - improved  # CAD  # CHF systolic HF- no sob  # Paroxysmal afib  # B/l Pleural Effusion  #  bradycardia sinus - metoprolol stopped by cardiology,   # Hypotension and orthostatic hypotension , metoprolol, entresto and furosemide stopped  pt completed remdesvir and decadron ,, ID f up noted  PT  had RRT on 12/18 am for near syncope - likely vasovagal- with orthostasis, Entresto, metoprolol, and furosemide stopped   started  midodrine 5 mg tid and improved   Goals of care discussion with daughter    Dc plan     d/w daughter ada   PMD [id number] PMHx HFrEF (LVEF 43% 5/2022), CAD with prior MI s/p PCI, Afib (on Eliquis) presenting with cough, SOB x2 days . positive for  COVID 2 days ago (vaccinated x3).  cough worsening and  feel more dyspneic prompting ER evaluation. In ER, concern for stridor and was given Racemic Epi x2 and Decadron 10mg x1.   CT Soft Tissue Neck negative. CT Chest with small bilateral pleural effusions/ atelectasis.   admitted for    # Covid infection, with ac resp failure with hypoxia- improved, completed tt  # edema uvula - improved  # CAD  # CHF systolic HF- no sob  # Paroxysmal afib  # B/l Pleural Effusion  #  bradycardia sinus - metoprolol stopped by cardiology,   # Hypotension and orthostatic hypotension , metoprolol, entresto and furosemide stopped  pt completed remdesvir and decadron ,, ID f up noted  PT  had RRT on 12/18 am for near syncope - likely vasovagal- with orthostasis, Entresto, metoprolol, and furosemide stopped   started  midodrine 5 mg tid and improved   Goals of care discussion with daughter    Dc plan     d/w daughter ada

## 2024-01-04 NOTE — H&P ADULT - NEGATIVE OPHTHALMOLOGIC SYMPTOMS
Gail Cervantes  [address] [facility] [address]    Dear Gail Cervantes,    Please see the below instructions for your upcoming procedure(s).      EGD PREPARATION AND INSTRUCTIONS    *Hold Trulicity for 7 days prior to procedure*    Please note  It is your responsibility to verify if the procedure is covered by your insurance.  If it is not covered you will be responsible for payment.  There is a $100.00 fee if you no-show your procedure.  Please call at least 24 hours in advance if you need to cancel or reschedule.    INFORM YOUR PHYSICIAN IF YOU TAKE COUMADIN, PLAVIX OR ANY OTHER BLOOD THINNER  YOU DO NOT NEED TO DISCONTINUE ASPIRIN      DAY OF EXAM:   01/09/24    1. Nothing to eat or drink after midnight the night before the exam.   [facility]. [facility] located on the main floor of the hospital at 10:30am .   3. Your procedure is at 11:30am .  4. Due to sedation, you will need to have a  to take you home. GI lab policy states you may not take a cab home.      5. If you have HMO insurance, we will generate a referral for you.  6. If you should have any questions or difficulties please feel free to contact a clinical associate during office hours (9-5). If the office is closed the answering service will contact the physician on call.    IF YOU HAVE A CHANGE IN HEALTH STATUS BETWEEN SCHEDULING AND THE DATE OF YOUR PROCEDURE PLEASE LET US KNOW.           PLEASE NOTE:       IT IS YOUR RESPONSIBILITY TO VERIFY WITH YOUR INSURANCE WHETHER OR NOT THIS PROCEDURE IS A COVERED BENEFIT UNDER YOUR INSURANCE POLICY.  IF IT IS NOT A COVERED BENEFIT YOU WILL BE RESPONSIBLE FOR PAYMENT    Thank You!      
no blurred vision L/no blurred vision R

## 2024-01-18 NOTE — H&P ADULT - VTE RISK ASSESSMENT
Head,  normocephalic,  atraumatic,  Face,  Face within normal limits,  Ears,  External ears within normal limits,  Nose/Nasopharynx,  External nose  normal appearance, no nasal discharge,  Mouth and Throat,  Oral cavity appearance normal Lips,  Appearance normal
VTE Assessment already completed for this visit

## 2024-02-22 NOTE — ED ADULT NURSE REASSESSMENT NOTE - EENT ASSESSMENT, MLM
Detail Level: Detailed Quality 226: Preventive Care And Screening: Tobacco Use: Screening And Cessation Intervention: Patient screened for tobacco use and is an ex/non-smoker Quality 47: Advance Care Plan: Advance Care Planning discussed and documented; advance care plan or surrogate decision maker documented in the medical record. Quality 130: Documentation Of Current Medications In The Medical Record: Current Medications Documented - - -

## 2024-03-18 NOTE — OCCUPATIONAL THERAPY INITIAL EVALUATION ADULT - EATING, PREVIOUS LEVEL OF FUNCTION, OT EVAL
independent awake, in chair  some confusion but at baseline per wife at bedside  rrr s1s2  ctab  soft abdomen no TTP. 2 drains  no le edema  gen weakness nonfocal neuro

## 2024-03-26 NOTE — ED ADULT TRIAGE NOTE - NSTRIAGECARE_GEN_A_ER
Saturations 87% on room air, patient reports feeling more short of breath. Patient placed on 4 liters by nasal cannula. Reports feeling short of breath.  Patient anxious and asking for BIPAP as she needed it last time Face Mask

## 2024-07-15 NOTE — DIETITIAN INITIAL EVALUATION ADULT - CALCULATED TO (G/KG)
Return patient's call to reschedule appointment. Next available is dec 6 and patient stated that was too far of to wait. Requested hat appointment be canceled.   69

## 2024-08-20 NOTE — ASU PATIENT PROFILE, ADULT - PAIN CHRONIC, PROFILE
Patient reports she did change taking her water pill during the day which has given her increased incontinence during the day.  She is going through more pads and is not comfortable with the daytime increase in incontinence.  She will continue to take the water pill in the AM.  Dr. Moore updated.  New prescription Trospium 20 mg twice daily was sent to her [facility].  She was transferred to the  for a med check appointment in 8 to 10 weeks.   no

## 2025-01-24 ENCOUNTER — INPATIENT (INPATIENT)
Facility: HOSPITAL | Age: 89
LOS: 2 days | Discharge: HOME | DRG: 312 | End: 2025-01-27
Attending: HOSPITALIST | Admitting: HOSPITALIST
Payer: MEDICARE

## 2025-01-24 VITALS
RESPIRATION RATE: 14 BRPM | HEIGHT: 63 IN | HEART RATE: 92 BPM | DIASTOLIC BLOOD PRESSURE: 82 MMHG | SYSTOLIC BLOOD PRESSURE: 113 MMHG | WEIGHT: 132.28 LBS | OXYGEN SATURATION: 100 % | TEMPERATURE: 98 F

## 2025-01-24 DIAGNOSIS — Z98.49 CATARACT EXTRACTION STATUS, UNSPECIFIED EYE: Chronic | ICD-10-CM

## 2025-01-24 LAB
ALBUMIN SERPL ELPH-MCNC: 3.2 G/DL — LOW (ref 3.3–5)
ALP SERPL-CCNC: 106 U/L — SIGNIFICANT CHANGE UP (ref 30–120)
ALT FLD-CCNC: 24 U/L — SIGNIFICANT CHANGE UP (ref 10–60)
ANION GAP SERPL CALC-SCNC: 10 MMOL/L — SIGNIFICANT CHANGE UP (ref 5–17)
APPEARANCE UR: CLEAR — SIGNIFICANT CHANGE UP
APTT BLD: 27.6 SEC — SIGNIFICANT CHANGE UP (ref 24.5–35.6)
AST SERPL-CCNC: 38 U/L — SIGNIFICANT CHANGE UP (ref 10–40)
BASOPHILS # BLD AUTO: 0.02 K/UL — SIGNIFICANT CHANGE UP (ref 0–0.2)
BASOPHILS NFR BLD AUTO: 0.3 % — SIGNIFICANT CHANGE UP (ref 0–2)
BILIRUB SERPL-MCNC: 0.6 MG/DL — SIGNIFICANT CHANGE UP (ref 0.2–1.2)
BILIRUB UR-MCNC: NEGATIVE — SIGNIFICANT CHANGE UP
BUN SERPL-MCNC: 23 MG/DL — SIGNIFICANT CHANGE UP (ref 7–23)
CALCIUM SERPL-MCNC: 8.6 MG/DL — SIGNIFICANT CHANGE UP (ref 8.4–10.5)
CHLORIDE SERPL-SCNC: 103 MMOL/L — SIGNIFICANT CHANGE UP (ref 96–108)
CO2 SERPL-SCNC: 22 MMOL/L — SIGNIFICANT CHANGE UP (ref 22–31)
COLOR SPEC: YELLOW — SIGNIFICANT CHANGE UP
CREAT SERPL-MCNC: 0.96 MG/DL — SIGNIFICANT CHANGE UP (ref 0.5–1.3)
DIFF PNL FLD: NEGATIVE — SIGNIFICANT CHANGE UP
EGFR: 57 ML/MIN/1.73M2 — LOW
EOSINOPHIL # BLD AUTO: 0.03 K/UL — SIGNIFICANT CHANGE UP (ref 0–0.5)
EOSINOPHIL NFR BLD AUTO: 0.4 % — SIGNIFICANT CHANGE UP (ref 0–6)
GLUCOSE SERPL-MCNC: 86 MG/DL — SIGNIFICANT CHANGE UP (ref 70–99)
GLUCOSE UR QL: NEGATIVE MG/DL — SIGNIFICANT CHANGE UP
HCT VFR BLD CALC: 39.5 % — SIGNIFICANT CHANGE UP (ref 34.5–45)
HGB BLD-MCNC: 13.2 G/DL — SIGNIFICANT CHANGE UP (ref 11.5–15.5)
IMM GRANULOCYTES NFR BLD AUTO: 0.1 % — SIGNIFICANT CHANGE UP (ref 0–0.9)
INR BLD: 1.25 RATIO — HIGH (ref 0.85–1.16)
KETONES UR-MCNC: ABNORMAL MG/DL
LEUKOCYTE ESTERASE UR-ACNC: NEGATIVE — SIGNIFICANT CHANGE UP
LYMPHOCYTES # BLD AUTO: 3.09 K/UL — SIGNIFICANT CHANGE UP (ref 1–3.3)
LYMPHOCYTES # BLD AUTO: 45.2 % — HIGH (ref 13–44)
MCHC RBC-ENTMCNC: 33.4 G/DL — SIGNIFICANT CHANGE UP (ref 32–36)
MCHC RBC-ENTMCNC: 33.6 PG — SIGNIFICANT CHANGE UP (ref 27–34)
MCV RBC AUTO: 100.5 FL — HIGH (ref 80–100)
MONOCYTES # BLD AUTO: 0.92 K/UL — HIGH (ref 0–0.9)
MONOCYTES NFR BLD AUTO: 13.5 % — SIGNIFICANT CHANGE UP (ref 2–14)
NEUTROPHILS # BLD AUTO: 2.76 K/UL — SIGNIFICANT CHANGE UP (ref 1.8–7.4)
NEUTROPHILS NFR BLD AUTO: 40.5 % — LOW (ref 43–77)
NITRITE UR-MCNC: NEGATIVE — SIGNIFICANT CHANGE UP
NRBC # BLD: 0 /100 WBCS — SIGNIFICANT CHANGE UP (ref 0–0)
NRBC BLD-RTO: 0 /100 WBCS — SIGNIFICANT CHANGE UP (ref 0–0)
NT-PROBNP SERPL-SCNC: 765 PG/ML — HIGH (ref 0–450)
PH UR: 5 — SIGNIFICANT CHANGE UP (ref 5–8)
PLATELET # BLD AUTO: 161 K/UL — SIGNIFICANT CHANGE UP (ref 150–400)
POTASSIUM SERPL-MCNC: 4.4 MMOL/L — SIGNIFICANT CHANGE UP (ref 3.5–5.3)
POTASSIUM SERPL-SCNC: 4.4 MMOL/L — SIGNIFICANT CHANGE UP (ref 3.5–5.3)
PROT SERPL-MCNC: 6.4 G/DL — SIGNIFICANT CHANGE UP (ref 6–8.3)
PROT UR-MCNC: NEGATIVE MG/DL — SIGNIFICANT CHANGE UP
PROTHROM AB SERPL-ACNC: 14.5 SEC — HIGH (ref 9.9–13.4)
RBC # BLD: 3.93 M/UL — SIGNIFICANT CHANGE UP (ref 3.8–5.2)
RBC # FLD: 15.1 % — HIGH (ref 10.3–14.5)
SODIUM SERPL-SCNC: 135 MMOL/L — SIGNIFICANT CHANGE UP (ref 135–145)
SP GR SPEC: 1.05 — HIGH (ref 1–1.03)
TROPONIN I, HIGH SENSITIVITY RESULT: 8.3 NG/L — SIGNIFICANT CHANGE UP
UROBILINOGEN FLD QL: 0.2 MG/DL — SIGNIFICANT CHANGE UP (ref 0.2–1)
WBC # BLD: 6.83 K/UL — SIGNIFICANT CHANGE UP (ref 3.8–10.5)
WBC # FLD AUTO: 6.83 K/UL — SIGNIFICANT CHANGE UP (ref 3.8–10.5)

## 2025-01-24 PROCEDURE — 93010 ELECTROCARDIOGRAM REPORT: CPT

## 2025-01-24 PROCEDURE — 70498 CT ANGIOGRAPHY NECK: CPT | Mod: 26

## 2025-01-24 PROCEDURE — 70496 CT ANGIOGRAPHY HEAD: CPT | Mod: 26

## 2025-01-24 PROCEDURE — 70450 CT HEAD/BRAIN W/O DYE: CPT | Mod: 26,XU

## 2025-01-24 PROCEDURE — 99285 EMERGENCY DEPT VISIT HI MDM: CPT

## 2025-01-24 PROCEDURE — 71045 X-RAY EXAM CHEST 1 VIEW: CPT | Mod: 26

## 2025-01-24 NOTE — ED ADULT TRIAGE NOTE - BEFAST SCREENING
PAtient is here to establish care, she states she has bumps on her arms and legs that's been there for years. Negative

## 2025-01-24 NOTE — ED ADULT NURSE NOTE - CODE STROKE DETAILS
Valtrex Pregnancy And Lactation Text: this medication is Pregnancy Category B and is considered safe during pregnancy. This medication is not directly found in breast milk but it's metabolite acyclovir is present. acute change in mental status

## 2025-01-24 NOTE — ED ADULT NURSE NOTE - NSFALLHARMRISKINTERV_ED_ALL_ED

## 2025-01-24 NOTE — ED ADULT NURSE NOTE - NSICDXFAMILYHX_GEN_ALL_CORE_FT
FAMILY HISTORY:  Father  Still living? Unknown  FH: dementia, Age at diagnosis: Age Unknown  FH: heart disease, Age at diagnosis: Age Unknown    Mother  Still living? Unknown  Family history of Continental's disease, Age at diagnosis: Age Unknown    Sibling  Still living? Unknown  FH: heart disease, Age at diagnosis: Age Unknown

## 2025-01-25 ENCOUNTER — TRANSCRIPTION ENCOUNTER (OUTPATIENT)
Age: 89
End: 2025-01-25

## 2025-01-25 ENCOUNTER — RESULT REVIEW (OUTPATIENT)
Age: 89
End: 2025-01-25

## 2025-01-25 DIAGNOSIS — R41.82 ALTERED MENTAL STATUS, UNSPECIFIED: ICD-10-CM

## 2025-01-25 LAB
A1C WITH ESTIMATED AVERAGE GLUCOSE RESULT: 5.6 % — SIGNIFICANT CHANGE UP (ref 4–5.6)
ANION GAP SERPL CALC-SCNC: 6 MMOL/L — SIGNIFICANT CHANGE UP (ref 5–17)
BUN SERPL-MCNC: 18 MG/DL — SIGNIFICANT CHANGE UP (ref 7–23)
CALCIUM SERPL-MCNC: 8.1 MG/DL — LOW (ref 8.4–10.5)
CHLORIDE SERPL-SCNC: 106 MMOL/L — SIGNIFICANT CHANGE UP (ref 96–108)
CHOLEST SERPL-MCNC: 104 MG/DL — SIGNIFICANT CHANGE UP
CO2 SERPL-SCNC: 28 MMOL/L — SIGNIFICANT CHANGE UP (ref 22–31)
CREAT SERPL-MCNC: 0.92 MG/DL — SIGNIFICANT CHANGE UP (ref 0.5–1.3)
EGFR: 60 ML/MIN/1.73M2 — SIGNIFICANT CHANGE UP
ESTIMATED AVERAGE GLUCOSE: 114 MG/DL — SIGNIFICANT CHANGE UP (ref 68–114)
FOLATE SERPL-MCNC: >20 NG/ML — SIGNIFICANT CHANGE UP
GLUCOSE SERPL-MCNC: 104 MG/DL — HIGH (ref 70–99)
HDLC SERPL-MCNC: 52 MG/DL — SIGNIFICANT CHANGE UP
LIDOCAIN IGE QN: 46 U/L — SIGNIFICANT CHANGE UP (ref 16–77)
LIPID PNL WITH DIRECT LDL SERPL: 43 MG/DL — SIGNIFICANT CHANGE UP
NON HDL CHOLESTEROL: 52 MG/DL — SIGNIFICANT CHANGE UP
POTASSIUM SERPL-MCNC: 3.8 MMOL/L — SIGNIFICANT CHANGE UP (ref 3.5–5.3)
POTASSIUM SERPL-SCNC: 3.8 MMOL/L — SIGNIFICANT CHANGE UP (ref 3.5–5.3)
SODIUM SERPL-SCNC: 140 MMOL/L — SIGNIFICANT CHANGE UP (ref 135–145)
TRIGL SERPL-MCNC: 24 MG/DL — SIGNIFICANT CHANGE UP
TROPONIN I, HIGH SENSITIVITY RESULT: 10.8 NG/L — SIGNIFICANT CHANGE UP
TSH SERPL-MCNC: 2.09 UIU/ML — SIGNIFICANT CHANGE UP (ref 0.27–4.2)
VIT B12 SERPL-MCNC: 1655 PG/ML — HIGH (ref 232–1245)

## 2025-01-25 PROCEDURE — 93356 MYOCRD STRAIN IMG SPCKL TRCK: CPT

## 2025-01-25 PROCEDURE — 93306 TTE W/DOPPLER COMPLETE: CPT | Mod: 26

## 2025-01-25 PROCEDURE — 74177 CT ABD & PELVIS W/CONTRAST: CPT | Mod: 26

## 2025-01-25 PROCEDURE — 76376 3D RENDER W/INTRP POSTPROCES: CPT | Mod: 26

## 2025-01-25 PROCEDURE — 99223 1ST HOSP IP/OBS HIGH 75: CPT

## 2025-01-25 RX ORDER — FAMOTIDINE 10 MG/ML
20 INJECTION INTRAVENOUS ONCE
Refills: 0 | Status: COMPLETED | OUTPATIENT
Start: 2025-01-25 | End: 2025-01-25

## 2025-01-25 RX ORDER — ATORVASTATIN CALCIUM 80 MG/1
80 TABLET, FILM COATED ORAL AT BEDTIME
Refills: 0 | Status: DISCONTINUED | OUTPATIENT
Start: 2025-01-25 | End: 2025-01-27

## 2025-01-25 RX ORDER — LEVOTHYROXINE SODIUM 25 UG/1
1 TABLET ORAL
Refills: 0 | DISCHARGE

## 2025-01-25 RX ORDER — BACTERIOSTATIC SODIUM CHLORIDE 0.9 %
1000 VIAL (ML) INJECTION ONCE
Refills: 0 | Status: COMPLETED | OUTPATIENT
Start: 2025-01-25 | End: 2025-01-25

## 2025-01-25 RX ORDER — AMIODARONE HYDROCHLORIDE 50 MG/ML
100 INJECTION, SOLUTION INTRAVENOUS DAILY
Refills: 0 | Status: DISCONTINUED | OUTPATIENT
Start: 2025-01-25 | End: 2025-01-27

## 2025-01-25 RX ORDER — FAMOTIDINE 10 MG/ML
20 INJECTION INTRAVENOUS DAILY
Refills: 0 | Status: DISCONTINUED | OUTPATIENT
Start: 2025-01-25 | End: 2025-01-25

## 2025-01-25 RX ORDER — ONDANSETRON 4 MG/1
4 TABLET, ORALLY DISINTEGRATING ORAL ONCE
Refills: 0 | Status: COMPLETED | OUTPATIENT
Start: 2025-01-25 | End: 2025-01-25

## 2025-01-25 RX ORDER — LEVOTHYROXINE SODIUM 25 UG/1
75 TABLET ORAL DAILY
Refills: 0 | Status: DISCONTINUED | OUTPATIENT
Start: 2025-01-25 | End: 2025-01-27

## 2025-01-25 RX ORDER — PANTOPRAZOLE 20 MG/1
40 TABLET, DELAYED RELEASE ORAL
Refills: 0 | Status: DISCONTINUED | OUTPATIENT
Start: 2025-01-25 | End: 2025-01-27

## 2025-01-25 RX ORDER — APIXABAN 5 MG/1
2.5 TABLET, FILM COATED ORAL
Refills: 0 | Status: DISCONTINUED | OUTPATIENT
Start: 2025-01-25 | End: 2025-01-27

## 2025-01-25 RX ADMIN — ATORVASTATIN CALCIUM 80 MILLIGRAM(S): 80 TABLET, FILM COATED ORAL at 21:31

## 2025-01-25 RX ADMIN — AMIODARONE HYDROCHLORIDE 100 MILLIGRAM(S): 50 INJECTION, SOLUTION INTRAVENOUS at 06:07

## 2025-01-25 RX ADMIN — Medication 1000 MILLILITER(S): at 02:22

## 2025-01-25 RX ADMIN — ONDANSETRON 4 MILLIGRAM(S): 4 TABLET, ORALLY DISINTEGRATING ORAL at 01:35

## 2025-01-25 RX ADMIN — Medication 1000 MILLILITER(S): at 01:35

## 2025-01-25 RX ADMIN — Medication 75 MILLIGRAM(S): at 13:34

## 2025-01-25 RX ADMIN — PANTOPRAZOLE 40 MILLIGRAM(S): 20 TABLET, DELAYED RELEASE ORAL at 06:06

## 2025-01-25 RX ADMIN — APIXABAN 2.5 MILLIGRAM(S): 5 TABLET, FILM COATED ORAL at 06:06

## 2025-01-25 RX ADMIN — FAMOTIDINE 20 MILLIGRAM(S): 10 INJECTION INTRAVENOUS at 01:35

## 2025-01-25 RX ADMIN — APIXABAN 2.5 MILLIGRAM(S): 5 TABLET, FILM COATED ORAL at 17:57

## 2025-01-25 RX ADMIN — LEVOTHYROXINE SODIUM 75 MICROGRAM(S): 25 TABLET ORAL at 06:12

## 2025-01-25 NOTE — ED PROVIDER NOTE - CLINICAL SUMMARY MEDICAL DECISION MAKING FREE TEXT BOX
89 year old female with a history of CAD with 4 stents, a-fib, MI on Eliquis and Plavix p/w AMS.  Patient BIBA from home,  Daughter states that at 9pm, patient suddenly appeared altered, was lethargic and unable to answer questions.  No LOC, vomiting, seizure-like activity. Currently, patient has returned to baseline. Stroke code called. Check labs, electrolytes, CE, coags, EKG. CXR. UA, CT/CTA, admit fo neuro evaluation

## 2025-01-25 NOTE — OCCUPATIONAL THERAPY INITIAL EVALUATION ADULT - PERTINENT HX OF CURRENT PROBLEM, REHAB EVAL
89 year old female with PMHx of CAD s/p PCI x4, chronic systolic heart failure, hypotension, A.fib on apixaban presents on 1/25 with AMS.    She says she felt dizzy at home with some upset stomach. According to ED physician, daughter found her to be pale and confused. Assumed her BP was low given her hypotension history, gave Pedialyte without relief. No prior CVA history.    In the ED, CT and CTA was negative. ED physician discussed with patient and family and agreed to stay. Case was discussed with ED provider.

## 2025-01-25 NOTE — PHYSICAL THERAPY INITIAL EVALUATION ADULT - ADDITIONAL COMMENTS
Pt resides in pvt home with dtr and family, available to assist as needed upon d/c. Pt denies GASPER, states 5 stairs +HR inside. Pt has RW and cane. Pt reports was independent prior to admission. Pt reports hx of dizziness.

## 2025-01-25 NOTE — PHYSICAL THERAPY INITIAL EVALUATION ADULT - PERTINENT HX OF CURRENT PROBLEM, REHAB EVAL
as per chart - 89 year old female with PMHx of CAD s/p PCI x4, chronic systolic heart failure, hypotension, A.fib on apixaban presents on 1/25 with AMS.    She says she felt dizzy at home with some upset stomach. According to ED physician, daughter found her to be pale and confused. Assumed her BP was low given her hypotension history, gave Pedialyte without relief. No prior CVA history.    In the ED, CT and CTA was negative. ED physician discussed with patient and family and agreed to stay. Case was discussed with ED provider.    Time of my evaluation, she feels fine. Has some upset stomach.

## 2025-01-25 NOTE — DISCHARGE NOTE PROVIDER - CARE PROVIDER_API CALL
Vangie Salcido  Neurology  [address] [place] [id number]  Phone: [phone]  Fax: [phone]  Follow Up Time:     Beth Thrasher  Neurology  [address] [facility] [address]  [place] [address]  Phone: [phone]  Fax: [phone]  Follow Up Time:    Vangie Salcido  Neurology  [address] [place] [address]  Phone: [phone]  Fax: [phone]  Follow Up Time:     Beth Thrasher  Neurology  [address] [facility], [address]  [place] [address]  Phone: [phone]  Fax: [phone]  Follow Up Time:     Octavio Malone  Surgery  [address] [place] [address]  Phone: [phone]  Fax: [phone]  Follow Up Time:

## 2025-01-25 NOTE — DISCHARGE NOTE PROVIDER - NSDCCPCAREPLAN_GEN_ALL_CORE_FT
PRINCIPAL DISCHARGE DIAGNOSIS  Diagnosis: Orthostatic hypotension  Assessment and Plan of Treatment: Your dizziness was likely caused by orthostatic hypotension, meaning your blood pressure would drop when you would stand/walk after sitting or lying down.  PLEASE hold entresto for now and take mdodrine 2.5mg three times a day for now.  PLEASE follow up with your primary doctor/cardiologist for closer monitoring.  You can also follow up with the neurologist if you still have dizziness.  PLEASE return to the ED if your symptoms worsens such as worsening dizziness, losing consciousness, headaches, chest pain, shortness of breath, weakness/numbness of extremities, etc.      SECONDARY DISCHARGE DIAGNOSES  Diagnosis: Hiatal hernia  Assessment and Plan of Treatment: You were found to have a large hiatal hernia which likely is causing your abdominal symptoms.  PLEASE follow up with surgery (Dr. Malone) for further monitoring and evaluation.  PLEASE return to the ED with any worsening symptoms such as worsening abdominal pain, nausea/vomiting, inability to eat, abdominal distension, chest pain, difficulty breathing, etc.

## 2025-01-25 NOTE — H&P ADULT - NSHPLABSRESULTS_GEN_ALL_CORE
Labs:                          13.2   6.83  )-----------( 161      ( 2025 22:54 )             39.5           135  |  103  |  23  ----------------------------<  86  4.4   |  22  |  0.96    Ca    8.6      2025 22:54    TPro  6.4  /  Alb  3.2[L]  /  TBili  0.6  /  DBili  x   /  AST  38  /  ALT  24  /  AlkPhos  106                Urinalysis Basic - ( 2025 23:12 )    Color: Yellow / Appearance: Clear / S.051 / pH: x  Gluc: x / Ketone: Trace mg/dL  / Bili: Negative / Urobili: 0.2 mg/dL   Blood: x / Protein: Negative mg/dL / Nitrite: Negative   Leuk Esterase: Negative / RBC: x / WBC x   Sq Epi: x / Non Sq Epi: x / Bacteria: x        PT/INR - ( 2025 22:54 )   PT: 14.5 sec;   INR: 1.25 ratio         PTT - ( 2025 22:54 )  PTT:27.6 sec    Lactate Trend            CAPILLARY BLOOD GLUCOSE      POCT Blood Glucose.: 89 mg/dL (2025 22:45)      EKG:   Personally Reviewed:  [ ] YES     Imaging:   Personally Reviewed:  [ ] YES

## 2025-01-25 NOTE — PHYSICAL THERAPY INITIAL EVALUATION ADULT - RANGE OF MOTION EXAMINATION, REHAB EVAL
bilateral upper extremity ROM was WFL (within functional limits)/bilateral lower extremity ROM was WFL (within functional limits) done

## 2025-01-25 NOTE — DISCHARGE NOTE PROVIDER - NSDCMRMEDTOKEN_GEN_ALL_CORE_FT
amiodarone 100 mg oral tablet: 2 tab(s) orally once a day  apixaban 2.5 mg oral tablet: 1 tab(s) orally every 12 hours  atorvastatin 40 mg oral tablet: 1 tab(s) orally once a day  clopidogrel 75 mg oral tablet: 1 tab(s) orally once a day  famotidine 20 mg oral tablet: 1 tab(s) orally once a day  levothyroxine 75 mcg (0.075 mg) oral tablet: 1 tab(s) orally once a day  midodrine 5 mg oral tablet: 1 tab(s) orally 3 times a day   amiodarone 100 mg oral tablet: 2 tab(s) orally once a day  apixaban 2.5 mg oral tablet: 1 tab(s) orally every 12 hours  atorvastatin 40 mg oral tablet: 1 tab(s) orally once a day  clopidogrel 75 mg oral tablet: 1 tab(s) orally once a day  famotidine 20 mg oral tablet: 1 tab(s) orally once a day  levothyroxine 75 mcg (0.075 mg) oral tablet: 1 tab(s) orally once a day  midodrine 2.5 mg oral tablet: 1 tab(s) orally 3 times a day

## 2025-01-25 NOTE — H&P ADULT - NSHPPHYSICALEXAM_GEN_ALL_CORE
PHYSICAL EXAM:  Vital Signs Last 24 Hrs  T(C): 36.5 (24 Jan 2025 23:29), Max: 36.8 (24 Jan 2025 22:45)  T(F): 97.7 (24 Jan 2025 23:29), Max: 98.3 (24 Jan 2025 22:45)  HR: 77 (25 Jan 2025 00:57) (63 - 92)  BP: 132/67 (25 Jan 2025 00:57) (113/82 - 132/67)  BP(mean): --  RR: 18 (25 Jan 2025 00:57) (14 - 18)  SpO2: 98% (25 Jan 2025 00:57) (98% - 100%)    Parameters below as of 24 Jan 2025 22:45  Patient On (Oxygen Delivery Method): room air        GENERAL: NAD, well-groomed, well-developed  HEAD:  Atraumatic, Normocephalic  EYES: EOMI, PERRLA, conjunctiva and sclera clear  ENMT: No tonsillar erythema, exudates, or enlargement; Moist mucous membranes, Good dentition, No lesions  NECK: Supple, No JVD, Normal thyroid  NERVOUS SYSTEM:  Alert & Oriented X3, Good concentration; Motor Strength 5/5 B/L upper and lower extremities;  CHEST/LUNG: Clear to auscultation bilaterally; No rales, rhonchi, wheezing, or rubs  HEART: Regular rate and rhythm; No murmurs, rubs, or gallops  ABDOMEN: Soft, Nontender, Nondistended; Bowel sounds present  EXTREMITIES:  2+ Peripheral Pulses, No clubbing, cyanosis, or edema  LYMPH: No lymphadenopathy noted  SKIN: No rashes or lesions

## 2025-01-25 NOTE — DISCHARGE NOTE PROVIDER - CARE PROVIDERS DIRECT ADDRESSES
,[email or web address],DirectAddress_Unknown [email or web address],DirectAddress_Unknown,[email or web address]

## 2025-01-25 NOTE — ED PROVIDER NOTE - DIFFERENTIAL DIAGNOSIS
DDx includes but not limited to CVA, TIA, ICH, SAH, MI, dehydration, hypoglycemia, electrolyte imbalance, metabolic encephalitis, hypotension, sepsis, UTI, seizure Differential Diagnosis

## 2025-01-25 NOTE — DISCHARGE NOTE PROVIDER - PROVIDER TOKENS
PROVIDER:[TOKEN:[[id number]]],PROVIDER:[TOKEN:[[id number]]] PROVIDER:[TOKEN:[[id number]]],PROVIDER:[TOKEN:[[id number]]],PROVIDER:[TOKEN:[[id number]]]

## 2025-01-25 NOTE — H&P ADULT - HISTORY OF PRESENT ILLNESS
89 year old female with PMHx of CAD s/p PCI x4, chronic systolic heart failure, hypotension, A.fib on apixaban presents on 1/25 with AMS.    She says she felt dizzy at home with some upset stomach. According to ED physician, daughter found her to be pale and confused. Assumed her BP was low given her hypotension history, gave Pedialyte without relief. No prior CVA history.    In the ED, CT and CTA was negative. ED physician discussed with patient and family and agreed to stay. Case was discussed with ED provider.    Time of my evaluation, she feels fine. Has some upset stomach.

## 2025-01-25 NOTE — ED PROVIDER NOTE - NSICDXFAMILYHX_GEN_ALL_CORE_FT
FAMILY HISTORY:  Father  Still living? Unknown  FH: dementia, Age at diagnosis: Age Unknown  FH: heart disease, Age at diagnosis: Age Unknown    Mother  Still living? Unknown  Family history of [place]'s disease, Age at diagnosis: Age Unknown    Sibling  Still living? Unknown  FH: heart disease, Age at diagnosis: Age Unknown

## 2025-01-25 NOTE — H&P ADULT - ASSESSMENT
#AMS: patient mentions more of room spinning sensation with upset stomach. Not sure if it is BPPV. Will consult neurology and PT/OT evaluation. No speech or swallow abnormalities noted, does not think she needs SLP. On clopidogrel 75mg daily.     #upset stomach: CT ab/pelv. Start on pantoprazole 40mg daily. Troponin negative x 2 and EKG no signs of ischemia.    #chronic issues  -a.fib: amiodarone 100mg daily, apixaban 2.5mg bid  -CAD: clopidogrel 75mg daily  -Hx of CHF: not in exacerbation. Takes entresto. Will hold for now.    Code: FULL  Diet: regular (no speech or swallowing abnormalities noted)  DVT ppx: apixaban  Dispo: admit to the hospital as inpatient; telemetry

## 2025-01-25 NOTE — ED PROVIDER NOTE - OBJECTIVE STATEMENT
89-year-old female with a history of MI, CAD with 4 stents, CHF, HTN, A-fib presents with altered mental status.  Patient brought in by ambulance from home.  History provided by EMS and daughter who lives with patient.  Daughter states that around 9 PM patient suddenly became pale and appeared altered.  Patient has a history of hypotension and daughter assumed that her blood pressure was low so she gave patient Pedialyte but it provided no relief.  Patient stated "I need help"  and daughter noted patient to appear lethargic and "out of it."  EMS was called at 10 PM.  Fingerstick at the scene was 81. Cardiology Kathie Rivers 89-year-old female with a history of MI, CAD with 4 stents, CHF, HTN, A-fib on Eliquis presents with altered mental status.  Patient brought in by ambulance from home.  History provided by EMS and daughter who lives with patient.  Daughter states that around 9 PM patient suddenly became pale and appeared altered.  Patient has a history of hypotension and daughter assumed that her blood pressure was low so she gave patient Pedialyte but it provided no relief.  Patient stated "I need help"  and daughter noted patient to appear lethargic and "out of it."  No vomiting, incontinence, or seizure-like activity, EMS was called at 10 PM.  Fingerstick at the scene was 81. Cardiology Kathie Rivers

## 2025-01-25 NOTE — PHYSICAL THERAPY INITIAL EVALUATION ADULT - THERAPY FREQUENCY, PT EVAL
Patient here at wound care clinic for a follow up appointment with MD. See MD note for further detail of today's visit. Plan of care ordered and implemented. Follow up as scheduled.   
3-5x/week

## 2025-01-25 NOTE — CHART NOTE - NSCHARTNOTEFT_GEN_A_CORE
Patient admitted early this morning for dizziness/upset stomach.  This AM, patient reports feeling improved.  Stomach issues is gone and no more dizziness.  No Patient admitted early this morning for dizziness/upset stomach.  This AM, patient reports feeling improved.  Stomach issues is gone and no more dizziness.  No nausea.  Feels weak but improved from yesterday.  Walked with neuro and patient started to get light-headed after a couple of minutes.  Reviewed labs and plan.   Will obtain ID consult for possible colitis and surg consult for large hiatal hernia.  May need midodrine for orthostatic hypotension.  Will also order TTE as well.  Discussed plan with daughter, Bebe.

## 2025-01-25 NOTE — DISCHARGE NOTE PROVIDER - HOSPITAL COURSE
Patient admitted on 1/25/2025 for AMS but more so for dizziness.  Seen by cardiology and neurology.  CTA head and neck was unremarkable.  Was eventually found to be orthostatic on vitals.  Entresto was held.  Patient's symptoms improved but will go home on low dose midodrine.  Patient also complained of some abdominal discomfort.  Found to have a large hiatal hernia and questionable colitis.  ID was consulted and given no other symptoms or signs of infection, abx was deferred.  Surgery saw patient regarding hiatal hernia and suggested outpatient monitoring.  Patient stable for DC to home.

## 2025-01-26 LAB
ALBUMIN SERPL ELPH-MCNC: 2.7 G/DL — LOW (ref 3.3–5)
ALP SERPL-CCNC: 70 U/L — SIGNIFICANT CHANGE UP (ref 30–120)
ALT FLD-CCNC: 24 U/L — SIGNIFICANT CHANGE UP (ref 10–60)
ANION GAP SERPL CALC-SCNC: 6 MMOL/L — SIGNIFICANT CHANGE UP (ref 5–17)
AST SERPL-CCNC: 22 U/L — SIGNIFICANT CHANGE UP (ref 10–40)
BASOPHILS # BLD AUTO: 0.01 K/UL — SIGNIFICANT CHANGE UP (ref 0–0.2)
BASOPHILS NFR BLD AUTO: 0.2 % — SIGNIFICANT CHANGE UP (ref 0–2)
BILIRUB SERPL-MCNC: 0.5 MG/DL — SIGNIFICANT CHANGE UP (ref 0.2–1.2)
BUN SERPL-MCNC: 16 MG/DL — SIGNIFICANT CHANGE UP (ref 7–23)
CALCIUM SERPL-MCNC: 8 MG/DL — LOW (ref 8.4–10.5)
CHLORIDE SERPL-SCNC: 109 MMOL/L — HIGH (ref 96–108)
CO2 SERPL-SCNC: 27 MMOL/L — SIGNIFICANT CHANGE UP (ref 22–31)
CREAT SERPL-MCNC: 1 MG/DL — SIGNIFICANT CHANGE UP (ref 0.5–1.3)
EGFR: 54 ML/MIN/1.73M2 — LOW
EOSINOPHIL # BLD AUTO: 0.03 K/UL — SIGNIFICANT CHANGE UP (ref 0–0.5)
EOSINOPHIL NFR BLD AUTO: 0.5 % — SIGNIFICANT CHANGE UP (ref 0–6)
GLUCOSE SERPL-MCNC: 94 MG/DL — SIGNIFICANT CHANGE UP (ref 70–99)
HCT VFR BLD CALC: 35.5 % — SIGNIFICANT CHANGE UP (ref 34.5–45)
HGB BLD-MCNC: 12 G/DL — SIGNIFICANT CHANGE UP (ref 11.5–15.5)
IMM GRANULOCYTES NFR BLD AUTO: 0.3 % — SIGNIFICANT CHANGE UP (ref 0–0.9)
LYMPHOCYTES # BLD AUTO: 1.29 K/UL — SIGNIFICANT CHANGE UP (ref 1–3.3)
LYMPHOCYTES # BLD AUTO: 21.5 % — SIGNIFICANT CHANGE UP (ref 13–44)
MAGNESIUM SERPL-MCNC: 1.9 MG/DL — SIGNIFICANT CHANGE UP (ref 1.6–2.6)
MCHC RBC-ENTMCNC: 33.8 G/DL — SIGNIFICANT CHANGE UP (ref 32–36)
MCHC RBC-ENTMCNC: 33.8 PG — SIGNIFICANT CHANGE UP (ref 27–34)
MCV RBC AUTO: 100 FL — SIGNIFICANT CHANGE UP (ref 80–100)
MONOCYTES # BLD AUTO: 0.51 K/UL — SIGNIFICANT CHANGE UP (ref 0–0.9)
MONOCYTES NFR BLD AUTO: 8.5 % — SIGNIFICANT CHANGE UP (ref 2–14)
NEUTROPHILS # BLD AUTO: 4.14 K/UL — SIGNIFICANT CHANGE UP (ref 1.8–7.4)
NEUTROPHILS NFR BLD AUTO: 69 % — SIGNIFICANT CHANGE UP (ref 43–77)
NRBC # BLD: 0 /100 WBCS — SIGNIFICANT CHANGE UP (ref 0–0)
NRBC BLD-RTO: 0 /100 WBCS — SIGNIFICANT CHANGE UP (ref 0–0)
PHOSPHATE SERPL-MCNC: 4.1 MG/DL — SIGNIFICANT CHANGE UP (ref 2.5–4.5)
PLATELET # BLD AUTO: 145 K/UL — LOW (ref 150–400)
POTASSIUM SERPL-MCNC: 4.5 MMOL/L — SIGNIFICANT CHANGE UP (ref 3.5–5.3)
POTASSIUM SERPL-SCNC: 4.5 MMOL/L — SIGNIFICANT CHANGE UP (ref 3.5–5.3)
PROT SERPL-MCNC: 4.8 G/DL — LOW (ref 6–8.3)
RBC # BLD: 3.55 M/UL — LOW (ref 3.8–5.2)
RBC # FLD: 15.3 % — HIGH (ref 10.3–14.5)
SODIUM SERPL-SCNC: 142 MMOL/L — SIGNIFICANT CHANGE UP (ref 135–145)
WBC # BLD: 6 K/UL — SIGNIFICANT CHANGE UP (ref 3.8–10.5)
WBC # FLD AUTO: 6 K/UL — SIGNIFICANT CHANGE UP (ref 3.8–10.5)

## 2025-01-26 PROCEDURE — 99233 SBSQ HOSP IP/OBS HIGH 50: CPT

## 2025-01-26 RX ORDER — BISACODYL 5 MG
5 TABLET, DELAYED RELEASE (ENTERIC COATED) ORAL DAILY
Refills: 0 | Status: DISCONTINUED | OUTPATIENT
Start: 2025-01-26 | End: 2025-01-27

## 2025-01-26 RX ADMIN — APIXABAN 2.5 MILLIGRAM(S): 5 TABLET, FILM COATED ORAL at 18:04

## 2025-01-26 RX ADMIN — ATORVASTATIN CALCIUM 80 MILLIGRAM(S): 80 TABLET, FILM COATED ORAL at 21:19

## 2025-01-26 RX ADMIN — Medication 75 MILLIGRAM(S): at 12:40

## 2025-01-26 RX ADMIN — Medication 5 MILLIGRAM(S): at 12:40

## 2025-01-26 RX ADMIN — AMIODARONE HYDROCHLORIDE 100 MILLIGRAM(S): 50 INJECTION, SOLUTION INTRAVENOUS at 06:10

## 2025-01-26 RX ADMIN — APIXABAN 2.5 MILLIGRAM(S): 5 TABLET, FILM COATED ORAL at 06:10

## 2025-01-26 RX ADMIN — PANTOPRAZOLE 40 MILLIGRAM(S): 20 TABLET, DELAYED RELEASE ORAL at 06:10

## 2025-01-26 RX ADMIN — LEVOTHYROXINE SODIUM 75 MICROGRAM(S): 25 TABLET ORAL at 06:10

## 2025-01-26 NOTE — PROGRESS NOTE ADULT - ASSESSMENT
89 year old female with PMHx of CAD s/p PCI x4, chronic systolic heart failure, hypotension, A.fib on apixaban presents on 1/25 with AMS.    Dizziness   - possibly secondary to orthostatic hypotension (was on midodrine in the past)  - hold entresto for now  - ambulate today, if dizzy or symptomatic, can restart midodrine 2.5mg  - neuro consulted  - cardiology consulted  - TTE resulted    Upset stomach - likely secondary to large hiatal hernia  - appreciate surgery consult  - no urgent surgical intervention needed at this time  - f/u with Dr. Malone as outpatient    CAD  - cont with plavix    Afib   - cont with apixaban  - cont with amiodarone    Systolic congestive heart failure - EF 45% on TTE, not decompensated  - hold entresto for now    Code: FULL  Diet: regular (no speech or swallowing abnormalities noted)  DVT ppx: apixaban     89 year old female with PMHx of CAD s/p PCI x4, chronic systolic heart failure, hypotension, A.fib on apixaban presents on 1/25 with AMS.    Dizziness   - possibly secondary to orthostatic hypotension (was on midodrine in the past)  - hold entresto for now  - ambulate today, if dizzy or symptomatic, can restart midodrine 2.5mg  - neuro consulted  - cardiology consulted  - TTE resulted    Upset stomach - likely secondary to large hiatal hernia  - appreciate surgery consult  - no urgent surgical intervention needed at this time  - f/u with Dr. Malone as outpatient    CAD  - cont with plavix    Afib   - cont with apixaban  - cont with amiodarone    Systolic congestive heart failure - EF 45% on TTE, not decompensated  - hold entresto for now    Code: FULL  Diet: regular (no speech or swallowing abnormalities noted)  DVT ppx: apixaban    Spoke to Bebe, daughter, over the phone and updated her on the plan

## 2025-01-26 NOTE — CONSULT NOTE ADULT - SUBJECTIVE AND OBJECTIVE BOX
HPI:  89YO F PMHx of CAD s/p PCI x4, chronic systolic heart failure, hypotension, A.fib on apixaban presents on 1/25 with AMS and dizziness. Had stomach upset and mild nausea that quickly resolved. IN Ed Afebrile wbc wnl. CT AP showed Large hiatal hernia contains a portion of the stomach, the superior and poss colitis. Pt denies vomiting abd pain urinary symptoms.     Infectious Disease consult was called to evaluate pt.      Past Medical & Surgical Hx:  PAST MEDICAL & SURGICAL HISTORY:  CAD (coronary artery disease)  Rapid atrial fibrillation  CAD (coronary artery disease)  s/p Cath & 3 stents to LAD on 3/2020; OM1 stent x 1  EF ~30%  Mild HTN  History of CHF (congestive heart failure)  Myocardial infarction  S/P cataract surgery    Social History--  EtOH: denies   Tobacco: denies  Drug Use: denies       FAMILY HISTORY:  FH: heart disease (Father, Sibling)  FH: dementia (Father)  Family history of [place]'s disease (Mother)    Allergies  Persantine (Rash)    Intolerances  NONE    Home Medications:  amiodarone 100 mg oral tablet: 2 tab(s) orally once a day (14 Dec 2023 21:03)  apixaban 2.5 mg oral tablet: 1 tab(s) orally every 12 hours (21 Dec 2023 09:39)  atorvastatin 40 mg oral tablet: 1 tab(s) orally once a day (14 Dec 2023 16:31)  clopidogrel 75 mg oral tablet: 1 tab(s) orally once a day (14 Dec 2023 16:31)  levothyroxine 75 mcg (0.075 mg) oral tablet: 1 tab(s) orally once a day (25 Jan 2025 03:15)      Current Inpatient Medications :    ANTIBIOTICS:       OTHER RELEVANT MEDICATIONS :  aMIOdarone    Tablet 100 milliGRAM(s) Oral daily  apixaban 2.5 milliGRAM(s) Oral two times a day  atorvastatin 80 milliGRAM(s) Oral at bedtime  clopidogrel Tablet 75 milliGRAM(s) Oral daily  levothyroxine 75 MICROGram(s) Oral daily  pantoprazole    Tablet 40 milliGRAM(s) Oral before breakfast      ROS:  CONSTITUTIONAL:  Negative fever or chills  EYES:  Negative  blurry vision or double vision  CARDIOVASCULAR:  Negative for chest pain or palpitations  RESPIRATORY:  Negative for cough, wheezing, or SOB   GASTROINTESTINAL:  Negative for vomiting, diarrhea, constipation, or abdominal pain + mld nausea,  GENITOURINARY:  Negative frequency, urgency , dysuria or hematuria   NEUROLOGIC:  No headache, confusion, dizziness, lightheadedness  All other systems were reviewed and are negative          I&O's Detail    24 Jan 2025 07:01  -  25 Jan 2025 07:00  --------------------------------------------------------  IN:  Total IN: 0 mL    OUT:    Voided (mL): 600 mL  Total OUT: 600 mL    Total NET: -600 mL      Physical Exam:  Vital Signs Last 24 Hrs  T(C): 36.9 (25 Jan 2025 21:09), Max: 36.9 (25 Jan 2025 21:09)  T(F): 98.5 (25 Jan 2025 21:09), Max: 98.5 (25 Jan 2025 21:09)  HR: 69 (25 Jan 2025 21:09) (57 - 92)  BP: 104/60 (25 Jan 2025 21:09) (104/60 - 145/67)  RR: 18 (25 Jan 2025 21:09) (14 - 18)  SpO2: 97% (25 Jan 2025 21:09) (93% - 100%)    Parameters below as of 25 Jan 2025 21:09  Patient On (Oxygen Delivery Method): room air    Height (cm): 160 (01-24 @ 22:45)  Weight (kg): 60 (01-24 @ 22:45)  BMI (kg/m2): 23.4 (01-24 @ 22:45)  BSA (m2): 1.62 (01-24 @ 22:45)    General: no acute distress  Neck: supple, trachea midline  Lungs: clear, no wheeze/rhonchi  Cardiovascular: regular rate and rhythm, S1 S2  Abdomen: soft, nontender, ND, bowel sounds normal  Neurological:  alert and oriented x3  Skin: no rash  Extremities: no edema    Labs:                         13.2   6.83  )-----------( 161      ( 24 Jan 2025 22:54 )             39.5   01-25    140  |  106  |  18  ----------------------------<  104[H]  3.8   |  28  |  0.92    Ca    8.1[L]      25 Jan 2025 06:00    TPro  6.4  /  Alb  3.2[L]  /  TBili  0.6  /  DBili  x   /  AST  38  /  ALT  24  /  AlkPhos  106  01-24    RECENT CULTURES:          RADIOLOGY & ADDITIONAL STUDIES:    ACC: [id number] EXAM:  CT ABDOMEN AND PELVIS IC   ORDERED BY: ANNE MATHEW     PROCEDURE DATE:  01/25/2025          INTERPRETATION:  CLINICAL INFORMATION: Epigastric pain    COMPARISON: None.    CONTRAST/COMPLICATIONS:  IV Contrast: Omnipaque 350 50 cc administered   50 cc discarded  Oral Contrast: NONE  .    PROCEDURE:  CT of the Abdomen and Pelvis was performed.  Sagittal and coronal reformats were performed.    FINDINGS:  LOWER CHEST: Within normal limits.    LIVER: A few scattered subcentimeter hypodensities too small to   characterize.  BILE DUCTS: Normal caliber.  GALLBLADDER: Within normal limits.  SPLEEN: Within normal limits.  PANCREAS: Within normal limits.  ADRENALS: Mildly thickened right adrenal gland.  KIDNEYS/URETERS: Bilateral parapelvic cysts. Additional left-sided cysts   and bilateral subcentimeter hypodensities too small to characterize. No   hydronephrosis. Nonspecific mild bilateral perinephric fat stranding.    BLADDER: Excreted contrast material in the urinarycollecting system.   Opacified with excreted urinary contrast. Grossly normal.  REPRODUCTIVE ORGANS: Calcified uterine fibroid. No adnexal masses.    BOWEL: No bowel obstruction. Appendix is normal. Question mild fat   stranding and transverse colon,correlate clinically to exclude colitis.   Colonic diverticulosis. Large hiatal hernia, contains a large portion of   stomach, the superior extent of which is not visualized in the   field-of-view.  PERITONEUM/RETROPERITONEUM: No free fluid or free air.  VESSELS: Atherosclerotic changes.  LYMPH NODES: No lymphadenopathy.  ABDOMINAL WALL: Within normal limits.  BONES: Degenerative changes. Likely degenerative grade 1 anterolisthesis   L4 on L5.    IMPRESSION:  Large hiatal hernia contains a portion of the stomach, the superior   extent of which is not visualized in the field-of-view.    Question mild fat stranding around the transverse colon, correlate   clinically to exclude colitis.    Assessment :   89YO F PMHx of CAD s/p PCI x4, chronic systolic heart failure, hypotension, A.fib on apixaban presents on 1/25 with AMS and dizziness. Had stomach upset and mild nausea that quickly resolved. In Ed Afebrile wbc wnl.   CT AP showed Large hiatal hernia contains a portion of the stomach, the superior and poss colitis.   Doubt colitis    Plan :   No indication for antibiotics  Trend temps and cbc  Serial abd exams  Asp precautions  Stable from ID standpoint    Advance Directives- Full code  Current Medications are documented.   Drug-drug interactions reviewed.    Continue with present regiment .  Approptiate use of antibiotics and adverse effects reviewed.      > 45 minutes spent in direct patient care reviewing  the notes, lab data/ imaging , discussion with multidisciplinary team. All questions were addressed and answered to the best of my capacity .    Thank you for allowing me to participate in the care of your patient .      Brady Oneal MD  Infectious Disease  [phone]
88 yo fem found to have a large size hiatal hernia on CT scan, h/o CHF last Echo EF 45%, afib,, coronary stents, no chest pain, no emesis, didn't know she has a hiatal hernia.    "date of service"01-25-25 @ 09:27    HPI:  89 year old female with PMHx of CAD s/p PCI x4, chronic systolic heart failure, hypotension, A.fib on apixaban presents on 1/25 with AMS.    She says she felt dizzy at home with some upset stomach. According to ED physician, daughter found her to be pale and confused. Assumed her BP was low given her hypotension history, gave Pedialyte without relief. No prior CVA history.    In the ED, CT and CTA was negative. ED physician discussed with patient and family and agreed to stay. Case was discussed with ED provider.    Time of my evaluation, she feels fine. Has some upset stomach. (25 Jan 2025 02:32)      PAST MEDICAL & SURGICAL HISTORY:  CAD (coronary artery disease)      Rapid atrial fibrillation      CAD (coronary artery disease)  s/p Cath & 3 stents to LAD on 3/2020; OM1 stent x 1  EF ~30%      Mild HTN      History of CHF (congestive heart failure)      Myocardial infarction      S/P cataract surgery          01-25-25 @ 09:27  REVIEW OF SYSTEMS:    CONSTITUTIONAL: No weakness, fevers or chills  EYES/ENT: No visual changes;  No vertigo or throat pain   NECK: No pain or stiffness  RESPIRATORY: No cough, wheezing, hemoptysis; No shortness of breath  CARDIOVASCULAR: No chest pain or palpitations  GASTROINTESTINAL: No abdominal or epigastric pain. No nausea, vomiting, or hematemesis; No diarrhea or constipation. No melena or hematochezia.  GENITOURINARY: No dysuria, frequency or hematuria  NEUROLOGICAL: No numbness or weakness  SKIN: No itching, burning, rashes, or lesions   All other review of systems is negative unless indicated above.    MEDICATIONS  (STANDING):  aMIOdarone    Tablet 100 milliGRAM(s) Oral daily  apixaban 2.5 milliGRAM(s) Oral two times a day  atorvastatin 80 milliGRAM(s) Oral at bedtime  clopidogrel Tablet 75 milliGRAM(s) Oral daily  levothyroxine 75 MICROGram(s) Oral daily  pantoprazole    Tablet 40 milliGRAM(s) Oral before breakfast    MEDICATIONS  (PRN):      Allergies    Persantine (Rash)  Persantine (Unknown)    Intolerances        SOCIAL HISTORY:    FAMILY HISTORY:  FH: heart disease (Father, Sibling)    FH: dementia (Father)    Family history of [place]'s disease (Mother)        Vital Signs Last 24 Hrs  T(C): 36.8 (25 Jan 2025 07:58), Max: 36.8 (24 Jan 2025 22:45)  T(F): 98.3 (25 Jan 2025 07:58), Max: 98.3 (24 Jan 2025 22:45)  HR: 63 (25 Jan 2025 07:58) (63 - 92)  BP: 124/69 (25 Jan 2025 07:58) (110/69 - 145/67)  BP(mean): --  RR: 18 (25 Jan 2025 07:58) (14 - 18)  SpO2: 98% (25 Jan 2025 07:58) (93% - 100%)    Parameters below as of 25 Jan 2025 07:58  Patient On (Oxygen Delivery Method): room air        .01-25-25 @ 09:27  VITAL SIGNS:  T(C): 36.8 (01-25-25 @ 07:58), Max: 36.8 (01-24-25 @ 22:45)  T(F): 98.3 (01-25-25 @ 07:58), Max: 98.3 (01-24-25 @ 22:45)  HR: 63 (01-25-25 @ 07:58) (63 - 92)  BP: 124/69 (01-25-25 @ 07:58) (110/69 - 145/67)  BP(mean): --  RR: 18 (01-25-25 @ 07:58) (14 - 18)  SpO2: 98% (01-25-25 @ 07:58) (93% - 100%)  Wt(kg): --    PHYSICAL EXAM:    Constitutional: resting comfortably in bed; NAD  Eyes: PERRL, EOMI, anicteric sclera  ENT: no nasal discharge; uvula midline, no oropharyngeal erythema or exudates  Neck: supple; no JVD or thyromegaly  Respiratory: CTA B/L; no W/R/R, no retractions  Cardiac: +S1/S2; irregular rhthm; no murmurs  Gastrointestinal: soft, NT/ND; no rebound or guarding; +BSx4  Back: spine midline, no bony tenderness or step-offs; no CVAT B/L  Extremities: no clubbing or cyanosis; no peripheral edema  Musculoskeletal: NROM x4; no joint swelling, tenderness or erythema  Vascular: 2+ radial, femoral, DP/PT pulses B/L  Dermatologic: skin warm, dry and intact; no rashes, wounds, or scars  Lymphatic: no submandibular or cervical LAD  Neurologic: AAOx3; CNII-XII grossly intact; no focal deficits  Psychiatric: affect and characteristics of appearance, verbalizations, behaviors are appropriate    LABS:                        13.2   6.83  )-----------( 161      ( 24 Jan 2025 22:54 )             39.5       01-25    140  |  106  |  18  ----------------------------<  104[H]  3.8   |  28  |  0.92    Ca    8.1[L]      25 Jan 2025 06:00    TPro  6.4  /  Alb  3.2[L]  /  TBili  0.6  /  DBili  x   /  AST  38  /  ALT  24  /  AlkPhos  106  01-24      PT/INR - ( 24 Jan 2025 22:54 )   PT: 14.5 sec;   INR: 1.25 ratio         PTT - ( 24 Jan 2025 22:54 )  PTT:27.6 sec    Urinalysis Basic - ( 25 Jan 2025 06:00 )    Color: x / Appearance: x / SG: x / pH: x  Gluc: 104 mg/dL / Ketone: x  / Bili: x / Urobili: x   Blood: x / Protein: x / Nitrite: x   Leuk Esterase: x / RBC: x / WBC x   Sq Epi: x / Non Sq Epi: x / Bacteria: x        RADIOLOGY & ADDITIONAL STUDIES:  < from: CT Abdomen and Pelvis w/ IV Cont (01.25.25 @ 02:21) >    BOWEL: No bowel obstruction. Appendix is normal. Question mild fat   stranding and transverse colon,correlate clinically to exclude colitis.   Colonic diverticulosis. Large hiatal hernia, contains a large portion of   stomach, the superior extent of which is not visualized in the   field-of-view.  PERITONEUM/RETROPERITONEUM: No free fluid or free air.  VESSELS: Atherosclerotic changes.  LYMPH NODES: No lymphadenopathy.  ABDOMINAL WALL: Within normal limits.  BONES: Degenerative changes. Likely degenerative grade 1 anterolisthesis   L4 on L5.    IMPRESSION:  Large hiatal hernia contains a portion of the stomach, the superior   extent of which is not visualized in the field-of-view.    Question mild fat stranding around the transverse colon, correlate   clinically to exclude colitis.    < end of copied text >  
History of Present Illness: The patient is an 89 year old female with a history of CAD with prior MI s/p PCI, chronic systolic heart failure, paroxysmal atrial fibrillation who presented with dizziness. She has had dizziness at times when exerting herself. No chest pain, palpitations, shortness of breath.    Past Medical/Surgical History:  CAD with prior MI s/p PCI, chronic systolic heart failure, paroxysmal atrial fibrillation    Medications:  Home Medications:  amiodarone 100 mg oral tablet: 2 tab(s) orally once a day (14 Dec 2023 21:03)  apixaban 2.5 mg oral tablet: 1 tab(s) orally every 12 hours (21 Dec 2023 09:39)  atorvastatin 40 mg oral tablet: 1 tab(s) orally once a day (14 Dec 2023 16:31)  clopidogrel 75 mg oral tablet: 1 tab(s) orally once a day (14 Dec 2023 16:31)  levothyroxine 75 mcg (0.075 mg) oral tablet: 1 tab(s) orally once a day (25 Jan 2025 03:15)      Family History: Non-contributory family history of premature cardiovascular atherosclerotic disease    Social History: No tobacco, alcohol or drug use    Review of Systems:  General: No fevers, chills, weight gain  Skin: No rashes, color changes  Cardiovascular: No chest pain, orthopnea  Respiratory: No shortness of breath, cough  Gastrointestinal: No nausea, abdominal pain  Genitourinary: No incontinence, pain with urination  Musculoskeletal: No pain, swelling, decreased range of motion  Neurological: No headache, weakness  Psychiatric: No depression, anxiety  Endocrine: No weight gain, increased thirst  All other systems are comprehensively negative.    Physical Exam:  Vitals:        Vital Signs Last 24 Hrs  T(C): 36.7 (26 Jan 2025 08:11), Max: 36.9 (25 Jan 2025 21:09)  T(F): 98.1 (26 Jan 2025 08:11), Max: 98.5 (25 Jan 2025 21:09)  HR: 69 (26 Jan 2025 08:11) (57 - 73)  BP: 99/62 (26 Jan 2025 08:11) (99/62 - 117/66)  BP(mean): --  RR: 18 (26 Jan 2025 08:11) (16 - 18)  SpO2: 98% (26 Jan 2025 08:11) (95% - 98%)    Parameters below as of 26 Jan 2025 08:11  Patient On (Oxygen Delivery Method): room air      General: NAD  HEENT: MMM  Neck: No JVD, no carotid bruit  Lungs: CTAB  CV: RRR, nl S1/S2, no M/R/G  Abdomen: S/NT/ND, +BS  Extremities: No LE edema, no cyanosis  Neuro: AAOx3, non-focal  Skin: No rash    Labs:                        12.0   6.00  )-----------( 145      ( 26 Jan 2025 06:00 )             35.5     01-26    142  |  109[H]  |  16  ----------------------------<  94  4.5   |  27  |  1.00    Ca    8.0[L]      26 Jan 2025 06:00  Phos  4.1     01-26  Mg     1.9     01-26    TPro  4.8[L]  /  Alb  2.7[L]  /  TBili  0.5  /  DBili  x   /  AST  22  /  ALT  24  /  AlkPhos  70  01-26        PT/INR - ( 24 Jan 2025 22:54 )   PT: 14.5 sec;   INR: 1.25 ratio         PTT - ( 24 Jan 2025 22:54 )  PTT:27.6 sec    ECG/Telemetry: NSR, PACs, RBBB

## 2025-01-26 NOTE — CONSULT NOTE ADULT - ASSESSMENT
89 yo fem with multiple comorbidities, CAD, stents, Afib, CHF, large size hiatal hernia  -Outpatient consult with Dr Malone for hiatal hernia repair? I'm not sure she'd qualify for surgery since she has CHF, stents  -d/c home
The patient is an 89 year old female with a history of CAD with prior MI s/p PCI, chronic systolic heart failure, paroxysmal atrial fibrillation who presented with dizziness.    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

## 2025-01-27 ENCOUNTER — TRANSCRIPTION ENCOUNTER (OUTPATIENT)
Age: 89
End: 2025-01-27

## 2025-01-27 VITALS
OXYGEN SATURATION: 95 % | RESPIRATION RATE: 18 BRPM | TEMPERATURE: 98 F | SYSTOLIC BLOOD PRESSURE: 129 MMHG | HEART RATE: 57 BPM | DIASTOLIC BLOOD PRESSURE: 51 MMHG

## 2025-01-27 LAB
ALBUMIN SERPL ELPH-MCNC: 3.1 G/DL — LOW (ref 3.3–5)
ALP SERPL-CCNC: 104 U/L — SIGNIFICANT CHANGE UP (ref 30–120)
ALT FLD-CCNC: 22 U/L — SIGNIFICANT CHANGE UP (ref 10–60)
ANION GAP SERPL CALC-SCNC: 12 MMOL/L — SIGNIFICANT CHANGE UP (ref 5–17)
AST SERPL-CCNC: 24 U/L — SIGNIFICANT CHANGE UP (ref 10–40)
BASOPHILS # BLD AUTO: 0.01 K/UL — SIGNIFICANT CHANGE UP (ref 0–0.2)
BASOPHILS NFR BLD AUTO: 0.2 % — SIGNIFICANT CHANGE UP (ref 0–2)
BILIRUB SERPL-MCNC: 0.6 MG/DL — SIGNIFICANT CHANGE UP (ref 0.2–1.2)
BUN SERPL-MCNC: 23 MG/DL — SIGNIFICANT CHANGE UP (ref 7–23)
CALCIUM SERPL-MCNC: 8.5 MG/DL — SIGNIFICANT CHANGE UP (ref 8.4–10.5)
CHLORIDE SERPL-SCNC: 107 MMOL/L — SIGNIFICANT CHANGE UP (ref 96–108)
CO2 SERPL-SCNC: 23 MMOL/L — SIGNIFICANT CHANGE UP (ref 22–31)
CREAT SERPL-MCNC: 1.05 MG/DL — SIGNIFICANT CHANGE UP (ref 0.5–1.3)
EGFR: 51 ML/MIN/1.73M2 — LOW
EOSINOPHIL # BLD AUTO: 0.06 K/UL — SIGNIFICANT CHANGE UP (ref 0–0.5)
EOSINOPHIL NFR BLD AUTO: 1 % — SIGNIFICANT CHANGE UP (ref 0–6)
GLUCOSE SERPL-MCNC: 91 MG/DL — SIGNIFICANT CHANGE UP (ref 70–99)
HCT VFR BLD CALC: 38.8 % — SIGNIFICANT CHANGE UP (ref 34.5–45)
HGB BLD-MCNC: 13.2 G/DL — SIGNIFICANT CHANGE UP (ref 11.5–15.5)
IMM GRANULOCYTES NFR BLD AUTO: 0.2 % — SIGNIFICANT CHANGE UP (ref 0–0.9)
LYMPHOCYTES # BLD AUTO: 2.16 K/UL — SIGNIFICANT CHANGE UP (ref 1–3.3)
LYMPHOCYTES # BLD AUTO: 36.1 % — SIGNIFICANT CHANGE UP (ref 13–44)
MAGNESIUM SERPL-MCNC: 1.8 MG/DL — SIGNIFICANT CHANGE UP (ref 1.6–2.6)
MCHC RBC-ENTMCNC: 34 G/DL — SIGNIFICANT CHANGE UP (ref 32–36)
MCHC RBC-ENTMCNC: 34.1 PG — HIGH (ref 27–34)
MCV RBC AUTO: 100.3 FL — HIGH (ref 80–100)
MONOCYTES # BLD AUTO: 0.61 K/UL — SIGNIFICANT CHANGE UP (ref 0–0.9)
MONOCYTES NFR BLD AUTO: 10.2 % — SIGNIFICANT CHANGE UP (ref 2–14)
NEUTROPHILS # BLD AUTO: 3.13 K/UL — SIGNIFICANT CHANGE UP (ref 1.8–7.4)
NEUTROPHILS NFR BLD AUTO: 52.3 % — SIGNIFICANT CHANGE UP (ref 43–77)
NRBC # BLD: 0 /100 WBCS — SIGNIFICANT CHANGE UP (ref 0–0)
NRBC BLD-RTO: 0 /100 WBCS — SIGNIFICANT CHANGE UP (ref 0–0)
PHOSPHATE SERPL-MCNC: 3.7 MG/DL — SIGNIFICANT CHANGE UP (ref 2.5–4.5)
PLATELET # BLD AUTO: 157 K/UL — SIGNIFICANT CHANGE UP (ref 150–400)
POTASSIUM SERPL-MCNC: 4.1 MMOL/L — SIGNIFICANT CHANGE UP (ref 3.5–5.3)
POTASSIUM SERPL-SCNC: 4.1 MMOL/L — SIGNIFICANT CHANGE UP (ref 3.5–5.3)
PROT SERPL-MCNC: 5.9 G/DL — LOW (ref 6–8.3)
RBC # BLD: 3.87 M/UL — SIGNIFICANT CHANGE UP (ref 3.8–5.2)
RBC # FLD: 15.5 % — HIGH (ref 10.3–14.5)
SODIUM SERPL-SCNC: 142 MMOL/L — SIGNIFICANT CHANGE UP (ref 135–145)
WBC # BLD: 5.98 K/UL — SIGNIFICANT CHANGE UP (ref 3.8–10.5)
WBC # FLD AUTO: 5.98 K/UL — SIGNIFICANT CHANGE UP (ref 3.8–10.5)

## 2025-01-27 PROCEDURE — 81003 URINALYSIS AUTO W/O SCOPE: CPT

## 2025-01-27 PROCEDURE — 36415 COLL VENOUS BLD VENIPUNCTURE: CPT

## 2025-01-27 PROCEDURE — 80053 COMPREHEN METABOLIC PANEL: CPT

## 2025-01-27 PROCEDURE — 82607 VITAMIN B-12: CPT

## 2025-01-27 PROCEDURE — 97530 THERAPEUTIC ACTIVITIES: CPT

## 2025-01-27 PROCEDURE — 82962 GLUCOSE BLOOD TEST: CPT

## 2025-01-27 PROCEDURE — 83880 ASSAY OF NATRIURETIC PEPTIDE: CPT

## 2025-01-27 PROCEDURE — 70496 CT ANGIOGRAPHY HEAD: CPT | Mod: MC

## 2025-01-27 PROCEDURE — 83036 HEMOGLOBIN GLYCOSYLATED A1C: CPT

## 2025-01-27 PROCEDURE — 70450 CT HEAD/BRAIN W/O DYE: CPT | Mod: MC

## 2025-01-27 PROCEDURE — 84443 ASSAY THYROID STIM HORMONE: CPT

## 2025-01-27 PROCEDURE — 83690 ASSAY OF LIPASE: CPT

## 2025-01-27 PROCEDURE — 74177 CT ABD & PELVIS W/CONTRAST: CPT | Mod: MC

## 2025-01-27 PROCEDURE — 85025 COMPLETE CBC W/AUTO DIFF WBC: CPT

## 2025-01-27 PROCEDURE — 97116 GAIT TRAINING THERAPY: CPT

## 2025-01-27 PROCEDURE — 82746 ASSAY OF FOLIC ACID SERUM: CPT

## 2025-01-27 PROCEDURE — 84100 ASSAY OF PHOSPHORUS: CPT

## 2025-01-27 PROCEDURE — 80061 LIPID PANEL: CPT

## 2025-01-27 PROCEDURE — 85730 THROMBOPLASTIN TIME PARTIAL: CPT

## 2025-01-27 PROCEDURE — 80048 BASIC METABOLIC PNL TOTAL CA: CPT

## 2025-01-27 PROCEDURE — 99239 HOSP IP/OBS DSCHRG MGMT >30: CPT

## 2025-01-27 PROCEDURE — 93356 MYOCRD STRAIN IMG SPCKL TRCK: CPT

## 2025-01-27 PROCEDURE — 96375 TX/PRO/DX INJ NEW DRUG ADDON: CPT

## 2025-01-27 PROCEDURE — 83735 ASSAY OF MAGNESIUM: CPT

## 2025-01-27 PROCEDURE — 99285 EMERGENCY DEPT VISIT HI MDM: CPT

## 2025-01-27 PROCEDURE — 71045 X-RAY EXAM CHEST 1 VIEW: CPT

## 2025-01-27 PROCEDURE — 93306 TTE W/DOPPLER COMPLETE: CPT

## 2025-01-27 PROCEDURE — 97165 OT EVAL LOW COMPLEX 30 MIN: CPT

## 2025-01-27 PROCEDURE — 93005 ELECTROCARDIOGRAM TRACING: CPT

## 2025-01-27 PROCEDURE — 84484 ASSAY OF TROPONIN QUANT: CPT

## 2025-01-27 PROCEDURE — 76376 3D RENDER W/INTRP POSTPROCES: CPT

## 2025-01-27 PROCEDURE — 97110 THERAPEUTIC EXERCISES: CPT

## 2025-01-27 PROCEDURE — 70498 CT ANGIOGRAPHY NECK: CPT | Mod: MC

## 2025-01-27 PROCEDURE — 97161 PT EVAL LOW COMPLEX 20 MIN: CPT

## 2025-01-27 PROCEDURE — 85610 PROTHROMBIN TIME: CPT

## 2025-01-27 PROCEDURE — 96374 THER/PROPH/DIAG INJ IV PUSH: CPT

## 2025-01-27 RX ORDER — MIDODRINE HYDROCHLORIDE 5 MG/1
2.5 TABLET ORAL THREE TIMES A DAY
Refills: 0 | Status: DISCONTINUED | OUTPATIENT
Start: 2025-01-27 | End: 2025-01-27

## 2025-01-27 RX ORDER — MIDODRINE HYDROCHLORIDE 5 MG/1
1 TABLET ORAL
Qty: 90 | Refills: 0
Start: 2025-01-27 | End: 2025-02-25

## 2025-01-27 RX ADMIN — PANTOPRAZOLE 40 MILLIGRAM(S): 20 TABLET, DELAYED RELEASE ORAL at 05:43

## 2025-01-27 RX ADMIN — Medication 75 MILLIGRAM(S): at 11:06

## 2025-01-27 RX ADMIN — LEVOTHYROXINE SODIUM 75 MICROGRAM(S): 25 TABLET ORAL at 05:44

## 2025-01-27 RX ADMIN — APIXABAN 2.5 MILLIGRAM(S): 5 TABLET, FILM COATED ORAL at 05:43

## 2025-01-27 RX ADMIN — AMIODARONE HYDROCHLORIDE 100 MILLIGRAM(S): 50 INJECTION, SOLUTION INTRAVENOUS at 05:43

## 2025-01-27 RX ADMIN — Medication 5 MILLIGRAM(S): at 11:06

## 2025-01-27 RX ADMIN — MIDODRINE HYDROCHLORIDE 2.5 MILLIGRAM(S): 5 TABLET ORAL at 11:06

## 2025-01-27 NOTE — CARE COORDINATION ASSESSMENT. - NSCAREPROVIDERS_GEN_ALL_CORE_FT
CARE PROVIDERS:  Accepting Physician: Johnny Fajardo  Administration: Alycia Martin  Admitting: Johnny Fajardo  Attending: Vito Vee  Case Management: Santaromana, Anna  Consultant: Vega Rivers  Consultant: Kwasi Matthew  Consultant: Herrera Enrique  Consultant: Brady Oneal  Covering Team: Vito Vee  ED Attending: Makayla Burden  ED Nurse: Nickie Loza  Infection Control: Lala Jonas  Nurse: Karol Menard  Occupational Therapy: Carlota Oh  Ordered: ServiceAccount, [facility]  Outpatient Provider: Vega Rivers  Outpatient Provider: Kathie Rivers  Outpatient Provider: Herrera Enrique  Override: Maia Eller  Physical Therapy: Pradeep Pelletier  Physical Therapy: Phylicia Zamora  Primary Team: Johnny Fajardo  Primary Team: Shayna Tavares  Quality Review: Maia Eller  Quality Review: Reena Parsons  Registered Dietitian: Gaby Valencia  : Angelina Lake  Team: PETER  Hospitalists, Team  UR// Supp. Assoc.: Arielle Corral

## 2025-01-27 NOTE — DISCHARGE NOTE NURSING/CASE MANAGEMENT/SOCIAL WORK - NSDCFUADDAPPT_GEN_ALL_CORE_FT
TRANSITION OF CARE PLAN  No skilled services needed at time of assessment as per PT eval; patient and daughter Bebe declining home care services; stating the patient lives with family; and does not need home care;  DC to home; daughter Bebe will assume care at DC;  will f/u with MD/ PCP post DC;  family will transport patient home. Case Management will remain available for any changes to this transition of care plan; Advised Bebe that patients PCP can order home care services going forward if patient feels she needs it at some point;

## 2025-01-27 NOTE — PROGRESS NOTE ADULT - ASSESSMENT
The patient is an 89 year old female with a history of CAD with prior MI s/p PCI, chronic systolic heart failure, paroxysmal atrial fibrillation who presented with dizziness.    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

## 2025-01-27 NOTE — PROGRESS NOTE ADULT - SUBJECTIVE AND OBJECTIVE BOX
Neurology follow up note    KAVIN [id number]      Interval History:    Patient feels ok no new complaints.    Allergies    Persantine (Rash)  Persantine (Unknown)    Intolerances        MEDICATIONS    aMIOdarone    Tablet 100 milliGRAM(s) Oral daily  apixaban 2.5 milliGRAM(s) Oral two times a day  atorvastatin 80 milliGRAM(s) Oral at bedtime  bisacodyl 5 milliGRAM(s) Oral daily  clopidogrel Tablet 75 milliGRAM(s) Oral daily  levothyroxine 75 MICROGram(s) Oral daily  pantoprazole    Tablet 40 milliGRAM(s) Oral before breakfast              Vital Signs Last 24 Hrs  T(C): 36.7 (26 Jan 2025 08:11), Max: 36.9 (25 Jan 2025 21:09)  T(F): 98.1 (26 Jan 2025 08:11), Max: 98.5 (25 Jan 2025 21:09)  HR: 69 (26 Jan 2025 08:11) (57 - 73)  BP: 99/62 (26 Jan 2025 08:11) (99/62 - 117/66)  BP(mean): --  RR: 18 (26 Jan 2025 08:11) (16 - 18)  SpO2: 98% (26 Jan 2025 08:11) (95% - 98%)    Parameters below as of 26 Jan 2025 08:11  Patient On (Oxygen Delivery Method): room air    REVIEW OF SYSTEMS:  CONSTITUTION:  The patient denies fever, chills, or night sweats.  HEAD:  No headaches.  EYES:  No double vision or blurry vision.  EARS:  No ringing in the ears.  NECK:  No neck pain.  CARDIOVASCULAR:  No chest pain.  RESPIRATORY:  No shortness of breath.  ABDOMEN:  No nausea, vomiting, or abdominal pain.  EXTREMITIES/NEUROLOGICAL:  No numbness or tingling.  MUSCULOSKELETAL:  Occasional joint pain suspect from arthritis.    GENERAL PHYSICAL EXAMINATION:      HEENT:  Head, normocephalic and atraumatic.  Eyes, no scleral icterus.  Ears, hearing bilaterally intact.    NECK:  Supple.    CARDIOVASCULAR:  S1, S2 heard.    RESPIRATORY:  Air entry bilaterally.    ABDOMEN:  Soft, nontender.    EXTREMITIES:  No clubbing or cyanosis were noted.      NEUROLOGIC:  The patient is awake and alert.  Extraocular movements were intact.  Speech was fluent.  Smile symmetric.  Motor, bilateral upper and lower 4/5.  Sensory bilateral upper and lower intact to light touch.  No drift.  Finger-to-nose within normal limits.  Gait, the patient is able to stand up to ambulate.  After she walked about 5-10 seconds she started to feel lightheaded, no vertigo.  The patient has decreased hearing in her left ear.      LABS:  CBC Full  -  ( 26 Jan 2025 06:00 )  WBC Count : 6.00 K/uL  RBC Count : 3.55 M/uL  Hemoglobin : 12.0 g/dL  Hematocrit : 35.5 %  Platelet Count - Automated : 145 K/uL  Mean Cell Volume : 100.0 fL  Mean Cell Hemoglobin : 33.8 pg  Mean Cell Hemoglobin Concentration : 33.8 g/dL  Auto Neutrophil # : 4.14 K/uL  Auto Lymphocyte # : 1.29 K/uL  Auto Monocyte # : 0.51 K/uL  Auto Eosinophil # : 0.03 K/uL  Auto Basophil # : 0.01 K/uL  Auto Neutrophil % : 69.0 %  Auto Lymphocyte % : 21.5 %  Auto Monocyte % : 8.5 %  Auto Eosinophil % : 0.5 %  Auto Basophil % : 0.2 %    Urinalysis Basic - ( 26 Jan 2025 06:00 )    Color: x / Appearance: x / SG: x / pH: x  Gluc: 94 mg/dL / Ketone: x  / Bili: x / Urobili: x   Blood: x / Protein: x / Nitrite: x   Leuk Esterase: x / RBC: x / WBC x   Sq Epi: x / Non Sq Epi: x / Bacteria: x      01-26    142  |  109[H]  |  16  ----------------------------<  94  4.5   |  27  |  1.00    Ca    8.0[L]      26 Jan 2025 06:00  Phos  4.1     01-26  Mg     1.9     01-26    TPro  4.8[L]  /  Alb  2.7[L]  /  TBili  0.5  /  DBili  x   /  AST  22  /  ALT  24  /  AlkPhos  70  01-26    Hemoglobin A1C:     LIVER FUNCTIONS - ( 26 Jan 2025 06:00 )  Alb: 2.7 g/dL / Pro: 4.8 g/dL / ALK PHOS: 70 U/L / ALT: 24 U/L / AST: 22 U/L / GGT: x           Vitamin B12 Vitamin B12, Serum: 1655 pg/mL (01-25 @ 11:40)    PT/INR - ( 24 Jan 2025 22:54 )   PT: 14.5 sec;   INR: 1.25 ratio         PTT - ( 24 Jan 2025 22:54 )  PTT:27.6 sec      RADIOLOGY  ANALYSIS AND PLAN:  An 89-year-old episode of dizziness;    .For episode of dizziness suspect most likely presyncopal or syncopal event secondary to possible  orthostatic hypotension.  Suspect less likely this is a new primary CNS event.  .We would recommend check outpatient blood pressure with her lying down and standing up.  .For history of hyperlipidemia, continue the patient on statin.  For history of hypertension, if possible please maintain a systolic blood pressure above 100 to maintain cerebral perfusion.  spoke to dtr updated her 1/26  47  minutes of time spent with patient, plan of care, reviewing data, speaking to multi-disciplinary healthcare team with greater than 50% time counseling and care coordination.    Thank you for courtesy of consultation.  
     KAVIN DENNIS is a 89yFemale , patient examined and chart reviewed.    INTERVAL HPI/ OVERNIGHT EVENTS:   No events. Afebrile Feels well.    PAST MEDICAL & SURGICAL HISTORY:  CAD (coronary artery disease)  Rapid atrial fibrillation  CAD (coronary artery disease)  s/p Cath & 3 stents to LAD on 3/2020; OM1 stent x 1  EF ~30%  Mild HTN  History of CHF (congestive heart failure)  Myocardial infarction  S/P cataract surgery    For details regarding the patient's social history, family history, and other miscellaneous elements, please refer the initial infectious diseases consultation and/or the admitting history and physical examination for this admission.      ROS:  CONSTITUTIONAL:  Negative fever or chills  EYES:  Negative  blurry vision or double vision  CARDIOVASCULAR:  Negative for chest pain or palpitations  RESPIRATORY:  Negative for cough, wheezing, or SOB   GASTROINTESTINAL:  Negative for nausea, vomiting, diarrhea, constipation, or abdominal pain  GENITOURINARY:  Negative frequency, urgency or dysuria  NEUROLOGIC:  No headache, confusion, dizziness, lightheadedness  All other systems were reviewed and are negative     ALLERGIES:  Persantine (Rash)  Persantine (Unknown)      Current inpatient medications :    ANTIBIOTICS/RELEVANT:      aMIOdarone    Tablet 100 milliGRAM(s) Oral daily  apixaban 2.5 milliGRAM(s) Oral two times a day  atorvastatin 80 milliGRAM(s) Oral at bedtime  bisacodyl 5 milliGRAM(s) Oral daily  clopidogrel Tablet 75 milliGRAM(s) Oral daily  levothyroxine 75 MICROGram(s) Oral daily  pantoprazole    Tablet 40 milliGRAM(s) Oral before breakfast      Objective:    T(C): 36.8 (01-26-25 @ 21:49), Max: 36.8 (01-26-25 @ 15:40)  HR: 70 (01-26-25 @ 21:49) (63 - 73)  BP: 124/77 (01-26-25 @ 21:49) (99/62 - 131/94)  RR: 18 (01-26-25 @ 21:49) (16 - 18)  SpO2: 95% (01-26-25 @ 21:49) (95% - 98%)      Physical Exam:  General:  no acute distress  Neck: supple, trachea midline  Lungs: clear, no wheeze/rhonchi  Cardiovascular: regular rate and rhythm, S1 S2  Abdomen: soft, nontender,  bowel sounds normal  Neurological: alert and oriented x3  Skin: no rash  Extremities: no edema      LABS:                       12.0   6.00  )-----------( 145      ( 26 Jan 2025 06:00 )             35.5       01-26    142  |  109[H]  |  16  ----------------------------<  94  4.5   |  27  |  1.00    Ca    8.0[L]      26 Jan 2025 06:00  Phos  4.1     01-26  Mg     1.9     01-26    TPro  4.8[L]  /  Alb  2.7[L]  /  TBili  0.5  /  DBili  x   /  AST  22  /  ALT  24  /  AlkPhos  70  01-26    MICROBIOLOGY:      RADIOLOGY & ADDITIONAL STUDIES:  ACC: [id number] EXAM:  CT ABDOMEN AND PELVIS IC   ORDERED BY: ANNE MATHEW     PROCEDURE DATE:  01/25/2025          INTERPRETATION:  CLINICAL INFORMATION: Epigastric pain    COMPARISON: None.    CONTRAST/COMPLICATIONS:  IV Contrast: Omnipaque 350 50 cc administered   50 cc discarded  Oral Contrast: NONE  .    PROCEDURE:  CT of the Abdomen and Pelvis was performed.  Sagittal and coronal reformats were performed.    FINDINGS:  LOWER CHEST: Within normal limits.    LIVER: A few scattered subcentimeter hypodensities too small to   characterize.  BILE DUCTS: Normal caliber.  GALLBLADDER: Within normal limits.  SPLEEN: Within normal limits.  PANCREAS: Within normal limits.  ADRENALS: Mildly thickened right adrenal gland.  KIDNEYS/URETERS: Bilateral parapelvic cysts. Additional left-sided cysts   and bilateral subcentimeter hypodensities too small to characterize. No   hydronephrosis. Nonspecific mild bilateral perinephric fat stranding.    BLADDER: Excreted contrast material in the urinarycollecting system.   Opacified with excreted urinary contrast. Grossly normal.  REPRODUCTIVE ORGANS: Calcified uterine fibroid. No adnexal masses.    BOWEL: No bowel obstruction. Appendix is normal. Question mild fat   stranding and transverse colon,correlate clinically to exclude colitis.   Colonic diverticulosis. Large hiatal hernia, contains a large portion of   stomach, the superior extent of which is not visualized in the   field-of-view.  PERITONEUM/RETROPERITONEUM: No free fluid or free air.  VESSELS: Atherosclerotic changes.  LYMPH NODES: No lymphadenopathy.  ABDOMINAL WALL: Within normal limits.  BONES: Degenerative changes. Likely degenerative grade 1 anterolisthesis   L4 on L5.    IMPRESSION:  Large hiatal hernia contains a portion of the stomach, the superior   extent of which is not visualized in the field-of-view.    Question mild fat stranding around the transverse colon, correlate   clinically to exclude colitis.    Assessment :   88YO F PMHx of CAD s/p PCI x4, chronic systolic heart failure, hypotension, A.fib on apixaban presents on 1/25 with AMS and dizziness. Had stomach upset and mild nausea that quickly resolved. In Ed Afebrile wbc wnl.   CT AP showed Large hiatal hernia contains a portion of the stomach, the superior and poss colitis.   Doubt colitis  Clinically stable    Plan :   No indication for antibiotics  Trend temps and cbc  Serial abd exams  Asp precautions  Stable from ID standpoint  No active ID issues   Will sign off case    Advance Directives- Full code  Current Medications are documented.   Drug-drug interactions reviewed.    Continue with present regiment.  Appropriate use of antibiotics and adverse effects reviewed.      > 35 minutes were spent in direct patient care reviewing notes, medications ,labs data/ imaging , discussion with multidisciplinary team.    Thank you for allowing me to participate in care of your patient .    Brady Oneal MD  Infectious Disease  [phone]
Chief Complaint: Dizziness    Interval Events: No events overnight.    Review of Systems:  General: No fevers, chills, weight gain  Skin: No rashes, color changes  Cardiovascular: No chest pain, orthopnea  Respiratory: No shortness of breath, cough  Gastrointestinal: No nausea, abdominal pain  Genitourinary: No incontinence, pain with urination  Musculoskeletal: No pain, swelling, decreased range of motion  Neurological: No headache, weakness  Psychiatric: No depression, anxiety  Endocrine: No weight gain, increased thirst  All other systems are comprehensively negative.    Physical Exam:  Vitals:        Vital Signs Last 24 Hrs  T(C): 36.6 (27 Jan 2025 07:25), Max: 36.8 (26 Jan 2025 15:40)  T(F): 97.9 (27 Jan 2025 07:25), Max: 98.2 (26 Jan 2025 15:40)  HR: 67 (27 Jan 2025 07:25) (56 - 70)  BP: 125/83 (27 Jan 2025 07:25) (111/58 - 131/94)  BP(mean): --  RR: 18 (27 Jan 2025 07:25) (18 - 18)  SpO2: 95% (27 Jan 2025 07:25) (95% - 96%)  Parameters below as of 27 Jan 2025 07:25  Patient On (Oxygen Delivery Method): room air  General: NAD  HEENT: MMM  Neck: No JVD, no carotid bruit  Lungs: CTAB  CV: RRR, nl S1/S2, no M/R/G  Abdomen: S/NT/ND, +BS  Extremities: No LE edema, no cyanosis  Neuro: AAOx3, non-focal  Skin: No rash    Labs:                        13.2   5.98  )-----------( 157      ( 27 Jan 2025 06:30 )             38.8     01-26    142  |  109[H]  |  16  ----------------------------<  94  4.5   |  27  |  1.00    Ca    8.0[L]      26 Jan 2025 06:00  Phos  4.1     01-26  Mg     1.9     01-26    TPro  4.8[L]  /  Alb  2.7[L]  /  TBili  0.5  /  DBili  x   /  AST  22  /  ALT  24  /  AlkPhos  70  01-26            ECG/Telemetry: Sinus rhythm
Neurology follow up note    KAVIN [id number]      Interval History:    Patient feels ok no new complaints.    Allergies    Persantine (Rash)  Persantine (Unknown)    Intolerances        MEDICATIONS    aMIOdarone    Tablet 100 milliGRAM(s) Oral daily  apixaban 2.5 milliGRAM(s) Oral two times a day  atorvastatin 80 milliGRAM(s) Oral at bedtime  bisacodyl 5 milliGRAM(s) Oral daily  clopidogrel Tablet 75 milliGRAM(s) Oral daily  levothyroxine 75 MICROGram(s) Oral daily  pantoprazole    Tablet 40 milliGRAM(s) Oral before breakfast              Vital Signs Last 24 Hrs  T(C): 36.6 (27 Jan 2025 07:25), Max: 36.8 (26 Jan 2025 15:40)  T(F): 97.9 (27 Jan 2025 07:25), Max: 98.2 (26 Jan 2025 15:40)  HR: 67 (27 Jan 2025 07:25) (56 - 70)  BP: 125/83 (27 Jan 2025 07:25) (111/58 - 131/94)  BP(mean): --  RR: 18 (27 Jan 2025 07:25) (18 - 18)  SpO2: 95% (27 Jan 2025 07:25) (95% - 96%)    Parameters below as of 27 Jan 2025 07:25  Patient On (Oxygen Delivery Method): room air    REVIEW OF SYSTEMS:  CONSTITUTION:  The patient denies fever, chills, or night sweats.  HEAD:  No headaches.  EYES:  No double vision or blurry vision.  EARS:  No ringing in the ears.  NECK:  No neck pain.  CARDIOVASCULAR:  No chest pain.  RESPIRATORY:  No shortness of breath.  ABDOMEN:  No nausea, vomiting, or abdominal pain.  EXTREMITIES/NEUROLOGICAL:  No numbness or tingling.  MUSCULOSKELETAL:  Occasional joint pain suspect from arthritis.    GENERAL PHYSICAL EXAMINATION:      HEENT:  Head, normocephalic and atraumatic.  Eyes, no scleral icterus.  Ears, hearing bilaterally intact.    NECK:  Supple.    CARDIOVASCULAR:  S1, S2 heard.    RESPIRATORY:  Air entry bilaterally.    ABDOMEN:  Soft, nontender.    EXTREMITIES:  No clubbing or cyanosis were noted.    NEUROLOGIC:  The patient is awake and alert.  Extraocular movements were intact.  Speech was fluent.  Smile symmetric.  Motor, bilateral upper and lower 4/5.  Sensory bilateral upper and lower intact to light touch.  No drift.  Finger-to-nose within normal limits.  Gait, the patient is able to stand up to ambulate.  After she walked about 5-10 seconds she started to feel lightheaded, no vertigo.  The patient has decreased hearing in her left ear.        LABS:  CBC Full  -  ( 27 Jan 2025 06:30 )  WBC Count : 5.98 K/uL  RBC Count : 3.87 M/uL  Hemoglobin : 13.2 g/dL  Hematocrit : 38.8 %  Platelet Count - Automated : 157 K/uL  Mean Cell Volume : 100.3 fL  Mean Cell Hemoglobin : 34.1 pg  Mean Cell Hemoglobin Concentration : 34.0 g/dL  Auto Neutrophil # : 3.13 K/uL  Auto Lymphocyte # : 2.16 K/uL  Auto Monocyte # : 0.61 K/uL  Auto Eosinophil # : 0.06 K/uL  Auto Basophil # : 0.01 K/uL  Auto Neutrophil % : 52.3 %  Auto Lymphocyte % : 36.1 %  Auto Monocyte % : 10.2 %  Auto Eosinophil % : 1.0 %  Auto Basophil % : 0.2 %    Urinalysis Basic - ( 27 Jan 2025 06:30 )    Color: x / Appearance: x / SG: x / pH: x  Gluc: 91 mg/dL / Ketone: x  / Bili: x / Urobili: x   Blood: x / Protein: x / Nitrite: x   Leuk Esterase: x / RBC: x / WBC x   Sq Epi: x / Non Sq Epi: x / Bacteria: x      01-27    142  |  107  |  23  ----------------------------<  91  4.1   |  23  |  1.05    Ca    8.5      27 Jan 2025 06:30  Phos  3.7     01-27  Mg     1.8     01-27    TPro  5.9[L]  /  Alb  3.1[L]  /  TBili  0.6  /  DBili  x   /  AST  24  /  ALT  22  /  AlkPhos  104  01-27    Hemoglobin A1C:     LIVER FUNCTIONS - ( 27 Jan 2025 06:30 )  Alb: 3.1 g/dL / Pro: 5.9 g/dL / ALK PHOS: 104 U/L / ALT: 22 U/L / AST: 24 U/L / GGT: x           Vitamin B12         RADIOLOGY    ANALYSIS AND PLAN:  An 89-year-old episode of dizziness;    .For episode of dizziness suspect most likely presyncopal or syncopal event secondary to possible  orthostatic hypotension.  Suspect less likely this is a new primary CNS event.  .We would recommend check outpatient blood pressure with her lying down and standing up.  .For history of hyperlipidemia, continue the patient on statin.  For history of hypertension, if possible please maintain a systolic blood pressure above 100 to maintain cerebral perfusion.  spoke to dtr updated her 1/26  fall precautions   46  minutes of time spent with patient, plan of care, reviewing data, speaking to multi-disciplinary healthcare team with greater than 50% time counseling and care coordination.    Thank you for courtesy of consultation.    
Patient is a 89y old  Female who presents with a chief complaint of Dizziness (25 Jan 2025 17:33)    INTERVAL HPI/OVERNIGHT EVENTS:  No major acute events overnight.  This AM, patient seen lying in her bed.  No acute complaints.  No dizziness or nausea at the moment.   Has some "rumbling" in her abdomen/chest but patient just ate breakfast.      MEDICATIONS  (STANDING):  aMIOdarone    Tablet 100 milliGRAM(s) Oral daily  apixaban 2.5 milliGRAM(s) Oral two times a day  atorvastatin 80 milliGRAM(s) Oral at bedtime  bisacodyl 5 milliGRAM(s) Oral daily  clopidogrel Tablet 75 milliGRAM(s) Oral daily  levothyroxine 75 MICROGram(s) Oral daily  pantoprazole    Tablet 40 milliGRAM(s) Oral before breakfast    MEDICATIONS  (PRN):    Allergies  Persantine (Rash)    Intolerances    ROS as above and reviewed and is otherwise negative    PHYSICAL EXAM:  Vital Signs Last 24 Hrs  T(C): 36.7 (26 Jan 2025 08:11), Max: 36.9 (25 Jan 2025 21:09)  T(F): 98.1 (26 Jan 2025 08:11), Max: 98.5 (25 Jan 2025 21:09)  HR: 69 (26 Jan 2025 08:11) (57 - 73)  BP: 99/62 (26 Jan 2025 08:11) (99/62 - 117/66)  BP(mean): --  RR: 18 (26 Jan 2025 08:11) (16 - 18)  SpO2: 98% (26 Jan 2025 08:11) (95% - 98%)    Parameters below as of 26 Jan 2025 08:11  Patient On (Oxygen Delivery Method): room air        GENERAL:     age appropriate, in NAD  HEAD:     atraumatic, normocephalic  EYES:     EOMI, conjunctiva and sclera clear  RESPIRATORY:     clear to auscultation bilaterally, no rales or rhonchi or wheezing or rubs  CARDIOVASCULAR:     regular rate and rhythm, no murmurs or rubs or gallops  GASTROINTESTINAL:     soft, nontender, nondistended, bowel sounds present  EXTREMITIES:     no clubbing or cyanosis or edema  MUSCULOSKELETAL:     no joint pain or swelling or deformities  NERVOUS SYSTEM:     moving both legs and feet, no sensory deficits noted  PSYCH:     appropriate, alert and orientated x3, good concentration      LABS:                        12.0   6.00  )-----------( 145      ( 26 Jan 2025 06:00 )             35.5     26 Jan 2025 06:00    142    |  109    |  16     ----------------------------<  94     4.5     |  27     |  1.00     Ca    8.0        26 Jan 2025 06:00  Phos  4.1       26 Jan 2025 06:00  Mg     1.9       26 Jan 2025 06:00    TPro  4.8    /  Alb  2.7    /  TBili  0.5    /  DBili  x      /  AST  22     /  ALT  24     /  AlkPhos  70     26 Jan 2025 06:00    PT/INR - ( 24 Jan 2025 22:54 )   PT: 14.5 sec;   INR: 1.25 ratio         PTT - ( 24 Jan 2025 22:54 )  PTT:27.6 sec  Urinalysis Basic - ( 26 Jan 2025 06:00 )    Color: x / Appearance: x / SG: x / pH: x  Gluc: 94 mg/dL / Ketone: x  / Bili: x / Urobili: x   Blood: x / Protein: x / Nitrite: x   Leuk Esterase: x / RBC: x / WBC x   Sq Epi: x / Non Sq Epi: x / Bacteria: x      CAPILLARY BLOOD GLUCOSE          
Patient is a 89y old  Female who presents with a chief complaint of Dizziness (27 Jan 2025 09:50)    INTERVAL HPI/OVERNIGHT EVENTS:  No major acute events overnight.  This AM, patient was able to ambulate up and down the hallway without any dizziness.  Had some abdominal discomfort but had a BM and then at a banana and felt improved.  No abdominal pain currently.  Patient feels close to baseline and would like to go home.      MEDICATIONS  (STANDING):  aMIOdarone    Tablet 100 milliGRAM(s) Oral daily  apixaban 2.5 milliGRAM(s) Oral two times a day  atorvastatin 80 milliGRAM(s) Oral at bedtime  bisacodyl 5 milliGRAM(s) Oral daily  clopidogrel Tablet 75 milliGRAM(s) Oral daily  levothyroxine 75 MICROGram(s) Oral daily  midodrine. 2.5 milliGRAM(s) Oral three times a day  pantoprazole    Tablet 40 milliGRAM(s) Oral before breakfast    MEDICATIONS  (PRN):    Allergies  Persantine (Rash)  Persantine (Unknown)    ROS as above and reviewed and is otherwise negative    PHYSICAL EXAM:  Vital Signs Last 24 Hrs  T(C): 36.6 (27 Jan 2025 07:25), Max: 36.8 (26 Jan 2025 15:40)  T(F): 97.9 (27 Jan 2025 07:25), Max: 98.2 (26 Jan 2025 15:40)  HR: 67 (27 Jan 2025 07:25) (56 - 70)  BP: 125/83 (27 Jan 2025 07:25) (111/58 - 131/94)  BP(mean): --  RR: 18 (27 Jan 2025 07:25) (18 - 18)  SpO2: 95% (27 Jan 2025 07:25) (95% - 96%)    Parameters below as of 27 Jan 2025 07:25  Patient On (Oxygen Delivery Method): room air      GENERAL:     age appropriate, in NAD  HEAD:     atraumatic, normocephalic  EYES:     EOMI, conjunctiva and sclera clear  RESPIRATORY:     clear to auscultation bilaterally, no rales or rhonchi or wheezing or rubs  CARDIOVASCULAR:     regular rate and rhythm, no murmurs or rubs or gallops  GASTROINTESTINAL:     soft, nontender, nondistended, bowel sounds present  EXTREMITIES:     no clubbing or cyanosis or edema  MUSCULOSKELETAL:     no joint pain or swelling or deformities  NERVOUS SYSTEM:     moving both legs and feet, no sensory deficits noted  PSYCH:     appropriate, alert and orientated x3, good concentration        LABS:                        13.2   5.98  )-----------( 157      ( 27 Jan 2025 06:30 )             38.8     27 Jan 2025 06:30    142    |  107    |  23     ----------------------------<  91     4.1     |  23     |  1.05     Ca    8.5        27 Jan 2025 06:30  Phos  3.7       27 Jan 2025 06:30  Mg     1.8       27 Jan 2025 06:30    TPro  5.9    /  Alb  3.1    /  TBili  0.6    /  DBili  x      /  AST  24     /  ALT  22     /  AlkPhos  104    27 Jan 2025 06:30      Urinalysis Basic - ( 27 Jan 2025 06:30 )    Color: x / Appearance: x / SG: x / pH: x  Gluc: 91 mg/dL / Ketone: x  / Bili: x / Urobili: x   Blood: x / Protein: x / Nitrite: x   Leuk Esterase: x / RBC: x / WBC x   Sq Epi: x / Non Sq Epi: x / Bacteria: x      CAPILLARY BLOOD GLUCOSE

## 2025-01-27 NOTE — DISCHARGE NOTE NURSING/CASE MANAGEMENT/SOCIAL WORK - NSDCPEFALRISK_GEN_ALL_CORE
For information on Fall & Injury Prevention, visit: [email or web address] OR  [email or web address] OR  https://www.cdc.gov/steadi/patient.html

## 2025-01-27 NOTE — CARE COORDINATION ASSESSMENT. - OTHER PERTINENT REFERRAL INFORMATION
RN LASHON met with pt. at bedside, pt. is A&O x4; able self direct own medical decisions; patient lives with daughter Bebe's family; as [per Bebe; patient's  previous functioning level is independent in stairs,  denies ambulation w/o assistive devices; ADLs & IADLs, CM role and DC planning process explained; verbalized understanding.   As per PT evaluation update 01/27/2025; patient is independent and does not meet criteria for home care; patient and daughter in agreement; patient stated she does not need home care;

## 2025-01-27 NOTE — DISCHARGE NOTE NURSING/CASE MANAGEMENT/SOCIAL WORK - PATIENT PORTAL LINK FT
You can access the FollowMyHealth Patient Portal offered by [facility] by registering at the following website: [email or web address]. By joining [employer]’s FollowMyHealth portal, you will also be able to view your health information using other applications (apps) compatible with our system.

## 2025-01-27 NOTE — CARE COORDINATION ASSESSMENT. - NSADDITIONAL INFORMATION_FT
TRANSITION OF CARE PLAN  No skilled services needed at time of assessment; patient and daughter Bebe in agreement; DC to home; daughter Bebe will assume care at DC;  will f/u with MD/ PCP post DC;  family will transport patient home. Case Management will remain available for any changes to this transition of care plan;

## 2025-01-27 NOTE — PROGRESS NOTE ADULT - ASSESSMENT
89 year old female with PMHx of CAD s/p PCI x4, chronic systolic heart failure, hypotension, A.fib on apixaban presents on 1/25 with AMS.    Dizziness   - possibly secondary to orthostatic hypotension (was on midodrine in the past)  - hold entresto for now  - orthostatic positive -> start midodrine 2.5mg TID  - neuro consulted  - cardiology consulted  - TTE resulted    Upset stomach - likely secondary to large hiatal hernia  - appreciate surgery consult  - no urgent surgical intervention needed at this time  - f/u with Dr. Malone as outpatient    CAD  - cont with plavix    Afib   - cont with apixaban  - cont with amiodarone    Systolic congestive heart failure - EF 45% on TTE, not decompensated  - hold entresto for now    Code: FULL  Diet: regular (no speech or swallowing abnormalities noted)  DVT ppx: apixaban

## 2025-01-27 NOTE — PATIENT CHOICE NOTE. - NSPTCHOICENOTES_GEN_ALL_CORE
TRANSITION OF CARE PLAN  No skilled services needed at time of assessment as per PT eval; patient and daughter Bebe declining home care services; stating the patient lives with family; and does not need home care;  DC to home; daughter Bebe will assume care at DC;  will f/u with MD/ PCP post DC;  family will transport patient home. Case Management will remain available for any changes to this transition of care plan; Advised Bebe that patients PCP can order home care services going forward if patient feels she needs it at some point;  TRANSITION OF CARE PLAN  No skilled services needed at time of assessment as per PT eval; patient and daughter Bebe declining home care services; stating the patient lives with family; and does not need home care;  DC to home; daughter Bebe will assume care at DC; Attending made aware; Patient will f/u with MD/ PCP post DC;  family will transport patient home. Case Management will remain available for any changes to this transition of care plan; Advised Bebe that patients PCP can order home care services going forward if patient feels she needs it at some point;

## 2025-01-27 NOTE — CASE MANAGEMENT PROGRESS NOTE - NSCMPROGRESSNOTE_GEN_ALL_CORE
TRANSITION OF CARE PLAN UPDATE	  Patient was re-evaluated by PT; As per PT patient is independent;  and does not require home care services; RN CCM spoke to patient at bedside; patient is declining home care services, stating "she does not need it"; telephone update call to Bebe [phone]; Bebe stating the same; declining home care services; informed Bebe that if patient or family changes mind on home care services; patient PCP can order home care for patient; IMM and appeal rights discussed; explained; patient does not wish to appeal DC; daughter Bebe will transport patient to home later in the afternoon;

## 2025-01-27 NOTE — DISCHARGE NOTE NURSING/CASE MANAGEMENT/SOCIAL WORK - FINANCIAL ASSISTANCE
[facility] provides services at a reduced cost to those who are determined to be eligible through [facility]’s financial assistance program. Information regarding [facility]’s financial assistance program can be found by going to [email or web address] or by calling [phone].